# Patient Record
Sex: MALE | Race: WHITE | Employment: FULL TIME | URBAN - NONMETROPOLITAN AREA
[De-identification: names, ages, dates, MRNs, and addresses within clinical notes are randomized per-mention and may not be internally consistent; named-entity substitution may affect disease eponyms.]

---

## 2017-01-09 ENCOUNTER — OFFICE VISIT (OUTPATIENT)
Dept: FAMILY MEDICINE CLINIC | Facility: CLINIC | Age: 27
End: 2017-01-09

## 2017-01-09 VITALS
SYSTOLIC BLOOD PRESSURE: 114 MMHG | TEMPERATURE: 99 F | BODY MASS INDEX: 38 KG/M2 | WEIGHT: 315 LBS | DIASTOLIC BLOOD PRESSURE: 86 MMHG

## 2017-01-09 DIAGNOSIS — R10.9 ABDOMINAL CRAMPING: ICD-10-CM

## 2017-01-09 DIAGNOSIS — E66.09 NON MORBID OBESITY DUE TO EXCESS CALORIES: ICD-10-CM

## 2017-01-09 DIAGNOSIS — K52.9 GASTROENTERITIS: Primary | ICD-10-CM

## 2017-01-09 PROCEDURE — 99214 OFFICE O/P EST MOD 30 MIN: CPT | Performed by: FAMILY MEDICINE

## 2017-01-09 NOTE — PATIENT INSTRUCTIONS
Discussed low residue diet. Avoid dairy ×48 hours. Pepto-Bismol as needed. Clear liquids to progress. May return to work tomorrow. Call with questions or problems. Good handwashing and hygiene.

## 2017-01-09 NOTE — PROGRESS NOTES
HPI:    Patient ID: Jake is a 31year old male.  + vomiting / diarrhea last night  Vomiting resolved  Diarrhea persists. Without abdominal pain no. Did not go to work today. Needs note for work. Without fever, chills.   Without blood noted in

## 2017-09-01 ENCOUNTER — OFFICE VISIT (OUTPATIENT)
Dept: FAMILY MEDICINE CLINIC | Facility: CLINIC | Age: 27
End: 2017-09-01

## 2017-09-01 VITALS
DIASTOLIC BLOOD PRESSURE: 72 MMHG | HEART RATE: 80 BPM | WEIGHT: 310.25 LBS | BODY MASS INDEX: 37.39 KG/M2 | SYSTOLIC BLOOD PRESSURE: 126 MMHG | RESPIRATION RATE: 16 BRPM | HEIGHT: 76.5 IN | TEMPERATURE: 98 F

## 2017-09-01 DIAGNOSIS — K21.9 GASTROESOPHAGEAL REFLUX DISEASE WITHOUT ESOPHAGITIS: ICD-10-CM

## 2017-09-01 DIAGNOSIS — R13.10 DYSPHAGIA, UNSPECIFIED TYPE: Primary | ICD-10-CM

## 2017-09-01 PROCEDURE — 99214 OFFICE O/P EST MOD 30 MIN: CPT | Performed by: FAMILY MEDICINE

## 2017-09-01 RX ORDER — OMEPRAZOLE 40 MG/1
40 CAPSULE, DELAYED RELEASE ORAL DAILY
Qty: 30 CAPSULE | Refills: 2 | Status: SHIPPED | OUTPATIENT
Start: 2017-09-01 | End: 2017-12-28 | Stop reason: ALTCHOICE

## 2017-09-01 NOTE — PROGRESS NOTES
HPI:    Patient ID: Jake is a 31year old male. With every meal - foods stuck - solids,w/o problems liquids  W/o heartburn  HPI    Review of Systems   Constitutional: Negative for chills and fever.    HENT: Positive for trouble swallowing (solid Referrals:  None       ET#[id number]

## 2017-09-01 NOTE — PATIENT INSTRUCTIONS
Michie diet. Avoid eating 2-3 hours prior to bedtime. Continue the symptoms recommend GI evaluation. Resolution of symptoms recommend continue Prilosec ×3 months. Call with questions or problems.

## 2017-10-12 ENCOUNTER — APPOINTMENT (OUTPATIENT)
Dept: GENERAL RADIOLOGY | Age: 27
End: 2017-10-12
Attending: FAMILY MEDICINE
Payer: COMMERCIAL

## 2017-10-12 ENCOUNTER — HOSPITAL ENCOUNTER (OUTPATIENT)
Age: 27
Discharge: HOME | End: 2017-10-12
Attending: FAMILY MEDICINE
Payer: COMMERCIAL

## 2017-10-12 VITALS
HEART RATE: 69 BPM | RESPIRATION RATE: 16 BRPM | TEMPERATURE: 98 F | DIASTOLIC BLOOD PRESSURE: 77 MMHG | OXYGEN SATURATION: 97 % | WEIGHT: 300 LBS | BODY MASS INDEX: 36 KG/M2 | SYSTOLIC BLOOD PRESSURE: 120 MMHG

## 2017-10-12 DIAGNOSIS — S29.9XXA CHEST WALL INJURY, INITIAL ENCOUNTER: Primary | ICD-10-CM

## 2017-10-12 DIAGNOSIS — S89.92XA INJURY OF LEFT KNEE, INITIAL ENCOUNTER: ICD-10-CM

## 2017-10-12 PROCEDURE — 71101 X-RAY EXAM UNILAT RIBS/CHEST: CPT | Performed by: FAMILY MEDICINE

## 2017-10-12 PROCEDURE — 99204 OFFICE O/P NEW MOD 45 MIN: CPT

## 2017-10-12 PROCEDURE — 99214 OFFICE O/P EST MOD 30 MIN: CPT

## 2017-10-12 PROCEDURE — 73560 X-RAY EXAM OF KNEE 1 OR 2: CPT | Performed by: FAMILY MEDICINE

## 2017-10-12 RX ORDER — IBUPROFEN 600 MG/1
600 TABLET ORAL EVERY 6 HOURS PRN
COMMUNITY
End: 2017-11-27 | Stop reason: ALTCHOICE

## 2017-10-12 NOTE — ED PROVIDER NOTES
Patient Seen in: [address] [facility]    History   Patient presents with:  Knee Pain  Fall (musculoskeletal, neurologic)    Stated Complaint: KNEE/RIB PAIN/FALL    HPI    58-year-old male who presents to the clinic today with chief complaints [10/12/17 1419]  BP: 124/78  Pulse: 71  Resp: 17  Temp: 98.5 °F (36.9 °C)  Temp src: Temporal  SpO2: 97 %  O2 Device: None (Room air)    Current:/77   Pulse 69   Temp 98.4 °F (36.9 °C)   Resp 16   Wt 136.1 kg   SpO2 97%   BMI 36.04 kg/m²         Phys drawer sign - negative   - Lachman's - negative   - Apley's - negative   - Mc Nair - negative   - ROM of knee joint - full   - able to bear weight - yes   - Tendon function intact:  Yes  -  Skin intact:  Yes  -  Normal sensation: Yes  -  Normal capillary ============================================================  ED Course  ------------------------------------------------------------  MDM     Left rib x-rays: Negative for fracture. No pneumothorax. No pleural effusion.   Left knee x-ray: No fracture o

## 2017-10-12 NOTE — ED INITIAL ASSESSMENT (HPI)
10/11 Pt was at home and slipped on deck. Hit his left side on wood railing and left knee on concrete. Denies SOB at this time but states pain increases with deep breaths.  +Full ROM with left knee but painful. Denies LOC or hitting head.

## 2017-11-27 ENCOUNTER — OFFICE VISIT (OUTPATIENT)
Dept: FAMILY MEDICINE CLINIC | Facility: CLINIC | Age: 27
End: 2017-11-27

## 2017-11-27 VITALS
BODY MASS INDEX: 37 KG/M2 | HEART RATE: 120 BPM | DIASTOLIC BLOOD PRESSURE: 70 MMHG | OXYGEN SATURATION: 97 % | SYSTOLIC BLOOD PRESSURE: 120 MMHG | WEIGHT: 304.38 LBS | TEMPERATURE: 98 F

## 2017-11-27 DIAGNOSIS — K52.9 GASTROENTERITIS: Primary | ICD-10-CM

## 2017-11-27 PROCEDURE — 99213 OFFICE O/P EST LOW 20 MIN: CPT | Performed by: FAMILY MEDICINE

## 2017-11-27 NOTE — PROGRESS NOTES
HPI:    Patient ID: Jake is a 28year old male.   abd pain / vomiting last night  + diarrhea x 3  W/o fever / chills  W/o cough / cold  HPI    Review of Systems           Current Outpatient Prescriptions:  Omeprazole 40 MG Oral Capsule Delayed Re

## 2017-12-28 ENCOUNTER — OFFICE VISIT (OUTPATIENT)
Dept: FAMILY MEDICINE CLINIC | Facility: CLINIC | Age: 27
End: 2017-12-28

## 2017-12-28 VITALS
OXYGEN SATURATION: 99 % | HEART RATE: 77 BPM | SYSTOLIC BLOOD PRESSURE: 136 MMHG | WEIGHT: 301.5 LBS | BODY MASS INDEX: 36 KG/M2 | DIASTOLIC BLOOD PRESSURE: 86 MMHG | TEMPERATURE: 98 F

## 2017-12-28 DIAGNOSIS — K21.9 GASTROESOPHAGEAL REFLUX DISEASE WITHOUT ESOPHAGITIS: Primary | ICD-10-CM

## 2017-12-28 DIAGNOSIS — R03.0 ELEVATED BLOOD PRESSURE READING IN OFFICE WITHOUT DIAGNOSIS OF HYPERTENSION: ICD-10-CM

## 2017-12-28 PROCEDURE — 99214 OFFICE O/P EST MOD 30 MIN: CPT | Performed by: FAMILY MEDICINE

## 2017-12-28 RX ORDER — OMEPRAZOLE 40 MG/1
40 CAPSULE, DELAYED RELEASE ORAL DAILY
Qty: 30 CAPSULE | Refills: 1 | Status: SHIPPED | OUTPATIENT
Start: 2017-12-28 | End: 2018-04-16

## 2017-12-28 NOTE — PATIENT INSTRUCTIONS
BIANCA motrin  maalox  Restart prilosec  Dumas diet  Apple cider vinegar 1 tablespoon daily trial  Cont problems GI eval

## 2017-12-28 NOTE — PROGRESS NOTES
HPI:    Patient ID: Jake is a 28year old male. Pt epigastric pain / heartburn  Worsening  W/o dysphagia  Motrin for discomfort  HPI    Review of Systems   Respiratory: Negative for cough and shortness of breath.     Cardiovascular: Negative for

## 2018-01-01 ENCOUNTER — HOSPITAL ENCOUNTER (OUTPATIENT)
Dept: RADIATION ONCOLOGY | Age: 28
Discharge: HOME | End: 2018-01-01
Attending: RADIOLOGY
Payer: COMMERCIAL

## 2018-01-02 ENCOUNTER — TELEPHONE (OUTPATIENT)
Dept: FAMILY MEDICINE CLINIC | Facility: CLINIC | Age: 28
End: 2018-01-02

## 2018-01-02 DIAGNOSIS — K21.9 GASTROESOPHAGEAL REFLUX DISEASE WITHOUT ESOPHAGITIS: Primary | ICD-10-CM

## 2018-01-02 NOTE — TELEPHONE ENCOUNTER
F/u Dr Davis Felty  Worsening or change in symptoms also option to re-see me or go to the  Urgent care

## 2018-01-02 NOTE — TELEPHONE ENCOUNTER
Was in last week and was told to call back if symptoms do not improve. Pt said he is still feeling the same way. Wants to know what he should do? Was given some meds that were not covered by insurance company.

## 2018-01-02 NOTE — TELEPHONE ENCOUNTER
PATIENT CONTINUES WITH SYMPTOMS- PAIN IN CHEST/ESOPHAGUS. GAVE HIM DR Abeba Marino INFORMATION PER YOUR NOTE.   PLEASE ADVISE

## 2018-01-03 ENCOUNTER — TELEPHONE (OUTPATIENT)
Dept: FAMILY MEDICINE CLINIC | Facility: CLINIC | Age: 28
End: 2018-01-03

## 2018-01-03 NOTE — TELEPHONE ENCOUNTER
Patient advised of below and verbalizes understanding. States he has not vomited since. Scheduled to see Dr. Lyric Cardenas tomorrow. Advised may take Maalox or Mylanta as advised at office visit.   If symptoms return or worsen before tomorrow, needs to go to ER

## 2018-01-03 NOTE — TELEPHONE ENCOUNTER
RAUN SPOKE WITH THE NURSE YESTERDAY AND HE SAID THE VOMITING GOT WORSE OVER NIGHT AND HE THINKS THERE IS BLOOD IN THE VOMIT.

## 2018-01-04 ENCOUNTER — APPOINTMENT (OUTPATIENT)
Dept: LAB | Age: 28
End: 2018-01-04
Attending: INTERNAL MEDICINE
Payer: COMMERCIAL

## 2018-01-04 DIAGNOSIS — Z86.14 HISTORY OF MRSA INFECTION: ICD-10-CM

## 2018-01-04 PROCEDURE — 87081 CULTURE SCREEN ONLY: CPT

## 2018-01-05 ENCOUNTER — NURSE ONLY (OUTPATIENT)
Dept: FAMILY MEDICINE CLINIC | Facility: CLINIC | Age: 28
End: 2018-01-05

## 2018-01-05 DIAGNOSIS — Z86.14 HISTORY OF MRSA INFECTION: ICD-10-CM

## 2018-01-05 PROCEDURE — 87081 CULTURE SCREEN ONLY: CPT | Performed by: INTERNAL MEDICINE

## 2018-01-06 ENCOUNTER — APPOINTMENT (OUTPATIENT)
Dept: LAB | Age: 28
End: 2018-01-06
Attending: INTERNAL MEDICINE
Payer: COMMERCIAL

## 2018-01-06 DIAGNOSIS — Z86.14 HISTORY OF MRSA INFECTION: ICD-10-CM

## 2018-01-06 PROCEDURE — 87081 CULTURE SCREEN ONLY: CPT

## 2018-01-08 ENCOUNTER — TELEPHONE (OUTPATIENT)
Dept: FAMILY MEDICINE CLINIC | Facility: CLINIC | Age: 28
End: 2018-01-08

## 2018-01-15 ENCOUNTER — LABORATORY ENCOUNTER (OUTPATIENT)
Dept: LAB | Age: 28
End: 2018-01-15
Attending: FAMILY MEDICINE
Payer: COMMERCIAL

## 2018-01-15 DIAGNOSIS — R10.13 EPIGASTRIC PAIN: ICD-10-CM

## 2018-01-15 LAB
ALBUMIN SERPL-MCNC: 3.9 G/DL (ref 3.5–4.8)
ALP LIVER SERPL-CCNC: 103 U/L (ref 45–117)
ALT SERPL-CCNC: 71 U/L (ref 17–63)
AST SERPL-CCNC: 49 U/L (ref 15–41)
BASOPHILS # BLD AUTO: 0.05 X10(3) UL (ref 0–0.1)
BASOPHILS NFR BLD AUTO: 0.6 %
BILIRUB SERPL-MCNC: 0.2 MG/DL (ref 0.1–2)
BUN BLD-MCNC: 8 MG/DL (ref 8–20)
CALCIUM BLD-MCNC: 9 MG/DL (ref 8.3–10.3)
CHLORIDE: 106 MMOL/L (ref 101–111)
CO2: 27 MMOL/L (ref 22–32)
CREAT BLD-MCNC: 0.84 MG/DL (ref 0.7–1.3)
EOSINOPHIL # BLD AUTO: 0.16 X10(3) UL (ref 0–0.3)
EOSINOPHIL NFR BLD AUTO: 1.9 %
ERYTHROCYTE [DISTWIDTH] IN BLOOD BY AUTOMATED COUNT: 13.4 % (ref 11.5–16)
GLUCOSE BLD-MCNC: 83 MG/DL (ref 70–99)
HCT VFR BLD AUTO: 41.3 % (ref 37–53)
HGB BLD-MCNC: 12.8 G/DL (ref 13–17)
IMMATURE GRANULOCYTE COUNT: 0.02 X10(3) UL (ref 0–1)
IMMATURE GRANULOCYTE RATIO %: 0.2 %
LYMPHOCYTES # BLD AUTO: 2.34 X10(3) UL (ref 0.9–4)
LYMPHOCYTES NFR BLD AUTO: 28 %
M PROTEIN MFR SERPL ELPH: 7.9 G/DL (ref 6.1–8.3)
MCH RBC QN AUTO: 25.1 PG (ref 27–33.2)
MCHC RBC AUTO-ENTMCNC: 31 G/DL (ref 31–37)
MCV RBC AUTO: 81 FL (ref 80–99)
MONOCYTES # BLD AUTO: 0.59 X10(3) UL (ref 0.1–0.6)
MONOCYTES NFR BLD AUTO: 7 %
NEUTROPHIL ABS PRELIM: 5.21 X10 (3) UL (ref 1.3–6.7)
NEUTROPHILS # BLD AUTO: 5.21 X10(3) UL (ref 1.3–6.7)
NEUTROPHILS NFR BLD AUTO: 62.3 %
PLATELET # BLD AUTO: 177 10(3)UL (ref 150–450)
POTASSIUM SERPL-SCNC: 4.2 MMOL/L (ref 3.6–5.1)
RBC # BLD AUTO: 5.1 X10(6)UL (ref 4.3–5.7)
RED CELL DISTRIBUTION WIDTH-SD: 39.4 FL (ref 35.1–46.3)
SODIUM SERPL-SCNC: 139 MMOL/L (ref 136–144)
WBC # BLD AUTO: 8.4 X10(3) UL (ref 4–13)

## 2018-01-15 PROCEDURE — 80053 COMPREHEN METABOLIC PANEL: CPT

## 2018-01-15 PROCEDURE — 85025 COMPLETE CBC W/AUTO DIFF WBC: CPT

## 2018-01-15 PROCEDURE — 36415 COLL VENOUS BLD VENIPUNCTURE: CPT

## 2018-01-17 ENCOUNTER — HOSPITAL ENCOUNTER (OUTPATIENT)
Dept: CT IMAGING | Age: 28
Discharge: HOME | End: 2018-01-17
Attending: CLINICAL NURSE SPECIALIST
Payer: COMMERCIAL

## 2018-01-17 ENCOUNTER — OFFICE VISIT (OUTPATIENT)
Dept: HEMATOLOGY/ONCOLOGY | Facility: HOSPITAL | Age: 28
End: 2018-01-17
Attending: INTERNAL MEDICINE
Payer: COMMERCIAL

## 2018-01-17 VITALS
HEART RATE: 80 BPM | WEIGHT: 291 LBS | BODY MASS INDEX: 33.67 KG/M2 | TEMPERATURE: 98 F | RESPIRATION RATE: 18 BRPM | HEIGHT: 78 IN | SYSTOLIC BLOOD PRESSURE: 134 MMHG | DIASTOLIC BLOOD PRESSURE: 73 MMHG

## 2018-01-17 DIAGNOSIS — C15.5 MALIGNANT NEOPLASM OF LOWER THIRD OF ESOPHAGUS (HCC): ICD-10-CM

## 2018-01-17 DIAGNOSIS — C15.5 MALIGNANT NEOPLASM OF LOWER THIRD OF ESOPHAGUS (HCC): Primary | ICD-10-CM

## 2018-01-17 LAB
CANCER AG19-9 SERPL-ACNC: 157 U/ML (ref 0–35)
CEA SERPL-MCNC: 1.4 NG/ML (ref 0–5)

## 2018-01-17 PROCEDURE — 71260 CT THORAX DX C+: CPT | Performed by: CLINICAL NURSE SPECIALIST

## 2018-01-17 PROCEDURE — 99205 OFFICE O/P NEW HI 60 MIN: CPT | Performed by: INTERNAL MEDICINE

## 2018-01-17 PROCEDURE — 74177 CT ABD & PELVIS W/CONTRAST: CPT | Performed by: CLINICAL NURSE SPECIALIST

## 2018-01-17 PROCEDURE — 99212 OFFICE O/P EST SF 10 MIN: CPT | Performed by: INTERNAL MEDICINE

## 2018-01-17 RX ORDER — HYDROCODONE BITARTRATE AND ACETAMINOPHEN 10; 325 MG/1; MG/1
1-2 TABLET ORAL EVERY 4 HOURS PRN
Qty: 120 TABLET | Refills: 0 | Status: SHIPPED | OUTPATIENT
Start: 2018-01-17 | End: 2018-01-23

## 2018-01-17 NOTE — PROGRESS NOTES
Pt here to see Dr. Connie White were ordered. Labs drawn at first attempt on left [facility] pt tolerated well, covered site with a band aid. Pt discharged with family member ambulatory.

## 2018-01-18 ENCOUNTER — TELEPHONE (OUTPATIENT)
Dept: HEMATOLOGY/ONCOLOGY | Facility: HOSPITAL | Age: 28
End: 2018-01-18

## 2018-01-18 ENCOUNTER — LAB ENCOUNTER (OUTPATIENT)
Dept: LAB | Facility: HOSPITAL | Age: 28
End: 2018-01-18
Attending: SURGERY
Payer: COMMERCIAL

## 2018-01-18 DIAGNOSIS — C15.5 MALIGNANT NEOPLASM OF LOWER THIRD OF ESOPHAGUS (HCC): Primary | ICD-10-CM

## 2018-01-18 DIAGNOSIS — R93.3 ABNORMAL CT SCAN, ESOPHAGUS: ICD-10-CM

## 2018-01-18 DIAGNOSIS — R69 DIAGNOSIS UNKNOWN: Primary | ICD-10-CM

## 2018-01-18 DIAGNOSIS — R59.0 RETROPERITONEAL LYMPHADENOPATHY: ICD-10-CM

## 2018-01-18 PROCEDURE — 88360 TUMOR IMMUNOHISTOCHEM/MANUAL: CPT

## 2018-01-18 NOTE — TELEPHONE ENCOUNTER
PET scheduled for Monday 1/22 at 2 pm, rescheduled Dr Jose Camejo appt to 12:30 pm. PET instructions emailed to his wife.

## 2018-01-18 NOTE — TELEPHONE ENCOUNTER
Spoke with patient and reviewed results of CT, retroperitoneal lymph node. Needs PET, ordered and will assist in scheduling and will call patient. He has appointment with Dr Kristie Zarate next Tuesday at 1 pm. Will try to get PET in am that day.

## 2018-01-19 ENCOUNTER — SOCIAL WORK SERVICES (OUTPATIENT)
Dept: HEMATOLOGY/ONCOLOGY | Facility: HOSPITAL | Age: 28
End: 2018-01-19

## 2018-01-20 ENCOUNTER — SOCIAL WORK SERVICES (OUTPATIENT)
Dept: HEMATOLOGY/ONCOLOGY | Facility: HOSPITAL | Age: 28
End: 2018-01-20

## 2018-01-20 NOTE — PROGRESS NOTES
ELIO called patient again, but he advised he was in [place] and couldn't talk. ELIO offered to email him sperm banking resources, and he agreed. ELIO emailed list of resources and [employer] brochure to him at [email or web address].   Encouraged him to call ELIO if

## 2018-01-22 ENCOUNTER — OFFICE VISIT (OUTPATIENT)
Dept: SURGERY | Facility: CLINIC | Age: 28
End: 2018-01-22

## 2018-01-22 ENCOUNTER — HOSPITAL ENCOUNTER (OUTPATIENT)
Dept: NUCLEAR MEDICINE | Facility: HOSPITAL | Age: 28
Discharge: HOME | End: 2018-01-22
Attending: CLINICAL NURSE SPECIALIST
Payer: COMMERCIAL

## 2018-01-22 VITALS
HEART RATE: 74 BPM | RESPIRATION RATE: 18 BRPM | TEMPERATURE: 97 F | SYSTOLIC BLOOD PRESSURE: 127 MMHG | BODY MASS INDEX: 33 KG/M2 | DIASTOLIC BLOOD PRESSURE: 83 MMHG | WEIGHT: 286.81 LBS

## 2018-01-22 DIAGNOSIS — C15.5 MALIGNANT NEOPLASM OF LOWER THIRD OF ESOPHAGUS (HCC): ICD-10-CM

## 2018-01-22 DIAGNOSIS — R93.3 ABNORMAL CT SCAN, ESOPHAGUS: ICD-10-CM

## 2018-01-22 DIAGNOSIS — C16.0 GE JUNCTION CARCINOMA (HCC): Primary | ICD-10-CM

## 2018-01-22 DIAGNOSIS — R59.0 RETROPERITONEAL LYMPHADENOPATHY: ICD-10-CM

## 2018-01-22 LAB
GLUCOSE BLD-MCNC: 77 MG/DL (ref 65–99)
PERCENT OF CELLS/CIRCUMFEREN: 0

## 2018-01-22 PROCEDURE — 82962 GLUCOSE BLOOD TEST: CPT

## 2018-01-22 PROCEDURE — 99245 OFF/OP CONSLTJ NEW/EST HI 55: CPT | Performed by: SURGERY

## 2018-01-22 PROCEDURE — 78815 PET IMAGE W/CT SKULL-THIGH: CPT | Performed by: CLINICAL NURSE SPECIALIST

## 2018-01-22 NOTE — CONSULTS
[facility] Surgical Oncology    Patient Name:  Samara Sanchez   YOB: 1990   Gender:  Male   Appt Date:  1/22/2018   Provider:  General Sun MD   Insurance:  Jackson Griffin DO   Address: [address] •  HYDROcodone-acetaminophen  MG Oral Tab, Take 1-2 tablets by mouth every 4 (four) hours as needed for Pain., Disp: 120 tablet, Rfl: 0  •  HYDROcodone-acetaminophen (NORCO)  MG Oral Tab, Take 1 tablet by mouth every 6 (six) hours as needed for · MUSCULOSKELETAL: no joint complaints, no back pain  · NEURO: no tingling, numbness, weakness  · ENDOCRINE: About 40 pounds weight loss since the summer.   · PSYCH: anxious         Physical Examination:  Constitutional: General Appearance: healthy-appearin [address] [place] [address],  Neel [facility], [address] [place] [address]  T: [phone]  F: [phone]

## 2018-01-23 ENCOUNTER — SOCIAL WORK SERVICES (OUTPATIENT)
Dept: HEMATOLOGY/ONCOLOGY | Facility: HOSPITAL | Age: 28
End: 2018-01-23

## 2018-01-23 ENCOUNTER — HOSPITAL ENCOUNTER (OUTPATIENT)
Dept: RADIATION ONCOLOGY | Age: 28
Discharge: HOME | End: 2018-01-23
Attending: RADIOLOGY
Payer: COMMERCIAL

## 2018-01-23 VITALS
RESPIRATION RATE: 16 BRPM | WEIGHT: 289.88 LBS | SYSTOLIC BLOOD PRESSURE: 127 MMHG | DIASTOLIC BLOOD PRESSURE: 83 MMHG | OXYGEN SATURATION: 99 % | HEART RATE: 95 BPM | BODY MASS INDEX: 33.54 KG/M2 | TEMPERATURE: 98 F | HEIGHT: 78 IN

## 2018-01-23 DIAGNOSIS — C16.0 GE JUNCTION CARCINOMA (HCC): ICD-10-CM

## 2018-01-23 PROCEDURE — 99214 OFFICE O/P EST MOD 30 MIN: CPT

## 2018-01-23 RX ORDER — FLUTICASONE PROPIONATE 50 MCG
2 SPRAY, SUSPENSION (ML) NASAL AS NEEDED
COMMUNITY
End: 2018-12-18

## 2018-01-23 NOTE — PROGRESS NOTES
Nursing Consultation Note  Patient: Can Villaseñor  YOB: 1990  Age: 28year old  Radiation Oncologist: Dr. Michel Level  Referring Physician: Bryant Reeves, Dr. Manpreet David, Dr. Noam Mosher, Dr. Jeannie Sullivan  Diagnosis: Alaina June   Spouse name: N/A    Years of education: N/A  Number of children: 0     Occupational History   at [address] [place] [address] History Main Topics   Smoking status: Never Smoker    Smokeless tobacco: Never Used    Alcohol use Yes  0.0 oz/week 134.4 kg (296 lb 6.4 oz)  12/28/17 : (!) 136.8 kg (301 lb 8 oz)  11/27/17 : (!) 138.1 kg (304 lb 6 oz)

## 2018-01-23 NOTE — PROGRESS NOTES
SW will meet with patient in [facility] on Thursday, 1/25, regarding FMLA and STD paperwork.

## 2018-01-23 NOTE — PATIENT INSTRUCTIONS
RADIATION INSTRUCTIONS:    - CT SIMULATION/MAPPING SCHEDULED FOR JAN. 25 AT 3:15 PM AT [facility].    - IF YOU HAVE ANY QUESTIONS OR CONCERNS, PLEASE CALL [phone].

## 2018-01-24 ENCOUNTER — HOSPITAL ENCOUNTER (OUTPATIENT)
Dept: RADIATION ONCOLOGY | Age: 28
Discharge: HOME | End: 2018-01-24
Attending: RADIOLOGY
Payer: COMMERCIAL

## 2018-01-24 PROCEDURE — 77334 RADIATION TREATMENT AID(S): CPT | Performed by: RADIOLOGY

## 2018-01-25 PROCEDURE — 77470 SPECIAL RADIATION TREATMENT: CPT | Performed by: RADIOLOGY

## 2018-01-25 PROCEDURE — 77399 UNLISTED PX MED RADJ PHYSICS: CPT | Performed by: RADIOLOGY

## 2018-01-26 ENCOUNTER — SOCIAL WORK SERVICES (OUTPATIENT)
Dept: HEMATOLOGY/ONCOLOGY | Facility: HOSPITAL | Age: 28
End: 2018-01-26

## 2018-01-26 NOTE — PROGRESS NOTES
ELIO got Dr Mark Restrepo on patient disability form that ELIO faxed to [address] fax # [phone].

## 2018-01-30 PROCEDURE — 77300 RADIATION THERAPY DOSE PLAN: CPT | Performed by: RADIOLOGY

## 2018-01-30 PROCEDURE — 77338 DESIGN MLC DEVICE FOR IMRT: CPT | Performed by: RADIOLOGY

## 2018-01-30 PROCEDURE — 77301 RADIOTHERAPY DOSE PLAN IMRT: CPT | Performed by: RADIOLOGY

## 2018-01-30 PROCEDURE — 77293 RESPIRATOR MOTION MGMT SIMUL: CPT | Performed by: RADIOLOGY

## 2018-01-31 ENCOUNTER — HOSPITAL ENCOUNTER (OUTPATIENT)
Dept: RADIATION ONCOLOGY | Age: 28
Discharge: HOME | End: 2018-01-31
Attending: RADIOLOGY
Payer: COMMERCIAL

## 2018-01-31 PROCEDURE — 77386 HC IMRT COMPLEX: CPT | Performed by: RADIOLOGY

## 2018-02-01 ENCOUNTER — HOSPITAL ENCOUNTER (OUTPATIENT)
Dept: RADIATION ONCOLOGY | Age: 28
Discharge: HOME | End: 2018-02-01
Attending: RADIOLOGY
Payer: COMMERCIAL

## 2018-02-01 ENCOUNTER — OFFICE VISIT (OUTPATIENT)
Dept: HEMATOLOGY/ONCOLOGY | Age: 28
End: 2018-02-01
Attending: INTERNAL MEDICINE
Payer: COMMERCIAL

## 2018-02-01 ENCOUNTER — NURSE ONLY (OUTPATIENT)
Dept: HEMATOLOGY/ONCOLOGY | Age: 28
End: 2018-02-01
Attending: INTERNAL MEDICINE
Payer: COMMERCIAL

## 2018-02-01 VITALS
OXYGEN SATURATION: 99 % | HEART RATE: 82 BPM | TEMPERATURE: 98 F | DIASTOLIC BLOOD PRESSURE: 83 MMHG | RESPIRATION RATE: 20 BRPM | SYSTOLIC BLOOD PRESSURE: 119 MMHG | HEIGHT: 78 IN | BODY MASS INDEX: 32.95 KG/M2 | WEIGHT: 284.81 LBS

## 2018-02-01 DIAGNOSIS — D64.9 NORMOCYTIC ANEMIA: Primary | ICD-10-CM

## 2018-02-01 DIAGNOSIS — K21.9 GASTROESOPHAGEAL REFLUX DISEASE WITHOUT ESOPHAGITIS: Primary | ICD-10-CM

## 2018-02-01 DIAGNOSIS — C16.0 GE JUNCTION CARCINOMA (HCC): ICD-10-CM

## 2018-02-01 DIAGNOSIS — R11.0 CHEMOTHERAPY-INDUCED NAUSEA: Primary | ICD-10-CM

## 2018-02-01 DIAGNOSIS — D50.0 IRON DEFICIENCY ANEMIA DUE TO CHRONIC BLOOD LOSS: ICD-10-CM

## 2018-02-01 DIAGNOSIS — T45.1X5A CHEMOTHERAPY-INDUCED NAUSEA: Primary | ICD-10-CM

## 2018-02-01 DIAGNOSIS — D64.9 NORMOCYTIC ANEMIA: ICD-10-CM

## 2018-02-01 DIAGNOSIS — Z71.9 ENCOUNTER FOR HEALTH EDUCATION: ICD-10-CM

## 2018-02-01 DIAGNOSIS — C16.0 GE JUNCTION CARCINOMA (HCC): Primary | ICD-10-CM

## 2018-02-01 LAB
ALBUMIN SERPL-MCNC: 3.6 G/DL (ref 3.5–4.8)
ALP LIVER SERPL-CCNC: 98 U/L (ref 45–117)
ALT SERPL-CCNC: 23 U/L (ref 17–63)
AST SERPL-CCNC: 19 U/L (ref 15–41)
BASOPHILS # BLD AUTO: 0.03 X10(3) UL (ref 0–0.1)
BASOPHILS NFR BLD AUTO: 0.4 %
BILIRUB SERPL-MCNC: 0.3 MG/DL (ref 0.1–2)
BUN BLD-MCNC: 7 MG/DL (ref 8–20)
CALCIUM BLD-MCNC: 8.9 MG/DL (ref 8.3–10.3)
CHLORIDE: 105 MMOL/L (ref 101–111)
CO2: 27 MMOL/L (ref 22–32)
CREAT BLD-MCNC: 0.82 MG/DL (ref 0.7–1.3)
DEPRECATED HBV CORE AB SER IA-ACNC: 4.5 NG/ML (ref 22–322)
EOSINOPHIL # BLD AUTO: 0.08 X10(3) UL (ref 0–0.3)
EOSINOPHIL NFR BLD AUTO: 1.1 %
ERYTHROCYTE [DISTWIDTH] IN BLOOD BY AUTOMATED COUNT: 14.3 % (ref 11.5–16)
GLUCOSE BLD-MCNC: 88 MG/DL (ref 70–99)
HCT VFR BLD AUTO: 37.1 % (ref 37–53)
HGB BLD-MCNC: 12.1 G/DL (ref 13–17)
IMMATURE GRANULOCYTE COUNT: 0.02 X10(3) UL (ref 0–1)
IMMATURE GRANULOCYTE RATIO %: 0.3 %
IRON SATURATION: 11 % (ref 13–45)
IRON: 52 UG/DL (ref 45–182)
LYMPHOCYTES # BLD AUTO: 1.4 X10(3) UL (ref 0.9–4)
LYMPHOCYTES NFR BLD AUTO: 18.9 %
M PROTEIN MFR SERPL ELPH: 7.7 G/DL (ref 6.1–8.3)
MCH RBC QN AUTO: 25.5 PG (ref 27–33.2)
MCHC RBC AUTO-ENTMCNC: 32.6 G/DL (ref 31–37)
MCV RBC AUTO: 78.3 FL (ref 80–99)
MONOCYTES # BLD AUTO: 0.52 X10(3) UL (ref 0.1–0.6)
MONOCYTES NFR BLD AUTO: 7 %
NEUTROPHIL ABS PRELIM: 5.34 X10 (3) UL (ref 1.3–6.7)
NEUTROPHILS # BLD AUTO: 5.34 X10(3) UL (ref 1.3–6.7)
NEUTROPHILS NFR BLD AUTO: 72.3 %
PLATELET # BLD AUTO: 165 10(3)UL (ref 150–450)
POTASSIUM SERPL-SCNC: 3.7 MMOL/L (ref 3.6–5.1)
RBC # BLD AUTO: 4.74 X10(6)UL (ref 4.3–5.7)
RED CELL DISTRIBUTION WIDTH-SD: 39.9 FL (ref 35.1–46.3)
SODIUM SERPL-SCNC: 139 MMOL/L (ref 136–144)
TOTAL IRON BINDING CAPACITY: 463 UG/DL (ref 298–536)
TRANSFERRIN: 311 MG/DL (ref 200–360)
WBC # BLD AUTO: 7.4 X10(3) UL (ref 4–13)

## 2018-02-01 PROCEDURE — 96417 CHEMO IV INFUS EACH ADDL SEQ: CPT

## 2018-02-01 PROCEDURE — 96375 TX/PRO/DX INJ NEW DRUG ADDON: CPT

## 2018-02-01 PROCEDURE — 83550 IRON BINDING TEST: CPT | Performed by: CLINICAL NURSE SPECIALIST

## 2018-02-01 PROCEDURE — 36415 COLL VENOUS BLD VENIPUNCTURE: CPT

## 2018-02-01 PROCEDURE — 85025 COMPLETE CBC W/AUTO DIFF WBC: CPT | Performed by: CLINICAL NURSE SPECIALIST

## 2018-02-01 PROCEDURE — 80053 COMPREHEN METABOLIC PANEL: CPT | Performed by: CLINICAL NURSE SPECIALIST

## 2018-02-01 PROCEDURE — 96413 CHEMO IV INFUSION 1 HR: CPT

## 2018-02-01 PROCEDURE — S0028 INJECTION, FAMOTIDINE, 20 MG: HCPCS | Performed by: INTERNAL MEDICINE

## 2018-02-01 PROCEDURE — 83540 ASSAY OF IRON: CPT | Performed by: CLINICAL NURSE SPECIALIST

## 2018-02-01 PROCEDURE — 77386 HC IMRT COMPLEX: CPT | Performed by: RADIOLOGY

## 2018-02-01 PROCEDURE — 82728 ASSAY OF FERRITIN: CPT | Performed by: CLINICAL NURSE SPECIALIST

## 2018-02-01 PROCEDURE — 99214 OFFICE O/P EST MOD 30 MIN: CPT | Performed by: INTERNAL MEDICINE

## 2018-02-01 RX ORDER — PROCHLORPERAZINE MALEATE 10 MG
10 TABLET ORAL EVERY 6 HOURS PRN
Qty: 30 TABLET | Refills: 3 | Status: SHIPPED | OUTPATIENT
Start: 2018-02-01 | End: 2018-08-03

## 2018-02-01 RX ORDER — ACETAMINOPHEN 325 MG/1
TABLET ORAL EVERY 6 HOURS PRN
Status: CANCELLED | OUTPATIENT
Start: 2018-02-08

## 2018-02-01 RX ORDER — ACETAMINOPHEN 325 MG/1
TABLET ORAL EVERY 6 HOURS PRN
Status: CANCELLED | OUTPATIENT
Start: 2018-02-22

## 2018-02-01 RX ORDER — DIPHENHYDRAMINE HYDROCHLORIDE 50 MG/ML
INJECTION INTRAMUSCULAR; INTRAVENOUS EVERY 4 HOURS PRN
Status: CANCELLED | OUTPATIENT
Start: 2018-03-01

## 2018-02-01 RX ORDER — LORAZEPAM 0.5 MG/1
TABLET ORAL EVERY 4 HOURS PRN
Status: CANCELLED | OUTPATIENT
Start: 2018-03-01

## 2018-02-01 RX ORDER — METOCLOPRAMIDE HYDROCHLORIDE 5 MG/ML
10 INJECTION INTRAMUSCULAR; INTRAVENOUS EVERY 6 HOURS PRN
Status: CANCELLED | OUTPATIENT
Start: 2018-02-08

## 2018-02-01 RX ORDER — DIPHENHYDRAMINE HYDROCHLORIDE 50 MG/ML
INJECTION INTRAMUSCULAR; INTRAVENOUS EVERY 4 HOURS PRN
Status: CANCELLED | OUTPATIENT
Start: 2018-02-01

## 2018-02-01 RX ORDER — ONDANSETRON 2 MG/ML
8 INJECTION INTRAMUSCULAR; INTRAVENOUS EVERY 6 HOURS PRN
Status: CANCELLED | OUTPATIENT
Start: 2018-02-15

## 2018-02-01 RX ORDER — ONDANSETRON 2 MG/ML
8 INJECTION INTRAMUSCULAR; INTRAVENOUS EVERY 6 HOURS PRN
Status: CANCELLED | OUTPATIENT
Start: 2018-02-01

## 2018-02-01 RX ORDER — DIPHENHYDRAMINE HYDROCHLORIDE 50 MG/ML
INJECTION INTRAMUSCULAR; INTRAVENOUS EVERY 4 HOURS PRN
Status: CANCELLED | OUTPATIENT
Start: 2018-02-08

## 2018-02-01 RX ORDER — METOCLOPRAMIDE HYDROCHLORIDE 5 MG/ML
10 INJECTION INTRAMUSCULAR; INTRAVENOUS EVERY 6 HOURS PRN
Status: CANCELLED | OUTPATIENT
Start: 2018-02-15

## 2018-02-01 RX ORDER — ALBUTEROL SULFATE 90 UG/1
2 AEROSOL, METERED RESPIRATORY (INHALATION) AS NEEDED
Status: CANCELLED | OUTPATIENT
Start: 2018-02-22

## 2018-02-01 RX ORDER — POLYETHYLENE GLYCOL 3350 17 G/17G
17 POWDER, FOR SOLUTION ORAL DAILY
COMMUNITY
End: 2018-04-16

## 2018-02-01 RX ORDER — RANITIDINE 25 MG/ML
50 INJECTION, SOLUTION INTRAMUSCULAR; INTRAVENOUS AS NEEDED
Status: CANCELLED | OUTPATIENT
Start: 2018-02-15

## 2018-02-01 RX ORDER — PROCHLORPERAZINE MALEATE 10 MG
10 TABLET ORAL EVERY 6 HOURS PRN
Status: CANCELLED | OUTPATIENT
Start: 2018-02-01

## 2018-02-01 RX ORDER — DIPHENHYDRAMINE HYDROCHLORIDE 50 MG/ML
25 INJECTION INTRAMUSCULAR; INTRAVENOUS ONCE
Status: CANCELLED
Start: 2018-03-01 | End: 2018-02-26

## 2018-02-01 RX ORDER — LORAZEPAM 0.5 MG/1
TABLET ORAL EVERY 4 HOURS PRN
Status: CANCELLED | OUTPATIENT
Start: 2018-02-01

## 2018-02-01 RX ORDER — LORAZEPAM 2 MG/ML
1 INJECTION INTRAMUSCULAR ONCE
Status: CANCELLED
Start: 2018-02-01 | End: 2018-02-01

## 2018-02-01 RX ORDER — ONDANSETRON 2 MG/ML
8 INJECTION INTRAMUSCULAR; INTRAVENOUS EVERY 6 HOURS PRN
Status: CANCELLED | OUTPATIENT
Start: 2018-02-22

## 2018-02-01 RX ORDER — ALBUTEROL SULFATE 90 UG/1
2 AEROSOL, METERED RESPIRATORY (INHALATION) AS NEEDED
Status: CANCELLED | OUTPATIENT
Start: 2018-02-15

## 2018-02-01 RX ORDER — HYDROCODONE BITARTRATE AND ACETAMINOPHEN 10; 325 MG/1; MG/1
2 TABLET ORAL EVERY 4 HOURS PRN
Status: CANCELLED
Start: 2018-02-01

## 2018-02-01 RX ORDER — RANITIDINE 25 MG/ML
50 INJECTION, SOLUTION INTRAMUSCULAR; INTRAVENOUS AS NEEDED
Status: CANCELLED | OUTPATIENT
Start: 2018-02-01

## 2018-02-01 RX ORDER — METOCLOPRAMIDE HYDROCHLORIDE 5 MG/ML
10 INJECTION INTRAMUSCULAR; INTRAVENOUS EVERY 6 HOURS PRN
Status: CANCELLED | OUTPATIENT
Start: 2018-02-22

## 2018-02-01 RX ORDER — LORAZEPAM 2 MG/ML
INJECTION INTRAMUSCULAR EVERY 4 HOURS PRN
Status: CANCELLED | OUTPATIENT
Start: 2018-02-22

## 2018-02-01 RX ORDER — ACETAMINOPHEN 325 MG/1
TABLET ORAL EVERY 6 HOURS PRN
Status: CANCELLED | OUTPATIENT
Start: 2018-02-01

## 2018-02-01 RX ORDER — LORAZEPAM 0.5 MG/1
TABLET ORAL EVERY 4 HOURS PRN
Status: CANCELLED | OUTPATIENT
Start: 2018-02-22

## 2018-02-01 RX ORDER — ALBUTEROL SULFATE 90 UG/1
2 AEROSOL, METERED RESPIRATORY (INHALATION) AS NEEDED
Status: CANCELLED | OUTPATIENT
Start: 2018-02-01

## 2018-02-01 RX ORDER — ONDANSETRON 2 MG/ML
8 INJECTION INTRAMUSCULAR; INTRAVENOUS EVERY 6 HOURS PRN
Status: CANCELLED | OUTPATIENT
Start: 2018-03-01

## 2018-02-01 RX ORDER — ONDANSETRON 2 MG/ML
8 INJECTION INTRAMUSCULAR; INTRAVENOUS EVERY 6 HOURS PRN
Status: CANCELLED | OUTPATIENT
Start: 2018-02-08

## 2018-02-01 RX ORDER — PROCHLORPERAZINE MALEATE 10 MG
10 TABLET ORAL EVERY 6 HOURS PRN
Status: CANCELLED | OUTPATIENT
Start: 2018-02-08

## 2018-02-01 RX ORDER — RANITIDINE 25 MG/ML
50 INJECTION, SOLUTION INTRAMUSCULAR; INTRAVENOUS AS NEEDED
Status: CANCELLED | OUTPATIENT
Start: 2018-02-22

## 2018-02-01 RX ORDER — DIPHENHYDRAMINE HYDROCHLORIDE 50 MG/ML
25 INJECTION INTRAMUSCULAR; INTRAVENOUS ONCE
Status: CANCELLED
Start: 2018-02-15 | End: 2018-02-12

## 2018-02-01 RX ORDER — MEPERIDINE HYDROCHLORIDE 25 MG/ML
INJECTION INTRAMUSCULAR; INTRAVENOUS; SUBCUTANEOUS AS NEEDED
Status: CANCELLED | OUTPATIENT
Start: 2018-02-15

## 2018-02-01 RX ORDER — FAMOTIDINE 10 MG/ML
20 INJECTION, SOLUTION INTRAVENOUS ONCE
Status: CANCELLED
Start: 2018-02-15 | End: 2018-02-12

## 2018-02-01 RX ORDER — LORAZEPAM 2 MG/ML
INJECTION INTRAMUSCULAR EVERY 4 HOURS PRN
Status: CANCELLED | OUTPATIENT
Start: 2018-02-08

## 2018-02-01 RX ORDER — LORAZEPAM 0.5 MG/1
TABLET ORAL EVERY 4 HOURS PRN
Status: CANCELLED | OUTPATIENT
Start: 2018-02-15

## 2018-02-01 RX ORDER — METOCLOPRAMIDE HYDROCHLORIDE 5 MG/ML
10 INJECTION INTRAMUSCULAR; INTRAVENOUS EVERY 6 HOURS PRN
Status: CANCELLED | OUTPATIENT
Start: 2018-03-01

## 2018-02-01 RX ORDER — DIPHENHYDRAMINE HYDROCHLORIDE 50 MG/ML
25 INJECTION INTRAMUSCULAR; INTRAVENOUS ONCE
Status: CANCELLED
Start: 2018-02-22 | End: 2018-02-19

## 2018-02-01 RX ORDER — PROCHLORPERAZINE MALEATE 10 MG
10 TABLET ORAL EVERY 6 HOURS PRN
Status: CANCELLED | OUTPATIENT
Start: 2018-03-01

## 2018-02-01 RX ORDER — LORAZEPAM 2 MG/ML
INJECTION INTRAMUSCULAR EVERY 4 HOURS PRN
Status: CANCELLED | OUTPATIENT
Start: 2018-03-01

## 2018-02-01 RX ORDER — LORAZEPAM 1 MG/1
1 TABLET ORAL EVERY 4 HOURS PRN
Qty: 60 TABLET | Refills: 0 | Status: SHIPPED | OUTPATIENT
Start: 2018-02-01 | End: 2018-06-01

## 2018-02-01 RX ORDER — MEPERIDINE HYDROCHLORIDE 25 MG/ML
INJECTION INTRAMUSCULAR; INTRAVENOUS; SUBCUTANEOUS AS NEEDED
Status: CANCELLED | OUTPATIENT
Start: 2018-02-22

## 2018-02-01 RX ORDER — MEPERIDINE HYDROCHLORIDE 25 MG/ML
INJECTION INTRAMUSCULAR; INTRAVENOUS; SUBCUTANEOUS AS NEEDED
Status: CANCELLED | OUTPATIENT
Start: 2018-02-08

## 2018-02-01 RX ORDER — DIPHENHYDRAMINE HYDROCHLORIDE 50 MG/ML
25 INJECTION INTRAMUSCULAR; INTRAVENOUS ONCE
Status: COMPLETED | OUTPATIENT
Start: 2018-02-01 | End: 2018-02-01

## 2018-02-01 RX ORDER — LORAZEPAM 2 MG/ML
INJECTION INTRAMUSCULAR EVERY 4 HOURS PRN
Status: CANCELLED | OUTPATIENT
Start: 2018-02-15

## 2018-02-01 RX ORDER — DIPHENHYDRAMINE HYDROCHLORIDE 50 MG/ML
25 INJECTION INTRAMUSCULAR; INTRAVENOUS ONCE
Status: CANCELLED
Start: 2018-02-08 | End: 2018-02-05

## 2018-02-01 RX ORDER — ONDANSETRON 8 MG/1
8 TABLET, ORALLY DISINTEGRATING ORAL EVERY 8 HOURS PRN
Qty: 30 TABLET | Refills: 5 | Status: SHIPPED | OUTPATIENT
Start: 2018-02-01 | End: 2019-02-07

## 2018-02-01 RX ORDER — RANITIDINE 25 MG/ML
50 INJECTION, SOLUTION INTRAMUSCULAR; INTRAVENOUS AS NEEDED
Status: CANCELLED | OUTPATIENT
Start: 2018-03-01

## 2018-02-01 RX ORDER — FAMOTIDINE 10 MG/ML
20 INJECTION, SOLUTION INTRAVENOUS ONCE
Status: CANCELLED
Start: 2018-02-22 | End: 2018-02-19

## 2018-02-01 RX ORDER — MEPERIDINE HYDROCHLORIDE 25 MG/ML
INJECTION INTRAMUSCULAR; INTRAVENOUS; SUBCUTANEOUS AS NEEDED
Status: CANCELLED | OUTPATIENT
Start: 2018-03-01

## 2018-02-01 RX ORDER — FAMOTIDINE 10 MG/ML
20 INJECTION, SOLUTION INTRAVENOUS ONCE
Status: COMPLETED | OUTPATIENT
Start: 2018-02-01 | End: 2018-02-01

## 2018-02-01 RX ORDER — DIPHENHYDRAMINE HYDROCHLORIDE 50 MG/ML
INJECTION INTRAMUSCULAR; INTRAVENOUS EVERY 4 HOURS PRN
Status: CANCELLED | OUTPATIENT
Start: 2018-02-22

## 2018-02-01 RX ORDER — LORAZEPAM 0.5 MG/1
TABLET ORAL EVERY 4 HOURS PRN
Status: CANCELLED | OUTPATIENT
Start: 2018-02-08

## 2018-02-01 RX ORDER — PROCHLORPERAZINE MALEATE 10 MG
10 TABLET ORAL EVERY 6 HOURS PRN
Status: CANCELLED | OUTPATIENT
Start: 2018-02-15

## 2018-02-01 RX ORDER — LORAZEPAM 2 MG/ML
INJECTION INTRAMUSCULAR EVERY 4 HOURS PRN
Status: CANCELLED | OUTPATIENT
Start: 2018-02-01

## 2018-02-01 RX ORDER — MEPERIDINE HYDROCHLORIDE 25 MG/ML
INJECTION INTRAMUSCULAR; INTRAVENOUS; SUBCUTANEOUS AS NEEDED
Status: CANCELLED | OUTPATIENT
Start: 2018-02-01

## 2018-02-01 RX ORDER — DIPHENHYDRAMINE HYDROCHLORIDE 50 MG/ML
INJECTION INTRAMUSCULAR; INTRAVENOUS EVERY 4 HOURS PRN
Status: CANCELLED | OUTPATIENT
Start: 2018-02-15

## 2018-02-01 RX ORDER — ALBUTEROL SULFATE 90 UG/1
2 AEROSOL, METERED RESPIRATORY (INHALATION) AS NEEDED
Status: CANCELLED | OUTPATIENT
Start: 2018-02-08

## 2018-02-01 RX ORDER — PROCHLORPERAZINE MALEATE 10 MG
10 TABLET ORAL EVERY 6 HOURS PRN
Status: CANCELLED | OUTPATIENT
Start: 2018-02-22

## 2018-02-01 RX ORDER — RANITIDINE 25 MG/ML
50 INJECTION, SOLUTION INTRAMUSCULAR; INTRAVENOUS AS NEEDED
Status: CANCELLED | OUTPATIENT
Start: 2018-02-08

## 2018-02-01 RX ORDER — FAMOTIDINE 10 MG/ML
20 INJECTION, SOLUTION INTRAVENOUS ONCE
Status: CANCELLED
Start: 2018-03-01 | End: 2018-02-26

## 2018-02-01 RX ORDER — METOCLOPRAMIDE HYDROCHLORIDE 5 MG/ML
10 INJECTION INTRAMUSCULAR; INTRAVENOUS EVERY 6 HOURS PRN
Status: CANCELLED | OUTPATIENT
Start: 2018-02-01

## 2018-02-01 RX ORDER — ACETAMINOPHEN 325 MG/1
TABLET ORAL EVERY 6 HOURS PRN
Status: CANCELLED | OUTPATIENT
Start: 2018-02-15

## 2018-02-01 RX ORDER — ACETAMINOPHEN 325 MG/1
TABLET ORAL EVERY 6 HOURS PRN
Status: CANCELLED | OUTPATIENT
Start: 2018-03-01

## 2018-02-01 RX ORDER — ALBUTEROL SULFATE 90 UG/1
2 AEROSOL, METERED RESPIRATORY (INHALATION) AS NEEDED
Status: CANCELLED | OUTPATIENT
Start: 2018-03-01

## 2018-02-01 RX ORDER — FAMOTIDINE 10 MG/ML
20 INJECTION, SOLUTION INTRAVENOUS ONCE
Status: CANCELLED
Start: 2018-02-08 | End: 2018-02-05

## 2018-02-01 RX ADMIN — FAMOTIDINE 20 MG: 10 INJECTION, SOLUTION INTRAVENOUS at 10:30:00

## 2018-02-01 RX ADMIN — DIPHENHYDRAMINE HYDROCHLORIDE 25 MG: 50 INJECTION INTRAMUSCULAR; INTRAVENOUS at 10:33:00

## 2018-02-01 NOTE — PROGRESS NOTES
Chemotherapy Education    Learner:  Patient and Spouse    Barriers / Limitations:   Other:  Nausea    Chemotherapy education goals:  · Learn the drug names  · Administration schedule  · Routes of administration  · Treatment setting    Drug names:  Milla Bah Achieved    Possible menstrual irregularity and vaginal dryness:  N/A    Notify MD/RN of any changes or problems:  Achieved    Comments:    Treatment Effects on Emotional Status:    Potential mood changes, depression, nervousness, difficulty sleeping:  Ach

## 2018-02-01 NOTE — PROGRESS NOTES
[facility]    PATIENT'S NAME: Deonte Fearing   ATTENDING PHYSICIAN: Katelynn Goodwin M.D.    PATIENT ACCOUNT #: [de-identified] LOCATION: [address] [place] [address] RECORD #: [id number] YOB: 1990   DATE OF SERVICE: 02/01/2018       CANCER CENT Unremarkable. He has pink conjunctivae, anicteric sclerae. Pharynx without lesions. LYMPHATICS:  No cervical, supraclavicular, or axillary adenopathy. LUNGS:  Resonant to percussion and clear to auscultation, with no wheezing, rales, or rhonchi.    HE

## 2018-02-01 NOTE — PROGRESS NOTES
Education Record    Learner:  Patient, spouse    Disease / Diagnosis: esophageal ca    Barriers / Limitations:  None   Comments:    Method:  Discussion, Printed material and Reinforcement   Comments:    General Topics:  Medication, Side effects and symptom

## 2018-02-02 ENCOUNTER — TELEPHONE (OUTPATIENT)
Dept: HEMATOLOGY/ONCOLOGY | Facility: HOSPITAL | Age: 28
End: 2018-02-02

## 2018-02-02 PROCEDURE — 77386 HC IMRT COMPLEX: CPT | Performed by: RADIOLOGY

## 2018-02-02 NOTE — TELEPHONE ENCOUNTER
Date of Treatment:  2/1/18                              Type of Chemo: Taxol/Carbo    Comments: Spoke with patient. He denies issues with appetite. No nausea this am, he did however, have dry heaves when leaving after RT. He denies fevers or chills.  No con

## 2018-02-02 NOTE — PROGRESS NOTES
[facility] Radiation Treatment Management Note 1-5    Patient:  Alessia Cabrera  Age:  28year old  Visit Diagnosis:    1. GE junction carcinoma ([address]      Primary Rad/Onc:  Dr. Bruna Jones    Site Delivered Dose (Gy) Prescrib

## 2018-02-05 ENCOUNTER — HOSPITAL ENCOUNTER (OUTPATIENT)
Dept: RADIATION ONCOLOGY | Age: 28
Discharge: HOME | End: 2018-02-05
Attending: RADIOLOGY
Payer: COMMERCIAL

## 2018-02-05 VITALS
RESPIRATION RATE: 16 BRPM | WEIGHT: 279.81 LBS | SYSTOLIC BLOOD PRESSURE: 125 MMHG | TEMPERATURE: 98 F | OXYGEN SATURATION: 99 % | BODY MASS INDEX: 32 KG/M2 | DIASTOLIC BLOOD PRESSURE: 85 MMHG | HEART RATE: 102 BPM

## 2018-02-05 DIAGNOSIS — C16.0 GE JUNCTION CARCINOMA (HCC): Primary | ICD-10-CM

## 2018-02-05 PROCEDURE — 77386 HC IMRT COMPLEX: CPT | Performed by: RADIOLOGY

## 2018-02-06 PROCEDURE — 77386 HC IMRT COMPLEX: CPT | Performed by: RADIOLOGY

## 2018-02-08 ENCOUNTER — NURSE ONLY (OUTPATIENT)
Dept: HEMATOLOGY/ONCOLOGY | Age: 28
End: 2018-02-08
Attending: INTERNAL MEDICINE
Payer: COMMERCIAL

## 2018-02-08 ENCOUNTER — OFFICE VISIT (OUTPATIENT)
Dept: HEMATOLOGY/ONCOLOGY | Age: 28
End: 2018-02-08
Attending: INTERNAL MEDICINE
Payer: COMMERCIAL

## 2018-02-08 ENCOUNTER — APPOINTMENT (OUTPATIENT)
Dept: HEMATOLOGY/ONCOLOGY | Age: 28
End: 2018-02-08
Attending: INTERNAL MEDICINE
Payer: COMMERCIAL

## 2018-02-08 ENCOUNTER — SOCIAL WORK SERVICES (OUTPATIENT)
Dept: HEMATOLOGY/ONCOLOGY | Facility: HOSPITAL | Age: 28
End: 2018-02-08

## 2018-02-08 VITALS
DIASTOLIC BLOOD PRESSURE: 79 MMHG | TEMPERATURE: 99 F | HEART RATE: 71 BPM | OXYGEN SATURATION: 98 % | SYSTOLIC BLOOD PRESSURE: 126 MMHG | RESPIRATION RATE: 18 BRPM

## 2018-02-08 VITALS
DIASTOLIC BLOOD PRESSURE: 84 MMHG | TEMPERATURE: 98 F | OXYGEN SATURATION: 97 % | RESPIRATION RATE: 20 BRPM | WEIGHT: 284.63 LBS | BODY MASS INDEX: 33 KG/M2 | SYSTOLIC BLOOD PRESSURE: 126 MMHG | HEART RATE: 82 BPM

## 2018-02-08 DIAGNOSIS — R11.0 CHEMOTHERAPY-INDUCED NAUSEA: ICD-10-CM

## 2018-02-08 DIAGNOSIS — C16.0 GE JUNCTION CARCINOMA (HCC): Primary | ICD-10-CM

## 2018-02-08 DIAGNOSIS — K21.9 GASTROESOPHAGEAL REFLUX DISEASE WITHOUT ESOPHAGITIS: Primary | ICD-10-CM

## 2018-02-08 DIAGNOSIS — T45.1X5A CHEMOTHERAPY-INDUCED NAUSEA: ICD-10-CM

## 2018-02-08 DIAGNOSIS — C16.0 GE JUNCTION CARCINOMA (HCC): ICD-10-CM

## 2018-02-08 LAB
BASOPHILS # BLD AUTO: 0.03 X10(3) UL (ref 0–0.1)
BASOPHILS NFR BLD AUTO: 0.6 %
BUN BLD-MCNC: 7 MG/DL (ref 8–20)
CALCIUM BLD-MCNC: 8.4 MG/DL (ref 8.3–10.3)
CHLORIDE: 108 MMOL/L (ref 101–111)
CO2: 26 MMOL/L (ref 22–32)
CREAT BLD-MCNC: 0.73 MG/DL (ref 0.7–1.3)
EOSINOPHIL # BLD AUTO: 0.16 X10(3) UL (ref 0–0.3)
EOSINOPHIL NFR BLD AUTO: 3.1 %
ERYTHROCYTE [DISTWIDTH] IN BLOOD BY AUTOMATED COUNT: 14.6 % (ref 11.5–16)
GLUCOSE BLD-MCNC: 99 MG/DL (ref 70–99)
HCT VFR BLD AUTO: 35.4 % (ref 37–53)
HGB BLD-MCNC: 11.7 G/DL (ref 13–17)
IMMATURE GRANULOCYTE COUNT: 0.03 X10(3) UL (ref 0–1)
IMMATURE GRANULOCYTE RATIO %: 0.6 %
LYMPHOCYTES # BLD AUTO: 0.67 X10(3) UL (ref 0.9–4)
LYMPHOCYTES NFR BLD AUTO: 13.1 %
MCH RBC QN AUTO: 25.9 PG (ref 27–33.2)
MCHC RBC AUTO-ENTMCNC: 33.1 G/DL (ref 31–37)
MCV RBC AUTO: 78.3 FL (ref 80–99)
MONOCYTES # BLD AUTO: 0.48 X10(3) UL (ref 0.1–1)
MONOCYTES NFR BLD AUTO: 9.4 %
NEUTROPHIL ABS PRELIM: 3.75 X10 (3) UL (ref 1.3–6.7)
NEUTROPHILS # BLD AUTO: 3.75 X10(3) UL (ref 1.3–6.7)
NEUTROPHILS NFR BLD AUTO: 73.2 %
PLATELET # BLD AUTO: 205 10(3)UL (ref 150–450)
POTASSIUM SERPL-SCNC: 4.1 MMOL/L (ref 3.6–5.1)
RBC # BLD AUTO: 4.52 X10(6)UL (ref 4.3–5.7)
RED CELL DISTRIBUTION WIDTH-SD: 40.7 FL (ref 35.1–46.3)
SODIUM SERPL-SCNC: 139 MMOL/L (ref 136–144)
WBC # BLD AUTO: 5.1 X10(3) UL (ref 4–13)

## 2018-02-08 PROCEDURE — 99214 OFFICE O/P EST MOD 30 MIN: CPT | Performed by: NURSE PRACTITIONER

## 2018-02-08 PROCEDURE — 77386 HC IMRT COMPLEX: CPT | Performed by: RADIOLOGY

## 2018-02-08 PROCEDURE — 96367 TX/PROPH/DG ADDL SEQ IV INF: CPT

## 2018-02-08 PROCEDURE — 96375 TX/PRO/DX INJ NEW DRUG ADDON: CPT

## 2018-02-08 PROCEDURE — 96417 CHEMO IV INFUS EACH ADDL SEQ: CPT

## 2018-02-08 PROCEDURE — S0028 INJECTION, FAMOTIDINE, 20 MG: HCPCS | Performed by: INTERNAL MEDICINE

## 2018-02-08 PROCEDURE — 96413 CHEMO IV INFUSION 1 HR: CPT

## 2018-02-08 PROCEDURE — 96376 TX/PRO/DX INJ SAME DRUG ADON: CPT

## 2018-02-08 RX ORDER — ACETAMINOPHEN 325 MG/1
650 TABLET ORAL EVERY 6 HOURS PRN
Status: DISCONTINUED | OUTPATIENT
Start: 2018-02-08 | End: 2018-02-08

## 2018-02-08 RX ORDER — DIPHENHYDRAMINE HYDROCHLORIDE 50 MG/ML
25 INJECTION INTRAMUSCULAR; INTRAVENOUS ONCE
Status: COMPLETED | OUTPATIENT
Start: 2018-02-08 | End: 2018-02-08

## 2018-02-08 RX ORDER — HYDROCODONE BITARTRATE AND ACETAMINOPHEN 10; 325 MG/1; MG/1
2 TABLET ORAL EVERY 4 HOURS PRN
Status: CANCELLED
Start: 2018-02-08

## 2018-02-08 RX ORDER — LORAZEPAM 2 MG/ML
1 INJECTION INTRAMUSCULAR ONCE
Status: CANCELLED
Start: 2018-02-08 | End: 2018-02-08

## 2018-02-08 RX ORDER — FAMOTIDINE 10 MG/ML
20 INJECTION, SOLUTION INTRAVENOUS ONCE
Status: COMPLETED | OUTPATIENT
Start: 2018-02-08 | End: 2018-02-08

## 2018-02-08 RX ADMIN — ACETAMINOPHEN 650 MG: 325 TABLET ORAL at 15:44:00

## 2018-02-08 RX ADMIN — DIPHENHYDRAMINE HYDROCHLORIDE 25 MG: 50 INJECTION INTRAMUSCULAR; INTRAVENOUS at 12:27:00

## 2018-02-08 RX ADMIN — FAMOTIDINE 20 MG: 10 INJECTION, SOLUTION INTRAVENOUS at 12:29:00

## 2018-02-08 NOTE — PROGRESS NOTES
Pt meeting criteria for SEVERE MALNUTRITION in the context of acute illness as evidenced by 6% unplanned wt loss x 1 mo and po intake meeting less than 50% estimated nutrition needs.     Nutrition Consultation    Patient Name: [facility]  Date of Birth Height: 6'6\"       Weight: 284 lbs 13 oz         IBW: 214 +/- 10%    WT HX:   02/01/18: 284 lbs 13 oz  01/17/18: 291 lbs  01/04/18: 296 lbs 6 oz  12/28/17: 301 lbs 8 oz  11/27/17: 304 lbs       Estimated Nutrition Needs: 30-35 kcals/kg IBW (108 kg) =

## 2018-02-08 NOTE — PATIENT INSTRUCTIONS
Iron Dextran injection  Brand Name: Evelia Elham  What is this medicine? IRON DEXTRAN (AHY manoj DEX mcduffie) is an iron complex. Iron is used to make healthy red blood cells, which carry oxygen and nutrients through the body.  This medicine is used to treat people It is important not to miss your dose. Call your doctor or health care professional if you are unable to keep an appointment. Where should I keep my medicine? This drug is given in a hospital or clinic and will not be stored at home.   What should I tell Talk to your pediatrician regarding the use of this medicine in children. While this drug may be prescribed for children as young as 1 months old for selected conditions, precautions do apply. What side effects may I notice from receiving this medicine? · an unusual or allergic reaction to iron, other medicines, foods, dyes, or preservatives  · pregnant or trying to get pregnant  · breast-feeding  What should I watch for while using this medicine? Visit your doctor or health care professional regularly.

## 2018-02-08 NOTE — PROGRESS NOTES
Pt here for follow up and treatment. See toxicities. Pt states Friday, Saturday and Sunday he was fatigued and could not keep anything down. He was taking compazine and zofran. He complains of constipation. He takes miralax daily.

## 2018-02-08 NOTE — PROGRESS NOTES
30 min post VS for INFED are stable. Patient is 30 min into his Taxol and tolerating fine. Patient going down for RT now.

## 2018-02-08 NOTE — PROGRESS NOTES
Patient here for [facility] Taxol/Carbo. Reinforced when to take antiemetics and call MD. Patient also getting 1x dose of INFED. Patient has never had before. Test dose given. Reviewed side effects and handout given. Next appts already made.     Education Record

## 2018-02-08 NOTE — PROGRESS NOTES
Patient called ELIO to ask that date be added next to his name on disability forms sent to [facility] on 2/8. ELIO added date and faxed to Mg, [phone], as requested.

## 2018-02-09 PROCEDURE — 77336 RADIATION PHYSICS CONSULT: CPT | Performed by: RADIOLOGY

## 2018-02-09 NOTE — PROGRESS NOTES
ANP Visit Note    Patient Name: Azalia Dodge   YOB: 1990   Medical Record Number: [id number]   CSN: [id number]   Date of visit: 2/9/2018     Chief Complaint/Reason for Visit: Follow up/ Esophageal Cancer     Oncologic History: The patient i History  Social History   Marital status:   Spouse name: N/A    Years of education: N/A  Number of children: 0     Occupational History   at [address] [place] [address] History Main Topics   Smoking status: Never Smoker    Smokeless tobacco: N 97%   BMI 32.60 kg/m²   HEENT: EOMs intact. PERRL. Oropharynx is clear. Neck: No JVD. No palpable lymphadenopathy. Neck is supple. Chest: Clear to auscultation. Heart: Regular rate and rhythm. Abdomen: Soft, non tender with good bowel sounds.   Extrem Final  Sodium                                        Date: 02/01/2018  Value: 139         Ref range: 136 - 144 mmol/L   Status: Final  Potassium                                     Date: 02/01/2018  Value: 3.7         Ref range: 3.6 - 5.1 mmol/L   Status: Date: 02/01/2018  Value: 5.34        Ref range: 1.30 - 6.70 x10(*  Status: Final  Lymphocyte Absolute                           Date: 02/01/2018  Value: 1.40        Ref range: 0.90 - 4.00 x10(*  Status: Final  Monocyte Absolute 02/01/2018  Value: 463         Ref range: 298 - 536 ug/dL    Status: Final  Iron Saturation                               Date: 02/01/2018  Value: 11*         Ref range: 13 - 45 %          Status: Final  ------------    Impression/Plan:    Esophageal Cance

## 2018-02-12 ENCOUNTER — HOSPITAL ENCOUNTER (OUTPATIENT)
Dept: RADIATION ONCOLOGY | Age: 28
Discharge: HOME | End: 2018-02-12
Attending: RADIOLOGY
Payer: COMMERCIAL

## 2018-02-12 VITALS
TEMPERATURE: 99 F | OXYGEN SATURATION: 99 % | SYSTOLIC BLOOD PRESSURE: 118 MMHG | DIASTOLIC BLOOD PRESSURE: 86 MMHG | WEIGHT: 282.81 LBS | BODY MASS INDEX: 32 KG/M2 | HEART RATE: 95 BPM | RESPIRATION RATE: 16 BRPM

## 2018-02-12 DIAGNOSIS — C16.0 GE JUNCTION CARCINOMA (HCC): Primary | ICD-10-CM

## 2018-02-12 PROCEDURE — 77386 HC IMRT COMPLEX: CPT | Performed by: RADIOLOGY

## 2018-02-12 NOTE — PROGRESS NOTES
[facility] Radiation Treatment Management Note 6-10    Patient:  Leslie Frost  Age:  28year old  Visit Diagnosis:    1. GE junction carcinoma ([address]      Primary Rad/Onc:  Dr. Laine Ayon    Site Delivered Dose (Gy) Prescri

## 2018-02-13 PROCEDURE — 77386 HC IMRT COMPLEX: CPT | Performed by: RADIOLOGY

## 2018-02-13 NOTE — PROGRESS NOTES
Pt meeting criteria for SEVERE MALNUTRITION in the context of acute illness as evidenced by 6% unplanned wt loss x 1 mo and po intake meeting less than 50% estimated nutrition needs.     Nutrition F/U Note:      Date of visit: 2/8/2018     Diet Rx: High pr

## 2018-02-14 PROCEDURE — 77386 HC IMRT COMPLEX: CPT | Performed by: RADIOLOGY

## 2018-02-15 ENCOUNTER — OFFICE VISIT (OUTPATIENT)
Dept: HEMATOLOGY/ONCOLOGY | Age: 28
End: 2018-02-15
Attending: INTERNAL MEDICINE
Payer: COMMERCIAL

## 2018-02-15 ENCOUNTER — NURSE ONLY (OUTPATIENT)
Dept: HEMATOLOGY/ONCOLOGY | Age: 28
End: 2018-02-15
Attending: INTERNAL MEDICINE
Payer: COMMERCIAL

## 2018-02-15 VITALS
TEMPERATURE: 98 F | OXYGEN SATURATION: 98 % | DIASTOLIC BLOOD PRESSURE: 82 MMHG | SYSTOLIC BLOOD PRESSURE: 126 MMHG | HEART RATE: 93 BPM | BODY MASS INDEX: 32 KG/M2 | RESPIRATION RATE: 20 BRPM | WEIGHT: 282.19 LBS

## 2018-02-15 VITALS
DIASTOLIC BLOOD PRESSURE: 77 MMHG | TEMPERATURE: 98 F | OXYGEN SATURATION: 100 % | RESPIRATION RATE: 20 BRPM | SYSTOLIC BLOOD PRESSURE: 114 MMHG | HEART RATE: 73 BPM

## 2018-02-15 DIAGNOSIS — C16.0 GE JUNCTION CARCINOMA (HCC): Primary | ICD-10-CM

## 2018-02-15 DIAGNOSIS — T80.90XA INFUSION REACTION, INITIAL ENCOUNTER: ICD-10-CM

## 2018-02-15 LAB
BASOPHILS # BLD AUTO: 0.03 X10(3) UL (ref 0–0.1)
BASOPHILS NFR BLD AUTO: 0.6 %
BUN BLD-MCNC: 9 MG/DL (ref 8–20)
CALCIUM BLD-MCNC: 8.4 MG/DL (ref 8.3–10.3)
CHLORIDE: 108 MMOL/L (ref 101–111)
CO2: 26 MMOL/L (ref 22–32)
CREAT BLD-MCNC: 0.74 MG/DL (ref 0.7–1.3)
EOSINOPHIL # BLD AUTO: 0.1 X10(3) UL (ref 0–0.3)
EOSINOPHIL NFR BLD AUTO: 1.8 %
ERYTHROCYTE [DISTWIDTH] IN BLOOD BY AUTOMATED COUNT: 15.8 % (ref 11.5–16)
GLUCOSE BLD-MCNC: 95 MG/DL (ref 70–99)
HCT VFR BLD AUTO: 35.2 % (ref 37–53)
HGB BLD-MCNC: 11.6 G/DL (ref 13–17)
IMMATURE GRANULOCYTE COUNT: 0.01 X10(3) UL (ref 0–1)
IMMATURE GRANULOCYTE RATIO %: 0.2 %
LYMPHOCYTES # BLD AUTO: 0.48 X10(3) UL (ref 0.9–4)
LYMPHOCYTES NFR BLD AUTO: 8.8 %
MCH RBC QN AUTO: 26.2 PG (ref 27–33.2)
MCHC RBC AUTO-ENTMCNC: 33 G/DL (ref 31–37)
MCV RBC AUTO: 79.6 FL (ref 80–99)
MONOCYTES # BLD AUTO: 0.47 X10(3) UL (ref 0.1–1)
MONOCYTES NFR BLD AUTO: 8.6 %
NEUTROPHIL ABS PRELIM: 4.35 X10 (3) UL (ref 1.3–6.7)
NEUTROPHILS # BLD AUTO: 4.35 X10(3) UL (ref 1.3–6.7)
NEUTROPHILS NFR BLD AUTO: 80 %
PLATELET # BLD AUTO: 167 10(3)UL (ref 150–450)
POTASSIUM SERPL-SCNC: 3.9 MMOL/L (ref 3.6–5.1)
RBC # BLD AUTO: 4.42 X10(6)UL (ref 4.3–5.7)
RED CELL DISTRIBUTION WIDTH-SD: 42.9 FL (ref 35.1–46.3)
SODIUM SERPL-SCNC: 139 MMOL/L (ref 136–144)
WBC # BLD AUTO: 5.4 X10(3) UL (ref 4–13)

## 2018-02-15 PROCEDURE — 96413 CHEMO IV INFUSION 1 HR: CPT

## 2018-02-15 PROCEDURE — 96375 TX/PRO/DX INJ NEW DRUG ADDON: CPT

## 2018-02-15 PROCEDURE — 96417 CHEMO IV INFUS EACH ADDL SEQ: CPT

## 2018-02-15 PROCEDURE — S0028 INJECTION, FAMOTIDINE, 20 MG: HCPCS | Performed by: INTERNAL MEDICINE

## 2018-02-15 PROCEDURE — 96411 CHEMO IV PUSH ADDL DRUG: CPT

## 2018-02-15 PROCEDURE — 96376 TX/PRO/DX INJ SAME DRUG ADON: CPT

## 2018-02-15 PROCEDURE — 96367 TX/PROPH/DG ADDL SEQ IV INF: CPT

## 2018-02-15 PROCEDURE — 99214 OFFICE O/P EST MOD 30 MIN: CPT | Performed by: INTERNAL MEDICINE

## 2018-02-15 PROCEDURE — 77386 HC IMRT COMPLEX: CPT | Performed by: RADIOLOGY

## 2018-02-15 RX ORDER — DEXAMETHASONE 4 MG/1
TABLET ORAL
Qty: 12 TABLET | Refills: 0 | Status: ON HOLD | OUTPATIENT
Start: 2018-02-15 | End: 2018-05-04

## 2018-02-15 RX ORDER — FAMOTIDINE 10 MG/ML
20 INJECTION, SOLUTION INTRAVENOUS ONCE
Status: COMPLETED | OUTPATIENT
Start: 2018-02-15 | End: 2018-02-15

## 2018-02-15 RX ORDER — DIPHENHYDRAMINE HYDROCHLORIDE 50 MG/ML
25 INJECTION INTRAMUSCULAR; INTRAVENOUS ONCE
Status: COMPLETED | OUTPATIENT
Start: 2018-02-15 | End: 2018-02-15

## 2018-02-15 RX ORDER — DIPHENHYDRAMINE HYDROCHLORIDE 50 MG/ML
25 INJECTION INTRAMUSCULAR; INTRAVENOUS EVERY 4 HOURS PRN
Status: DISCONTINUED | OUTPATIENT
Start: 2018-02-15 | End: 2018-02-15

## 2018-02-15 RX ADMIN — DIPHENHYDRAMINE HYDROCHLORIDE 25 MG: 50 INJECTION INTRAMUSCULAR; INTRAVENOUS at 12:34:00

## 2018-02-15 RX ADMIN — DIPHENHYDRAMINE HYDROCHLORIDE 25 MG: 50 INJECTION INTRAMUSCULAR; INTRAVENOUS at 13:41:00

## 2018-02-15 RX ADMIN — FAMOTIDINE 20 MG: 10 INJECTION, SOLUTION INTRAVENOUS at 12:38:00

## 2018-02-15 NOTE — PROGRESS NOTES
Shortly after Taxol started, pt became flushed with back pain and SOB. Taxol stopped and pt immediately felt better. 1336 IVF started. 2Lnc. 1337 Solu cortef 100mg. 1340 Dr Marshall Dears in room and ordered to re challenge Taxol when pt feeling better. See VSS.

## 2018-02-15 NOTE — PROGRESS NOTES
Pt meeting criteria for SEVERE MALNUTRITION in the context of acute illness as evidenced by 6% unplanned wt loss x 1 mo and po intake meeting less than 50% estimated nutrition needs.     Nutrition F/U Note:      Date of visit: 2/15/2018     Diet Rx: High p

## 2018-02-15 NOTE — PROGRESS NOTES
Pt here for 2 week MD f/vinod and due for chemo. Pt notes feeling nausea/vomiting for 4 days after chemo, improved after that. Energy level is down after chemo, then improves. Pt had constipation after chemo, then turns to diarrhea, reinforced miralax use.  Pt

## 2018-02-15 NOTE — PROGRESS NOTES
Medication - paclitaxel    Type of reaction, patient symptoms at time of reaction - Face flushing, SOB, back pain    Vital Signs taken - see flowsheet    MD/APN called to room, orders received - Dr Alejandro Zuniga at bedside    Outcome of reaction - re-challenged p

## 2018-02-15 NOTE — PATIENT INSTRUCTIONS
To reach Dr Wilbert Austin nurse during business hours, please call [phone]. After hours, including weekends, evenings, and holidays, please call the main number [phone] for emergent needs.

## 2018-02-15 NOTE — PROGRESS NOTES
Education Record    Learner:  Patient    Disease / Diagnosis:esophogeal cancer    Barriers / Limitations:  None    Method:  Brief focused, printed material and  reinforcement    General Topics:  Plan of care reviewed    Outcome:  Shows understanding

## 2018-02-16 PROCEDURE — 77336 RADIATION PHYSICS CONSULT: CPT | Performed by: RADIOLOGY

## 2018-02-16 PROCEDURE — 77386 HC IMRT COMPLEX: CPT | Performed by: RADIOLOGY

## 2018-02-19 ENCOUNTER — HOSPITAL ENCOUNTER (OUTPATIENT)
Dept: RADIATION ONCOLOGY | Age: 28
Discharge: HOME | End: 2018-02-19
Attending: RADIOLOGY
Payer: COMMERCIAL

## 2018-02-19 VITALS
DIASTOLIC BLOOD PRESSURE: 81 MMHG | BODY MASS INDEX: 32 KG/M2 | SYSTOLIC BLOOD PRESSURE: 127 MMHG | RESPIRATION RATE: 16 BRPM | HEART RATE: 111 BPM | WEIGHT: 282.69 LBS | TEMPERATURE: 99 F

## 2018-02-19 DIAGNOSIS — C16.0 GE JUNCTION CARCINOMA (HCC): Primary | ICD-10-CM

## 2018-02-19 PROCEDURE — 77386 HC IMRT COMPLEX: CPT | Performed by: RADIOLOGY

## 2018-02-19 NOTE — PROGRESS NOTES
[facility] Radiation Treatment Management Note 11-15    Patient:  Bryon Moss  Age:  28year old  Visit Diagnosis:    1. GE junction carcinoma ([address]      Primary Rad/Onc:  Dr. Zee Coto    Site Delivered Dose (Gy) Prescr

## 2018-02-20 PROCEDURE — 77386 HC IMRT COMPLEX: CPT | Performed by: RADIOLOGY

## 2018-02-21 PROCEDURE — 77386 HC IMRT COMPLEX: CPT | Performed by: RADIOLOGY

## 2018-02-22 ENCOUNTER — NURSE ONLY (OUTPATIENT)
Dept: HEMATOLOGY/ONCOLOGY | Age: 28
End: 2018-02-22
Attending: INTERNAL MEDICINE
Payer: COMMERCIAL

## 2018-02-22 ENCOUNTER — OFFICE VISIT (OUTPATIENT)
Dept: HEMATOLOGY/ONCOLOGY | Age: 28
End: 2018-02-22
Attending: INTERNAL MEDICINE
Payer: COMMERCIAL

## 2018-02-22 VITALS
OXYGEN SATURATION: 95 % | RESPIRATION RATE: 20 BRPM | TEMPERATURE: 99 F | HEART RATE: 107 BPM | SYSTOLIC BLOOD PRESSURE: 129 MMHG | DIASTOLIC BLOOD PRESSURE: 80 MMHG | BODY MASS INDEX: 32 KG/M2 | WEIGHT: 283 LBS

## 2018-02-22 DIAGNOSIS — C16.0 GE JUNCTION CARCINOMA (HCC): Primary | ICD-10-CM

## 2018-02-22 LAB
BASOPHILS # BLD AUTO: 0.01 X10(3) UL (ref 0–0.1)
BASOPHILS NFR BLD AUTO: 0.1 %
BUN BLD-MCNC: 10 MG/DL (ref 8–20)
CALCIUM BLD-MCNC: 8.8 MG/DL (ref 8.3–10.3)
CHLORIDE: 103 MMOL/L (ref 101–111)
CO2: 24 MMOL/L (ref 22–32)
CREAT BLD-MCNC: 0.78 MG/DL (ref 0.7–1.3)
EOSINOPHIL # BLD AUTO: 0 X10(3) UL (ref 0–0.3)
EOSINOPHIL NFR BLD AUTO: 0 %
ERYTHROCYTE [DISTWIDTH] IN BLOOD BY AUTOMATED COUNT: 17.4 % (ref 11.5–16)
GLUCOSE BLD-MCNC: 162 MG/DL (ref 70–99)
HCT VFR BLD AUTO: 37.4 % (ref 37–53)
HGB BLD-MCNC: 12.4 G/DL (ref 13–17)
IMMATURE GRANULOCYTE COUNT: 0.05 X10(3) UL (ref 0–1)
IMMATURE GRANULOCYTE RATIO %: 0.6 %
LYMPHOCYTES # BLD AUTO: 0.11 X10(3) UL (ref 0.9–4)
LYMPHOCYTES NFR BLD AUTO: 1.4 %
MCH RBC QN AUTO: 26.6 PG (ref 27–33.2)
MCHC RBC AUTO-ENTMCNC: 33.2 G/DL (ref 31–37)
MCV RBC AUTO: 80.3 FL (ref 80–99)
MONOCYTES # BLD AUTO: 0.25 X10(3) UL (ref 0.1–1)
MONOCYTES NFR BLD AUTO: 3.2 %
NEUTROPHIL ABS PRELIM: 7.3 X10 (3) UL (ref 1.3–6.7)
NEUTROPHILS # BLD AUTO: 7.3 X10(3) UL (ref 1.3–6.7)
NEUTROPHILS NFR BLD AUTO: 94.7 %
PLATELET # BLD AUTO: 195 10(3)UL (ref 150–450)
POTASSIUM SERPL-SCNC: 3.7 MMOL/L (ref 3.6–5.1)
RBC # BLD AUTO: 4.66 X10(6)UL (ref 4.3–5.7)
RED CELL DISTRIBUTION WIDTH-SD: 47.8 FL (ref 35.1–46.3)
SODIUM SERPL-SCNC: 137 MMOL/L (ref 136–144)
WBC # BLD AUTO: 7.7 X10(3) UL (ref 4–13)

## 2018-02-22 PROCEDURE — 85025 COMPLETE CBC W/AUTO DIFF WBC: CPT

## 2018-02-22 PROCEDURE — 96417 CHEMO IV INFUS EACH ADDL SEQ: CPT

## 2018-02-22 PROCEDURE — 36415 COLL VENOUS BLD VENIPUNCTURE: CPT

## 2018-02-22 PROCEDURE — 96413 CHEMO IV INFUSION 1 HR: CPT

## 2018-02-22 PROCEDURE — 80048 BASIC METABOLIC PNL TOTAL CA: CPT

## 2018-02-22 PROCEDURE — S0028 INJECTION, FAMOTIDINE, 20 MG: HCPCS | Performed by: INTERNAL MEDICINE

## 2018-02-22 PROCEDURE — 96367 TX/PROPH/DG ADDL SEQ IV INF: CPT

## 2018-02-22 PROCEDURE — 96375 TX/PRO/DX INJ NEW DRUG ADDON: CPT

## 2018-02-22 PROCEDURE — 77386 HC IMRT COMPLEX: CPT | Performed by: RADIOLOGY

## 2018-02-22 RX ORDER — DIPHENHYDRAMINE HYDROCHLORIDE 50 MG/ML
25 INJECTION INTRAMUSCULAR; INTRAVENOUS ONCE
Status: COMPLETED | OUTPATIENT
Start: 2018-02-22 | End: 2018-02-22

## 2018-02-22 RX ORDER — FAMOTIDINE 10 MG/ML
20 INJECTION, SOLUTION INTRAVENOUS ONCE
Status: COMPLETED | OUTPATIENT
Start: 2018-02-22 | End: 2018-02-22

## 2018-02-22 RX ADMIN — FAMOTIDINE 20 MG: 10 INJECTION, SOLUTION INTRAVENOUS at 12:19:00

## 2018-02-22 RX ADMIN — DIPHENHYDRAMINE HYDROCHLORIDE 25 MG: 50 INJECTION INTRAMUSCULAR; INTRAVENOUS at 12:23:00

## 2018-02-22 NOTE — PROGRESS NOTES
Pt here for Taxol/Carbo- noted reaction last visit. Pt confirmed he is taking PO dex starting day before chemo.

## 2018-02-22 NOTE — PROGRESS NOTES
Education Record    Learner:  Patient    Disease / Lebron Lopez    Barriers / Limitations:  None   Comments:    Method:  Brief focused and Printed material   Comments:    General Topics:  Medication, Side effects and symptom management and Plan of c

## 2018-02-22 NOTE — PROGRESS NOTES
Pt took PO steroids @ home last night and this AM. Pt given Taxol/Carbo per MD orders with out incident.

## 2018-02-23 PROCEDURE — 77386 HC IMRT COMPLEX: CPT | Performed by: RADIOLOGY

## 2018-02-23 PROCEDURE — 77336 RADIATION PHYSICS CONSULT: CPT | Performed by: RADIOLOGY

## 2018-02-25 PROBLEM — T80.90XA INFUSION REACTION: Status: ACTIVE | Noted: 2018-02-25

## 2018-02-26 ENCOUNTER — HOSPITAL ENCOUNTER (OUTPATIENT)
Dept: RADIATION ONCOLOGY | Age: 28
Discharge: HOME | End: 2018-02-26
Attending: RADIOLOGY
Payer: COMMERCIAL

## 2018-02-26 VITALS
RESPIRATION RATE: 16 BRPM | TEMPERATURE: 98 F | SYSTOLIC BLOOD PRESSURE: 128 MMHG | BODY MASS INDEX: 32 KG/M2 | DIASTOLIC BLOOD PRESSURE: 83 MMHG | OXYGEN SATURATION: 98 % | WEIGHT: 283.13 LBS | HEART RATE: 116 BPM

## 2018-02-26 DIAGNOSIS — C16.0 GE JUNCTION CARCINOMA (HCC): Primary | ICD-10-CM

## 2018-02-26 PROCEDURE — 77386 HC IMRT COMPLEX: CPT | Performed by: RADIOLOGY

## 2018-02-26 NOTE — PROGRESS NOTES
[facility] Radiation Treatment Management Note 16-20    Patient:  Lincoln Monroe  Age:  28year old  Visit Diagnosis:    1. GE junction carcinoma ([address]      Primary Rad/Onc:  Dr. Lex Meneses    Site Delivered Dose (Gy) Prescr Doretha Laurent MD

## 2018-02-26 NOTE — PROGRESS NOTES
[facility]    PATIENT'S NAME: Rebeca Marley   ATTENDING PHYSICIAN: Cisco Reeves M.D.    PATIENT ACCOUNT #: [de-identified] LOCATION: [address] [place] [address] RECORD #: [id number] YOB: 1990   DATE OF SERVICE: 02/15/2018       CANCER CENT cervical, supraclavicular, or axillary adenopathy. LUNGS:  Resonant to percussion and clear to auscultation with no wheezing, rales, or rhonchi. HEART:  Regular S1 and S2 with no murmur or gallop. ABDOMEN:  No hepatosplenomegaly or tenderness.   EXTREMIT

## 2018-02-27 PROCEDURE — 77386 HC IMRT COMPLEX: CPT | Performed by: RADIOLOGY

## 2018-02-28 PROCEDURE — 77386 HC IMRT COMPLEX: CPT | Performed by: RADIOLOGY

## 2018-03-01 ENCOUNTER — HOSPITAL ENCOUNTER (OUTPATIENT)
Dept: RADIATION ONCOLOGY | Age: 28
Discharge: HOME | End: 2018-03-01
Attending: RADIOLOGY
Payer: COMMERCIAL

## 2018-03-01 ENCOUNTER — NURSE ONLY (OUTPATIENT)
Dept: HEMATOLOGY/ONCOLOGY | Age: 28
End: 2018-03-01
Attending: INTERNAL MEDICINE
Payer: COMMERCIAL

## 2018-03-01 ENCOUNTER — OFFICE VISIT (OUTPATIENT)
Dept: HEMATOLOGY/ONCOLOGY | Age: 28
End: 2018-03-01
Attending: INTERNAL MEDICINE
Payer: COMMERCIAL

## 2018-03-01 VITALS
HEIGHT: 78 IN | RESPIRATION RATE: 18 BRPM | WEIGHT: 283.38 LBS | SYSTOLIC BLOOD PRESSURE: 122 MMHG | HEART RATE: 109 BPM | TEMPERATURE: 98 F | OXYGEN SATURATION: 96 % | BODY MASS INDEX: 32.79 KG/M2 | DIASTOLIC BLOOD PRESSURE: 86 MMHG

## 2018-03-01 DIAGNOSIS — C16.0 GE JUNCTION CARCINOMA (HCC): Primary | ICD-10-CM

## 2018-03-01 LAB
BASOPHILS # BLD AUTO: 0.01 X10(3) UL (ref 0–0.1)
BASOPHILS NFR BLD AUTO: 0.1 %
BUN BLD-MCNC: 9 MG/DL (ref 8–20)
CALCIUM BLD-MCNC: 8.7 MG/DL (ref 8.3–10.3)
CHLORIDE: 105 MMOL/L (ref 101–111)
CO2: 21 MMOL/L (ref 22–32)
CREAT BLD-MCNC: 0.83 MG/DL (ref 0.7–1.3)
EOSINOPHIL # BLD AUTO: 0 X10(3) UL (ref 0–0.3)
EOSINOPHIL NFR BLD AUTO: 0 %
ERYTHROCYTE [DISTWIDTH] IN BLOOD BY AUTOMATED COUNT: 17.8 % (ref 11.5–16)
GLUCOSE BLD-MCNC: 128 MG/DL (ref 70–99)
HCT VFR BLD AUTO: 37.5 % (ref 37–53)
HGB BLD-MCNC: 12.8 G/DL (ref 13–17)
IMMATURE GRANULOCYTE COUNT: 0.06 X10(3) UL (ref 0–1)
IMMATURE GRANULOCYTE RATIO %: 0.8 %
LYMPHOCYTES # BLD AUTO: 0.08 X10(3) UL (ref 0.9–4)
LYMPHOCYTES NFR BLD AUTO: 1 %
MCH RBC QN AUTO: 27.6 PG (ref 27–33.2)
MCHC RBC AUTO-ENTMCNC: 34.1 G/DL (ref 31–37)
MCV RBC AUTO: 81 FL (ref 80–99)
MONOCYTES # BLD AUTO: 0.11 X10(3) UL (ref 0.1–1)
MONOCYTES NFR BLD AUTO: 1.4 %
NEUTROPHIL ABS PRELIM: 7.57 X10 (3) UL (ref 1.3–6.7)
NEUTROPHILS # BLD AUTO: 7.57 X10(3) UL (ref 1.3–6.7)
NEUTROPHILS NFR BLD AUTO: 96.7 %
PLATELET # BLD AUTO: 163 10(3)UL (ref 150–450)
POTASSIUM SERPL-SCNC: 3.8 MMOL/L (ref 3.6–5.1)
RBC # BLD AUTO: 4.63 X10(6)UL (ref 4.3–5.7)
RED CELL DISTRIBUTION WIDTH-SD: 50.8 FL (ref 35.1–46.3)
SODIUM SERPL-SCNC: 137 MMOL/L (ref 136–144)
WBC # BLD AUTO: 7.8 X10(3) UL (ref 4–13)

## 2018-03-01 PROCEDURE — 96417 CHEMO IV INFUS EACH ADDL SEQ: CPT

## 2018-03-01 PROCEDURE — 99214 OFFICE O/P EST MOD 30 MIN: CPT | Performed by: INTERNAL MEDICINE

## 2018-03-01 PROCEDURE — 96375 TX/PRO/DX INJ NEW DRUG ADDON: CPT

## 2018-03-01 PROCEDURE — 96413 CHEMO IV INFUSION 1 HR: CPT

## 2018-03-01 PROCEDURE — S0028 INJECTION, FAMOTIDINE, 20 MG: HCPCS | Performed by: INTERNAL MEDICINE

## 2018-03-01 PROCEDURE — 77386 HC IMRT COMPLEX: CPT | Performed by: RADIOLOGY

## 2018-03-01 PROCEDURE — 96367 TX/PROPH/DG ADDL SEQ IV INF: CPT

## 2018-03-01 RX ORDER — FAMOTIDINE 10 MG/ML
20 INJECTION, SOLUTION INTRAVENOUS ONCE
Status: COMPLETED | OUTPATIENT
Start: 2018-03-01 | End: 2018-03-01

## 2018-03-01 RX ORDER — DIPHENHYDRAMINE HYDROCHLORIDE 50 MG/ML
25 INJECTION INTRAMUSCULAR; INTRAVENOUS ONCE
Status: COMPLETED | OUTPATIENT
Start: 2018-03-01 | End: 2018-03-01

## 2018-03-01 RX ADMIN — DIPHENHYDRAMINE HYDROCHLORIDE 25 MG: 50 INJECTION INTRAMUSCULAR; INTRAVENOUS at 12:26:00

## 2018-03-01 RX ADMIN — FAMOTIDINE 20 MG: 10 INJECTION, SOLUTION INTRAVENOUS at 12:21:00

## 2018-03-01 NOTE — PROGRESS NOTES
Pt meeting criteria for SEVERE MALNUTRITION in the context of acute illness as evidenced by 6% unplanned wt loss x 1 mo and po intake meeting less than 50% estimated nutrition needs.     Nutrition F/U Note:      Date of visit: 03/01/2018     Diet Rx: High

## 2018-03-01 NOTE — PROGRESS NOTES
Pt here for 2 week MD f/u and due for chemo. Energy level is fair, appetite good. Pt started on liquid hydrocodone for issues with swallowing. Pt notes some bowel irregularities but not bad enough for any intervention. No nausea.

## 2018-03-02 PROCEDURE — 77386 HC IMRT COMPLEX: CPT | Performed by: RADIOLOGY

## 2018-03-02 PROCEDURE — 77336 RADIATION PHYSICS CONSULT: CPT | Performed by: RADIOLOGY

## 2018-03-02 NOTE — PROGRESS NOTES
[facility]    PATIENT'S NAME: Andie aM   ATTENDING PHYSICIAN: Ruperto Ruiz M.D.    PATIENT ACCOUNT #: [de-identified] LOCATION: [address] [place] [address] RECORD #: [id number] YOB: 1990   DATE OF SERVICE: 03/01/2018       CANCER CENT anicteric sclerae. Pharynx without lesions. LYMPHATICS:  He has no cervical, supraclavicular, or axillary adenopathy. LUNGS:  Resonant to percussion and clear to auscultation. No wheezing, rales, or rhonchi.   HEART:  Regular S1 and S2 with no murmur or

## 2018-03-05 ENCOUNTER — HOSPITAL ENCOUNTER (OUTPATIENT)
Dept: RADIATION ONCOLOGY | Age: 28
Discharge: HOME | End: 2018-03-05
Attending: RADIOLOGY
Payer: COMMERCIAL

## 2018-03-05 VITALS
OXYGEN SATURATION: 99 % | WEIGHT: 283.38 LBS | TEMPERATURE: 98 F | RESPIRATION RATE: 16 BRPM | HEART RATE: 115 BPM | DIASTOLIC BLOOD PRESSURE: 82 MMHG | BODY MASS INDEX: 32 KG/M2 | SYSTOLIC BLOOD PRESSURE: 132 MMHG

## 2018-03-05 DIAGNOSIS — C16.0 GE JUNCTION CARCINOMA (HCC): Primary | ICD-10-CM

## 2018-03-05 PROCEDURE — 77386 HC IMRT COMPLEX: CPT | Performed by: RADIOLOGY

## 2018-03-05 NOTE — PROGRESS NOTES
[facility] Radiation Treatment Management Note 21-25    Patient:  Laura Aguillon  Age:  28year old  Visit Diagnosis:    1. GE junction carcinoma ([address]      Primary Rad/Onc:  Dr. Perico House    Site Delivered Dose (Gy) Prescr

## 2018-03-06 PROCEDURE — 77386 HC IMRT COMPLEX: CPT | Performed by: RADIOLOGY

## 2018-03-07 ENCOUNTER — APPOINTMENT (OUTPATIENT)
Dept: RADIATION ONCOLOGY | Age: 28
End: 2018-03-07
Attending: RADIOLOGY
Payer: COMMERCIAL

## 2018-03-07 PROCEDURE — 77386 HC IMRT COMPLEX: CPT | Performed by: RADIOLOGY

## 2018-03-08 ENCOUNTER — NURSE ONLY (OUTPATIENT)
Dept: HEMATOLOGY/ONCOLOGY | Age: 28
End: 2018-03-08
Attending: INTERNAL MEDICINE
Payer: COMMERCIAL

## 2018-03-08 PROCEDURE — 77386 HC IMRT COMPLEX: CPT | Performed by: RADIOLOGY

## 2018-03-08 PROCEDURE — 77338 DESIGN MLC DEVICE FOR IMRT: CPT | Performed by: RADIOLOGY

## 2018-03-08 PROCEDURE — 77300 RADIATION THERAPY DOSE PLAN: CPT | Performed by: RADIOLOGY

## 2018-03-09 ENCOUNTER — HOSPITAL ENCOUNTER (OUTPATIENT)
Dept: RADIATION ONCOLOGY | Age: 28
Discharge: HOME | End: 2018-03-09
Attending: RADIOLOGY
Payer: COMMERCIAL

## 2018-03-09 ENCOUNTER — OFFICE VISIT (OUTPATIENT)
Dept: FAMILY MEDICINE CLINIC | Facility: CLINIC | Age: 28
End: 2018-03-09

## 2018-03-09 ENCOUNTER — OFFICE VISIT (OUTPATIENT)
Dept: SURGERY | Facility: CLINIC | Age: 28
End: 2018-03-09

## 2018-03-09 ENCOUNTER — TELEPHONE (OUTPATIENT)
Dept: FAMILY MEDICINE CLINIC | Facility: CLINIC | Age: 28
End: 2018-03-09

## 2018-03-09 VITALS
DIASTOLIC BLOOD PRESSURE: 80 MMHG | TEMPERATURE: 98 F | SYSTOLIC BLOOD PRESSURE: 130 MMHG | BODY MASS INDEX: 28 KG/M2 | WEIGHT: 248.5 LBS

## 2018-03-09 VITALS
WEIGHT: 248 LBS | SYSTOLIC BLOOD PRESSURE: 130 MMHG | DIASTOLIC BLOOD PRESSURE: 80 MMHG | TEMPERATURE: 98 F | BODY MASS INDEX: 28 KG/M2

## 2018-03-09 DIAGNOSIS — L05.91 PILONIDAL CYST: Primary | ICD-10-CM

## 2018-03-09 DIAGNOSIS — L05.01 PILONIDAL CYST WITH ABSCESS: Primary | ICD-10-CM

## 2018-03-09 PROCEDURE — 99213 OFFICE O/P EST LOW 20 MIN: CPT | Performed by: FAMILY MEDICINE

## 2018-03-09 PROCEDURE — 77386 HC IMRT COMPLEX: CPT | Performed by: RADIOLOGY

## 2018-03-09 PROCEDURE — 77336 RADIATION PHYSICS CONSULT: CPT | Performed by: RADIOLOGY

## 2018-03-09 PROCEDURE — 99243 OFF/OP CNSLTJ NEW/EST LOW 30: CPT | Performed by: SURGERY

## 2018-03-09 RX ORDER — CEPHALEXIN 250 MG/1
500 CAPSULE ORAL 2 TIMES DAILY
Qty: 28 CAPSULE | Refills: 0 | Status: SHIPPED | OUTPATIENT
Start: 2018-03-09 | End: 2018-03-16

## 2018-03-09 NOTE — H&P
Min Koch is a 28year old male  No chief complaint on file. REFERRED BY    Patient presents with swelling on the ian cleft area for 1 week. Patient states that he developed severe swelling it with pain at this location.   Patient states he ha 1 tablet by mouth every 6 (six) hours as needed for Pain. Disp: 30 tablet Rfl: 0   Omeprazole 40 MG Oral Capsule Delayed Release Take 1 capsule (40 mg total) by mouth daily.  Disp: 30 capsule Rfl: 1     No current facility-administered medications on file p auscultation, no rales , rhonchi or wheezing  CARDIOVASCULAR: RRR, murmur negative  ABDOMEN: normal active BS, soft, nondistended  no HSM, no masses or hernias  LYMPHATIC: no axillary , supraclavicular or inguinal lymphadenopathy  EXTREMITIES: no cyanosis, 0.10 x10(3) uL Final   • Immature Granulocyte Absolute 03/01/2018 0.06  0.00 - 1.00 x10(3) uL Final   • Neutrophil % 03/01/2018 96.7  % Final   • Lymphocyte % 03/01/2018 1.0  % Final   • Monocyte % 03/01/2018 1.4  % Final   • Eosinophil % 03/01/2018 0.0  %

## 2018-03-09 NOTE — PATIENT INSTRUCTIONS
POST-RADIATION INSTRUCTIONS:   - CALL [phone] FOR A FOLLOW-UP WITH DR. Gaston Kim 3 MONTHS AFTER SURGERY  - PET/CT ON APRIL 2 AT 8 AM  [address] DR. ORTIZ ON APRIL 4 AT 11 AM AT [address] [facility] EFFECTS O

## 2018-03-09 NOTE — PROGRESS NOTES
Sharifa Saunders is a 28year old male. Patient presents with: Other: pt noticed area about tail bone, possible cyst.. pt reports it drainged last night. . room 2      HPI:   As above. He did have some mild pain. Drain last night.   He really was not awar WISDOM TEETH REMOVED  Family History   Problem Relation Age of Onset   • Cancer Mother 50     breast cancer   • cancer- kidney [OTHER] Maternal Grandfather    • Cancer Paternal Grandmother      cervicasl cancer        Social History:  Smoking status: Never

## 2018-03-09 NOTE — TELEPHONE ENCOUNTER
Bump on tailbone not sure if it is a cyst, is there something that can help like a cream? Please advise

## 2018-03-09 NOTE — TELEPHONE ENCOUNTER
Patient states that he has what appears to be a pimple on his tailbone. It broke open in the middle of the night. It continues to drain a yellowish, brown discharge. No fever. Advised that patient should be seen.   He has an appointment with Dr. Yash Blevins

## 2018-03-09 NOTE — PROGRESS NOTES
Pt meeting criteria for SEVERE MALNUTRITION in the context of acute illness as evidenced by 6% unplanned wt loss x 1 mo and po intake meeting less than 50% estimated nutrition needs.     Nutrition F/U Note:      Date of visit: 03/08/2018     Diet Rx: High

## 2018-03-12 ENCOUNTER — HOSPITAL ENCOUNTER (OUTPATIENT)
Dept: RADIATION ONCOLOGY | Age: 28
Discharge: HOME | End: 2018-03-12
Attending: RADIOLOGY
Payer: COMMERCIAL

## 2018-03-12 VITALS
HEART RATE: 17 BPM | OXYGEN SATURATION: 97 % | RESPIRATION RATE: 17 BRPM | DIASTOLIC BLOOD PRESSURE: 84 MMHG | SYSTOLIC BLOOD PRESSURE: 125 MMHG

## 2018-03-12 DIAGNOSIS — C16.0 GE JUNCTION CARCINOMA (HCC): Primary | ICD-10-CM

## 2018-03-12 PROCEDURE — 77386 HC IMRT COMPLEX: CPT | Performed by: RADIOLOGY

## 2018-03-12 NOTE — PROGRESS NOTES
[facility] Radiation Treatment Management Note 26-30    Patient:  Bryon Moss  Age:  28year old  Visit Diagnosis:    1. GE junction carcinoma ([address]      Primary Rad/Onc:  Dr. Zee Coto    Site Delivered Dose (Gy) Prescr

## 2018-03-13 PROCEDURE — 77386 HC IMRT COMPLEX: CPT | Performed by: RADIOLOGY

## 2018-03-16 PROCEDURE — 77336 RADIATION PHYSICS CONSULT: CPT | Performed by: RADIOLOGY

## 2018-03-17 ENCOUNTER — MEDICAL CORRESPONDENCE (OUTPATIENT)
Dept: RADIATION ONCOLOGY | Age: 28
End: 2018-03-17

## 2018-03-17 NOTE — PROGRESS NOTES
[facility] Location: [facility]   Date of Birth   10/3/1990 Radiation Oncologist     Татьяна Bonds MD, [facility]       RADIATION ONCOLOGY TREATMENT SUMMARY     PHYSICIANS: T3N1M0, stage IIIA.       The patient underwent metastatic workup including CT chest, abdomen, and pelvis performed January 17, 2018. This revealed a large distal esophageal mass consistent with his known adenocarcinoma.   It measured 3.5 x 3.2 x 3.5 cm on 3/8/2018 36   Esophagus Boost 6X 1.8 3 / 3 5.4 3/9/2018 3/13/2018 4   Total:    50.4 1/31/2018 3/13/2018 41     [x] Concurrent Chemo:  Drugs: Carboplatin and Taxol (Dr. Jourdan Lin)    TOLERANCE: The patient tolerated radiation therapy reasonably well and had m

## 2018-03-21 ENCOUNTER — TELEPHONE (OUTPATIENT)
Dept: HEMATOLOGY/ONCOLOGY | Facility: HOSPITAL | Age: 28
End: 2018-03-21

## 2018-03-21 NOTE — TELEPHONE ENCOUNTER
Dry cough with some dyspnea with exertion, Dr Ginny Simmonds told him related to RT, asking when to expect to go away.

## 2018-04-02 ENCOUNTER — HOSPITAL ENCOUNTER (OUTPATIENT)
Dept: NUCLEAR MEDICINE | Facility: HOSPITAL | Age: 28
Discharge: HOME | End: 2018-04-02
Attending: INTERNAL MEDICINE
Payer: COMMERCIAL

## 2018-04-02 DIAGNOSIS — C16.0 GE JUNCTION CARCINOMA (HCC): ICD-10-CM

## 2018-04-02 PROCEDURE — 82962 GLUCOSE BLOOD TEST: CPT

## 2018-04-02 PROCEDURE — 78815 PET IMAGE W/CT SKULL-THIGH: CPT | Performed by: INTERNAL MEDICINE

## 2018-04-04 ENCOUNTER — OFFICE VISIT (OUTPATIENT)
Dept: SURGERY | Facility: CLINIC | Age: 28
End: 2018-04-04

## 2018-04-04 ENCOUNTER — OFFICE VISIT (OUTPATIENT)
Dept: HEMATOLOGY/ONCOLOGY | Facility: HOSPITAL | Age: 28
End: 2018-04-04
Attending: INTERNAL MEDICINE
Payer: COMMERCIAL

## 2018-04-04 VITALS
OXYGEN SATURATION: 100 % | WEIGHT: 283 LBS | TEMPERATURE: 98 F | DIASTOLIC BLOOD PRESSURE: 84 MMHG | HEIGHT: 78 IN | BODY MASS INDEX: 32.74 KG/M2 | RESPIRATION RATE: 17 BRPM | HEART RATE: 87 BPM | SYSTOLIC BLOOD PRESSURE: 123 MMHG

## 2018-04-04 DIAGNOSIS — C16.0 GE JUNCTION CARCINOMA (HCC): Primary | ICD-10-CM

## 2018-04-04 PROCEDURE — 99214 OFFICE O/P EST MOD 30 MIN: CPT | Performed by: SURGERY

## 2018-04-04 PROCEDURE — 99211 OFF/OP EST MAY X REQ PHY/QHP: CPT

## 2018-04-04 NOTE — H&P
[facility] Surgical Oncology    Patient Name:  Sharifa Saunders   YOB: 1990   Gender:  Male   Appt Date:  4/4/2018   Provider:  Brandi Yousif MD   Insurance:  [facility]    Address: [facility] •  HYDROcodone-acetaminophen 7.5-325 MG/15ML Oral Solution, Take 15 mL by mouth every 4 (four) hours as needed for Pain., Disp: 473 mL, Rfl: 0  •  dexamethasone (DECADRON) 4 MG tablet, 2 tabs evening prior and am of chemo, Disp: 12 tablet, Rfl: 0  •  LORAZ Alcohol use: Yes           0.0 oz/week     Comment: 4-5 beers on the weekend     Reviewed:  Family History   Problem Relation Age of Onset   • Cancer Mother 50     breast cancer   • cancer- kidney [OTHER] Maternal Grandfather    • Cancer Paternal Herve Villatoro Comparing to the study from 1/22/2018, FDG activity within the distal esophageal mass has decreased from prior study. Maximal SUV on prior examination was 18.32 and size of mass has decreased.   On prior examination this measured approximate 3.7 x 2.8 cm

## 2018-04-04 NOTE — PATIENT INSTRUCTIONS
Surgery:Esophagogastrectomy with feeding j-tube. Joint case with Dr. Mark Srinivasan. Date of Surgery: 4/27/18    Hosptial:    [place] [address] Mariela Damon, [address]  Phone: [phone]      · This is an inpatient procedure.   · Use t your primary care physician of your surgery and ask if him/her will need to see you prior to surgery. · Contact Lalo Byers RN for CTU7 education class for Friday 4/13/18 10am-11am. [phone].       Pre-Operative Testing   CBC  CMP  BMP    PT, PTT, I

## 2018-04-05 ENCOUNTER — SOCIAL WORK SERVICES (OUTPATIENT)
Dept: HEMATOLOGY/ONCOLOGY | Facility: HOSPITAL | Age: 28
End: 2018-04-05

## 2018-04-05 NOTE — PROGRESS NOTES
ELIO prepared letter for patient to return to work until his surgery date. ELIO gave to [facility] for Dr. Denita Valladares signature.

## 2018-04-06 DIAGNOSIS — C16.0 GE JUNCTION CARCINOMA (HCC): Primary | ICD-10-CM

## 2018-04-09 NOTE — CONSULTS
Thoracic Surgery Consult    Reason for Consultation: esophageal cancer    Consulting Physician: Brandin Peck    Chief Complaint: \" I've finished my chemo and radiation. \"    History of Present Illness: Patient is a 28year old, male with esophageal cancer. Breath      Past Medical History:    Past Medical History:   Diagnosis Date   • Abdominal distention    • Abdominal pain    • Bloating    • Body piercing    • Chest pain    • Decorative tattoo    • Flatulence/gas pain/belching    • Frequent urinat non-distended. No hepatosplenomegaly noted. Extremities: No clubbing or cyanosis.  No lateralizing weakness  Neuro: No gross cranial nerve defects, no loss of sensation  Psych: oriented to person place and time, normal mood and affect    Review of Data: risks.

## 2018-04-12 ENCOUNTER — SOCIAL WORK SERVICES (OUTPATIENT)
Dept: HEMATOLOGY/ONCOLOGY | Facility: HOSPITAL | Age: 28
End: 2018-04-12

## 2018-04-12 NOTE — PROGRESS NOTES
Physician's portion of Application for Disability Benefits completed and faxed to [place]., [phone].

## 2018-04-16 RX ORDER — HEPARIN SODIUM 5000 [USP'U]/ML
5000 INJECTION, SOLUTION INTRAVENOUS; SUBCUTANEOUS ONCE
Status: CANCELLED | OUTPATIENT
Start: 2018-04-16 | End: 2018-04-16

## 2018-04-16 RX ORDER — METRONIDAZOLE 500 MG/100ML
500 INJECTION, SOLUTION INTRAVENOUS ONCE
Status: CANCELLED | OUTPATIENT
Start: 2018-04-16 | End: 2018-04-16

## 2018-04-18 NOTE — TELEPHONE ENCOUNTER
Patient has [facility] paperwork that needs to be completed, gave him fax number. Patient also with a panic/anxiety attack last night. He took Ativan 1 mg twice and he was able to sleep.  Discussed with him and will start Celexa and will also take Ativan PRN when

## 2018-04-19 ENCOUNTER — SOCIAL WORK SERVICES (OUTPATIENT)
Dept: HEMATOLOGY/ONCOLOGY | Facility: HOSPITAL | Age: 28
End: 2018-04-19

## 2018-04-21 ENCOUNTER — LABORATORY ENCOUNTER (OUTPATIENT)
Dept: LAB | Age: 28
End: 2018-04-21
Payer: COMMERCIAL

## 2018-04-21 DIAGNOSIS — C16.0 GE JUNCTION CARCINOMA (HCC): ICD-10-CM

## 2018-04-21 PROCEDURE — 86850 RBC ANTIBODY SCREEN: CPT | Performed by: SURGERY

## 2018-04-21 PROCEDURE — 86901 BLOOD TYPING SEROLOGIC RH(D): CPT | Performed by: SURGERY

## 2018-04-21 PROCEDURE — 86900 BLOOD TYPING SEROLOGIC ABO: CPT | Performed by: SURGERY

## 2018-04-25 ENCOUNTER — ANESTHESIA EVENT (OUTPATIENT)
Dept: SURGERY | Facility: HOSPITAL | Age: 28
DRG: 327 | End: 2018-04-25
Payer: COMMERCIAL

## 2018-04-27 ENCOUNTER — HOSPITAL ENCOUNTER (INPATIENT)
Facility: HOSPITAL | Age: 28
LOS: 7 days | Discharge: HOME HEALTH | DRG: 327 | End: 2018-05-04
Attending: SURGERY | Admitting: SURGERY
Payer: COMMERCIAL

## 2018-04-27 ENCOUNTER — ANESTHESIA (OUTPATIENT)
Dept: SURGERY | Facility: HOSPITAL | Age: 28
DRG: 327 | End: 2018-04-27
Payer: COMMERCIAL

## 2018-04-27 ENCOUNTER — SURGERY (OUTPATIENT)
Age: 28
End: 2018-04-27

## 2018-04-27 ENCOUNTER — APPOINTMENT (OUTPATIENT)
Dept: GENERAL RADIOLOGY | Facility: HOSPITAL | Age: 28
DRG: 327 | End: 2018-04-27
Attending: INTERNAL MEDICINE
Payer: COMMERCIAL

## 2018-04-27 DIAGNOSIS — C16.0 GE JUNCTION CARCINOMA (HCC): Primary | ICD-10-CM

## 2018-04-27 PROBLEM — D69.6 THROMBOCYTOPENIA (HCC): Status: ACTIVE | Noted: 2018-04-27

## 2018-04-27 PROBLEM — F41.9 ANXIETY: Status: ACTIVE | Noted: 2018-04-27

## 2018-04-27 PROCEDURE — 07B70ZX EXCISION OF THORAX LYMPHATIC, OPEN APPROACH, DIAGNOSTIC: ICD-10-PCS | Performed by: SURGERY

## 2018-04-27 PROCEDURE — 71045 X-RAY EXAM CHEST 1 VIEW: CPT | Performed by: INTERNAL MEDICINE

## 2018-04-27 PROCEDURE — 0D1A0Z4 BYPASS JEJUNUM TO CUTANEOUS, OPEN APPROACH: ICD-10-PCS | Performed by: SURGERY

## 2018-04-27 PROCEDURE — 0DB40ZZ EXCISION OF ESOPHAGOGASTRIC JUNCTION, OPEN APPROACH: ICD-10-PCS | Performed by: THORACIC SURGERY (CARDIOTHORACIC VASCULAR SURGERY)

## 2018-04-27 PROCEDURE — 88342 IMHCHEM/IMCYTCHM 1ST ANTB: CPT

## 2018-04-27 PROCEDURE — 88360 TUMOR IMMUNOHISTOCHEM/MANUAL: CPT

## 2018-04-27 PROCEDURE — 0DB30ZZ EXCISION OF LOWER ESOPHAGUS, OPEN APPROACH: ICD-10-PCS | Performed by: THORACIC SURGERY (CARDIOTHORACIC VASCULAR SURGERY)

## 2018-04-27 PROCEDURE — 0DB60ZZ EXCISION OF STOMACH, OPEN APPROACH: ICD-10-PCS | Performed by: SURGERY

## 2018-04-27 PROCEDURE — 0BJ08ZZ INSPECTION OF TRACHEOBRONCHIAL TREE, VIA NATURAL OR ARTIFICIAL OPENING ENDOSCOPIC: ICD-10-PCS | Performed by: THORACIC SURGERY (CARDIOTHORACIC VASCULAR SURGERY)

## 2018-04-27 PROCEDURE — 07BB0ZX EXCISION OF MESENTERIC LYMPHATIC, OPEN APPROACH, DIAGNOSTIC: ICD-10-PCS | Performed by: SURGERY

## 2018-04-27 PROCEDURE — 99233 SBSQ HOSP IP/OBS HIGH 50: CPT | Performed by: HOSPITALIST

## 2018-04-27 RX ORDER — ACETAMINOPHEN 500 MG
1000 TABLET ORAL ONCE
Status: ON HOLD | COMMUNITY
End: 2018-05-04

## 2018-04-27 RX ORDER — LORAZEPAM 2 MG/ML
1 INJECTION INTRAMUSCULAR EVERY 6 HOURS PRN
Status: DISCONTINUED | OUTPATIENT
Start: 2018-04-27 | End: 2018-05-04

## 2018-04-27 RX ORDER — DIPHENHYDRAMINE HYDROCHLORIDE 50 MG/ML
12.5 INJECTION INTRAMUSCULAR; INTRAVENOUS AS NEEDED
Status: ACTIVE | OUTPATIENT
Start: 2018-04-27 | End: 2018-04-28

## 2018-04-27 RX ORDER — SODIUM CHLORIDE, SODIUM LACTATE, POTASSIUM CHLORIDE, CALCIUM CHLORIDE 600; 310; 30; 20 MG/100ML; MG/100ML; MG/100ML; MG/100ML
INJECTION, SOLUTION INTRAVENOUS CONTINUOUS
Status: DISCONTINUED | OUTPATIENT
Start: 2018-04-27 | End: 2018-04-28

## 2018-04-27 RX ORDER — NALOXONE HYDROCHLORIDE 0.4 MG/ML
80 INJECTION, SOLUTION INTRAMUSCULAR; INTRAVENOUS; SUBCUTANEOUS AS NEEDED
Status: ACTIVE | OUTPATIENT
Start: 2018-04-27 | End: 2018-04-28

## 2018-04-27 RX ORDER — ONDANSETRON 2 MG/ML
4 INJECTION INTRAMUSCULAR; INTRAVENOUS EVERY 6 HOURS PRN
Status: CANCELLED | OUTPATIENT
Start: 2018-04-27

## 2018-04-27 RX ORDER — SODIUM CHLORIDE, SODIUM LACTATE, POTASSIUM CHLORIDE, CALCIUM CHLORIDE 600; 310; 30; 20 MG/100ML; MG/100ML; MG/100ML; MG/100ML
INJECTION, SOLUTION INTRAVENOUS CONTINUOUS
Status: CANCELLED | OUTPATIENT
Start: 2018-04-27

## 2018-04-27 RX ORDER — MEPERIDINE HYDROCHLORIDE 25 MG/ML
12.5 INJECTION INTRAMUSCULAR; INTRAVENOUS; SUBCUTANEOUS AS NEEDED
Status: ACTIVE | OUTPATIENT
Start: 2018-04-27 | End: 2018-04-28

## 2018-04-27 RX ORDER — ACETAMINOPHEN 500 MG
1000 TABLET ORAL ONCE
Status: DISCONTINUED | OUTPATIENT
Start: 2018-04-27 | End: 2018-04-27 | Stop reason: HOSPADM

## 2018-04-27 RX ORDER — ENOXAPARIN SODIUM 100 MG/ML
40 INJECTION SUBCUTANEOUS DAILY
Status: CANCELLED | OUTPATIENT
Start: 2018-04-27

## 2018-04-27 RX ORDER — HEPARIN SODIUM 5000 [USP'U]/ML
5000 INJECTION, SOLUTION INTRAVENOUS; SUBCUTANEOUS ONCE
Status: COMPLETED | OUTPATIENT
Start: 2018-04-27 | End: 2018-04-27

## 2018-04-27 RX ORDER — LORAZEPAM 2 MG/ML
0.5 INJECTION INTRAMUSCULAR EVERY 6 HOURS PRN
Status: DISCONTINUED | OUTPATIENT
Start: 2018-04-27 | End: 2018-05-04

## 2018-04-27 RX ORDER — FAMOTIDINE 20 MG/1
20 TABLET ORAL 2 TIMES DAILY
Status: CANCELLED | OUTPATIENT
Start: 2018-04-27

## 2018-04-27 RX ORDER — ACETAMINOPHEN 10 MG/ML
1000 INJECTION, SOLUTION INTRAVENOUS EVERY 6 HOURS
Status: CANCELLED | OUTPATIENT
Start: 2018-04-27

## 2018-04-27 RX ORDER — METOCLOPRAMIDE HYDROCHLORIDE 5 MG/ML
10 INJECTION INTRAMUSCULAR; INTRAVENOUS AS NEEDED
Status: ACTIVE | OUTPATIENT
Start: 2018-04-27 | End: 2018-04-28

## 2018-04-27 RX ORDER — ACETAMINOPHEN 10 MG/ML
1000 INJECTION, SOLUTION INTRAVENOUS EVERY 6 HOURS
Status: DISCONTINUED | OUTPATIENT
Start: 2018-04-27 | End: 2018-05-03

## 2018-04-27 RX ORDER — ENOXAPARIN SODIUM 100 MG/ML
40 INJECTION SUBCUTANEOUS DAILY
Status: DISCONTINUED | OUTPATIENT
Start: 2018-04-28 | End: 2018-05-04

## 2018-04-27 RX ORDER — FAMOTIDINE 10 MG/ML
20 INJECTION, SOLUTION INTRAVENOUS 2 TIMES DAILY
Status: DISCONTINUED | OUTPATIENT
Start: 2018-04-27 | End: 2018-05-04

## 2018-04-27 RX ORDER — FAMOTIDINE 10 MG/ML
20 INJECTION, SOLUTION INTRAVENOUS 2 TIMES DAILY
Status: CANCELLED | OUTPATIENT
Start: 2018-04-27

## 2018-04-27 RX ORDER — BUPIVACAINE HYDROCHLORIDE 2.5 MG/ML
INJECTION, SOLUTION EPIDURAL; INFILTRATION; INTRACAUDAL AS NEEDED
Status: DISCONTINUED | OUTPATIENT
Start: 2018-04-27 | End: 2018-04-27 | Stop reason: HOSPADM

## 2018-04-27 RX ORDER — FAMOTIDINE 20 MG/1
20 TABLET ORAL 2 TIMES DAILY
Status: DISCONTINUED | OUTPATIENT
Start: 2018-04-27 | End: 2018-05-04

## 2018-04-27 RX ORDER — CEFOXITIN 1 G/1
INJECTION, POWDER, FOR SOLUTION INTRAVENOUS
Status: DISCONTINUED | OUTPATIENT
Start: 2018-04-27 | End: 2018-04-27 | Stop reason: HOSPADM

## 2018-04-27 RX ORDER — ONDANSETRON 2 MG/ML
4 INJECTION INTRAMUSCULAR; INTRAVENOUS AS NEEDED
Status: ACTIVE | OUTPATIENT
Start: 2018-04-27 | End: 2018-04-28

## 2018-04-27 RX ORDER — HYDROMORPHONE HYDROCHLORIDE 1 MG/ML
0.4 INJECTION, SOLUTION INTRAMUSCULAR; INTRAVENOUS; SUBCUTANEOUS EVERY 5 MIN PRN
Status: DISPENSED | OUTPATIENT
Start: 2018-04-27 | End: 2018-04-27

## 2018-04-27 RX ORDER — ONDANSETRON 2 MG/ML
4 INJECTION INTRAMUSCULAR; INTRAVENOUS EVERY 6 HOURS PRN
Status: DISCONTINUED | OUTPATIENT
Start: 2018-04-27 | End: 2018-05-04

## 2018-04-27 RX ORDER — ACETAMINOPHEN 10 MG/ML
INJECTION, SOLUTION INTRAVENOUS
Status: DISCONTINUED | OUTPATIENT
Start: 2018-04-27 | End: 2018-04-27 | Stop reason: HOSPADM

## 2018-04-27 NOTE — CONSULTS
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  Disp: 30 tablet Rfl: 5 More than a month at Unknown time   Prochlorperazine Maleate (COMPAZINE) 10 mg tablet Take 1 evening prior and am of chemo Disp: 12 tablet Rfl: 0   LORAZEPAM 1 MG Oral Tab Take 1 tablet (1 mg total) by mouth every 4 (four) hours as needed for Anxiety.  Disp: 60 tablet Rfl: 0       Scheduled Medication:  • [START ON 4/28/2018] enoxaparin  40 mg Subc Pulse 94   Temp 97.9 °F (36.6 °C) (Temporal)   Resp 19   Ht 6' 6\" (1.981 m)   Wt 292 lb 5.3 oz (132.6 kg)   SpO2 98%   BMI 33.78 kg/m²     General Appearance:    Somnolent, obese, appears stated age   Head:    Normocephalic, without obvious abnormality, a nasal tube will check CBC  3. Anxiety  -prn benzos  4. FEN  -NPO, feeds when ok w/ surgery  5. Proph  -SCDs  6.  Dispo  -ICU                   Lizette Maria [facility] [place]  [facility] Pulmonary and Critical Care

## 2018-04-27 NOTE — INTERVAL H&P NOTE
There has been no significant change since I saw patient as documented in Psychiatric. Surgery revisted. To proceed as planned. Chele Dalal MD FACS

## 2018-04-27 NOTE — ANESTHESIA PREPROCEDURE EVALUATION
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          Pulmonary    Pulmonary exam normal.                 Other findings            ASA: 3   Plan: g

## 2018-04-27 NOTE — PLAN OF CARE
CARDIOVASCULAR - ADULT    • Maintains optimal cardiac output and hemodynamic stability Progressing        GASTROINTESTINAL - ADULT    • Minimal or absence of nausea and vomiting Progressing        HEMATOLOGIC - ADULT    • Maintains hematologic stability Pr

## 2018-04-27 NOTE — BRIEF OP NOTE
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

## 2018-04-27 NOTE — H&P (VIEW-ONLY)
[facility]    Patient Name:  Guerline Kelsey   YOB: 1990   Gender:  Male   Appt Date:  4/4/2018   Provider:  Larry Jacobsen MD   Insurance:  [facility]    Address: [facility] •  HYDROcodone-acetaminophen 7.5-325 MG/15ML Oral Solution, Take 15 mL by mouth every 4 (four) hours as needed for Pain., Disp: 473 mL, Rfl: 0  •  dexamethasone (DECADRON) 4 MG tablet, 2 tabs evening prior and am of chemo, Disp: 12 tablet, Rfl: 0  •  LORAZ Alcohol use: Yes           0.0 oz/week     Comment: 4-5 beers on the weekend     Reviewed:  Family History   Problem Relation Age of Onset   • Cancer Mother 50     breast cancer   • cancer- kidney [OTHER] Maternal Grandfather    • Cancer Paternal Silvia Valencia Comparing to the study from 1/22/2018, FDG activity within the distal esophageal mass has decreased from prior study. Maximal SUV on prior examination was 18.32 and size of mass has decreased.   On prior examination this measured approximate 3.7 x 2.8 cm

## 2018-04-27 NOTE — ANESTHESIA POSTPROCEDURE EVALUATION
[address] Patient Status:  Inpatient   Age/Gender 28year old male MRN [id number]   [facility] 4SW-A Attending Caitlyn Valdovinos MD   Hosp Day # 0 PCP Niko Mariscal, DO       Anesthesia Post-op Note    Procedure(s):  La

## 2018-04-28 ENCOUNTER — APPOINTMENT (OUTPATIENT)
Dept: GENERAL RADIOLOGY | Facility: HOSPITAL | Age: 28
DRG: 327 | End: 2018-04-28
Attending: NURSE PRACTITIONER
Payer: COMMERCIAL

## 2018-04-28 PROCEDURE — 71045 X-RAY EXAM CHEST 1 VIEW: CPT | Performed by: NURSE PRACTITIONER

## 2018-04-28 PROCEDURE — 99232 SBSQ HOSP IP/OBS MODERATE 35: CPT | Performed by: HOSPITALIST

## 2018-04-28 RX ORDER — HYDROMORPHONE HYDROCHLORIDE 1 MG/ML
0.3 INJECTION, SOLUTION INTRAMUSCULAR; INTRAVENOUS; SUBCUTANEOUS
Status: DISCONTINUED | OUTPATIENT
Start: 2018-04-28 | End: 2018-04-29

## 2018-04-28 RX ORDER — DEXTROSE, SODIUM CHLORIDE, AND POTASSIUM CHLORIDE 5; .45; .15 G/100ML; G/100ML; G/100ML
INJECTION INTRAVENOUS CONTINUOUS
Status: DISCONTINUED | OUTPATIENT
Start: 2018-04-28 | End: 2018-05-04

## 2018-04-28 RX ORDER — IPRATROPIUM BROMIDE AND ALBUTEROL SULFATE 2.5; .5 MG/3ML; MG/3ML
3 SOLUTION RESPIRATORY (INHALATION) EVERY 4 HOURS PRN
Status: DISCONTINUED | OUTPATIENT
Start: 2018-04-28 | End: 2018-05-04

## 2018-04-28 NOTE — PROGRESS NOTES
[facility]   [facility] Progress Note    Boom Brody Patient Status:  Inpatient    10/3/1990 MRN [id number]   [facility] 4SW-A Attending Lincoln Kaur MD   Hosp Day # 1 PCP Jennifer Avila DO     Subjective:    Pain with deep breathin 04/28/2018   BUN 9 04/28/2018    04/28/2018   K 4.3 04/28/2018    04/28/2018   CO2 27.0 04/28/2018    04/28/2018   CA 7.6 04/28/2018   ALB 2.5 04/28/2018       Chest Xray: FINDINGS:       Enteric catheter having the tip adjacent to the r

## 2018-04-28 NOTE — PROGRESS NOTES
Pulmonary Progress Note        NAME: [facility]: 469/469-A - MRN: [id number] - Age: 28year old - : 10/3/1990        SUBJECTIVE: No events overnight, still having some pain related to chest tube    OBJECTIVE:   18  0500 18  06 for input(s): BNP in the last 72 hours.     Invalid input(s): TROPI      Albumin   Date/Time Value Ref Range Status   04/28/2018 04:14 AM 2.5 (L) 3.5 - 4.8 g/dL Final   ----------      Imaging: chest x-ray reviewed- bibasilar atelectasis, chest tube noted

## 2018-04-28 NOTE — PLAN OF CARE
Pt alert/. oriented x4, afebrile, rating pain 5-8 out of 10, states pain is the worst at chest tube site, right rib cage. Abdominal incisions intact, old bloody drainage on dressings, no active bleeding.   PATO x1 Right abdomen draining serosang (85ml dayshif

## 2018-04-28 NOTE — PROGRESS NOTES
[facility]  Progress Note    Rodolfo Boast Patient Status:  Inpatient    10/3/1990 MRN [id number]   [facility] 4SW-A Attending Heath Andrews MD   Hosp Day # 1 PCP Jill Stevenson DO     Subjective:    Awake and alert.   Pain control

## 2018-04-28 NOTE — DIETARY NOTE
[address] ASSESSMENT    Pt does not meet malnutrition criteria    NUTRITION DIAGNOSIS/PROBLEM:    Inadequate oral intake related to altered GI structure and/or function as evidenced by need for J-tube feeds and NPO status    NUTR grams protein/day (1.5-2 grams protein per kg IBW)  Fluid: ~1 ml/kcal or per MD discretion    MONITOR AND EVALUATE/NUTRITION GOALS:    3. No signs of skin breakdown  4. Maintain lean body mass  5.  Tolerance of enteral nutrition without signs/symptoms of in

## 2018-04-28 NOTE — PROGRESS NOTES
[facility]  Progress Note     Christiankapil Warner Patient Status:  Inpatient    10/3/1990 MRN [id number]   [facility] 4SW-A Attending Brittni Quintanilla MD   Hosp Day # 1 PCP Ainsley Chew DO     Chief Complaint: Medical Management    S: 1. Esophageal CA:  S/P Esophagogastrectomy, pain control. Per Dr. Steffanie Tucker  2. Thrombocytopenia:  Stable, likely mild consumption.   3. Anxiety:  PRN ativan    Plan of care: As above    Quality:  · DVT Prophylaxis: Lovenox  · CODE status: Full  · Howard: P

## 2018-04-28 NOTE — CONSULTS
[facility]  CONSULT     [address] [facility] Patient Status:  Inpatient    10/3/1990 MRN [id number]   [facility] 4SW-A Attending Lauren Blevins MD   Hosp Day # 0 PCP Keyonna Bland DO     Reason for consult: Medical management    Req Paclitaxel              Pain, Face Flushing, Shortness of                            Breath    Medications:    No current facility-administered medications on file prior to encounter.    Current Outpatient Prescriptions on File Prior to Encounter:  dexame sounds. No rebound, guarding or organomegaly. Neurologic: No focal neurological deficits. CNII-XII grossly intact. Musculoskeletal: Moves all extremities. Extremities: No edema or cyanosis. Integument: No rashes or lesions.    Psychiatric: Appropriate m

## 2018-04-29 ENCOUNTER — APPOINTMENT (OUTPATIENT)
Dept: GENERAL RADIOLOGY | Facility: HOSPITAL | Age: 28
DRG: 327 | End: 2018-04-29
Attending: NURSE PRACTITIONER
Payer: COMMERCIAL

## 2018-04-29 PROCEDURE — 99232 SBSQ HOSP IP/OBS MODERATE 35: CPT | Performed by: HOSPITALIST

## 2018-04-29 PROCEDURE — 71045 X-RAY EXAM CHEST 1 VIEW: CPT | Performed by: NURSE PRACTITIONER

## 2018-04-29 RX ORDER — HYDROMORPHONE HYDROCHLORIDE 1 MG/ML
0.5 INJECTION, SOLUTION INTRAMUSCULAR; INTRAVENOUS; SUBCUTANEOUS EVERY 30 MIN PRN
Status: DISCONTINUED | OUTPATIENT
Start: 2018-04-29 | End: 2018-05-04

## 2018-04-29 RX ORDER — KETOROLAC TROMETHAMINE 30 MG/ML
30 INJECTION, SOLUTION INTRAMUSCULAR; INTRAVENOUS ONCE
Status: COMPLETED | OUTPATIENT
Start: 2018-04-29 | End: 2018-04-29

## 2018-04-29 NOTE — PROGRESS NOTES
Pulmonary Progress Note        NAME: Kristie Reeves - ROOM: [address] [place] [address]-A - MRN: [id number] - Age: 28year old - : 10/3/1990        SUBJECTIVE: Febrile and tachycardic overnight w/ increased pain, better this AM    OBJECTIVE:   18  0400 18 Invalid input(s): ARTERIALPH, ARTERIALPO2, ARTERIALPCO2, ARTERIALHCO3    No results for input(s): BNP in the last 72 hours.     Invalid input(s): TROPI      Albumin   Date/Time Value Ref Range Status   04/28/2018 04:14 AM 2.5 (L) 3.5 - 4.8 g/dL Final

## 2018-04-29 NOTE — PLAN OF CARE
Assumed care at 1900. Pt alert and oriented x4, intermittently drowsy. -116 per tele at the start of the shift. On 3L NC. At 2000 temp up to 101.6, not due for tylenol yet and verified with pharmacy about giving it early. Cold wash cloth applied.  HR Term Goal Progressing    • Patient/Family Short Term Goal Progressing        RESPIRATORY - ADULT    • Achieves optimal ventilation and oxygenation Progressing        SKIN/TISSUE INTEGRITY - ADULT    • Skin integrity remains intact Progressing    • Incision

## 2018-04-29 NOTE — PROGRESS NOTES
[facility]  Progress Note    Virginie Spicer Patient Status:  Inpatient    10/3/1990 MRN [id number]   [facility] [place] 4SW-A Attending Elida Cockayne, MD   [address] Day # 2 PCP Bridgette Hammond DO     Subjective:    Pain better controlled.  Compla

## 2018-04-29 NOTE — PROGRESS NOTES
Assumed care of patient at 1330  AOx4, [facility], NSR on tele  Pain 2/10- managed with Dilaudid PCA  NPO  NGT to LISChrisube with Vital TF at 20 ml/hr  Right chest tube to wall suction, dressing C/D/I  Right PATO drain, dressing C/D/I  IVF infusing per order  Will

## 2018-04-29 NOTE — PHYSICAL THERAPY NOTE
PHYSICAL THERAPY EVALUATION - INPATIENT     Room Number: 469/469-A  Evaluation Date: 4/29/2018  Type of Evaluation: Initial  Physician Order: PT Eval and Treat    Presenting Problem: S/p gastroesophagectomy on 4/27 for ge junction ca  Reason for Ther ok\"    Patient self-stated goal is  - not stated this session, pt lethargic.     OBJECTIVE  Precautions: Chest tube;Drain(s) (Hob cannot be lower than 30 degrees)  Fall Risk: Standard fall risk    WEIGHT BEARING RESTRICTION  Weight Bearing Restriction: Non Scale): 45.44   CMS Modifier (G-Code): CK    FUNCTIONAL ABILITY STATUS  Gait Assessment   Gait Assistance: Dependent assistance (Based on FIM scale per dept protocol)  Distance (ft): 3  Assistive Device: Rolling walker  Pattern: Shuffle          Skilled Th degree of impairment in mobility. Research supports that patients with this level of impairment may benefit from home at d/c. Based on this evaluation, patient's clinical presentation is stable and overall the evaluation complexity is considered low.   The

## 2018-04-29 NOTE — PROGRESS NOTES
[facility]  Progress Note     Telly Boast Patient Status:  Inpatient    10/3/1990 MRN [id number]   [facility] 4SW-A Attending Heath Andrews MD   Hosp Day # 2 PCP Jill Stevenson DO     Chief Complaint: Medical Management    S: Once   • enoxaparin  40 mg Subcutaneous Daily   • acetaminophen  1,000 mg Intravenous Q6H   • famoTIDine  20 mg Oral BID    Or   • famoTIDine  20 mg Intravenous BID       ASSESSMENT / PLAN:     1. Esophageal CA:  S/P Esophagogastrectomy, pain control.   Per

## 2018-04-29 NOTE — PROGRESS NOTES
[facility]   [facility] Progress Note    Cristofer Rose Patient Status:  Inpatient    10/3/1990 MRN [id number]   [facility] 4SW-A Attending Hima Torrez MD   Hosp Day # 2 PCP Geradlo Bro DO     Subjective:  Sitting up in chair surgi (ATIVAN) injection 1 mg 1 mg Intravenous Q6H PRN       Labs:    Lab Results  Component Value Date   WBC 5.8 04/29/2018   HGB 11.0 04/29/2018   HCT 34.7 04/29/2018   .0 04/29/2018   CREATSERUM 0.75 04/29/2018   BUN 9 04/29/2018    04/29/2018

## 2018-04-30 PROCEDURE — 99232 SBSQ HOSP IP/OBS MODERATE 35: CPT | Performed by: HOSPITALIST

## 2018-04-30 NOTE — PROGRESS NOTES
POD #3    Patient admits to feeling warm at night, Tmax 100 F  Denies worsening abdominal pain, N/V, chest pain, or SOB  No flatus, No BM    /72 (BP Location: Right arm)   Pulse 104   Temp 97.9 °F (36.6 °C) (Oral)   Resp 18   Ht 1.981 m (6' 6\")   Wt

## 2018-04-30 NOTE — OPERATIVE REPORT
[facility]    PATIENT'S NAME: ARUN Benitez   ATTENDING PHYSICIAN: Neva Grimaldo. Quita Villanueva MD   OPERATING PHYSICIAN: Neva Grimaldo.  Quita Villanueva MD   PATIENT ACCOUNT#:   [id number]    LOCATION:  [address] [place] [address]  MEDICAL RECORD #:   [id number]       DATE OF BIRTH: appeared to be soft, clear spots on the omentum. Biopsies were performed, which were negative. The greater curvature of the stomach was then mobilized towards the hiatus. This was carried out, also, on the medial aspect of the lesser curvature.   The eso further tacking sutures to the abdominal wall and seromuscularly. The system was inspected for leak via saline injection, and none was noted. The drain was stitched in place using 3-0 nylon suture.   The 12 mm trocar site on the right side was closed usin

## 2018-04-30 NOTE — PROGRESS NOTES
04/30/18 1453   Clinical Encounter Type   Visited With Patient and family together  (Responded to consult.)   Continue Visiting No  ( remains available at pager #[phone] as needed/requested.)   Patient's Supportive Strategies/Resources  pro

## 2018-04-30 NOTE — PAYOR COMM NOTE
--------------  ADMISSION REVIEW     Payor: GABBIE RODAS  Subscriber #:  [id number]  Authorization Number:  [id number]    Admit date: 4/27/18  Admit time: [address]       Admitting Physician: Woo Louis MD  Attending Physician:  Woo Louis MD  Primary Car AM  2. Adenoca s/p Esophagogastrectomy and Lymph node dissection  -surgery following  -managing tubes and drains  3. Anemia  -due to underlying disease  -monitor and transfuse as needed for Hgb <7  4.  Thrombocytopenia  -suspect consumption related to surge Intravenous Ivana Alejandra, KLEBER    4/29/2018 1946 Rate/Dose Verify (none) Intravenous Jorge Swanson, KLEBER    4/29/2018 1701 New Bag (none) Intravenous Ying Grant, RN          PLEASE FAX INPATIENT AUTHORIZATION. THANK YOU.

## 2018-04-30 NOTE — PLAN OF CARE
Assumed care of patient at 0730  Oriented X 4, drowsy at times, 1LNC, NSR/ST on tele  Pain managed with Dilaudid PCA/IV Tylenol  NPO, Vital AF Tube feeding via J tube, tolerating, no residuals noted  Right PATO drain with serosanguinous output, dressing C/D/

## 2018-04-30 NOTE — OPERATIVE REPORT
[address]    PATIENT'S NAME: Phillip ARUN Romero   ATTENDING PHYSICIAN: Vu Franks MD   OPERATING PHYSICIAN: Mony Schuster.  Ailyn Verma MD   PATIENT ACCOUNT#:   [id number]    LOCATION:  [address] [place] [address]  MEDICAL RECORD #:   [id number]       DATE OF BIR supine and underwent laparoscopic gastric mobilization and conduit creation with lymph node dissection by Dr. Yanira Velazquez with me as co-surgeon. Please see separate dictation for this portion of the procedure.   Once this was done, he was placed in left lateral and transected it using a vascular load of the Ethicon stapler. I continued my circumferential dissection to approximately 2 cm above the azygous vein.   I then went back down cephalad and ensured that I had good dissection around the esophagus at the leve with warm water irrigation. An NG tube was placed by anesthesia and air was insufflated through this and the conduit was clamped. No leaking was seen from the conduit or the anastomosis. Irrigation was sucked out.   I placed a 28-Sinhala chest tube and a

## 2018-04-30 NOTE — PHYSICAL THERAPY NOTE
PHYSICAL THERAPY TREATMENT NOTE - INPATIENT    Room Number: 6187/2710-K     Session: 1   Number of Visits to Meet Established Goals: 3    Presenting Problem: S/p gastroesophagectomy on 4/27 for ge junction ca    Problem List  Active Problems:    Non morbi bedclothes, sheets and blankets)?: None   -   Sitting down on and standing up from a chair with arms (e.g., wheelchair, bedside commode, etc.): A Little   -   Moving from lying on back to sitting on the side of the bed?: None   How much help from another p session/findings; All patient questions and concerns addressed; Family present    ASSESSMENT     Pt seen for gait training this AM due to [facility] admission for S/p gastroesophagectomy on 4/27 for ge junction ca.     Results of the AM-PAC \"6 clicks\"

## 2018-04-30 NOTE — PAYOR COMM NOTE
--------------  CONTINUED STAY REVIEW    Carlos A Strickland [facility]  Subscriber #:  [id number]  Authorization Number:  [id number]    Admit date: 4/27/18  Admit time: 1624    Admitting Physician: Woo Louis MD  Attending Physician:  Woo Louis MD  Primary Ca feeds at 40 mL/hr, decreased IV fluids to 60 mL/hr              -Bowel regimen  Neuro: postoperative pain              -IV dilaudid PRN, IV tylenol scheduled q6h  Prophylaxis: SQH, SCDs, OOBA at least 3x daily      PHYSICAL THERAPY TREATMENT NOTE - Joselito Bedoya

## 2018-04-30 NOTE — PROGRESS NOTES
THORACIC SURGERY PROGRESS NOTE  Keya Chavarria is a 28year old male. MRN [id number]. Admitted 4/27/2018    [address] [facility] EVENTS:     No acute events overnight. Patient up to chair this morning, has not ambulated.   Tolerating tube feeds, complaint of junction now s/p [place] Bruno esophagectomy on 4/27/18, POD 3    CV: No active issues  Pulm: Right chest tube to water seal, Geovanni drain on bulb suction   -AMBULATE IN HALLWAY at least 3 times daily   -Wilfredo; encourage cough/deep breathe; IS 10x/hr every h

## 2018-04-30 NOTE — PROGRESS NOTES
[facility]  Progress Note     Mau Barney Patient Status:  Inpatient    10/3/1990 MRN [id number]   [facility] 4SW-A Attending Dacia Clayton MD   Hosp Day # 3 PCP Laine Jones DO     Chief Complaint: Medical Management    S: famoTIDine  20 mg Oral BID    Or   • famoTIDine  20 mg Intravenous BID       ASSESSMENT / PLAN:     1. Esophageal CA:  S/P Esophagogastrectomy, pain control. Per Dr. Nancy Sunshine  2. Thrombocytopenia:  Stable, likely mild consumption. CBC today  3.  Atelectasis:

## 2018-05-01 PROCEDURE — 99232 SBSQ HOSP IP/OBS MODERATE 35: CPT | Performed by: HOSPITALIST

## 2018-05-01 NOTE — PAYOR COMM NOTE
--------------  CONTINUED STAY REVIEW    PayorJuanna Leyden [facility]  Subscriber #:  [id number]  Authorization Number:  [id number]    Admit date: 4/27/18  Admit time: 1624    Admitting Physician: Heath Andrews MD  Attending Physician:  Heath Andrews MD  Primary Ca RN      HYDROmorphone HCl (DILAUDID) 1 MG/ML injection 0.5 mg     Date Action Dose Route User    4/30/2018 1008 Given 0.5 mg Intravenous Ying Grant, KLEBER      dextrose 5 % and 0.45 % NaCl with KCl 20 mEq infusion     Date Action Dose Route User    5/1/2

## 2018-05-01 NOTE — CM/SW NOTE
Pt is a 27 yo male admitted for GE junction carcinoma. Pt is  and lives with his wife in a single-story home in [place]. They have no children. Pt was independent for his adls and was working full time as an  for [employer].   Pt had manrique

## 2018-05-01 NOTE — PROGRESS NOTES
[facility]  Progress Note     Teresa Garrison Patient Status:  Inpatient    10/3/1990 MRN [id number]   [facility] 4SW-A Attending [place] MD   [facility] Day # 4 PCP Lex Degroot DO     Chief Complaint: Medical Management    S: Intravenous Q6H   • famoTIDine  20 mg Oral BID    Or   • famoTIDine  20 mg Intravenous BID       ASSESSMENT / PLAN:     1. Esophageal CA:  S/P Esophagogastrectomy, pain control. Per Dr. Juan Alberto Jaramillo  2. Thrombocytopenia:  Imprved, likely mild consumption.    3. A

## 2018-05-01 NOTE — PHYSICAL THERAPY NOTE
PHYSICAL THERAPY TREATMENT NOTE - INPATIENT    Room Number: 3153/4419-B     Session: 2   Number of Visits to Meet Established Goals: 3    Presenting Problem: S/p gastroesophagectomy on 4/27 for ge junction ca    Problem List  Active Problems:    Non morbi sheets and blankets)?: None   -   Sitting down on and standing up from a chair with arms (e.g., wheelchair, bedside commode, etc.): A Little   -   Moving from lying on back to sitting on the side of the bed?: None   How much help from another person does t training this PM due to [facility] admission for S/p gastroesophagectomy on 4/27 for ge junction CA. Pt able to increased ambulation distance and with improved mona/balance.       Results of the AM-PAC \"6 clicks\" Inpatient Daily Mobility Short Fo

## 2018-05-01 NOTE — PROGRESS NOTES
THORACIC SURGERY PROGRESS NOTE  Foster Reaves is a 28year old male. MRN [id number]. Admitted 4/27/2018    [address] [facility] [address] EVENTS:     No acute events overnight. States feeling better than yesterday.   Patient has been ambulating, using incentive spirom 05/01/2018   AST 24 05/01/2018   ALT 46 05/01/2018       ASSESSMENT / PLAN:   28year old male with carcinoma of the GE junction now s/p [place] Bruno esophagectomy on 4/27/18, POD 4    CV:no active issues  Pulm: Right chest tube to water seal, Geovanni drain on

## 2018-05-01 NOTE — PROGRESS NOTES
POD #4    Patient admits to feeling warm at night, Tmax 98.9 F  Denies worsening abdominal pain, N/V, chest pain, or SOB  No flatus, No BM, denies abdominal distention or bloating    /80 (BP Location: Left arm)   Pulse 111   Temp 98.6 °F (37 °C) (Ora

## 2018-05-02 ENCOUNTER — APPOINTMENT (OUTPATIENT)
Dept: GENERAL RADIOLOGY | Facility: HOSPITAL | Age: 28
DRG: 327 | End: 2018-05-02
Attending: SURGERY
Payer: COMMERCIAL

## 2018-05-02 PROCEDURE — 99232 SBSQ HOSP IP/OBS MODERATE 35: CPT | Performed by: HOSPITALIST

## 2018-05-02 PROCEDURE — 74240 X-RAY XM UPR GI TRC 1CNTRST: CPT | Performed by: SURGERY

## 2018-05-02 NOTE — HOME CARE LIAISON
Referral received from Dr. Eli James today in Surgical- Oncology multidisciplinary rounds. I will meet with the patient.   Thank you for the referral.

## 2018-05-02 NOTE — PLAN OF CARE
GASTROINTESTINAL - ADULT    • Minimal or absence of nausea and vomiting Progressing        Patient/Family Goals    • Patient/Family Long Term Goal Progressing    • Patient/Family Short Term Goal Progressing        RESPIRATORY - ADULT    • Achieves optimal

## 2018-05-02 NOTE — PROGRESS NOTES
[facility]  Progress Note     Nghia Yip Patient Status:  Inpatient    10/3/1990 MRN [id number]   [facility] 4SW-A Attending Manny Morales MD   Hosp Day # 5 PCP Jacinto Mulligan DO     Chief Complaint: Medical Management    S: 20 mg Oral BID    Or   • famoTIDine  20 mg Intravenous BID       ASSESSMENT / PLAN:     1. Esophageal CA:  S/P Esophagogastrectomy, pain control. Per Dr. Kimmie Franks. Chest tube to be removed today. 2. Thrombocytopenia:  Improved, likely mild consumption.    3

## 2018-05-02 NOTE — PAYOR COMM NOTE
--------------  CONTINUED STAY REVIEW    [facility]  Subscriber #:  [id number]  Authorization Number:  [id number]    Admit date: 4/27/18  Admit time: 1624    Admitting Physician: Chris Colon MD  Attending Physician:  Chris Colon MD  Primary Ca RN         HYDROmorphone (DILAUDID) 0.2 mg/ml PCA infusion     Date Action Dose Route User    5/1/2018 1806 New Bag 20 mg Intravenous Cady Rico RN      ondansetron HCl [facility]) injection 4 mg     Date Action Dose Route User    5/2/2018 1012 Given

## 2018-05-02 NOTE — PHYSICAL THERAPY NOTE
PHYSICAL THERAPY TREATMENT NOTE - INPATIENT    Room Number: 4752/5082-W     Session: 3   Number of Visits to Meet Established Goals: 3    Presenting Problem: S/p gastroesophagectomy on 4/27 for ge junction ca    Problem List  Active Problems:    Non morbi and blankets)?: None   -   Sitting down on and standing up from a chair with arms (e.g., wheelchair, bedside commode, etc.): A Little   -   Moving from lying on back to sitting on the side of the bed?: None   How much help from another person does the wilner met    ASSESSMENT     Pt seen for gait training this PM due to [facility] admission for S/p gastroesophagectomy on 4/27 for ge junction CA. Pt able to increased ambulation distance and with improved mona/balance.       Results of the AM-PAC \"6 cli

## 2018-05-02 NOTE — HOME CARE LIAISON
I met with patient. He is agreeable to home health. We discussed the importance and difference between incentive spirometry and flutter. Patient reports 1500 ml on incentive spirometry and + cough with flutter and notices its benefit.   Patient reports s

## 2018-05-02 NOTE — HOME CARE LIAISON
Patient sitting up in chair napping. Did not wake up with my approach. Brochure and my card left at bedside, will check back.

## 2018-05-02 NOTE — PROGRESS NOTES
THORACIC SURGERY PROGRESS NOTE  Hussein Sheridan is a 28year old male. MRN [id number]. Admitted 2018    [address] [place] [address] EVENTS:     Pt feeling well. Had upper GI study this morning.     VITALS:     Temp (24hrs), Av.7 °F (37.1 °C), Min:98.5 °F (3 s/p [place] Bruno esophagectomy on 4/27/18, POD 5    CV: no active issues  Pulm: R chest tube pulled today   -AMBULATE IN HALLWAY at least 3 times daily   -Duonebs; encourage cough/deep breathe; IS 10x/hr every hour they're awake  Renal: appropriate UOP  ID:

## 2018-05-02 NOTE — PLAN OF CARE
Assumed care @7170. Pt AOx4. VSS on RA. Pain controlled with scheduled IV Tylenol and Dilaudid PCA. Chest tube removed by MD.   NGT removed. CLD. Tolerating well. Tube feedings infusing per order through j-tube. Tubing changed.    Incisions c/d/I,

## 2018-05-02 NOTE — CM/SW NOTE
Order for home health care received. Home care liaison to assess patient for home healthcare.  sw to continue to follow    [address] [facility]

## 2018-05-02 NOTE — PROGRESS NOTES
POD #5    Patient had upper GI to assess esophagogastric anastomosis.   Complains of some nausea s/p upper GI study  Otherwise feels well this AM    /87 (BP Location: Left arm)   Pulse 122   Temp 98.7 °F (37.1 °C) (Oral)   Resp 18   Ht 1.981 m (6' 6\" reviewed all relevant labs and reports. I agree with her physical exam and the data listed in the report.     I agree with the above listed assessment and plan and have modified the report to reflect my opinion.     Care discussed as above  DIANA

## 2018-05-02 NOTE — PLAN OF CARE
Pt was up walking in halls today. Tolerated well. Up in chair most of day. Using IS. pca working well for pain control. Tolerating feedings. Labs stable. vss. Family here this evening. For [employer]. CT to water seal. Will cont to monitor.    CARDIO

## 2018-05-03 PROCEDURE — 99232 SBSQ HOSP IP/OBS MODERATE 35: CPT | Performed by: HOSPITALIST

## 2018-05-03 RX ORDER — ESCITALOPRAM OXALATE 20 MG/1
20 TABLET ORAL DAILY
Status: DISCONTINUED | OUTPATIENT
Start: 2018-05-03 | End: 2018-05-04

## 2018-05-03 RX ORDER — ACETAMINOPHEN 500 MG
1000 TABLET ORAL EVERY 6 HOURS
Status: DISCONTINUED | OUTPATIENT
Start: 2018-05-03 | End: 2018-05-04

## 2018-05-03 RX ORDER — OXYCODONE HYDROCHLORIDE 5 MG/1
5 TABLET ORAL EVERY 6 HOURS PRN
Status: DISCONTINUED | OUTPATIENT
Start: 2018-05-03 | End: 2018-05-04

## 2018-05-03 RX ORDER — ENOXAPARIN SODIUM 100 MG/ML
40 INJECTION SUBCUTANEOUS DAILY
Qty: 21 SYRINGE | Refills: 0 | Status: SHIPPED | OUTPATIENT
Start: 2018-05-04 | End: 2018-06-07

## 2018-05-03 NOTE — PAYOR COMM NOTE
--------------  CONTINUED STAY REVIEW    [facility]  Subscriber #:  [id number]  Authorization Number:  [id number]    Admit date: 4/27/18  Admit time: 1624    Admitting Physician: Adelina Martin MD  Attending Physician:  Adelina Martin MD  Primary Ca New Bag (none) Intravenous Tres Guadalupe RN          APPROVED TO 5/2 - PATIENT REMAINS HOSPITALIZED ON DILAUDID PCA AND ATTEMPTING TO ADVANCE DIET - PLEASE PROVIDE ADDITIONAL INPATIENT DAYS. THANK YOU.

## 2018-05-03 NOTE — PROGRESS NOTES
[facility]  Progress Note     Crys Luna Patient Status:  Inpatient    10/3/1990 MRN [id number]   [facility] 4SW-A Attending Dayanara Hardy MD   [address] Day # 6 PCP Nichole Murphy DO     Chief Complaint: Medical Management    S: Oral BID    Or   • famoTIDine  20 mg Intravenous BID       ASSESSMENT / PLAN:     1. Esophageal CA:  S/P Esophagogastrectomy, pain control. Per Dr. Dori Kehr. Chest tube to be removed today. 2. Thrombocytopenia:  Improved, likely mild consumption.    3. Atel

## 2018-05-03 NOTE — DIETARY NOTE
Nutrition Short Note    Pt seen for diet education for home.  Appropriate education and handout provided to patient and patient's wife. Receptive, expect compliance.  All questions answered and RD contact information provided. Will continue to monitor daily

## 2018-05-03 NOTE — PROGRESS NOTES
POD #5    Doing well this AM  Denies fever or chills  + BM, +flatus  Tolerated clear liquid diet, no N/V    /78 (BP Location: Left arm)   Pulse 100   Temp 98.8 °F (37.1 °C) (Oral)   Resp 18   Ht 1.981 m (6' 6\")   Wt 127.4 kg (280 lb 13.9 oz)   SpO2

## 2018-05-03 NOTE — PHYSICAL THERAPY NOTE
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 ..   -   Moving DISCHARGE RECOMMENDATIONS  PT Discharge Recommendations: Home     PLAN  PT Treatment Plan: Bed mobility; Body mechanics; Endurance; Patient education; Family education;Gait training;Strengthening;Stoop training;Transfer training;Balance training  Rehab Pot

## 2018-05-03 NOTE — PLAN OF CARE
Pt AOx4. Family at bedside. RA.  ., active bowel sounds, +gas, No BM.  JTube intact infusing tube feed at 75cc/hr. NSR/ST. PATO draining SS.  D5.45 + 20K at 20cc/hr. PCA Dilaudid for pain relief. Will continue to monitor.

## 2018-05-03 NOTE — PLAN OF CARE
Received pt A&Ox4, RA, NSR-ST at 0700  Pt has active bowel sounds, + gas, last BM 5/3/18  Jtube intact and infusing at 75 cc/hr  PCA dilaudid and PO tylenol for pain relief  PATO drain has minimal drainage  Educated pt on lovenox for self administration, pt

## 2018-05-03 NOTE — PROGRESS NOTES
THORACIC SURGERY PROGRESS NOTE  Noemi Sierra is a 28year old male. MRN [id number]. Admitted 4/27/2018    [address] [facility] EVENTS:     Pt feeling well. Has been ambulating with PT and nursing staff.   Tolerating full liquids today, no nausea, vomiting, No active issues; perioperative antibiotics complete  GI: tolerating tube feedings, on full liquid diet today, no issues   -BM today  Neuro: postoperative pain   -IV dilaudid PRN, IV tylenol scheduled q6h  Prophylaxis: SQH, SCDs, OOBA at least 3x daily  Natasha Combs

## 2018-05-04 VITALS
DIASTOLIC BLOOD PRESSURE: 75 MMHG | HEART RATE: 90 BPM | WEIGHT: 275.88 LBS | TEMPERATURE: 99 F | RESPIRATION RATE: 18 BRPM | SYSTOLIC BLOOD PRESSURE: 132 MMHG | OXYGEN SATURATION: 96 % | BODY MASS INDEX: 31.92 KG/M2 | HEIGHT: 78 IN

## 2018-05-04 PROCEDURE — 99232 SBSQ HOSP IP/OBS MODERATE 35: CPT | Performed by: HOSPITALIST

## 2018-05-04 RX ORDER — ACETAMINOPHEN 500 MG
1000 TABLET ORAL EVERY 6 HOURS PRN
Qty: 30 TABLET | Refills: 0 | Status: SHIPPED | OUTPATIENT
Start: 2018-05-04 | End: 2019-05-02

## 2018-05-04 RX ORDER — HYDROCODONE BITARTRATE AND ACETAMINOPHEN 5; 325 MG/1; MG/1
1-2 TABLET ORAL EVERY 4 HOURS PRN
Status: DISCONTINUED | OUTPATIENT
Start: 2018-05-04 | End: 2018-05-04

## 2018-05-04 RX ORDER — OXYCODONE HYDROCHLORIDE 5 MG/1
5 TABLET ORAL EVERY 6 HOURS PRN
Qty: 40 TABLET | Refills: 0 | Status: SHIPPED | OUTPATIENT
Start: 2018-05-04 | End: 2018-12-03

## 2018-05-04 NOTE — PLAN OF CARE
CARDIOVASCULAR - ADULT    • Maintains optimal cardiac output and hemodynamic stability Adequate for Discharge        GASTROINTESTINAL - ADULT    • Minimal or absence of nausea and vomiting Adequate for Discharge        HEMATOLOGIC - ADULT    • Maintains he

## 2018-05-04 NOTE — DIETARY NOTE
Nutrition Short Note    Educated pt and pt's wife on full liquid diet and calorie/protein goals. Discussed strategies to meet needs, handout provided. Receptive, expect compliance.  All questions answered and RD contact information provided. Will continue t

## 2018-05-04 NOTE — PLAN OF CARE
NURSING DISCHARGE NOTE    Discharged Home via Wheelchair. Accompanied by Support staff  Belongings Taken by patient/family. VSS. IV removed without s/s of complications. Pain tolerable. Demonstrated lovenox injection.  Confirmed Lovenox available at p

## 2018-05-04 NOTE — CM/SW NOTE
05/04/18 1200   Discharge disposition   Expected discharge disposition Home-Health   Name of Facillity/Home Care/Hospice Residential

## 2018-05-04 NOTE — PROGRESS NOTES
[facility]  Progress Note     Kimberly Hester Patient Status:  Inpatient    10/3/1990 MRN [id number]   [facility] 4SW-A Attending Sidney Pagan MD   [facility] Day # 7 PCP Arian Hu DO     Chief Complaint: Medical Management    S: control. Per Dr. Em Fan. Chest tube removed. PRN [place], DC PCA if ok with Dr. Em Fan. 2. Thrombocytopenia:  Resolved  3. Atelectasis: Improved  4. Fever:  Resolved  5.  Anxiety:  PRN ativan, celexa    Plan of care: As above    Quality:  · DVT Prophylaxis:

## 2018-05-04 NOTE — PAYOR COMM NOTE
--------------  CONTINUED STAY REVIEW    Celestina Forte [facility]  Subscriber #:  [id number]  Authorization Number:  [id number]    Admit date: 4/27/18  Admit time: 1624    Admitting Physician: Howard Reyes MD  Attending Physician:  Howard Reyes MD  Primary Ca oxyCODONE HCl (OXY-IR) cap/tab 5 mg     Date Action Dose Route User    5/4/2018 6632 Given 5 mg Oral Babita Wayne, RN          APPROVED TO 5/4 - NO PLANS TO DC TODAY - PLEASE PROVIDE AUTHORIZATION FOR ADDITIONAL INPATIENT DAYS. THANK YOU.

## 2018-05-04 NOTE — PROGRESS NOTES
Assumed pt care @ 2230. Pt sitting up in chair, no acute distress observed, no complaints made. Pt with PCA dilaudid, Vital AF 1.2 infusing @ 75cc/hr via J-tube, IVF infusing @ 20cc/hr. Geovanni drain from right abdomen with min. Drainage.

## 2018-05-04 NOTE — PROGRESS NOTES
THORACIC SURGERY PROGRESS NOTE  Josy Forman is a 28year old male. MRN [id number]. Admitted 4/27/2018    [address] [facility] EVENTS:     Pt feeling well. Has been ambulating today without issue.   Tolerating full liquid diet, no nausea, vomiting, pain wit [phone] Ext [phone]

## 2018-05-04 NOTE — PROGRESS NOTES
POD #7    Patient feels well today  Denies fever, chills, or abdominal pain  +BM, +flatus  Tolerated full liquid diet, no N/V    /71 (BP Location: Right arm)   Pulse 82   Temp 98.5 °F (36.9 °C) (Oral)   Resp 18   Ht 1.981 m (6' 6\")   Wt 125.1 kg (27

## 2018-05-04 NOTE — PLAN OF CARE
Assumed care at [address]. Pt A/O x4. RA. NSR on tele. VSS. Pt c/o pain rated at 1/10. Dilaudid PCA in place. JTube with Vital AF 1.2  Infusing at 75ml/hr with 20ml water flushes q12. Pt up with standby assist.  Walked x1 through the halls.  Tolerating full liq

## 2018-05-05 ENCOUNTER — TELEPHONE (OUTPATIENT)
Dept: FAMILY MEDICINE CLINIC | Facility: CLINIC | Age: 28
End: 2018-05-05

## 2018-05-05 NOTE — TELEPHONE ENCOUNTER
Iram @ [facility] called,  Pt has been admitted into [address]. Would like to talk to the nurse. Please call.

## 2018-05-07 ENCOUNTER — OFFICE VISIT (OUTPATIENT)
Dept: HEMATOLOGY/ONCOLOGY | Facility: HOSPITAL | Age: 28
End: 2018-05-07
Attending: INTERNAL MEDICINE
Payer: COMMERCIAL

## 2018-05-07 ENCOUNTER — PATIENT OUTREACH (OUTPATIENT)
Dept: CASE MANAGEMENT | Age: 28
End: 2018-05-07

## 2018-05-07 ENCOUNTER — OFFICE VISIT (OUTPATIENT)
Dept: SURGERY | Facility: CLINIC | Age: 28
End: 2018-05-07

## 2018-05-07 VITALS
SYSTOLIC BLOOD PRESSURE: 114 MMHG | RESPIRATION RATE: 16 BRPM | WEIGHT: 270 LBS | HEART RATE: 88 BPM | BODY MASS INDEX: 31 KG/M2 | DIASTOLIC BLOOD PRESSURE: 80 MMHG | TEMPERATURE: 98 F

## 2018-05-07 DIAGNOSIS — C16.0 GE JUNCTION CARCINOMA (HCC): ICD-10-CM

## 2018-05-07 DIAGNOSIS — C16.0 GE JUNCTION CARCINOMA (HCC): Primary | ICD-10-CM

## 2018-05-07 PROCEDURE — 99024 POSTOP FOLLOW-UP VISIT: CPT | Performed by: PHYSICIAN ASSISTANT

## 2018-05-07 NOTE — DISCHARGE SUMMARY
[facility]  Discharge Summary    Kimberly Hester Patient Status:  Inpatient    10/3/1990 MRN [id number]   [facility] 7NE-A Attending No att. providers found   Hosp Day # 7 PCP Agueda Wells DO     Date of Admission: 2018    Antoni removed. He was tolerating a full liquid diet, pain was controlled, and he was ambulating. Patient discharged on POD #7. Procedures:   1. Laparoscopic-assisted [place] Bruno esophagogastrectomy. 2.       Abdominal and mediastinal lymphadenectomy. days with Joy/Nilam.     Karlene Esparza  5/7/2018  7:44 AM

## 2018-05-07 NOTE — PROGRESS NOTES
[facility] Surgical Oncology    Patient Name:  Mau Barney   YOB: 1990   Gender:  Male   Appt Date:  5/7/2018   Provider:  LARRY Helton   Insurance:  [facility] PPO     PATIENT Yaelyong 56, DO   Address: 1 Rfl: 3  •  LORAZEPAM 1 MG Oral Tab, Take 1 tablet (1 mg total) by mouth every 4 (four) hours as needed for Anxiety. , Disp: 60 tablet, Rfl: 0  •  ondansetron 8 MG Oral Tablet Dispersible, Take 1 tablet (8 mg total) by mouth every 8 (eight) hours as needed f Reviewed:       Review of Systems:  Patient feels well. Appetite is good. No fevers or chills. No coughing. Physical Examination:  Constitutional:  NAD. Eyes: non-icteric. Abdomen: Soft, non-tender, non-distended.  Incision healing well w

## 2018-05-07 NOTE — PROGRESS NOTES
Initial Post Discharge Follow Up   Discharge Date: 5/4/18  Contact Date: 5/7/2018    Consent Verification:  Assessment Completed With: Patient  HIPAA Verified?   Yes    Discharge Dx:   Ge junction carcinoma, S/P Laparoscopic assisted angela Aguilar mouth every 6 (six) hours as needed. Disp: 40 tablet Rfl: 0   acetaminophen 500 MG Oral Tab Take 2 tablets (1,000 mg total) by mouth every 6 (six) hours as needed for Pain.  Disp: 30 tablet Rfl: 0   Enoxaparin Sodium 40 MG/0.4ML Subcutaneous Solution Inject meds, disease concerns, Etc): No     Follow up appointments:       Your appointments     Date & Time Appointment Department [facility])    May 11, 2018 10:30 AM CDT Hospital/SNF F/U with DO Lizy Fuller [address]

## 2018-05-08 ENCOUNTER — TELEPHONE (OUTPATIENT)
Dept: HEMATOLOGY/ONCOLOGY | Facility: HOSPITAL | Age: 28
End: 2018-05-08

## 2018-05-08 NOTE — PROGRESS NOTES
Pt meeting criteria for SEVERE MALNUTRITION in the context of acute illness as evidenced by 5% unplanned wt loss x 1 mo and po intake meeting less than 50% estimated nutrition needs.     Nutrition F/U Note:      Date of visit: 05/07/2018     Diet Rx: High

## 2018-05-11 ENCOUNTER — OFFICE VISIT (OUTPATIENT)
Dept: FAMILY MEDICINE CLINIC | Facility: CLINIC | Age: 28
End: 2018-05-11

## 2018-05-11 VITALS
DIASTOLIC BLOOD PRESSURE: 80 MMHG | HEART RATE: 100 BPM | SYSTOLIC BLOOD PRESSURE: 108 MMHG | TEMPERATURE: 98 F | BODY MASS INDEX: 31.35 KG/M2 | OXYGEN SATURATION: 97 % | WEIGHT: 271 LBS | HEIGHT: 78 IN

## 2018-05-11 DIAGNOSIS — K21.9 GASTROESOPHAGEAL REFLUX DISEASE WITHOUT ESOPHAGITIS: ICD-10-CM

## 2018-05-11 DIAGNOSIS — C16.0 GE JUNCTION CARCINOMA (HCC): Primary | ICD-10-CM

## 2018-05-11 PROCEDURE — 1111F DSCHRG MED/CURRENT MED MERGE: CPT | Performed by: FAMILY MEDICINE

## 2018-05-11 PROCEDURE — 99214 OFFICE O/P EST MOD 30 MIN: CPT | Performed by: FAMILY MEDICINE

## 2018-05-11 RX ORDER — PANTOPRAZOLE SODIUM 40 MG/1
40 TABLET, DELAYED RELEASE ORAL
Qty: 30 TABLET | Refills: 2 | Status: SHIPPED | OUTPATIENT
Start: 2018-05-11 | End: 2018-05-15

## 2018-05-11 NOTE — PROGRESS NOTES
HPI:    Patient ID: Danita Leiva is a 28year old male. Pt s/p surgery esophagus   Doing OK  Breathing OK  c/o GERD  w/o swelling    HPI    Review of Systems   Constitutional: Negative for chills and fever.    Respiratory: Negative for cough and dave Cardiovascular: Normal rate, normal heart sounds and intact distal pulses. Pulmonary/Chest: Effort normal and breath sounds normal.   Musculoskeletal: He exhibits no edema. Neurological: He is alert and oriented to person, place, and time.    Skin: S

## 2018-05-15 ENCOUNTER — TELEPHONE (OUTPATIENT)
Dept: FAMILY MEDICINE CLINIC | Facility: CLINIC | Age: 28
End: 2018-05-15

## 2018-05-15 RX ORDER — OMEPRAZOLE 40 MG/1
40 CAPSULE, DELAYED RELEASE ORAL DAILY
Qty: 30 CAPSULE | Refills: 0 | Status: SHIPPED | OUTPATIENT
Start: 2018-05-15 | End: 2019-05-23

## 2018-05-15 NOTE — TELEPHONE ENCOUNTER
HE MEDICATION THAT WAS PRESCRIBED TO HIM ON Friday IS NOT COVERED BY HIS INSURANCE SO IS THERE ANOTHER ONE?

## 2018-05-16 ENCOUNTER — OFFICE VISIT (OUTPATIENT)
Dept: SURGERY | Facility: CLINIC | Age: 28
End: 2018-05-16

## 2018-05-16 ENCOUNTER — TELEPHONE (OUTPATIENT)
Dept: HEMATOLOGY/ONCOLOGY | Facility: HOSPITAL | Age: 28
End: 2018-05-16

## 2018-05-16 VITALS
RESPIRATION RATE: 17 BRPM | SYSTOLIC BLOOD PRESSURE: 118 MMHG | HEIGHT: 78 IN | BODY MASS INDEX: 30.8 KG/M2 | DIASTOLIC BLOOD PRESSURE: 77 MMHG | HEART RATE: 84 BPM | TEMPERATURE: 98 F | WEIGHT: 266.19 LBS | OXYGEN SATURATION: 99 %

## 2018-05-16 DIAGNOSIS — C16.0 GE JUNCTION CARCINOMA (HCC): Primary | ICD-10-CM

## 2018-05-16 PROCEDURE — 99024 POSTOP FOLLOW-UP VISIT: CPT | Performed by: SURGERY

## 2018-05-16 NOTE — TELEPHONE ENCOUNTER
Pt meeting criteria for SEVERE MALNUTRITION in the context of acute illness as evidenced by 5% unplanned wt loss x 1 mo and po intake meeting less than 50% estimated nutrition needs.     Nutrition F/U Note:      Date of visit: 05/16/2018     Diet Rx: High

## 2018-05-16 NOTE — PROGRESS NOTES
Surgical oncology progress note    Patient returns today for a post visit. He has been doing fairly well since we saw her last.    Rashida Arellano was constipated and took MiraLAX and felt better afterwards.   Otherwise no fevers chills nausea vomiting or d

## 2018-05-17 ENCOUNTER — SOCIAL WORK SERVICES (OUTPATIENT)
Dept: HEMATOLOGY/ONCOLOGY | Facility: HOSPITAL | Age: 28
End: 2018-05-17

## 2018-05-17 ENCOUNTER — OFFICE VISIT (OUTPATIENT)
Dept: HEMATOLOGY/ONCOLOGY | Age: 28
End: 2018-05-17
Attending: INTERNAL MEDICINE
Payer: COMMERCIAL

## 2018-05-17 VITALS
TEMPERATURE: 97 F | SYSTOLIC BLOOD PRESSURE: 118 MMHG | HEART RATE: 74 BPM | DIASTOLIC BLOOD PRESSURE: 81 MMHG | OXYGEN SATURATION: 98 % | WEIGHT: 267.63 LBS | BODY MASS INDEX: 31 KG/M2 | RESPIRATION RATE: 18 BRPM

## 2018-05-17 DIAGNOSIS — C16.0 GE JUNCTION CARCINOMA (HCC): ICD-10-CM

## 2018-05-17 DIAGNOSIS — D50.0 IRON DEFICIENCY ANEMIA DUE TO CHRONIC BLOOD LOSS: ICD-10-CM

## 2018-05-17 PROCEDURE — 99214 OFFICE O/P EST MOD 30 MIN: CPT | Performed by: INTERNAL MEDICINE

## 2018-05-17 NOTE — PROGRESS NOTES
Pt here for 2.5 month MD f/u. Pt had surgery on 4/27, still feeling weak. Energy level has been low, appetite is fair. Pt feels that food is stuck in mid esophagus, started Prilosec yesterday. Pt has post surgical pain, takes Tylenol or Oxycodone.  Pt had c

## 2018-05-17 NOTE — PROGRESS NOTES
ELIO met with patient and wife. Patient states he has been off work since the beginning of the year. 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   Patient has contact his employer and expressed his wish to continue working and not take t

## 2018-05-18 ENCOUNTER — MED REC SCAN ONLY (OUTPATIENT)
Dept: FAMILY MEDICINE CLINIC | Facility: CLINIC | Age: 28
End: 2018-05-18

## 2018-05-18 NOTE — PROGRESS NOTES
[facility]    PATIENT'S NAME: Petey LUTZ   ATTENDING PHYSICIAN: James Curtis M.D.    PATIENT ACCOUNT #: [de-identified] LOCATION: [address] [place] [address] RECORD #: [id number] YOB: 1990   DATE OF SERVICE: 05/17/2018       CANCER daily, enoxaparin 40 mg subcutaneous daily which he is doing for a total of 21 days, fluticasone propionate nasal spray, lorazepam 1 mg q.4 h. p.r.n., omeprazole 40 mg daily, ondansetron 8 mg q.8 h. p.r.n., oxycodone 5 to 10 mg q.6 h. p.r.n., prochlorperaz removal.  We talked about the side effects, including hair loss, peripheral neuropathy, cold sensitivity, cytopenia, mucositis, risk of diarrhea, constipation, and he agrees to proceed.   We are planning on starting him as of May 31, which will give him 2 m

## 2018-05-21 ENCOUNTER — OFFICE VISIT (OUTPATIENT)
Dept: SURGERY | Facility: CLINIC | Age: 28
End: 2018-05-21

## 2018-05-21 DIAGNOSIS — C16.0 GE JUNCTION CARCINOMA (HCC): Primary | ICD-10-CM

## 2018-05-21 PROCEDURE — 99024 POSTOP FOLLOW-UP VISIT: CPT | Performed by: PHYSICIAN ASSISTANT

## 2018-05-21 NOTE — PROGRESS NOTES
Surgical Oncology Progress Note  HPI:  The patient presents for removal of j-tube. He denies fever, chills, abdominal pain, heartburn, dysphagia, N/V, diarrhea, or constipation. He has been tolerating PO diet.  He has gained 1 lb since his last vi

## 2018-05-23 ENCOUNTER — APPOINTMENT (OUTPATIENT)
Dept: HEMATOLOGY/ONCOLOGY | Facility: HOSPITAL | Age: 28
End: 2018-05-23
Attending: INTERNAL MEDICINE
Payer: COMMERCIAL

## 2018-05-23 VITALS
TEMPERATURE: 97 F | HEART RATE: 94 BPM | WEIGHT: 266 LBS | RESPIRATION RATE: 20 BRPM | DIASTOLIC BLOOD PRESSURE: 83 MMHG | SYSTOLIC BLOOD PRESSURE: 123 MMHG | HEIGHT: 78 IN | OXYGEN SATURATION: 99 % | BODY MASS INDEX: 30.78 KG/M2

## 2018-05-23 DIAGNOSIS — C16.0 GE JUNCTION CARCINOMA (HCC): Primary | ICD-10-CM

## 2018-05-23 PROCEDURE — 99211 OFF/OP EST MAY X REQ PHY/QHP: CPT

## 2018-05-23 NOTE — PROGRESS NOTES
Thoracic Surgery Postoperative Visit    CC: s/p esophagectomy, initial hospital f/u    Mr. Nikolay Davidson is a 28year old year old male seen today in follow up after a laparoscopic- assisted Oleta Cone esophagogastrectomy performed on 4/27/18.   He is doing well as needed for Nausea., Disp: 30 tablet, Rfl: 5  •  Prochlorperazine Maleate (COMPAZINE) 10 mg tablet, Take 1 tablet (10 mg total) by mouth every 6 (six) hours as needed for Nausea., Disp: 30 tablet, Rfl: 3  •  Fluticasone Propionate 50 MCG/ACT Nasal Suspen active lifestyle. Plan:  Encouraged continued exercise and range of motion work.   No activity restrictions  Follow up again as needed    Rogelio Vines PA-C  Thoracic Surgery

## 2018-05-24 ENCOUNTER — HOSPITAL ENCOUNTER (OUTPATIENT)
Dept: RADIATION ONCOLOGY | Age: 28
End: 2018-05-24
Attending: RADIOLOGY
Payer: COMMERCIAL

## 2018-05-25 ENCOUNTER — TELEPHONE (OUTPATIENT)
Dept: FAMILY MEDICINE CLINIC | Facility: CLINIC | Age: 28
End: 2018-05-25

## 2018-05-25 NOTE — TELEPHONE ENCOUNTER
Home Health orders signed for renewal on 5/14/2018. Good through 7/3/2018. Message left for [facility].

## 2018-05-30 ENCOUNTER — HOSPITAL ENCOUNTER (OUTPATIENT)
Dept: PERIOP | Facility: HOSPITAL | Age: 28
Discharge: HOME | End: 2018-05-30
Attending: INTERNAL MEDICINE
Payer: COMMERCIAL

## 2018-05-30 PROCEDURE — 36569 INSJ PICC 5 YR+ W/O IMAGING: CPT

## 2018-05-30 PROCEDURE — 76937 US GUIDE VASCULAR ACCESS: CPT

## 2018-05-31 ENCOUNTER — OFFICE VISIT (OUTPATIENT)
Dept: HEMATOLOGY/ONCOLOGY | Age: 28
End: 2018-05-31
Attending: INTERNAL MEDICINE
Payer: COMMERCIAL

## 2018-05-31 VITALS
OXYGEN SATURATION: 99 % | DIASTOLIC BLOOD PRESSURE: 70 MMHG | SYSTOLIC BLOOD PRESSURE: 111 MMHG | HEIGHT: 76.22 IN | BODY MASS INDEX: 31.6 KG/M2 | TEMPERATURE: 98 F | WEIGHT: 262.19 LBS | HEART RATE: 80 BPM | RESPIRATION RATE: 20 BRPM

## 2018-05-31 DIAGNOSIS — Z71.9 ENCOUNTER FOR HEALTH EDUCATION: Primary | ICD-10-CM

## 2018-05-31 DIAGNOSIS — C16.0 GE JUNCTION CARCINOMA (HCC): Primary | ICD-10-CM

## 2018-05-31 DIAGNOSIS — C16.0 GE JUNCTION CARCINOMA (HCC): ICD-10-CM

## 2018-05-31 PROCEDURE — 96375 TX/PRO/DX INJ NEW DRUG ADDON: CPT

## 2018-05-31 PROCEDURE — 99215 OFFICE O/P EST HI 40 MIN: CPT | Performed by: CLINICAL NURSE SPECIALIST

## 2018-05-31 PROCEDURE — 96417 CHEMO IV INFUS EACH ADDL SEQ: CPT

## 2018-05-31 PROCEDURE — 96415 CHEMO IV INFUSION ADDL HR: CPT

## 2018-05-31 PROCEDURE — 96368 THER/DIAG CONCURRENT INF: CPT

## 2018-05-31 PROCEDURE — 96413 CHEMO IV INFUSION 1 HR: CPT

## 2018-05-31 PROCEDURE — 96377 APPLICATON ON-BODY INJECTOR: CPT

## 2018-05-31 RX ORDER — FLUOROURACIL 50 MG/ML
2600 INJECTION, SOLUTION INTRAVENOUS CONTINUOUS
Status: DISCONTINUED | OUTPATIENT
Start: 2018-05-31 | End: 2018-05-31

## 2018-05-31 RX ADMIN — FLUOROURACIL 6650 MG: 50 INJECTION, SOLUTION INTRAVENOUS at 15:00:00

## 2018-05-31 NOTE — PROGRESS NOTES
Pt here for C1D1.   Arrives Ambulating independently, accompanied by Spouse           Modifications in dose or schedule: No     Frequency of blood return and site check throughout administration: Prior to administration   Discharged to Home, accompanied by

## 2018-05-31 NOTE — PROGRESS NOTES
Chemotherapy Education    Learner:  Patient and Spouse    Barriers / Limitations:  None    Chemotherapy education goals:  · Learn the drug names  · Administration schedule  · Routes of administration  · Treatment setting    Drug names:  Docetaxel, Leucovor Achieved    Possible menstrual irregularity and vaginal dryness:  N/A    Notify MD/RN of any changes or problems:  Achieved    Comments:    Treatment Effects on Emotional Status:    Potential mood changes, depression, nervousness, difficulty sleeping:  Ach

## 2018-05-31 NOTE — PATIENT INSTRUCTIONS
To reach Dr Ora Diego nurse during business hours, please call [phone]. After hours, including weekends, evenings, and holidays, please call the main number [phone] for emergent needs.                                                 On-body Inject placed in the trash.

## 2018-06-01 ENCOUNTER — OFFICE VISIT (OUTPATIENT)
Dept: HEMATOLOGY/ONCOLOGY | Age: 28
End: 2018-06-01
Attending: INTERNAL MEDICINE
Payer: COMMERCIAL

## 2018-06-01 ENCOUNTER — NURSE ONLY (OUTPATIENT)
Dept: HEMATOLOGY/ONCOLOGY | Age: 28
End: 2018-06-01
Attending: INTERNAL MEDICINE
Payer: COMMERCIAL

## 2018-06-01 DIAGNOSIS — R11.2 CHEMOTHERAPY INDUCED NAUSEA AND VOMITING: ICD-10-CM

## 2018-06-01 DIAGNOSIS — T45.1X5A CHEMOTHERAPY INDUCED NAUSEA AND VOMITING: ICD-10-CM

## 2018-06-01 DIAGNOSIS — R11.2 NAUSEA AND VOMITING: ICD-10-CM

## 2018-06-01 DIAGNOSIS — C16.0 GE JUNCTION CARCINOMA (HCC): Primary | ICD-10-CM

## 2018-06-01 DIAGNOSIS — E86.0 DEHYDRATION: ICD-10-CM

## 2018-06-01 PROCEDURE — 80048 BASIC METABOLIC PNL TOTAL CA: CPT

## 2018-06-01 PROCEDURE — 96375 TX/PRO/DX INJ NEW DRUG ADDON: CPT

## 2018-06-01 PROCEDURE — 83735 ASSAY OF MAGNESIUM: CPT

## 2018-06-01 PROCEDURE — 96365 THER/PROPH/DIAG IV INF INIT: CPT

## 2018-06-01 PROCEDURE — 99215 OFFICE O/P EST HI 40 MIN: CPT | Performed by: CLINICAL NURSE SPECIALIST

## 2018-06-01 PROCEDURE — 85025 COMPLETE CBC W/AUTO DIFF WBC: CPT

## 2018-06-01 PROCEDURE — 96361 HYDRATE IV INFUSION ADD-ON: CPT

## 2018-06-01 RX ORDER — SODIUM CHLORIDE 9 MG/ML
1000 INJECTION, SOLUTION INTRAVENOUS ONCE
Status: CANCELLED
Start: 2018-06-01 | End: 2018-06-01

## 2018-06-01 RX ORDER — LORAZEPAM 1 MG/1
1 TABLET ORAL EVERY 4 HOURS PRN
Qty: 60 TABLET | Refills: 0 | Status: SHIPPED | OUTPATIENT
Start: 2018-06-01 | End: 2018-10-09

## 2018-06-01 RX ORDER — ONDANSETRON 2 MG/ML
8 INJECTION INTRAMUSCULAR; INTRAVENOUS ONCE
Status: CANCELLED
Start: 2018-06-01 | End: 2018-06-01

## 2018-06-01 RX ORDER — SODIUM CHLORIDE 9 MG/ML
1000 INJECTION, SOLUTION INTRAVENOUS ONCE
Status: DISCONTINUED | OUTPATIENT
Start: 2018-06-03 | End: 2020-03-30

## 2018-06-01 RX ORDER — SODIUM CHLORIDE 9 MG/ML
1000 INJECTION, SOLUTION INTRAVENOUS ONCE
Status: DISCONTINUED | OUTPATIENT
Start: 2018-06-02 | End: 2020-03-30

## 2018-06-01 RX ORDER — DEXAMETHASONE 4 MG/1
8 TABLET ORAL 2 TIMES DAILY
Qty: 12 TABLET | Refills: 0 | Status: SHIPPED | OUTPATIENT
Start: 2018-06-01 | End: 2018-06-04

## 2018-06-01 RX ORDER — APREPITANT 125MG-80MG
KIT ORAL
Qty: 1 PACKAGE | Refills: 0 | Status: SHIPPED | OUTPATIENT
Start: 2018-06-01 | End: 2018-06-01

## 2018-06-01 RX ORDER — SODIUM CHLORIDE 9 MG/ML
1000 INJECTION, SOLUTION INTRAVENOUS ONCE
Status: COMPLETED | OUTPATIENT
Start: 2018-06-01 | End: 2018-06-01

## 2018-06-01 RX ORDER — LORAZEPAM 0.5 MG/1
1 TABLET ORAL ONCE
Status: COMPLETED | OUTPATIENT
Start: 2018-06-01 | End: 2018-06-01

## 2018-06-01 RX ADMIN — LORAZEPAM 1 MG: 0.5 TABLET ORAL at 15:39:00

## 2018-06-01 RX ADMIN — SODIUM CHLORIDE 1000 ML: 9 INJECTION, SOLUTION INTRAVENOUS at 14:30:00

## 2018-06-01 NOTE — PROGRESS NOTES
Pt here for pump d/c, IVF.   Arrives Ambulating independently, accompanied by Family member              Frequency of blood return and site check throughout administration: Prior to administration and At completion of therapy   Discharged to Del[facility] Erazo

## 2018-06-01 NOTE — PROGRESS NOTES
Cancer Center Progress Note    Patient Name: Annie Lopes   YOB: 1990   Medical Record Number: [id number]   CSN: [id number]   Date of visit: 6/1/2018   Provider: [facility]  Referring Physician: No ref.  provider found    Pro every 6 (six) hours as needed for Pain., Disp: 30 tablet, Rfl: 0  •  Enoxaparin Sodium 40 MG/0.4ML Subcutaneous Solution, Inject 0.4 mL (40 mg total) into the skin daily. 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

## 2018-06-01 NOTE — PATIENT INSTRUCTIONS
Anti- nausea medication:  Take Zofran 8 mg tablet every 8 hours while awake. Take Compazine 10 mg tablet every 6 hours while aware.   Take Dexamethasone 4 mg tablet twice daily, once with breakfast, once with dinner (TAKE WITH FOOD)  As needed, take Ativan

## 2018-06-02 ENCOUNTER — OFFICE VISIT (OUTPATIENT)
Dept: HEMATOLOGY/ONCOLOGY | Facility: HOSPITAL | Age: 28
End: 2018-06-02
Attending: INTERNAL MEDICINE
Payer: COMMERCIAL

## 2018-06-02 DIAGNOSIS — C16.0 GE JUNCTION CARCINOMA (HCC): Primary | ICD-10-CM

## 2018-06-03 ENCOUNTER — OFFICE VISIT (OUTPATIENT)
Dept: HEMATOLOGY/ONCOLOGY | Facility: HOSPITAL | Age: 28
End: 2018-06-03
Attending: INTERNAL MEDICINE
Payer: COMMERCIAL

## 2018-06-03 DIAGNOSIS — C16.0 GE JUNCTION CARCINOMA (HCC): Primary | ICD-10-CM

## 2018-06-04 ENCOUNTER — OFFICE VISIT (OUTPATIENT)
Dept: HEMATOLOGY/ONCOLOGY | Age: 28
End: 2018-06-04
Attending: INTERNAL MEDICINE
Payer: COMMERCIAL

## 2018-06-04 ENCOUNTER — APPOINTMENT (OUTPATIENT)
Dept: HEMATOLOGY/ONCOLOGY | Age: 28
End: 2018-06-04
Attending: INTERNAL MEDICINE
Payer: COMMERCIAL

## 2018-06-04 VITALS
HEART RATE: 92 BPM | WEIGHT: 258.63 LBS | DIASTOLIC BLOOD PRESSURE: 72 MMHG | BODY MASS INDEX: 31 KG/M2 | OXYGEN SATURATION: 97 % | RESPIRATION RATE: 18 BRPM | SYSTOLIC BLOOD PRESSURE: 120 MMHG | TEMPERATURE: 98 F

## 2018-06-04 DIAGNOSIS — E87.1 HYPONATREMIA: ICD-10-CM

## 2018-06-04 DIAGNOSIS — T45.1X5A CHEMOTHERAPY INDUCED NAUSEA AND VOMITING: ICD-10-CM

## 2018-06-04 DIAGNOSIS — E86.0 DEHYDRATION: ICD-10-CM

## 2018-06-04 DIAGNOSIS — R11.2 CHEMOTHERAPY INDUCED NAUSEA AND VOMITING: ICD-10-CM

## 2018-06-04 DIAGNOSIS — C16.0 GE JUNCTION CARCINOMA (HCC): Primary | ICD-10-CM

## 2018-06-04 PROCEDURE — 80053 COMPREHEN METABOLIC PANEL: CPT

## 2018-06-04 PROCEDURE — 36591 DRAW BLOOD OFF VENOUS DEVICE: CPT

## 2018-06-04 PROCEDURE — 85027 COMPLETE CBC AUTOMATED: CPT

## 2018-06-04 PROCEDURE — 99215 OFFICE O/P EST HI 40 MIN: CPT | Performed by: CLINICAL NURSE SPECIALIST

## 2018-06-04 PROCEDURE — 83735 ASSAY OF MAGNESIUM: CPT

## 2018-06-04 PROCEDURE — 85025 COMPLETE CBC W/AUTO DIFF WBC: CPT

## 2018-06-04 PROCEDURE — 85007 BL SMEAR W/DIFF WBC COUNT: CPT

## 2018-06-04 NOTE — PROGRESS NOTES
Cancer Center Progress Note    Patient Name: Margarita Grover   YOB: 1990   Medical Record Number: [id number]   CSN: [id number]   Date of visit: 6/4/2018   Provider: [facility] Ruth  Referring Physician: No ref.  provider found    Pro capsule, Rfl: 0  •  OxyCODONE HCl 5 MG Oral Tab, Take 1 tablet (5 mg total) by mouth every 6 (six) hours as needed. , Disp: 40 tablet, Rfl: 0  •  acetaminophen 500 MG Oral Tab, Take 2 tablets (1,000 mg total) by mouth every 6 (six) hours as needed for Pain. 8.3 g/dL   Albumin 3.5 3.5 - 4.8 g/dL   Sodium 134 (L) 136 - 144 mmol/L   Potassium 3.9 3.6 - 5.1 mmol/L   Chloride 104 101 - 111 mmol/L   CO2 23.0 22.0 - 32.0 mmol/L   -MAGNESIUM   Collection Time: 06/04/18  8:25 AM   Result Value Ref Range   Magnesium 2.

## 2018-06-04 NOTE — PROGRESS NOTES
Pt here for CLL, apn assessment and possible IVF. He reports receiving IVF over the weekend at home. Last episode of vomiting was Friday. Pt ate breakfast this am. Denies fevers, chills, no constipation or diarrhea. Labs drawn. APN aware of above.   Pt he

## 2018-06-06 ENCOUNTER — TELEPHONE (OUTPATIENT)
Dept: HEMATOLOGY/ONCOLOGY | Facility: HOSPITAL | Age: 28
End: 2018-06-06

## 2018-06-06 NOTE — TELEPHONE ENCOUNTER
Raw esophagus from throwing up this weekend. Add Zantac 150 mg at bedtime to Omeprazole and can take Gaviscon liquid.

## 2018-06-07 ENCOUNTER — OFFICE VISIT (OUTPATIENT)
Dept: HEMATOLOGY/ONCOLOGY | Age: 28
End: 2018-06-07
Attending: INTERNAL MEDICINE
Payer: COMMERCIAL

## 2018-06-07 ENCOUNTER — NURSE ONLY (OUTPATIENT)
Dept: HEMATOLOGY/ONCOLOGY | Age: 28
End: 2018-06-07
Attending: INTERNAL MEDICINE
Payer: COMMERCIAL

## 2018-06-07 ENCOUNTER — HOSPITAL ENCOUNTER (OUTPATIENT)
Dept: ULTRASOUND IMAGING | Age: 28
Discharge: HOME | End: 2018-06-07
Attending: CLINICAL NURSE SPECIALIST
Payer: COMMERCIAL

## 2018-06-07 VITALS
RESPIRATION RATE: 18 BRPM | SYSTOLIC BLOOD PRESSURE: 119 MMHG | OXYGEN SATURATION: 97 % | DIASTOLIC BLOOD PRESSURE: 81 MMHG | TEMPERATURE: 98 F | HEART RATE: 112 BPM

## 2018-06-07 DIAGNOSIS — C16.0 GE JUNCTION CARCINOMA (HCC): ICD-10-CM

## 2018-06-07 DIAGNOSIS — M79.601 RIGHT ARM PAIN: ICD-10-CM

## 2018-06-07 DIAGNOSIS — R11.15 INTRACTABLE CYCLICAL VOMITING WITH NAUSEA: ICD-10-CM

## 2018-06-07 DIAGNOSIS — K21.9 GASTROESOPHAGEAL REFLUX DISEASE WITHOUT ESOPHAGITIS: Primary | ICD-10-CM

## 2018-06-07 DIAGNOSIS — M79.601 RIGHT ARM PAIN: Primary | ICD-10-CM

## 2018-06-07 PROCEDURE — 96523 IRRIG DRUG DELIVERY DEVICE: CPT

## 2018-06-07 PROCEDURE — 93971 EXTREMITY STUDY: CPT | Performed by: CLINICAL NURSE SPECIALIST

## 2018-06-07 PROCEDURE — 99214 OFFICE O/P EST MOD 30 MIN: CPT | Performed by: CLINICAL NURSE SPECIALIST

## 2018-06-07 RX ORDER — SODIUM CHLORIDE 0.9 % (FLUSH) 0.9 %
10 SYRINGE (ML) INJECTION ONCE
Status: CANCELLED | OUTPATIENT
Start: 2018-06-07

## 2018-06-07 NOTE — PROGRESS NOTES
Pt here for PICC dressing change. Pt noted that right arm above picc is \"sore and feels like there is a lump there. \" No redness or warmth to area. Dressing changed, pt initially said pain better however, then recurred when moving arm.   Pt denies lifting

## 2018-06-07 NOTE — PROGRESS NOTES
ANP Visit Note    Patient Name: Margarita Grover   YOB: 1990   Medical Record Number: [id number]   CSN: [id number]   Date of visit: 6/7/2018       Chief Complaint/Reason for Visit:  Esophageal cancer, right arm pain     History of Present I Marital status:   Spouse name: N/A    Years of education: N/A  Number of children: 0     Occupational History   at [address] [place] [address] History Main Topics   Smoking status: Never Smoker    Smokeless tobacco: Never Used    Alcohol use Y Height    Height    Weight    Weight    BSA (m2)    /81   Pulse 112   Resp 18   Temp 98.1   SpO2 97   Pain Score 0     HEENT: EOMs intact. PERRL. Oropharynx is clear. Neck: No JVD. No palpable lymphadenopathy. Neck is supple.   Chest: Clear to Shantelle Malone Ref range: 0.1 - 2.0 mg/dL    Status: Final  Total Protein                                 Date: 06/04/2018  Value: 7.9         Ref range: 6.1 - 8.3 g/dL     Status: Final  Albumin                                       Date: 06/04/2018  Value: 3.5 g/dL   Status: Final  RDW                                           Date: 06/04/2018  Value: 12.2        Ref range: 11.5 - 16.0 %      Status: Final  RDW-SD                                        Date: 06/04/2018  Value: 36.8        Ref range: 35.1 - 46.3 Final  Basophil % Manual                             Date: 06/04/2018  Value: 0           Ref range: %                  Status: Final  Metamyelocyte %                               Date: 06/04/2018  Value: 2           Ref range: %                  Status: Negative.     =====  CONCLUSION:  Findings of DVT within the right upper extremity extending from the right mid brachial vein to the proximal subclavian vein. There is superficial thrombus present within the right basilic vein.      Critical results we

## 2018-06-08 ENCOUNTER — TELEPHONE (OUTPATIENT)
Dept: HEMATOLOGY/ONCOLOGY | Facility: HOSPITAL | Age: 28
End: 2018-06-08

## 2018-06-08 RX ORDER — OLANZAPINE 5 MG/1
5 TABLET ORAL NIGHTLY
Qty: 30 TABLET | Refills: 0 | Status: SHIPPED | OUTPATIENT
Start: 2018-06-08 | End: 2018-07-09

## 2018-06-08 NOTE — TELEPHONE ENCOUNTER
Continues to have emesis in the am, prescription sent for Olanzapine 5 mg nightly. Patient will call with update. Arm less painful today was able to take the Xarelto without any issues.

## 2018-06-11 ENCOUNTER — TELEPHONE (OUTPATIENT)
Dept: HEMATOLOGY/ONCOLOGY | Facility: HOSPITAL | Age: 28
End: 2018-06-11

## 2018-06-11 ENCOUNTER — NURSE ONLY (OUTPATIENT)
Dept: HEMATOLOGY/ONCOLOGY | Age: 28
End: 2018-06-11
Attending: INTERNAL MEDICINE
Payer: COMMERCIAL

## 2018-06-11 DIAGNOSIS — C16.0 GE JUNCTION CARCINOMA (HCC): Primary | ICD-10-CM

## 2018-06-11 PROCEDURE — 96523 IRRIG DRUG DELIVERY DEVICE: CPT

## 2018-06-11 RX ORDER — MINOCYCLINE HYDROCHLORIDE 100 MG/1
100 TABLET ORAL 2 TIMES DAILY
Qty: 60 TABLET | Refills: 0 | Status: SHIPPED | OUTPATIENT
Start: 2018-06-11 | End: 2018-09-19

## 2018-06-11 RX ORDER — SODIUM CHLORIDE 0.9 % (FLUSH) 0.9 %
10 SYRINGE (ML) INJECTION ONCE
Status: CANCELLED | OUTPATIENT
Start: 2018-06-11

## 2018-06-11 RX ORDER — ONDANSETRON 2 MG/ML
8 INJECTION INTRAMUSCULAR; INTRAVENOUS ONCE
Status: CANCELLED
Start: 2018-06-11 | End: 2018-06-11

## 2018-06-11 RX ORDER — SODIUM CHLORIDE 0.9 % (FLUSH) 0.9 %
10 SYRINGE (ML) INJECTION ONCE
Status: COMPLETED | OUTPATIENT
Start: 2018-06-11 | End: 2018-06-11

## 2018-06-11 RX ORDER — SODIUM CHLORIDE 9 MG/ML
1000 INJECTION, SOLUTION INTRAVENOUS ONCE
Status: CANCELLED
Start: 2018-06-11 | End: 2018-06-11

## 2018-06-11 RX ADMIN — SODIUM CHLORIDE 0.9 % (FLUSH) 10 ML: 0.9 % SYRINGE (ML) INJECTION at 16:45:00

## 2018-06-11 NOTE — TELEPHONE ENCOUNTER
Having diarrhea, Imodium stops it but when it wears off, he has diarrhea. Will take TID today and in am, if no BM will hold that later doses tomorrow.

## 2018-06-11 NOTE — PROGRESS NOTES
Pt here for RN assessment. Pt reports noticing blood under dressing on right arm picc. Pt had blood clot last discovered last week and start anti-coagulation. He reports improved pain and movement in that arm. Dressing site assessed.  Scan amount of blood

## 2018-06-14 ENCOUNTER — NURSE ONLY (OUTPATIENT)
Dept: HEMATOLOGY/ONCOLOGY | Age: 28
End: 2018-06-14
Attending: INTERNAL MEDICINE
Payer: COMMERCIAL

## 2018-06-14 ENCOUNTER — OFFICE VISIT (OUTPATIENT)
Dept: HEMATOLOGY/ONCOLOGY | Age: 28
End: 2018-06-14
Attending: INTERNAL MEDICINE
Payer: COMMERCIAL

## 2018-06-14 VITALS
TEMPERATURE: 97 F | DIASTOLIC BLOOD PRESSURE: 77 MMHG | OXYGEN SATURATION: 99 % | RESPIRATION RATE: 16 BRPM | SYSTOLIC BLOOD PRESSURE: 116 MMHG | WEIGHT: 265 LBS | BODY MASS INDEX: 32 KG/M2 | HEART RATE: 99 BPM

## 2018-06-14 DIAGNOSIS — R11.2 CHEMOTHERAPY-INDUCED NAUSEA AND VOMITING: ICD-10-CM

## 2018-06-14 DIAGNOSIS — C16.0 GE JUNCTION CARCINOMA (HCC): Primary | ICD-10-CM

## 2018-06-14 DIAGNOSIS — L73.9 FOLLICULITIS: ICD-10-CM

## 2018-06-14 DIAGNOSIS — T45.1X5A ANEMIA DUE TO CHEMOTHERAPY: ICD-10-CM

## 2018-06-14 DIAGNOSIS — M79.89 SWELLING OF RIGHT UPPER EXTREMITY: ICD-10-CM

## 2018-06-14 DIAGNOSIS — D64.81 ANEMIA DUE TO CHEMOTHERAPY: ICD-10-CM

## 2018-06-14 DIAGNOSIS — T45.1X5A CHEMOTHERAPY-INDUCED NAUSEA AND VOMITING: ICD-10-CM

## 2018-06-14 DIAGNOSIS — T45.1X5A CHEMOTHERAPY INDUCED NAUSEA AND VOMITING: ICD-10-CM

## 2018-06-14 DIAGNOSIS — L53.9 ERYTHEMA OF UPPER EXTREMITY: Primary | ICD-10-CM

## 2018-06-14 DIAGNOSIS — C16.0 GE JUNCTION CARCINOMA (HCC): ICD-10-CM

## 2018-06-14 DIAGNOSIS — R11.2 CHEMOTHERAPY INDUCED NAUSEA AND VOMITING: ICD-10-CM

## 2018-06-14 PROCEDURE — 80053 COMPREHEN METABOLIC PANEL: CPT

## 2018-06-14 PROCEDURE — 96415 CHEMO IV INFUSION ADDL HR: CPT

## 2018-06-14 PROCEDURE — 96375 TX/PRO/DX INJ NEW DRUG ADDON: CPT

## 2018-06-14 PROCEDURE — 99214 OFFICE O/P EST MOD 30 MIN: CPT | Performed by: INTERNAL MEDICINE

## 2018-06-14 PROCEDURE — 87040 BLOOD CULTURE FOR BACTERIA: CPT

## 2018-06-14 PROCEDURE — 85025 COMPLETE CBC W/AUTO DIFF WBC: CPT

## 2018-06-14 PROCEDURE — 96413 CHEMO IV INFUSION 1 HR: CPT

## 2018-06-14 PROCEDURE — 96367 TX/PROPH/DG ADDL SEQ IV INF: CPT

## 2018-06-14 PROCEDURE — 96417 CHEMO IV INFUS EACH ADDL SEQ: CPT

## 2018-06-14 PROCEDURE — 96416 CHEMO PROLONG INFUSE W/PUMP: CPT

## 2018-06-14 PROCEDURE — 96368 THER/DIAG CONCURRENT INF: CPT

## 2018-06-14 PROCEDURE — 96377 APPLICATON ON-BODY INJECTOR: CPT

## 2018-06-14 RX ORDER — FLUOROURACIL 50 MG/ML
2600 INJECTION, SOLUTION INTRAVENOUS CONTINUOUS
Status: DISCONTINUED | OUTPATIENT
Start: 2018-06-14 | End: 2018-06-14

## 2018-06-14 RX ORDER — FLUOROURACIL 50 MG/ML
2600 INJECTION, SOLUTION INTRAVENOUS CONTINUOUS
Status: CANCELLED | OUTPATIENT
Start: 2018-06-14

## 2018-06-14 RX ORDER — LOPERAMIDE HYDROCHLORIDE 2 MG/1
2 CAPSULE ORAL 4 TIMES DAILY PRN
COMMUNITY

## 2018-06-14 RX ADMIN — FLUOROURACIL 6650 MG: 50 INJECTION, SOLUTION INTRAVENOUS at 15:50:00

## 2018-06-14 NOTE — PROGRESS NOTES
Pt meeting criteria for SEVERE MALNUTRITION in the context of chronic illness as evidenced by 7% unplanned wt loss x 3 mos and po intake meeting less than 75% estimated nutrition needs.     Nutrition F/U Note:      Date of visit: 06/14/2018     Diet Rx: Hi

## 2018-06-14 NOTE — PROGRESS NOTES
Pt here for C2D1.   Arrives Ambulating independently, accompanied by Family member           Modifications in dose or schedule:      Frequency of blood return and site check throughout administration: Prior to administration   Discharged to Home, accompanie

## 2018-06-14 NOTE — PATIENT INSTRUCTIONS
On-body Injector for Neulasta Patient Instructions       Your On-Body Injection device was applied on June 14 @ 2 pm    Your dose of medication will start on Symone 15 @ 5 pm    You may remove this device on Symone 15 @ 7 pm      Important information to dhiraj

## 2018-06-14 NOTE — PROGRESS NOTES
Pt here for 1 week MD f/u and due for chemo. Energy level and appetite has been up and down. Feeling overall better this week. Nausea is better since adding olanzapine at night, also alternating compazine/zofran.  Pt notes new redness/swelling and warmth in

## 2018-06-15 ENCOUNTER — NURSE ONLY (OUTPATIENT)
Dept: HEMATOLOGY/ONCOLOGY | Age: 28
End: 2018-06-15
Attending: INTERNAL MEDICINE
Payer: COMMERCIAL

## 2018-06-15 PROCEDURE — 96523 IRRIG DRUG DELIVERY DEVICE: CPT

## 2018-06-15 NOTE — PROGRESS NOTES
Patient states his cousin is a nurse and will be flushing his picc and d/c'ing his pump after chemo.

## 2018-06-18 PROBLEM — T45.1X5A CHEMOTHERAPY-INDUCED NAUSEA AND VOMITING: Status: ACTIVE | Noted: 2018-06-18

## 2018-06-18 PROBLEM — L73.9 FOLLICULITIS: Status: ACTIVE | Noted: 2018-06-18

## 2018-06-18 PROBLEM — T45.1X5A ANEMIA DUE TO CHEMOTHERAPY: Status: ACTIVE | Noted: 2018-06-18

## 2018-06-18 PROBLEM — D64.81 ANEMIA DUE TO CHEMOTHERAPY: Status: ACTIVE | Noted: 2018-06-18

## 2018-06-18 PROBLEM — R11.2 CHEMOTHERAPY-INDUCED NAUSEA AND VOMITING: Status: ACTIVE | Noted: 2018-06-18

## 2018-06-19 NOTE — PROGRESS NOTES
[facility]    PATIENT'S NAME: Tye Clarisse LUTZ   ATTENDING PHYSICIAN: Teresa Blood M.D.    PATIENT ACCOUNT #: [de-identified] LOCATION: [address] [place] [address] RECORD #: [id number] YOB: 1990   DATE OF SERVICE: 06/14/2018       CANCER deal of weight to lose.   His current medications include Tylenol; citalopram 20 mg daily; dexamethasone 2 tablets twice daily for 3 days around the chemotherapy; fluticasone propionate nasal spray; loperamide 2 mg 1 to 2 tablets q.i.d. p.r.n.; lorazepam 1 Sharrie Councilman, M.D.  d: 06/18/2018 13:59:24  t: 06/18/2018 15:35:17  [facility] [id number]  XZ/    cc: WADE Oliveira M.D. Joanne Landsberg, M.D. Nonnie Irvine, D.O.

## 2018-06-21 ENCOUNTER — NURSE ONLY (OUTPATIENT)
Dept: HEMATOLOGY/ONCOLOGY | Age: 28
End: 2018-06-21
Attending: INTERNAL MEDICINE
Payer: COMMERCIAL

## 2018-06-21 ENCOUNTER — SOCIAL WORK SERVICES (OUTPATIENT)
Dept: HEMATOLOGY/ONCOLOGY | Facility: HOSPITAL | Age: 28
End: 2018-06-21

## 2018-06-21 PROCEDURE — 96523 IRRIG DRUG DELIVERY DEVICE: CPT

## 2018-06-21 NOTE — PROGRESS NOTES
Pt here for dressing change. Pt reported one episode of vomiting this AM. Energy and appetite good. Denies nausea. Denies bowel problems. Denies pain. Pt states he had one nosebleed that stopped immediately. No other bleeding.  Advised pt to use OTC nasal s

## 2018-06-25 ENCOUNTER — TELEPHONE (OUTPATIENT)
Dept: HEMATOLOGY/ONCOLOGY | Facility: HOSPITAL | Age: 28
End: 2018-06-25

## 2018-06-28 ENCOUNTER — OFFICE VISIT (OUTPATIENT)
Dept: HEMATOLOGY/ONCOLOGY | Age: 28
End: 2018-06-28
Attending: INTERNAL MEDICINE
Payer: COMMERCIAL

## 2018-06-28 ENCOUNTER — NURSE ONLY (OUTPATIENT)
Dept: HEMATOLOGY/ONCOLOGY | Age: 28
End: 2018-06-28
Attending: INTERNAL MEDICINE
Payer: COMMERCIAL

## 2018-06-28 VITALS
HEIGHT: 76.22 IN | OXYGEN SATURATION: 98 % | WEIGHT: 265 LBS | SYSTOLIC BLOOD PRESSURE: 119 MMHG | HEART RATE: 77 BPM | DIASTOLIC BLOOD PRESSURE: 77 MMHG | RESPIRATION RATE: 16 BRPM | TEMPERATURE: 98 F | BODY MASS INDEX: 31.94 KG/M2

## 2018-06-28 DIAGNOSIS — L73.9 FOLLICULITIS: ICD-10-CM

## 2018-06-28 DIAGNOSIS — T45.1X5A ANEMIA DUE TO CHEMOTHERAPY: ICD-10-CM

## 2018-06-28 DIAGNOSIS — C16.0 GE JUNCTION CARCINOMA (HCC): Primary | ICD-10-CM

## 2018-06-28 DIAGNOSIS — D64.81 ANEMIA DUE TO CHEMOTHERAPY: ICD-10-CM

## 2018-06-28 PROCEDURE — 96415 CHEMO IV INFUSION ADDL HR: CPT

## 2018-06-28 PROCEDURE — 96413 CHEMO IV INFUSION 1 HR: CPT

## 2018-06-28 PROCEDURE — 96417 CHEMO IV INFUS EACH ADDL SEQ: CPT

## 2018-06-28 PROCEDURE — 80053 COMPREHEN METABOLIC PANEL: CPT

## 2018-06-28 PROCEDURE — 96416 CHEMO PROLONG INFUSE W/PUMP: CPT

## 2018-06-28 PROCEDURE — 96377 APPLICATON ON-BODY INJECTOR: CPT

## 2018-06-28 PROCEDURE — 96367 TX/PROPH/DG ADDL SEQ IV INF: CPT

## 2018-06-28 PROCEDURE — 99214 OFFICE O/P EST MOD 30 MIN: CPT | Performed by: INTERNAL MEDICINE

## 2018-06-28 PROCEDURE — 96375 TX/PRO/DX INJ NEW DRUG ADDON: CPT

## 2018-06-28 PROCEDURE — 85025 COMPLETE CBC W/AUTO DIFF WBC: CPT

## 2018-06-28 PROCEDURE — 96368 THER/DIAG CONCURRENT INF: CPT

## 2018-06-28 RX ORDER — FLUOROURACIL 50 MG/ML
2600 INJECTION, SOLUTION INTRAVENOUS CONTINUOUS
Status: DISCONTINUED | OUTPATIENT
Start: 2018-06-28 | End: 2018-06-28

## 2018-06-28 RX ORDER — FLUOROURACIL 50 MG/ML
2600 INJECTION, SOLUTION INTRAVENOUS CONTINUOUS
Status: CANCELLED | OUTPATIENT
Start: 2018-06-28

## 2018-06-28 RX ADMIN — FLUOROURACIL 6650 MG: 50 INJECTION, SOLUTION INTRAVENOUS at 15:02:00

## 2018-06-28 NOTE — PROGRESS NOTES
Pt here for 2 week MD lynn/vinod and due for chemo. Pt notes this cycle went better than previous cycle. Energy level has been pretty good. Appetite is good. Denies pain. Pt has had 2 random episodes on nausea/vomiting this last week, took meds with relief.  Pt ha

## 2018-06-28 NOTE — PROGRESS NOTES
Pt here for C3D1.   Arrives Ambulating independently, accompanied by Family Member          Modifications in dose or schedule: No     Frequency of blood return and site check throughout administration: Prior to administration   Discharged to Home, Ambulatin

## 2018-06-28 NOTE — PATIENT INSTRUCTIONS
On-body Injector for Neulasta Patient Instructions       Your On-Body Injection device was applied on June 28 @ 1:00 pm    Your dose of medication will start on June 29 @ 4:00 pm    You may remove this device on June 29 @ 6:00 pm      Important informatio

## 2018-06-29 NOTE — PROGRESS NOTES
[facility]    PATIENT'S NAME: Flaco Mirela LUTZ   ATTENDING PHYSICIAN: Katharine Sigala M.D.    PATIENT ACCOUNT #: [de-identified] LOCATION: [address] [place] [address] RECORD #: [id number] YOB: 1990   DATE OF SERVICE: 06/28/2018       CANCER p.r.n.; lorazepam 1 mg q.6 h. p.r.n. anxiety; minocycline 100 mg b.i.d.; olanzapine 5 mg every night for 5 days after the chemotherapy;  omeprazole 40 mg daily; ondansetron 8 mg q.8 h. p.r.n.; oxycodone 5 mg q.6 h. p.r.n., which he is not taking routinely; intervention. Dictated By Dorys Wolff M.D.  d: 06/29/2018 06:19:15  t: 06/29/2018 13:38:20  Job [id number]  /    cc: WADE Arriaga M.D. Silvester Scrivener, M.D. Brantley Limerick, D.O.

## 2018-07-02 NOTE — PROGRESS NOTES
Pt meeting criteria for SEVERE MALNUTRITION in the context of chronic illness as evidenced by 7% unplanned wt loss x 3 mos and po intake meeting less than 75% estimated nutrition needs.     Nutrition F/U Note:      Date of visit: 06/28/2018     Diet Rx: Hi

## 2018-07-05 ENCOUNTER — NURSE ONLY (OUTPATIENT)
Dept: HEMATOLOGY/ONCOLOGY | Age: 28
End: 2018-07-05
Attending: INTERNAL MEDICINE
Payer: COMMERCIAL

## 2018-07-05 PROCEDURE — 96523 IRRIG DRUG DELIVERY DEVICE: CPT

## 2018-07-05 NOTE — PROGRESS NOTES
Pt here for dressing change. Pt c/o dry cough x 3-4 days. Denies fever/chills. Denies sore throat, nasal drainage. Denies any other complaints.    Discussed pt c/o with [facility] Carpenter- pt to take Claritin or Allegra once a day and call if develops fever, 10

## 2018-07-07 ENCOUNTER — MED REC SCAN ONLY (OUTPATIENT)
Dept: FAMILY MEDICINE CLINIC | Facility: CLINIC | Age: 28
End: 2018-07-07

## 2018-07-09 RX ORDER — OLANZAPINE 5 MG/1
TABLET ORAL
Qty: 30 TABLET | Refills: 5 | Status: SHIPPED | OUTPATIENT
Start: 2018-07-09 | End: 2018-12-27

## 2018-07-12 ENCOUNTER — OFFICE VISIT (OUTPATIENT)
Dept: HEMATOLOGY/ONCOLOGY | Age: 28
End: 2018-07-12
Attending: INTERNAL MEDICINE
Payer: COMMERCIAL

## 2018-07-12 ENCOUNTER — NURSE ONLY (OUTPATIENT)
Dept: HEMATOLOGY/ONCOLOGY | Age: 28
End: 2018-07-12
Attending: INTERNAL MEDICINE
Payer: COMMERCIAL

## 2018-07-12 VITALS
RESPIRATION RATE: 16 BRPM | HEART RATE: 77 BPM | OXYGEN SATURATION: 100 % | DIASTOLIC BLOOD PRESSURE: 80 MMHG | SYSTOLIC BLOOD PRESSURE: 117 MMHG | WEIGHT: 265.81 LBS | BODY MASS INDEX: 32 KG/M2 | TEMPERATURE: 98 F

## 2018-07-12 DIAGNOSIS — F41.8 ANXIETY ABOUT HEALTH: ICD-10-CM

## 2018-07-12 DIAGNOSIS — C16.0 GE JUNCTION CARCINOMA (HCC): ICD-10-CM

## 2018-07-12 DIAGNOSIS — D64.81 ANEMIA DUE TO CHEMOTHERAPY: ICD-10-CM

## 2018-07-12 DIAGNOSIS — C15.5 MALIGNANT NEOPLASM OF LOWER THIRD OF ESOPHAGUS (HCC): Primary | ICD-10-CM

## 2018-07-12 DIAGNOSIS — C16.0 GE JUNCTION CARCINOMA (HCC): Primary | ICD-10-CM

## 2018-07-12 DIAGNOSIS — T45.1X5A ANEMIA DUE TO CHEMOTHERAPY: ICD-10-CM

## 2018-07-12 LAB
ALBUMIN SERPL-MCNC: 3.3 G/DL (ref 3.5–4.8)
ALP LIVER SERPL-CCNC: 152 U/L (ref 45–117)
ALT SERPL-CCNC: 51 U/L (ref 17–63)
AST SERPL-CCNC: 36 U/L (ref 15–41)
BASOPHILS # BLD AUTO: 0.05 X10(3) UL (ref 0–0.1)
BASOPHILS NFR BLD AUTO: 0.5 %
BILIRUB SERPL-MCNC: 0.3 MG/DL (ref 0.1–2)
BUN BLD-MCNC: 7 MG/DL (ref 8–20)
CALCIUM BLD-MCNC: 8.7 MG/DL (ref 8.3–10.3)
CHLORIDE: 108 MMOL/L (ref 101–111)
CO2: 26 MMOL/L (ref 22–32)
CREAT BLD-MCNC: 0.71 MG/DL (ref 0.7–1.3)
EOSINOPHIL # BLD AUTO: 0.1 X10(3) UL (ref 0–0.3)
EOSINOPHIL NFR BLD AUTO: 1.1 %
ERYTHROCYTE [DISTWIDTH] IN BLOOD BY AUTOMATED COUNT: 16.9 % (ref 11.5–16)
GLUCOSE BLD-MCNC: 115 MG/DL (ref 70–99)
HCT VFR BLD AUTO: 36.9 % (ref 37–53)
HGB BLD-MCNC: 12 G/DL (ref 13–17)
IMMATURE GRANULOCYTE COUNT: 0.06 X10(3) UL (ref 0–1)
IMMATURE GRANULOCYTE RATIO %: 0.6 %
LYMPHOCYTES # BLD AUTO: 0.85 X10(3) UL (ref 0.9–4)
LYMPHOCYTES NFR BLD AUTO: 9.2 %
M PROTEIN MFR SERPL ELPH: 7.1 G/DL (ref 6.1–8.3)
MCH RBC QN AUTO: 27.4 PG (ref 27–33.2)
MCHC RBC AUTO-ENTMCNC: 32.5 G/DL (ref 31–37)
MCV RBC AUTO: 84.2 FL (ref 80–99)
MONOCYTES # BLD AUTO: 0.62 X10(3) UL (ref 0.1–1)
MONOCYTES NFR BLD AUTO: 6.7 %
NEUTROPHIL ABS PRELIM: 7.6 X10 (3) UL (ref 1.3–6.7)
NEUTROPHILS # BLD AUTO: 7.6 X10(3) UL (ref 1.3–6.7)
NEUTROPHILS NFR BLD AUTO: 81.9 %
PLATELET # BLD AUTO: 158 10(3)UL (ref 150–450)
POTASSIUM SERPL-SCNC: 4.1 MMOL/L (ref 3.6–5.1)
RBC # BLD AUTO: 4.38 X10(6)UL (ref 4.3–5.7)
RED CELL DISTRIBUTION WIDTH-SD: 49.8 FL (ref 35.1–46.3)
SODIUM SERPL-SCNC: 140 MMOL/L (ref 136–144)
WBC # BLD AUTO: 9.3 X10(3) UL (ref 4–13)

## 2018-07-12 PROCEDURE — 96377 APPLICATON ON-BODY INJECTOR: CPT

## 2018-07-12 PROCEDURE — 96368 THER/DIAG CONCURRENT INF: CPT

## 2018-07-12 PROCEDURE — 96413 CHEMO IV INFUSION 1 HR: CPT

## 2018-07-12 PROCEDURE — 96367 TX/PROPH/DG ADDL SEQ IV INF: CPT

## 2018-07-12 PROCEDURE — 96417 CHEMO IV INFUS EACH ADDL SEQ: CPT

## 2018-07-12 PROCEDURE — 99214 OFFICE O/P EST MOD 30 MIN: CPT | Performed by: INTERNAL MEDICINE

## 2018-07-12 PROCEDURE — 80053 COMPREHEN METABOLIC PANEL: CPT

## 2018-07-12 PROCEDURE — 96415 CHEMO IV INFUSION ADDL HR: CPT

## 2018-07-12 PROCEDURE — 96375 TX/PRO/DX INJ NEW DRUG ADDON: CPT

## 2018-07-12 PROCEDURE — 85025 COMPLETE CBC W/AUTO DIFF WBC: CPT

## 2018-07-12 RX ORDER — LORATADINE 10 MG/1
10 TABLET ORAL DAILY
COMMUNITY
End: 2018-12-18

## 2018-07-12 RX ORDER — FLUOROURACIL 50 MG/ML
2600 INJECTION, SOLUTION INTRAVENOUS CONTINUOUS
Status: DISCONTINUED | OUTPATIENT
Start: 2018-07-12 | End: 2018-07-12

## 2018-07-12 RX ORDER — FLUOROURACIL 50 MG/ML
2600 INJECTION, SOLUTION INTRAVENOUS CONTINUOUS
Status: CANCELLED | OUTPATIENT
Start: 2018-07-12

## 2018-07-12 RX ADMIN — FLUOROURACIL 6650 MG: 50 INJECTION, SOLUTION INTRAVENOUS at 17:04:00

## 2018-07-12 NOTE — PROGRESS NOTES
Pt here for C4D1.   Arrives Ambulating independently, accompanied by Spouse           Modifications in dose or schedule: No     Frequency of blood return and site check throughout administration: Prior to administration and Prior to each drug   Discharged t

## 2018-07-12 NOTE — PROGRESS NOTES
Per Dr. Jerad Ellis, patient to have PICC line removed tomorrow after iv chemo pump complete. Patient is currently taking Xarelto for DVT in arm from PICC line.  Per Dr. Jerad Ellis, since PICC line is being removed, patient is to continue taking Xarelto for 2 more w

## 2018-07-12 NOTE — PATIENT INSTRUCTIONS
Continue your Xarelto for the next two weeks, then you can stop taking per Dr. Hammond Springerton for Neulasta Patient Instructions       Your On-Body Injection device was applied on this day:  7/12 at th

## 2018-07-12 NOTE — PROGRESS NOTES
Pt here for 2 week MD f/u and due for chemo. Energy level was very low through the weekend following chemo, but has slowly gotten better.  Appetite is NL, but has had a lot of random nausea and vomiting throughout the cycle, has abdominal pain right before

## 2018-07-13 ENCOUNTER — NURSE ONLY (OUTPATIENT)
Dept: HEMATOLOGY/ONCOLOGY | Age: 28
End: 2018-07-13
Attending: INTERNAL MEDICINE
Payer: COMMERCIAL

## 2018-07-13 PROCEDURE — 96523 IRRIG DRUG DELIVERY DEVICE: CPT

## 2018-07-13 NOTE — PROGRESS NOTES
Pt meeting criteria for SEVERE MALNUTRITION in the context of chronic illness as evidenced by 7% unplanned wt loss x 3 mos and po intake meeting less than 75% estimated nutrition needs.     Nutrition F/U Note:      Date of visit: 07/12/2018     Diet Rx: Hi

## 2018-07-14 NOTE — PROGRESS NOTES
[facility]    PATIENT'S NAME: Con Nara LUTZ   ATTENDING PHYSICIAN: Car Paulino M.D.    PATIENT ACCOUNT #: [de-identified] LOCATION: [address] [place] [address] RECORD #: [id number] YOB: 1990   DATE OF SERVICE: 07/12/2018       CANCER chemotherapy, omeprazole 40 mg daily, ondansetron 8 mg q.8 h. p.r.n., oxycodone which he is not taking routinely, prochlorperazine 10 mg q.6 h. p.r.n., rivaroxaban for his upper extremity clot.     PHYSICAL EXAMINATION:    GENERAL:  He is a well-developed m Lord Leatha M.D. Vishnu Bucio M.D.    Kem Biggs D.O.

## 2018-07-16 ENCOUNTER — NURSE ONLY (OUTPATIENT)
Dept: HEMATOLOGY/ONCOLOGY | Facility: HOSPITAL | Age: 28
End: 2018-07-16

## 2018-07-19 ENCOUNTER — TELEPHONE (OUTPATIENT)
Dept: FAMILY MEDICINE CLINIC | Facility: CLINIC | Age: 28
End: 2018-07-19

## 2018-07-19 ENCOUNTER — TELEPHONE (OUTPATIENT)
Dept: HEMATOLOGY/ONCOLOGY | Facility: HOSPITAL | Age: 28
End: 2018-07-19

## 2018-07-19 ENCOUNTER — NURSE ONLY (OUTPATIENT)
Dept: FAMILY MEDICINE CLINIC | Facility: CLINIC | Age: 28
End: 2018-07-19
Payer: COMMERCIAL

## 2018-07-19 DIAGNOSIS — R10.9 FLANK PAIN, ACUTE: Primary | ICD-10-CM

## 2018-07-19 LAB
APPEARANCE: CLEAR
BILIRUBIN: NEGATIVE
GLUCOSE (URINE DIPSTICK): NEGATIVE MG/DL
KETONES (URINE DIPSTICK): NEGATIVE MG/DL
LEUKOCYTES: NEGATIVE
MULTISTIX LOT#: NORMAL NUMERIC
NITRITE, URINE: NEGATIVE
PH, URINE: 5.5 (ref 4.5–8)
SPECIFIC GRAVITY: 1.02 (ref 1–1.03)
URINE-COLOR: YELLOW
UROBILINOGEN,SEMI-QN: 0.2 MG/DL (ref 0–1.9)

## 2018-07-19 PROCEDURE — 87086 URINE CULTURE/COLONY COUNT: CPT | Performed by: FAMILY MEDICINE

## 2018-07-19 PROCEDURE — 81003 URINALYSIS AUTO W/O SCOPE: CPT | Performed by: FAMILY MEDICINE

## 2018-07-19 RX ORDER — CEPHALEXIN 500 MG/1
500 CAPSULE ORAL 2 TIMES DAILY
Qty: 20 CAPSULE | Refills: 0 | Status: SHIPPED | OUTPATIENT
Start: 2018-07-19 | End: 2018-07-29

## 2018-07-19 NOTE — TELEPHONE ENCOUNTER
Returned pt's phone call, reports he completed his chemo last week, has been recently put on xarelto by Dr. Vianney Hi due to blood clot in PICC line.  States for the last 24 hours he has had lower right sided back pain radiating to the groin with dark urine ou

## 2018-07-19 NOTE — PROGRESS NOTES
Pt provides urine specimen. Results given to Dr. Halie Grant for review. Pt to be started on Keflex 500mg twice daily x 10 days will send to [facility] per pt's request. Encouraged to push fluids. Culture ordered and sent.  Pt asks if he should be mariia

## 2018-07-19 NOTE — TELEPHONE ENCOUNTER
Patient with lower back pain early this am then had groin pain with dark urine. Is at PCP office to have UA.  Will follow up pending results

## 2018-07-30 ENCOUNTER — TELEPHONE (OUTPATIENT)
Dept: HEMATOLOGY/ONCOLOGY | Facility: HOSPITAL | Age: 28
End: 2018-07-30

## 2018-07-30 ENCOUNTER — SOCIAL WORK SERVICES (OUTPATIENT)
Dept: HEMATOLOGY/ONCOLOGY | Facility: HOSPITAL | Age: 28
End: 2018-07-30

## 2018-07-30 NOTE — TELEPHONE ENCOUNTER
Kristie calling, he increased the Celexa to 40 mg, over the weekend on Friday and yesterday out of the blue he had 2 full blown panic attacks, He took Ativan and it worked, calling to discuss medication alternatives.  He also was approved for STD until 8/19 an

## 2018-07-30 NOTE — PROGRESS NOTES
ELIO prepared letter extending patient's time off work with return to work date of August 20. ELIO emailed it to Digna at [employer], [email or web address]

## 2018-08-03 DIAGNOSIS — D64.9 NORMOCYTIC ANEMIA: ICD-10-CM

## 2018-08-03 DIAGNOSIS — D50.0 IRON DEFICIENCY ANEMIA DUE TO CHRONIC BLOOD LOSS: ICD-10-CM

## 2018-08-03 DIAGNOSIS — C16.0 GE JUNCTION CARCINOMA (HCC): ICD-10-CM

## 2018-08-03 RX ORDER — PROCHLORPERAZINE MALEATE 10 MG
10 TABLET ORAL EVERY 6 HOURS PRN
Qty: 30 TABLET | Refills: 5 | Status: SHIPPED | OUTPATIENT
Start: 2018-08-03 | End: 2019-01-31

## 2018-08-06 ENCOUNTER — HOSPITAL ENCOUNTER (OUTPATIENT)
Dept: CT IMAGING | Age: 28
Discharge: HOME | End: 2018-08-06
Attending: INTERNAL MEDICINE
Payer: COMMERCIAL

## 2018-08-06 DIAGNOSIS — C15.5 MALIGNANT NEOPLASM OF LOWER THIRD OF ESOPHAGUS (HCC): ICD-10-CM

## 2018-08-06 LAB — CREAT SERPL-MCNC: 0.7 MG/DL (ref 0.7–1.3)

## 2018-08-06 PROCEDURE — 74177 CT ABD & PELVIS W/CONTRAST: CPT | Performed by: INTERNAL MEDICINE

## 2018-08-06 PROCEDURE — 82565 ASSAY OF CREATININE: CPT

## 2018-08-06 PROCEDURE — 71260 CT THORAX DX C+: CPT | Performed by: INTERNAL MEDICINE

## 2018-08-07 ENCOUNTER — SOCIAL WORK SERVICES (OUTPATIENT)
Dept: HEMATOLOGY/ONCOLOGY | Facility: HOSPITAL | Age: 28
End: 2018-08-07

## 2018-08-07 NOTE — PROGRESS NOTES
SW received call from patient who states his employer states they have not received the most recent return to work letter. ELIO spoke with patient and faxed and emailed again to Digna at [employer]. Digna emailed back that she has received the letter.

## 2018-08-10 RX ORDER — CITALOPRAM 20 MG/1
40 TABLET ORAL DAILY
Qty: 60 TABLET | Refills: 3 | Status: SHIPPED
Start: 2018-08-10 | End: 2019-03-28

## 2018-08-23 ENCOUNTER — TELEPHONE (OUTPATIENT)
Dept: HEMATOLOGY/ONCOLOGY | Facility: HOSPITAL | Age: 28
End: 2018-08-23

## 2018-08-23 NOTE — TELEPHONE ENCOUNTER
Spoke to patient. He has returned to work this week. First day went well. Second day he felt sick and had to leave work. Third day went well and then fourth day (today) he felt sick and vomited and left.   Still coping with anxiety and due to see psychi

## 2018-08-23 NOTE — TELEPHONE ENCOUNTER
Pt left multiple messages and called clinical leader today re: questioning if he should go on long term disability d/t the fact that he went back to work and has been coming home feeling sick.  DW, Dr. Izzy Jorgensen, states he needs to talk with him about it over

## 2018-08-24 ENCOUNTER — SOCIAL WORK SERVICES (OUTPATIENT)
Dept: HEMATOLOGY/ONCOLOGY | Facility: HOSPITAL | Age: 28
End: 2018-08-24

## 2018-08-24 NOTE — PROGRESS NOTES
ELIO spoke with patient. He is having difficulty returning to work. ELIO discussed with Dr. Deirdre Carranza. ELIO wrote letter for patient to be off work for six weeks. ELIO faxed to Digna at [employer] at [phone].

## 2018-08-31 ENCOUNTER — SOCIAL WORK SERVICES (OUTPATIENT)
Dept: HEMATOLOGY/ONCOLOGY | Facility: HOSPITAL | Age: 28
End: 2018-08-31

## 2018-08-31 NOTE — PROGRESS NOTES
ELIO spoke with patient and completed LTD paperwork. Patient states he met with psychiatrist.   The patient and the psychiatrist decided against medication at this time and will pursue some other strategies first, such as distractions and exercise.    Alma

## 2018-09-06 ENCOUNTER — OFFICE VISIT (OUTPATIENT)
Dept: HEMATOLOGY/ONCOLOGY | Age: 28
End: 2018-09-06
Attending: INTERNAL MEDICINE
Payer: COMMERCIAL

## 2018-09-06 VITALS
OXYGEN SATURATION: 97 % | DIASTOLIC BLOOD PRESSURE: 74 MMHG | SYSTOLIC BLOOD PRESSURE: 125 MMHG | TEMPERATURE: 97 F | RESPIRATION RATE: 18 BRPM | BODY MASS INDEX: 31 KG/M2 | HEART RATE: 83 BPM | WEIGHT: 259.19 LBS

## 2018-09-06 DIAGNOSIS — C16.0 GE JUNCTION CARCINOMA (HCC): Primary | ICD-10-CM

## 2018-09-06 LAB
ALBUMIN SERPL-MCNC: 3.7 G/DL (ref 3.5–4.8)
ALBUMIN/GLOB SERPL: 0.9 {RATIO} (ref 1–2)
ALP LIVER SERPL-CCNC: 166 U/L (ref 45–117)
ALT SERPL-CCNC: 40 U/L (ref 17–63)
ANION GAP SERPL CALC-SCNC: 5 MMOL/L (ref 0–18)
AST SERPL-CCNC: 30 U/L (ref 15–41)
BASOPHILS # BLD AUTO: 0.04 X10(3) UL (ref 0–0.1)
BASOPHILS NFR BLD AUTO: 0.7 %
BILIRUB SERPL-MCNC: 0.3 MG/DL (ref 0.1–2)
BUN BLD-MCNC: 13 MG/DL (ref 8–20)
BUN/CREAT SERPL: 20 (ref 10–20)
CALCIUM BLD-MCNC: 9.1 MG/DL (ref 8.3–10.3)
CANCER AG19-9 SERPL-ACNC: 221.5 U/ML (ref ?–37)
CEA SERPL-MCNC: 1.5 NG/ML (ref 0.5–5)
CHLORIDE SERPL-SCNC: 108 MMOL/L (ref 101–111)
CO2 SERPL-SCNC: 28 MMOL/L (ref 22–32)
CREAT BLD-MCNC: 0.65 MG/DL (ref 0.7–1.3)
EOSINOPHIL # BLD AUTO: 0.2 X10(3) UL (ref 0–0.3)
EOSINOPHIL NFR BLD AUTO: 3.7 %
ERYTHROCYTE [DISTWIDTH] IN BLOOD BY AUTOMATED COUNT: 14.6 % (ref 11.5–16)
GLOBULIN PLAS-MCNC: 4 G/DL (ref 2.5–4)
GLUCOSE BLD-MCNC: 79 MG/DL (ref 70–99)
HCT VFR BLD AUTO: 42.7 % (ref 37–53)
HGB BLD-MCNC: 13.8 G/DL (ref 13–17)
IMMATURE GRANULOCYTE COUNT: 0.02 X10(3) UL (ref 0–1)
IMMATURE GRANULOCYTE RATIO %: 0.4 %
LYMPHOCYTES # BLD AUTO: 0.75 X10(3) UL (ref 0.9–4)
LYMPHOCYTES NFR BLD AUTO: 13.9 %
M PROTEIN MFR SERPL ELPH: 7.7 G/DL (ref 6.1–8.3)
MCH RBC QN AUTO: 28.3 PG (ref 27–33.2)
MCHC RBC AUTO-ENTMCNC: 32.3 G/DL (ref 31–37)
MCV RBC AUTO: 87.5 FL (ref 80–99)
MONOCYTES # BLD AUTO: 0.61 X10(3) UL (ref 0.1–1)
MONOCYTES NFR BLD AUTO: 11.3 %
NEUTROPHIL ABS PRELIM: 3.76 X10 (3) UL (ref 1.3–6.7)
NEUTROPHILS # BLD AUTO: 3.76 X10(3) UL (ref 1.3–6.7)
NEUTROPHILS NFR BLD AUTO: 70 %
OSMOLALITY SERPL CALC.SUM OF ELEC: 291 MOSM/KG (ref 275–295)
PLATELET # BLD AUTO: 187 10(3)UL (ref 150–450)
POTASSIUM SERPL-SCNC: 4.4 MMOL/L (ref 3.6–5.1)
RBC # BLD AUTO: 4.88 X10(6)UL (ref 4.3–5.7)
RED CELL DISTRIBUTION WIDTH-SD: 46.7 FL (ref 35.1–46.3)
SODIUM SERPL-SCNC: 141 MMOL/L (ref 136–144)
WBC # BLD AUTO: 5.4 X10(3) UL (ref 4–13)

## 2018-09-06 PROCEDURE — 99214 OFFICE O/P EST MOD 30 MIN: CPT | Performed by: INTERNAL MEDICINE

## 2018-09-06 NOTE — PROGRESS NOTES
Patient is here for MD f/u. Patient has not been able to work due to chronic fatigue. Patient had one episode of upset stomach after eating but generally eating well. Patient has 2 loose stools every morning. Last Ct scan was on 8/6.          [facility]

## 2018-09-11 NOTE — PROGRESS NOTES
[facility]    PATIENT'S NAME: Charlotteglenis Samina LUTZ   ATTENDING PHYSICIAN: Zachary Lopez M.D.    PATIENT ACCOUNT #: [de-identified] LOCATION: [address] [place] [address] RECORD #: [id number] YOB: 1990   DATE OF SERVICE: 09/06/2018       CANCER daily, lorazepam 1 mg b.i.d. p.r.n. anxiety, minocycline 100 mg twice daily p.r.n. rash, omeprazole 40 mg daily. He is no longer taking olanzapine.   Ondansetron 8 mg q.8 hours p.r.n., oxycodone 5 mg q.6 hours p.r.n., which he is not taking routinely, and 05:54:58  [facility] [id number]  /    cc: WADE Taborr, WADE Shankar M.D. Coley Bers, D.O.

## 2018-09-19 RX ORDER — MINOCYCLINE HYDROCHLORIDE 100 MG/1
100 TABLET ORAL 2 TIMES DAILY
Qty: 60 TABLET | Refills: 0 | Status: SHIPPED | OUTPATIENT
Start: 2018-09-19 | End: 2018-12-18

## 2018-10-09 DIAGNOSIS — C16.0 GE JUNCTION CARCINOMA (HCC): ICD-10-CM

## 2018-10-09 DIAGNOSIS — R11.2 NAUSEA AND VOMITING: ICD-10-CM

## 2018-10-09 RX ORDER — LORAZEPAM 1 MG/1
1 TABLET ORAL EVERY 4 HOURS PRN
Qty: 60 TABLET | Refills: 0 | OUTPATIENT
Start: 2018-10-09 | End: 2018-11-14

## 2018-11-07 ENCOUNTER — HOSPITAL ENCOUNTER (OUTPATIENT)
Dept: CT IMAGING | Age: 28
Discharge: HOME | End: 2018-11-07
Attending: INTERNAL MEDICINE
Payer: COMMERCIAL

## 2018-11-07 DIAGNOSIS — C16.0 GE JUNCTION CARCINOMA (HCC): ICD-10-CM

## 2018-11-07 PROCEDURE — 71260 CT THORAX DX C+: CPT | Performed by: INTERNAL MEDICINE

## 2018-11-07 PROCEDURE — 82565 ASSAY OF CREATININE: CPT

## 2018-11-07 PROCEDURE — 74177 CT ABD & PELVIS W/CONTRAST: CPT | Performed by: INTERNAL MEDICINE

## 2018-11-09 ENCOUNTER — TELEPHONE (OUTPATIENT)
Dept: HEMATOLOGY/ONCOLOGY | Facility: HOSPITAL | Age: 28
End: 2018-11-09

## 2018-11-13 ENCOUNTER — TELEPHONE (OUTPATIENT)
Dept: HEMATOLOGY/ONCOLOGY | Facility: HOSPITAL | Age: 28
End: 2018-11-13

## 2018-11-13 DIAGNOSIS — Z85.01 HISTORY OF ESOPHAGEAL CANCER: Primary | ICD-10-CM

## 2018-11-13 DIAGNOSIS — R59.0 RETROPERITONEAL LYMPHADENOPATHY: ICD-10-CM

## 2018-11-13 NOTE — TELEPHONE ENCOUNTER
Spoke to patient about CT. Has new minor growth of retroperitoneal nodes at the level of the kidneys. Needs a PET scan to see if this is FDG avid and if there is any other disease evident. Order placed. He will call to schedule.   Livan Wiggins MD

## 2018-11-14 ENCOUNTER — TELEPHONE (OUTPATIENT)
Dept: HEMATOLOGY/ONCOLOGY | Facility: HOSPITAL | Age: 28
End: 2018-11-14

## 2018-11-14 DIAGNOSIS — R11.2 NAUSEA AND VOMITING: ICD-10-CM

## 2018-11-14 DIAGNOSIS — C16.0 GE JUNCTION CARCINOMA (HCC): ICD-10-CM

## 2018-11-14 RX ORDER — LORAZEPAM 1 MG/1
1 TABLET ORAL EVERY 4 HOURS PRN
Qty: 60 TABLET | Refills: 0 | Status: SHIPPED
Start: 2018-11-14 | End: 2019-02-21

## 2018-11-14 NOTE — TELEPHONE ENCOUNTER
Patient is having a lot of anxiety after last night's conversation with Dr Xavi Mccauley. He has been vomiting today. Scheduled for a PET on Monday. Per Eva Gleason, patient to take his Ativan more regular. Rx sent to patient's pharmacy. Await PET results.

## 2018-11-19 ENCOUNTER — HOSPITAL ENCOUNTER (OUTPATIENT)
Dept: NUCLEAR MEDICINE | Facility: HOSPITAL | Age: 28
Discharge: HOME | End: 2018-11-19
Attending: INTERNAL MEDICINE
Payer: COMMERCIAL

## 2018-11-19 DIAGNOSIS — R59.0 RETROPERITONEAL LYMPHADENOPATHY: ICD-10-CM

## 2018-11-19 DIAGNOSIS — Z85.01 HISTORY OF ESOPHAGEAL CANCER: ICD-10-CM

## 2018-11-19 PROCEDURE — 78815 PET IMAGE W/CT SKULL-THIGH: CPT | Performed by: INTERNAL MEDICINE

## 2018-11-19 PROCEDURE — 82962 GLUCOSE BLOOD TEST: CPT

## 2018-11-21 ENCOUNTER — TELEPHONE (OUTPATIENT)
Dept: HEMATOLOGY/ONCOLOGY | Facility: HOSPITAL | Age: 28
End: 2018-11-21

## 2018-11-21 NOTE — TELEPHONE ENCOUNTER
Pt called LVM asking if Dr. Lisa Aase has his PET scan results. He was hoping to hear from MD before the long holiday weekend.

## 2018-11-21 NOTE — TELEPHONE ENCOUNTER
Spoke to patient and told him PET lights up in para-aortic nodes and neck nodes - likley cancer recurrence - getting PDL-1 testing done. Also contact in to Dr David Silverman at [facility] for clinical trial options. To see me on Tuesday at 4:15 in [address] [place] [address].   A H

## 2018-11-27 ENCOUNTER — LAB ENCOUNTER (OUTPATIENT)
Dept: LAB | Facility: HOSPITAL | Age: 28
End: 2018-11-27
Attending: SURGERY
Payer: COMMERCIAL

## 2018-11-27 ENCOUNTER — OFFICE VISIT (OUTPATIENT)
Dept: HEMATOLOGY/ONCOLOGY | Facility: HOSPITAL | Age: 28
End: 2018-11-27
Attending: INTERNAL MEDICINE
Payer: COMMERCIAL

## 2018-11-27 VITALS
WEIGHT: 271.19 LBS | OXYGEN SATURATION: 99 % | DIASTOLIC BLOOD PRESSURE: 76 MMHG | TEMPERATURE: 98 F | HEART RATE: 82 BPM | BODY MASS INDEX: 32.68 KG/M2 | RESPIRATION RATE: 18 BRPM | HEIGHT: 76.22 IN | SYSTOLIC BLOOD PRESSURE: 118 MMHG

## 2018-11-27 DIAGNOSIS — C77.0 SECONDARY MALIGNANT NEOPLASM OF SUPRACLAVICULAR LYMPH NODE (HCC): ICD-10-CM

## 2018-11-27 DIAGNOSIS — C16.0 GE JUNCTION CARCINOMA (HCC): Primary | ICD-10-CM

## 2018-11-27 DIAGNOSIS — C77.2 SECONDARY MALIGNANT NEOPLASM OF RETROPERITONEAL LYMPH NODES (HCC): ICD-10-CM

## 2018-11-27 PROCEDURE — 99214 OFFICE O/P EST MOD 30 MIN: CPT | Performed by: INTERNAL MEDICINE

## 2018-11-27 NOTE — PROGRESS NOTES
Patient is here for MD f/u to discuss recent PET/CT. Denies pain. Appetite is good. No GI complaints.          Education Record    Learner:  Patient    Disease / Diagnosis:  GE junction carcinoma     Barriers / Limitations:  None   Comments:    Method:  Dis

## 2018-11-28 ENCOUNTER — NURSE NAVIGATOR ENCOUNTER (OUTPATIENT)
Dept: PHARMACY | Facility: HOSPITAL | Age: 28
End: 2018-11-28

## 2018-12-03 ENCOUNTER — TELEPHONE (OUTPATIENT)
Dept: HEMATOLOGY/ONCOLOGY | Facility: HOSPITAL | Age: 28
End: 2018-12-03

## 2018-12-03 ENCOUNTER — TELEPHONE (OUTPATIENT)
Dept: FAMILY MEDICINE CLINIC | Facility: CLINIC | Age: 28
End: 2018-12-03

## 2018-12-03 NOTE — TELEPHONE ENCOUNTER
Having back pain. Lymph nodes of his back are inflamed according to his oncologist - he is requesting medication for the pain.

## 2018-12-03 NOTE — TELEPHONE ENCOUNTER
Patient reports back pain started last week- he has been taking Tylenol and applying icy hot. Patient doesn't know if from inflamed lymph nodes. Reported to Dr Shana Guerin. Advised- recommend he notify Dr Jass Root office of development of back pain.   If defe

## 2018-12-06 PROBLEM — C77.2 SECONDARY MALIGNANT NEOPLASM OF RETROPERITONEAL LYMPH NODES (HCC): Status: ACTIVE | Noted: 2018-12-06

## 2018-12-06 PROBLEM — C77.0 SECONDARY MALIGNANT NEOPLASM OF SUPRACLAVICULAR LYMPH NODE (HCC): Status: ACTIVE | Noted: 2018-12-06

## 2018-12-18 ENCOUNTER — TELEPHONE (OUTPATIENT)
Dept: HEMATOLOGY/ONCOLOGY | Facility: HOSPITAL | Age: 28
End: 2018-12-18

## 2018-12-18 ENCOUNTER — SOCIAL WORK SERVICES (OUTPATIENT)
Dept: HEMATOLOGY/ONCOLOGY | Facility: HOSPITAL | Age: 28
End: 2018-12-18

## 2018-12-18 ENCOUNTER — OFFICE VISIT (OUTPATIENT)
Dept: HEMATOLOGY/ONCOLOGY | Facility: HOSPITAL | Age: 28
End: 2018-12-18
Attending: INTERNAL MEDICINE
Payer: COMMERCIAL

## 2018-12-18 VITALS
DIASTOLIC BLOOD PRESSURE: 77 MMHG | RESPIRATION RATE: 18 BRPM | SYSTOLIC BLOOD PRESSURE: 120 MMHG | HEIGHT: 76.22 IN | OXYGEN SATURATION: 99 % | WEIGHT: 272.38 LBS | BODY MASS INDEX: 32.83 KG/M2 | TEMPERATURE: 98 F | HEART RATE: 78 BPM

## 2018-12-18 DIAGNOSIS — C15.5 MALIGNANT NEOPLASM OF LOWER THIRD OF ESOPHAGUS (HCC): Primary | ICD-10-CM

## 2018-12-18 DIAGNOSIS — C77.0 SECONDARY MALIGNANT NEOPLASM LYMPH NODES OF HEAD, FACE AND NECK (HCC): ICD-10-CM

## 2018-12-18 DIAGNOSIS — C77.2 SECONDARY MALIGNANT NEOPLASM OF RETROPERITONEAL LYMPH NODES (HCC): ICD-10-CM

## 2018-12-18 DIAGNOSIS — C77.0 SECONDARY MALIGNANT NEOPLASM OF SUPRACLAVICULAR LYMPH NODE (HCC): ICD-10-CM

## 2018-12-18 DIAGNOSIS — C16.0 GE JUNCTION CARCINOMA (HCC): ICD-10-CM

## 2018-12-18 PROCEDURE — 99214 OFFICE O/P EST MOD 30 MIN: CPT | Performed by: INTERNAL MEDICINE

## 2018-12-18 RX ORDER — ONDANSETRON HYDROCHLORIDE 8 MG/1
8 TABLET, FILM COATED ORAL EVERY 8 HOURS PRN
Qty: 30 TABLET | Refills: 3 | Status: SHIPPED | OUTPATIENT
Start: 2018-12-18 | End: 2019-05-02

## 2018-12-18 RX ORDER — PROCHLORPERAZINE MALEATE 10 MG
10 TABLET ORAL EVERY 6 HOURS PRN
Qty: 30 TABLET | Refills: 3 | Status: SHIPPED | OUTPATIENT
Start: 2018-12-18 | End: 2018-12-20

## 2018-12-18 NOTE — PROGRESS NOTES
SW met with patient and provided him with information on sperm banking. This info was also given to him in Jan 2018.

## 2018-12-18 NOTE — TELEPHONE ENCOUNTER
Spoke with patient and aware of below scheduled appointments:  12/26 730 for picc line placement  12/26 1045 for us guided bx. Pt verbalized understanding.   Order for PICC faxed to [address], vascular RN at ext [phone] per request.

## 2018-12-18 NOTE — PROGRESS NOTES
Patient is here for Md f/u. Dr Alis Martinez  at [facility] last Thursday. Patient is here to discuss the next step. Patient continues to have lower back pain. Patient is taking Oxycodone and Tylenol for pain. No other complaints.        Education Record    Learner

## 2018-12-19 NOTE — PROGRESS NOTES
[facility]    PATIENT'S NAME: Simone LUTZ   ATTENDING PHYSICIAN: Nguyen Alejandro M.D.    PATIENT ACCOUNT #: [de-identified] LOCATION: [address] [place] [address] RECORD #: [id number] YOB: 1990   DATE OF SERVICE: 12/18/2018       CANCER has been reported in a publication as showing a 79% disease control rate and approximately a 25% to 30% response rate. He is here to talk about this today. He is taking his pain medication, using oxycodone 5 to 10 mg at night and Tylenol during the day. radiation at this point, and he certainly may have occult disease in other places. He has had multiple prior chemotherapies, but FOLFIRI is a reasonable consideration now along with ramucirumab.   He has already had taxanes on 2 occasions with Taxol and do

## 2018-12-20 ENCOUNTER — TELEPHONE (OUTPATIENT)
Dept: HEMATOLOGY/ONCOLOGY | Facility: HOSPITAL | Age: 28
End: 2018-12-20

## 2018-12-20 ENCOUNTER — OFFICE VISIT (OUTPATIENT)
Dept: FAMILY MEDICINE CLINIC | Facility: CLINIC | Age: 28
End: 2018-12-20
Payer: COMMERCIAL

## 2018-12-20 VITALS
OXYGEN SATURATION: 97 % | HEIGHT: 76 IN | TEMPERATURE: 98 F | HEART RATE: 110 BPM | SYSTOLIC BLOOD PRESSURE: 138 MMHG | BODY MASS INDEX: 33.15 KG/M2 | WEIGHT: 272.25 LBS | DIASTOLIC BLOOD PRESSURE: 78 MMHG

## 2018-12-20 DIAGNOSIS — N45.1 EPIDIDYMITIS, LEFT: Primary | ICD-10-CM

## 2018-12-20 PROCEDURE — 99213 OFFICE O/P EST LOW 20 MIN: CPT | Performed by: FAMILY MEDICINE

## 2018-12-20 RX ORDER — DOXYCYCLINE 100 MG/1
100 CAPSULE ORAL 2 TIMES DAILY
Qty: 20 CAPSULE | Refills: 0 | Status: SHIPPED | OUTPATIENT
Start: 2018-12-20 | End: 2019-01-11

## 2018-12-20 NOTE — TELEPHONE ENCOUNTER
Sperm banking is cost prohibitive, asking about fertility, explained the issue is the ability to give him a treatment break in which time they would be able to try to conceive which he places at about 50%.    Patient understands and they are not ready at th

## 2018-12-20 NOTE — PROGRESS NOTES
HPI:    Patient ID: Jess Hdez is a 33year old male.   Last week - L testicle pain  Off + on  W/o discharge  W/o dysuria / freq  W/o fever  HPI    Review of Systems           Current Outpatient Medications:  Doxycycline Monohydrate 100 MG Oral Cap Penis normal. Right testis shows no mass, no swelling and no tenderness. Left testis shows mass and tenderness. Left testis shows no swelling. Circumcised. Musculoskeletal: He exhibits no edema.    Lymphadenopathy:        Right: No inguinal adenopathy pre

## 2018-12-26 ENCOUNTER — HOSPITAL ENCOUNTER (OUTPATIENT)
Dept: PERIOP | Facility: HOSPITAL | Age: 28
Setting detail: HOSPITAL OUTPATIENT SURGERY
Discharge: HOME | End: 2018-12-26
Attending: INTERNAL MEDICINE
Payer: COMMERCIAL

## 2018-12-26 ENCOUNTER — HOSPITAL ENCOUNTER (OUTPATIENT)
Dept: ULTRASOUND IMAGING | Facility: HOSPITAL | Age: 28
Discharge: HOME | End: 2018-12-26
Attending: INTERNAL MEDICINE
Payer: COMMERCIAL

## 2018-12-26 ENCOUNTER — APPOINTMENT (OUTPATIENT)
Dept: LAB | Facility: HOSPITAL | Age: 28
End: 2018-12-26
Attending: INTERNAL MEDICINE
Payer: COMMERCIAL

## 2018-12-26 VITALS
OXYGEN SATURATION: 96 % | RESPIRATION RATE: 16 BRPM | DIASTOLIC BLOOD PRESSURE: 72 MMHG | HEART RATE: 69 BPM | SYSTOLIC BLOOD PRESSURE: 123 MMHG

## 2018-12-26 DIAGNOSIS — C77.0 SECONDARY MALIGNANT NEOPLASM LYMPH NODES OF HEAD, FACE AND NECK (HCC): ICD-10-CM

## 2018-12-26 DIAGNOSIS — C15.5 MALIGNANT NEOPLASM OF LOWER THIRD OF ESOPHAGUS (HCC): Primary | ICD-10-CM

## 2018-12-26 PROCEDURE — 05HY33Z INSERTION OF INFUSION DEVICE INTO UPPER VEIN, PERCUTANEOUS APPROACH: ICD-10-PCS | Performed by: RADIOLOGY

## 2018-12-26 PROCEDURE — 88172 CYTP DX EVAL FNA 1ST EA SITE: CPT | Performed by: INTERNAL MEDICINE

## 2018-12-26 PROCEDURE — 07923ZX DRAINAGE OF LEFT NECK LYMPHATIC, PERCUTANEOUS APPROACH, DIAGNOSTIC: ICD-10-PCS | Performed by: RADIOLOGY

## 2018-12-26 PROCEDURE — 76937 US GUIDE VASCULAR ACCESS: CPT

## 2018-12-26 PROCEDURE — 10022 US FNA LYMPH NODE, LEFT (CPT=76942,10022): CPT | Performed by: INTERNAL MEDICINE

## 2018-12-26 PROCEDURE — 76942 ECHO GUIDE FOR BIOPSY: CPT | Performed by: INTERNAL MEDICINE

## 2018-12-26 PROCEDURE — 88305 TISSUE EXAM BY PATHOLOGIST: CPT | Performed by: INTERNAL MEDICINE

## 2018-12-26 PROCEDURE — 88173 CYTOPATH EVAL FNA REPORT: CPT | Performed by: INTERNAL MEDICINE

## 2018-12-26 PROCEDURE — 36569 INSJ PICC 5 YR+ W/O IMAGING: CPT

## 2018-12-26 PROCEDURE — BP49ZZZ ULTRASONOGRAPHY OF LEFT SHOULDER: ICD-10-PCS | Performed by: RADIOLOGY

## 2018-12-26 RX ORDER — SODIUM CHLORIDE 0.9 % (FLUSH) 0.9 %
10 SYRINGE (ML) INJECTION AS NEEDED
Status: DISCONTINUED | OUTPATIENT
Start: 2018-12-26 | End: 2018-12-28

## 2018-12-26 RX ORDER — SODIUM CHLORIDE 9 MG/ML
INJECTION, SOLUTION INTRAVENOUS CONTINUOUS
Status: DISCONTINUED | OUTPATIENT
Start: 2018-12-26 | End: 2018-12-28

## 2018-12-26 NOTE — IMAGING NOTE
Pt here for lymph node biopsy of L supraclavicular node. Accompanied by parents. Pt denies being on any prescription BT meds and last used Aleve on 12/24. Was using Aleve BID for 4 days prior to stopping it. No labs needed per Dr Kulwant Miranda.  Pre-procedure maria victoria

## 2018-12-27 ENCOUNTER — NURSE ONLY (OUTPATIENT)
Dept: HEMATOLOGY/ONCOLOGY | Age: 28
End: 2018-12-27
Attending: INTERNAL MEDICINE
Payer: COMMERCIAL

## 2018-12-27 ENCOUNTER — OFFICE VISIT (OUTPATIENT)
Dept: HEMATOLOGY/ONCOLOGY | Age: 28
End: 2018-12-27
Attending: INTERNAL MEDICINE
Payer: COMMERCIAL

## 2018-12-27 VITALS
HEART RATE: 82 BPM | WEIGHT: 275 LBS | DIASTOLIC BLOOD PRESSURE: 77 MMHG | SYSTOLIC BLOOD PRESSURE: 124 MMHG | TEMPERATURE: 98 F | RESPIRATION RATE: 18 BRPM | OXYGEN SATURATION: 96 % | BODY MASS INDEX: 33 KG/M2

## 2018-12-27 VITALS — BODY MASS INDEX: 33 KG/M2 | HEIGHT: 76.38 IN

## 2018-12-27 DIAGNOSIS — C16.0 GE JUNCTION CARCINOMA (HCC): ICD-10-CM

## 2018-12-27 DIAGNOSIS — C77.2 SECONDARY MALIGNANT NEOPLASM OF RETROPERITONEAL LYMPH NODES (HCC): Primary | ICD-10-CM

## 2018-12-27 DIAGNOSIS — Z71.9 ENCOUNTER FOR HEALTH EDUCATION: ICD-10-CM

## 2018-12-27 DIAGNOSIS — T45.1X5A CHEMOTHERAPY INDUCED NAUSEA AND VOMITING: ICD-10-CM

## 2018-12-27 DIAGNOSIS — C77.0 SECONDARY MALIGNANT NEOPLASM OF SUPRACLAVICULAR LYMPH NODE (HCC): ICD-10-CM

## 2018-12-27 DIAGNOSIS — R11.2 CHEMOTHERAPY INDUCED NAUSEA AND VOMITING: ICD-10-CM

## 2018-12-27 PROCEDURE — 96413 CHEMO IV INFUSION 1 HR: CPT

## 2018-12-27 PROCEDURE — 96375 TX/PRO/DX INJ NEW DRUG ADDON: CPT

## 2018-12-27 PROCEDURE — 96368 THER/DIAG CONCURRENT INF: CPT

## 2018-12-27 PROCEDURE — 86301 IMMUNOASSAY TUMOR CA 19-9: CPT

## 2018-12-27 PROCEDURE — 96416 CHEMO PROLONG INFUSE W/PUMP: CPT

## 2018-12-27 PROCEDURE — 85025 COMPLETE CBC W/AUTO DIFF WBC: CPT

## 2018-12-27 PROCEDURE — 96367 TX/PROPH/DG ADDL SEQ IV INF: CPT

## 2018-12-27 PROCEDURE — 80053 COMPREHEN METABOLIC PANEL: CPT

## 2018-12-27 PROCEDURE — 82378 CARCINOEMBRYONIC ANTIGEN: CPT

## 2018-12-27 PROCEDURE — 99215 OFFICE O/P EST HI 40 MIN: CPT | Performed by: CLINICAL NURSE SPECIALIST

## 2018-12-27 RX ORDER — PROCHLORPERAZINE MALEATE 10 MG
10 TABLET ORAL EVERY 6 HOURS PRN
Status: DISCONTINUED | OUTPATIENT
Start: 2018-12-27 | End: 2018-12-27

## 2018-12-27 RX ORDER — LORAZEPAM 2 MG/ML
INJECTION INTRAMUSCULAR EVERY 4 HOURS PRN
Status: CANCELLED | OUTPATIENT
Start: 2018-12-27

## 2018-12-27 RX ORDER — METOCLOPRAMIDE HYDROCHLORIDE 5 MG/ML
10 INJECTION INTRAMUSCULAR; INTRAVENOUS EVERY 6 HOURS PRN
Status: CANCELLED | OUTPATIENT
Start: 2018-12-27

## 2018-12-27 RX ORDER — OLANZAPINE 5 MG/1
5 TABLET ORAL NIGHTLY
Qty: 30 TABLET | Refills: 5 | Status: SHIPPED | OUTPATIENT
Start: 2018-12-27 | End: 2019-06-25

## 2018-12-27 RX ORDER — LORAZEPAM 0.5 MG/1
TABLET ORAL EVERY 4 HOURS PRN
Status: CANCELLED | OUTPATIENT
Start: 2018-12-27

## 2018-12-27 RX ORDER — OXYCODONE HYDROCHLORIDE 5 MG/1
5 TABLET ORAL EVERY 4 HOURS PRN
Qty: 60 TABLET | Refills: 0 | Status: SHIPPED | OUTPATIENT
Start: 2018-12-27 | End: 2019-08-05 | Stop reason: ALTCHOICE

## 2018-12-27 RX ORDER — ONDANSETRON 2 MG/ML
8 INJECTION INTRAMUSCULAR; INTRAVENOUS EVERY 6 HOURS PRN
Status: CANCELLED | OUTPATIENT
Start: 2018-12-27

## 2018-12-27 RX ORDER — ATROPINE SULFATE 0.4 MG/ML
0.2 AMPUL (ML) INJECTION ONCE
Status: COMPLETED | OUTPATIENT
Start: 2018-12-27 | End: 2018-12-27

## 2018-12-27 RX ORDER — FLUOROURACIL 50 MG/ML
2400 INJECTION, SOLUTION INTRAVENOUS CONTINUOUS
Status: DISCONTINUED | OUTPATIENT
Start: 2018-12-27 | End: 2018-12-27

## 2018-12-27 RX ADMIN — ATROPINE SULFATE 0.2 MG: 0.4 MG/ML AMPUL (ML) INJECTION at 10:04:00

## 2018-12-27 RX ADMIN — FLUOROURACIL 6050 MG: 50 INJECTION, SOLUTION INTRAVENOUS at 12:31:00

## 2018-12-27 RX ADMIN — PROCHLORPERAZINE MALEATE 10 MG: 10 MG TABLET ORAL at 11:54:00

## 2018-12-27 NOTE — PROGRESS NOTES
Chemotherapy Education    Learner:  Patient and Spouse    Barriers / Limitations:  None    Chemotherapy education goals:  · Learn the drug names  · Administration schedule  · Routes of administration  · Treatment setting    Drug names:  Fluorouracil, Leuco menstrual irregularity and vaginal dryness:  N/A    Notify MD/RN of any changes or problems:  Achieved    Comments:    Treatment Effects on Emotional Status:    Potential mood changes, depression, nervousness, difficulty sleeping:  Achieved    Importance o

## 2018-12-27 NOTE — PROGRESS NOTES
Pt here for C1D1.   Arrives Ambulating independently, accompanied by Spouse           Modifications in dose or schedule: No - first chemo     Frequency of blood return and site check throughout administration: Prior to administration   Discharged to Home, A

## 2018-12-27 NOTE — PROGRESS NOTES
Nutrition Consultation    Patient Name: Kinsey Presume  YOB: 1990  Medical Record Number: [id number]   Account Number: [de-identified]  Dietitian: Willian Walter RD    Date of visit: 12/27/2018    Diet Rx: high protein/calorie, post-esopha as needed for Nausea., Disp: 30 tablet, Rfl: 5  •  Loperamide HCl 2 MG Oral Cap, Take 2 mg by mouth 4 (four) times daily as needed for Diarrhea., Disp: , Rfl:   •  Omeprazole 40 MG Oral Capsule Delayed Release, Take 1 capsule (40 mg total) by mouth daily. , to assess tolerance. Both pt and spouse verbalized understanding. RD provided name and # for referral. RD will follow throughout his tx. Thank you for allowing me to participate in the continued care of Kristie.

## 2019-01-02 ENCOUNTER — TELEPHONE (OUTPATIENT)
Dept: HEMATOLOGY/ONCOLOGY | Facility: HOSPITAL | Age: 29
End: 2019-01-02

## 2019-01-02 NOTE — TELEPHONE ENCOUNTER
Date of Treatment: 12/27/18                                 Chemo: FOLFIRI    Comments: Spoke with patient. He states nausea was significant on Monday- not able to keep much more than medications down. Better today, eating and drinking.   Pt also went to No

## 2019-01-03 ENCOUNTER — NURSE ONLY (OUTPATIENT)
Dept: HEMATOLOGY/ONCOLOGY | Age: 29
End: 2019-01-03
Attending: INTERNAL MEDICINE
Payer: COMMERCIAL

## 2019-01-03 PROCEDURE — 96523 IRRIG DRUG DELIVERY DEVICE: CPT

## 2019-01-04 ENCOUNTER — SOCIAL WORK SERVICES (OUTPATIENT)
Dept: HEMATOLOGY/ONCOLOGY | Facility: HOSPITAL | Age: 29
End: 2019-01-04

## 2019-01-10 ENCOUNTER — OFFICE VISIT (OUTPATIENT)
Dept: HEMATOLOGY/ONCOLOGY | Age: 29
End: 2019-01-10
Attending: INTERNAL MEDICINE
Payer: COMMERCIAL

## 2019-01-10 VITALS
TEMPERATURE: 98 F | RESPIRATION RATE: 18 BRPM | BODY MASS INDEX: 32.12 KG/M2 | DIASTOLIC BLOOD PRESSURE: 79 MMHG | HEIGHT: 76.38 IN | HEART RATE: 90 BPM | WEIGHT: 266.5 LBS | OXYGEN SATURATION: 96 % | SYSTOLIC BLOOD PRESSURE: 124 MMHG

## 2019-01-10 DIAGNOSIS — C77.0 SECONDARY MALIGNANT NEOPLASM OF SUPRACLAVICULAR LYMPH NODE (HCC): ICD-10-CM

## 2019-01-10 DIAGNOSIS — C77.2 SECONDARY MALIGNANT NEOPLASM OF RETROPERITONEAL LYMPH NODES (HCC): Primary | ICD-10-CM

## 2019-01-10 DIAGNOSIS — C16.0 GE JUNCTION CARCINOMA (HCC): Primary | ICD-10-CM

## 2019-01-10 DIAGNOSIS — C16.0 GE JUNCTION CARCINOMA (HCC): ICD-10-CM

## 2019-01-10 DIAGNOSIS — C77.2 SECONDARY MALIGNANT NEOPLASM OF RETROPERITONEAL LYMPH NODES (HCC): ICD-10-CM

## 2019-01-10 LAB
ALBUMIN SERPL-MCNC: 3.6 G/DL (ref 3.1–4.5)
ALBUMIN/GLOB SERPL: 0.9 {RATIO} (ref 1–2)
ALP LIVER SERPL-CCNC: 140 U/L (ref 45–117)
ALT SERPL-CCNC: 37 U/L (ref 17–63)
ANION GAP SERPL CALC-SCNC: 6 MMOL/L (ref 0–18)
AST SERPL-CCNC: 21 U/L (ref 15–41)
BASOPHILS # BLD AUTO: 0.03 X10(3) UL (ref 0–0.1)
BASOPHILS NFR BLD AUTO: 0.7 %
BILIRUB SERPL-MCNC: 0.3 MG/DL (ref 0.1–2)
BUN BLD-MCNC: 11 MG/DL (ref 8–20)
BUN/CREAT SERPL: 12.2 (ref 10–20)
CALCIUM BLD-MCNC: 8.7 MG/DL (ref 8.3–10.3)
CANCER AG19-9 SERPL-ACNC: 897.6 U/ML (ref ?–37)
CEA SERPL-MCNC: 2 NG/ML (ref 0.5–5)
CHLORIDE SERPL-SCNC: 108 MMOL/L (ref 101–111)
CO2 SERPL-SCNC: 27 MMOL/L (ref 22–32)
CONTROL RUN WITHIN 24 HOURS?: YES
CREAT BLD-MCNC: 0.9 MG/DL (ref 0.7–1.3)
EOSINOPHIL # BLD AUTO: 0.12 X10(3) UL (ref 0–0.3)
EOSINOPHIL NFR BLD AUTO: 2.9 %
ERYTHROCYTE [DISTWIDTH] IN BLOOD BY AUTOMATED COUNT: 12.8 % (ref 11.5–16)
GLOBULIN PLAS-MCNC: 3.9 G/DL (ref 2.8–4.4)
GLUCOSE BLD-MCNC: 104 MG/DL (ref 70–99)
GLUCOSE URINE: NEGATIVE
HCT VFR BLD AUTO: 41.2 % (ref 37–53)
HGB BLD-MCNC: 13.5 G/DL (ref 13–17)
IMMATURE GRANULOCYTE COUNT: 0.01 X10(3) UL (ref 0–1)
IMMATURE GRANULOCYTE RATIO %: 0.2 %
LEUKOCYTE ESTERASE URINE: NEGATIVE
LYMPHOCYTES # BLD AUTO: 0.59 X10(3) UL (ref 0.9–4)
LYMPHOCYTES NFR BLD AUTO: 14.5 %
M PROTEIN MFR SERPL ELPH: 7.5 G/DL (ref 6.4–8.2)
MCH RBC QN AUTO: 28.2 PG (ref 27–33.2)
MCHC RBC AUTO-ENTMCNC: 32.8 G/DL (ref 31–37)
MCV RBC AUTO: 86.2 FL (ref 80–99)
MONOCYTES # BLD AUTO: 0.43 X10(3) UL (ref 0.1–1)
MONOCYTES NFR BLD AUTO: 10.6 %
NEUTROPHIL ABS PRELIM: 2.89 X10 (3) UL (ref 1.3–6.7)
NEUTROPHILS # BLD AUTO: 2.89 X10(3) UL (ref 1.3–6.7)
NEUTROPHILS NFR BLD AUTO: 71.1 %
NITRITE URINE: NEGATIVE
OSMOLALITY SERPL CALC.SUM OF ELEC: 292 MOSM/KG (ref 275–295)
PLATELET # BLD AUTO: 184 10(3)UL (ref 150–450)
POTASSIUM SERPL-SCNC: 3.9 MMOL/L (ref 3.6–5.1)
PROTEIN URINE: NEGATIVE
RBC # BLD AUTO: 4.78 X10(6)UL (ref 4.3–5.7)
RED CELL DISTRIBUTION WIDTH-SD: 39.4 FL (ref 35.1–46.3)
SODIUM SERPL-SCNC: 141 MMOL/L (ref 136–144)
WBC # BLD AUTO: 4.1 X10(3) UL (ref 4–13)

## 2019-01-10 PROCEDURE — 80053 COMPREHEN METABOLIC PANEL: CPT

## 2019-01-10 PROCEDURE — 96413 CHEMO IV INFUSION 1 HR: CPT

## 2019-01-10 PROCEDURE — 96367 TX/PROPH/DG ADDL SEQ IV INF: CPT

## 2019-01-10 PROCEDURE — 86301 IMMUNOASSAY TUMOR CA 19-9: CPT

## 2019-01-10 PROCEDURE — 96416 CHEMO PROLONG INFUSE W/PUMP: CPT

## 2019-01-10 PROCEDURE — 82378 CARCINOEMBRYONIC ANTIGEN: CPT

## 2019-01-10 PROCEDURE — 99214 OFFICE O/P EST MOD 30 MIN: CPT | Performed by: INTERNAL MEDICINE

## 2019-01-10 PROCEDURE — 85025 COMPLETE CBC W/AUTO DIFF WBC: CPT

## 2019-01-10 PROCEDURE — 96375 TX/PRO/DX INJ NEW DRUG ADDON: CPT

## 2019-01-10 PROCEDURE — 96417 CHEMO IV INFUS EACH ADDL SEQ: CPT

## 2019-01-10 PROCEDURE — 96377 APPLICATON ON-BODY INJECTOR: CPT

## 2019-01-10 PROCEDURE — 96368 THER/DIAG CONCURRENT INF: CPT

## 2019-01-10 PROCEDURE — S0028 INJECTION, FAMOTIDINE, 20 MG: HCPCS | Performed by: INTERNAL MEDICINE

## 2019-01-10 RX ORDER — DEXAMETHASONE 4 MG/1
TABLET ORAL
Qty: 40 TABLET | Refills: 0 | Status: SHIPPED | OUTPATIENT
Start: 2019-01-10 | End: 2019-03-07

## 2019-01-10 RX ORDER — ONDANSETRON 2 MG/ML
8 INJECTION INTRAMUSCULAR; INTRAVENOUS EVERY 6 HOURS PRN
Status: CANCELLED | OUTPATIENT
Start: 2019-01-10

## 2019-01-10 RX ORDER — ATROPINE SULFATE 0.4 MG/ML
0.2 AMPUL (ML) INJECTION ONCE
Status: CANCELLED | OUTPATIENT
Start: 2019-01-10

## 2019-01-10 RX ORDER — PROCHLORPERAZINE MALEATE 10 MG
10 TABLET ORAL EVERY 6 HOURS PRN
Status: CANCELLED | OUTPATIENT
Start: 2019-01-10

## 2019-01-10 RX ORDER — ATROPINE SULFATE 0.4 MG/ML
0.2 AMPUL (ML) INJECTION ONCE
Status: COMPLETED | OUTPATIENT
Start: 2019-01-10 | End: 2019-01-10

## 2019-01-10 RX ORDER — LORAZEPAM 0.5 MG/1
TABLET ORAL EVERY 4 HOURS PRN
Status: CANCELLED | OUTPATIENT
Start: 2019-01-10

## 2019-01-10 RX ORDER — FLUOROURACIL 50 MG/ML
2400 INJECTION, SOLUTION INTRAVENOUS CONTINUOUS
Status: CANCELLED | OUTPATIENT
Start: 2019-01-10

## 2019-01-10 RX ORDER — DIPHENHYDRAMINE HYDROCHLORIDE 50 MG/ML
50 INJECTION INTRAMUSCULAR; INTRAVENOUS ONCE
Status: COMPLETED | OUTPATIENT
Start: 2019-01-10 | End: 2019-01-10

## 2019-01-10 RX ORDER — FLUOROURACIL 50 MG/ML
2400 INJECTION, SOLUTION INTRAVENOUS CONTINUOUS
Status: DISCONTINUED | OUTPATIENT
Start: 2019-01-10 | End: 2019-01-10

## 2019-01-10 RX ORDER — LORAZEPAM 2 MG/ML
INJECTION INTRAMUSCULAR EVERY 4 HOURS PRN
Status: CANCELLED | OUTPATIENT
Start: 2019-01-10

## 2019-01-10 RX ORDER — FAMOTIDINE 10 MG/ML
20 INJECTION, SOLUTION INTRAVENOUS ONCE
Status: COMPLETED | OUTPATIENT
Start: 2019-01-10 | End: 2019-01-10

## 2019-01-10 RX ORDER — METOCLOPRAMIDE HYDROCHLORIDE 5 MG/ML
10 INJECTION INTRAMUSCULAR; INTRAVENOUS EVERY 6 HOURS PRN
Status: CANCELLED | OUTPATIENT
Start: 2019-01-10

## 2019-01-10 RX ADMIN — DIPHENHYDRAMINE HYDROCHLORIDE 50 MG: 50 INJECTION INTRAMUSCULAR; INTRAVENOUS at 13:00:00

## 2019-01-10 RX ADMIN — FAMOTIDINE 20 MG: 10 INJECTION, SOLUTION INTRAVENOUS at 13:00:00

## 2019-01-10 RX ADMIN — ATROPINE SULFATE 0.2 MG: 0.4 MG/ML AMPUL (ML) INJECTION at 14:46:00

## 2019-01-10 RX ADMIN — FLUOROURACIL 6050 MG: 50 INJECTION, SOLUTION INTRAVENOUS at 16:37:00

## 2019-01-10 NOTE — PROGRESS NOTES
Patient is here for Md lynn/vinod and cycle 2 of chemo. Patient had severe nausea and vomiting that lasted 24 hours on 12/31. Very tired and fatigued. No cough or SOB. Patient also had diarrhea that lasted 24 hours the week after chemo.  Lower back pain has improv

## 2019-01-10 NOTE — PROGRESS NOTES
Pt here for C2D1.   Arrives Ambulating independently, accompanied by           Modifications in dose or schedule: No     Frequency of blood return and site check throughout administration: Prior to administration   Discharged to Home, Ambulating independent

## 2019-01-10 NOTE — PATIENT INSTRUCTIONS
On-body Injector for Neulasta Patient Instructions       Your On-Body Injection device was applied on Miky. 10 @ 3:00 pm    Your dose of medication will start on Jan. 11 @ 6:00 pm    You may remove this device on Jan. 8 @ 8:00 pm      Important information

## 2019-01-14 NOTE — PROGRESS NOTES
[facility]    PATIENT'S NAME: Kimmie LUTZ   ATTENDING PHYSICIAN: Malka Multani M.D.    PATIENT ACCOUNT #: [de-identified] LOCATION: [address] [place] [address] RECORD #: [id number] YOB: 1990   DATE OF SERVICE: 01/10/2019       CANCER the supraclavicular node to prove it was malignant, but there was insufficient tissue since only an FNA could be done due to the anatomy in the area. I have communicated on an ongoing basis with Dr. Nubia Alexander at the [facility]   His current med adding ramucirumab in. I cautioned him that this is unlikely to cause significant additional toxicity. He could have increased blood pressure, and he may have some risk of bleeding.   He is already anticoagulated, so thrombosis is unlikely to be a problem

## 2019-01-17 ENCOUNTER — NURSE ONLY (OUTPATIENT)
Dept: HEMATOLOGY/ONCOLOGY | Age: 29
End: 2019-01-17
Attending: INTERNAL MEDICINE
Payer: COMMERCIAL

## 2019-01-17 PROCEDURE — 99211 OFF/OP EST MAY X REQ PHY/QHP: CPT

## 2019-01-17 PROCEDURE — 96523 IRRIG DRUG DELIVERY DEVICE: CPT

## 2019-01-24 ENCOUNTER — NURSE ONLY (OUTPATIENT)
Dept: HEMATOLOGY/ONCOLOGY | Age: 29
End: 2019-01-24
Attending: INTERNAL MEDICINE
Payer: COMMERCIAL

## 2019-01-24 ENCOUNTER — OFFICE VISIT (OUTPATIENT)
Dept: HEMATOLOGY/ONCOLOGY | Age: 29
End: 2019-01-24
Attending: INTERNAL MEDICINE
Payer: COMMERCIAL

## 2019-01-24 VITALS
WEIGHT: 268 LBS | BODY MASS INDEX: 32 KG/M2 | HEART RATE: 76 BPM | RESPIRATION RATE: 16 BRPM | DIASTOLIC BLOOD PRESSURE: 80 MMHG | TEMPERATURE: 99 F | OXYGEN SATURATION: 98 % | SYSTOLIC BLOOD PRESSURE: 121 MMHG

## 2019-01-24 DIAGNOSIS — C77.0 SECONDARY MALIGNANT NEOPLASM OF SUPRACLAVICULAR LYMPH NODE (HCC): ICD-10-CM

## 2019-01-24 DIAGNOSIS — L73.9 FOLLICULITIS: ICD-10-CM

## 2019-01-24 DIAGNOSIS — R11.2 CHEMOTHERAPY-INDUCED NAUSEA AND VOMITING: ICD-10-CM

## 2019-01-24 DIAGNOSIS — C77.2 SECONDARY MALIGNANT NEOPLASM OF RETROPERITONEAL LYMPH NODES (HCC): Primary | ICD-10-CM

## 2019-01-24 DIAGNOSIS — T45.1X5A CHEMOTHERAPY-INDUCED NAUSEA AND VOMITING: ICD-10-CM

## 2019-01-24 DIAGNOSIS — C16.0 GE JUNCTION CARCINOMA (HCC): ICD-10-CM

## 2019-01-24 DIAGNOSIS — D69.6 THROMBOCYTOPENIA (HCC): ICD-10-CM

## 2019-01-24 DIAGNOSIS — F41.9 ANXIETY: ICD-10-CM

## 2019-01-24 DIAGNOSIS — C77.2 SECONDARY MALIGNANT NEOPLASM OF RETROPERITONEAL LYMPH NODES (HCC): ICD-10-CM

## 2019-01-24 DIAGNOSIS — D64.81 ANEMIA DUE TO CHEMOTHERAPY: ICD-10-CM

## 2019-01-24 DIAGNOSIS — K21.9 GASTROESOPHAGEAL REFLUX DISEASE WITHOUT ESOPHAGITIS: ICD-10-CM

## 2019-01-24 DIAGNOSIS — C16.0 GE JUNCTION CARCINOMA (HCC): Primary | ICD-10-CM

## 2019-01-24 DIAGNOSIS — Z51.11 ENCOUNTER FOR CHEMOTHERAPY MANAGEMENT: ICD-10-CM

## 2019-01-24 DIAGNOSIS — T45.1X5A ANEMIA DUE TO CHEMOTHERAPY: ICD-10-CM

## 2019-01-24 DIAGNOSIS — I82.A11 DVT OF RIGHT AXILLARY VEIN, ACUTE (HCC): ICD-10-CM

## 2019-01-24 LAB
ALBUMIN SERPL-MCNC: 2.9 G/DL (ref 3.1–4.5)
ALBUMIN/GLOB SERPL: 0.9 {RATIO} (ref 1–2)
ALP LIVER SERPL-CCNC: 118 U/L (ref 45–117)
ALT SERPL-CCNC: 54 U/L (ref 17–63)
ANION GAP SERPL CALC-SCNC: 6 MMOL/L (ref 0–18)
AST SERPL-CCNC: 30 U/L (ref 15–41)
BASOPHILS # BLD AUTO: 0.02 X10(3) UL (ref 0–0.1)
BASOPHILS NFR BLD AUTO: 0.7 %
BILIRUB SERPL-MCNC: 0.3 MG/DL (ref 0.1–2)
BUN BLD-MCNC: 11 MG/DL (ref 8–20)
BUN/CREAT SERPL: 14.1 (ref 10–20)
CALCIUM BLD-MCNC: 8.2 MG/DL (ref 8.3–10.3)
CANCER AG19-9 SERPL-ACNC: 894.7 U/ML (ref ?–37)
CEA SERPL-MCNC: 1.9 NG/ML (ref 0.5–5)
CHLORIDE SERPL-SCNC: 109 MMOL/L (ref 101–111)
CO2 SERPL-SCNC: 26 MMOL/L (ref 22–32)
CREAT BLD-MCNC: 0.78 MG/DL (ref 0.7–1.3)
EOSINOPHIL # BLD AUTO: 0.06 X10(3) UL (ref 0–0.3)
EOSINOPHIL NFR BLD AUTO: 2.1 %
ERYTHROCYTE [DISTWIDTH] IN BLOOD BY AUTOMATED COUNT: 13.6 % (ref 11.5–16)
GLOBULIN PLAS-MCNC: 3.4 G/DL (ref 2.8–4.4)
GLUCOSE BLD-MCNC: 104 MG/DL (ref 70–99)
HCT VFR BLD AUTO: 40.5 % (ref 37–53)
HGB BLD-MCNC: 13.2 G/DL (ref 13–17)
IMMATURE GRANULOCYTE COUNT: 0.01 X10(3) UL (ref 0–1)
IMMATURE GRANULOCYTE RATIO %: 0.4 %
LYMPHOCYTES # BLD AUTO: 0.63 X10(3) UL (ref 0.9–4)
LYMPHOCYTES NFR BLD AUTO: 22.2 %
M PROTEIN MFR SERPL ELPH: 6.3 G/DL (ref 6.4–8.2)
MCH RBC QN AUTO: 28.5 PG (ref 27–33.2)
MCHC RBC AUTO-ENTMCNC: 32.6 G/DL (ref 31–37)
MCV RBC AUTO: 87.5 FL (ref 80–99)
MONOCYTES # BLD AUTO: 0.64 X10(3) UL (ref 0.1–1)
MONOCYTES NFR BLD AUTO: 22.5 %
NEUTROPHIL ABS PRELIM: 1.48 X10 (3) UL (ref 1.3–6.7)
NEUTROPHILS # BLD AUTO: 1.48 X10(3) UL (ref 1.3–6.7)
NEUTROPHILS NFR BLD AUTO: 52.1 %
OSMOLALITY SERPL CALC.SUM OF ELEC: 292 MOSM/KG (ref 275–295)
PLATELET # BLD AUTO: 101 10(3)UL (ref 150–450)
POTASSIUM SERPL-SCNC: 4.1 MMOL/L (ref 3.6–5.1)
RBC # BLD AUTO: 4.63 X10(6)UL (ref 4.3–5.7)
RED CELL DISTRIBUTION WIDTH-SD: 42.5 FL (ref 35.1–46.3)
SODIUM SERPL-SCNC: 141 MMOL/L (ref 136–144)
WBC # BLD AUTO: 2.8 X10(3) UL (ref 4–13)

## 2019-01-24 PROCEDURE — 85025 COMPLETE CBC W/AUTO DIFF WBC: CPT

## 2019-01-24 PROCEDURE — 96367 TX/PROPH/DG ADDL SEQ IV INF: CPT

## 2019-01-24 PROCEDURE — 96413 CHEMO IV INFUSION 1 HR: CPT

## 2019-01-24 PROCEDURE — 96375 TX/PRO/DX INJ NEW DRUG ADDON: CPT

## 2019-01-24 PROCEDURE — 96368 THER/DIAG CONCURRENT INF: CPT

## 2019-01-24 PROCEDURE — S0028 INJECTION, FAMOTIDINE, 20 MG: HCPCS | Performed by: CLINICAL NURSE SPECIALIST

## 2019-01-24 PROCEDURE — 96377 APPLICATON ON-BODY INJECTOR: CPT

## 2019-01-24 PROCEDURE — 80053 COMPREHEN METABOLIC PANEL: CPT

## 2019-01-24 PROCEDURE — 86301 IMMUNOASSAY TUMOR CA 19-9: CPT

## 2019-01-24 PROCEDURE — 82378 CARCINOEMBRYONIC ANTIGEN: CPT

## 2019-01-24 PROCEDURE — 96417 CHEMO IV INFUS EACH ADDL SEQ: CPT

## 2019-01-24 PROCEDURE — 99215 OFFICE O/P EST HI 40 MIN: CPT | Performed by: CLINICAL NURSE SPECIALIST

## 2019-01-24 RX ORDER — FAMOTIDINE 10 MG/ML
20 INJECTION, SOLUTION INTRAVENOUS ONCE
Status: CANCELLED
Start: 2019-01-24 | End: 2019-01-24

## 2019-01-24 RX ORDER — LORAZEPAM 0.5 MG/1
TABLET ORAL EVERY 4 HOURS PRN
Status: CANCELLED | OUTPATIENT
Start: 2019-01-24

## 2019-01-24 RX ORDER — DIPHENHYDRAMINE HYDROCHLORIDE 50 MG/ML
50 INJECTION INTRAMUSCULAR; INTRAVENOUS ONCE
Status: COMPLETED | OUTPATIENT
Start: 2019-01-24 | End: 2019-01-24

## 2019-01-24 RX ORDER — ATROPINE SULFATE 0.4 MG/ML
0.2 AMPUL (ML) INJECTION ONCE
Status: COMPLETED | OUTPATIENT
Start: 2019-01-24 | End: 2019-01-24

## 2019-01-24 RX ORDER — METOCLOPRAMIDE HYDROCHLORIDE 5 MG/ML
10 INJECTION INTRAMUSCULAR; INTRAVENOUS EVERY 6 HOURS PRN
Status: CANCELLED | OUTPATIENT
Start: 2019-01-24

## 2019-01-24 RX ORDER — FLUOROURACIL 50 MG/ML
2400 INJECTION, SOLUTION INTRAVENOUS CONTINUOUS
Status: CANCELLED | OUTPATIENT
Start: 2019-01-24

## 2019-01-24 RX ORDER — LORAZEPAM 2 MG/ML
INJECTION INTRAMUSCULAR EVERY 4 HOURS PRN
Status: CANCELLED | OUTPATIENT
Start: 2019-01-24

## 2019-01-24 RX ORDER — ATROPINE SULFATE 0.4 MG/ML
0.2 AMPUL (ML) INJECTION ONCE
Status: CANCELLED | OUTPATIENT
Start: 2019-01-24

## 2019-01-24 RX ORDER — PROCHLORPERAZINE MALEATE 10 MG
10 TABLET ORAL EVERY 6 HOURS PRN
Status: CANCELLED | OUTPATIENT
Start: 2019-01-24

## 2019-01-24 RX ORDER — ONDANSETRON 2 MG/ML
8 INJECTION INTRAMUSCULAR; INTRAVENOUS EVERY 6 HOURS PRN
Status: CANCELLED | OUTPATIENT
Start: 2019-01-24

## 2019-01-24 RX ORDER — FAMOTIDINE 10 MG/ML
20 INJECTION, SOLUTION INTRAVENOUS ONCE
Status: COMPLETED | OUTPATIENT
Start: 2019-01-24 | End: 2019-01-24

## 2019-01-24 RX ORDER — FLUOROURACIL 50 MG/ML
2400 INJECTION, SOLUTION INTRAVENOUS CONTINUOUS
Status: DISCONTINUED | OUTPATIENT
Start: 2019-01-24 | End: 2019-01-24

## 2019-01-24 RX ORDER — DIPHENHYDRAMINE HYDROCHLORIDE 50 MG/ML
50 INJECTION INTRAMUSCULAR; INTRAVENOUS ONCE
Status: CANCELLED
Start: 2019-01-24 | End: 2019-01-24

## 2019-01-24 RX ADMIN — FAMOTIDINE 20 MG: 10 INJECTION, SOLUTION INTRAVENOUS at 10:37:00

## 2019-01-24 RX ADMIN — FLUOROURACIL 6050 MG: 50 INJECTION, SOLUTION INTRAVENOUS at 13:50:00

## 2019-01-24 RX ADMIN — DIPHENHYDRAMINE HYDROCHLORIDE 50 MG: 50 INJECTION INTRAMUSCULAR; INTRAVENOUS at 10:00:00

## 2019-01-24 RX ADMIN — ATROPINE SULFATE 0.2 MG: 0.4 MG/ML AMPUL (ML) INJECTION at 12:05:00

## 2019-01-24 NOTE — PROGRESS NOTES
Pt here for C3D1.   Arrives Ambulating independently, accompanied by Family member           Modifications in dose or schedule: No     Frequency of blood return and site check throughout administration: Prior to administration and At completion of therapy

## 2019-01-24 NOTE — PROGRESS NOTES
ANP Visit Note    Patient Name: Romina Menard   YOB: 1990   Medical Record Number: [id number]   CSN: [id number]   Date of visit: 1/24/2019       Chief Complaint/Reason for Visit:  Metastatic Esophageal Cancer, Chemotherapy     Oncologic diagnostic radiation    • Flatulence/gas pain/belching    • Frequent urination    • GE junction carcinoma (HCC)    • Indigestion    • Loss of appetite    • Personal history of antineoplastic chemotherapy     03/01/2018 - No Port   • Sleep disturbance    • Concerns:        Caffeine Concern: Yes          2 / day - pop        Exercise: Yes          walk        Seat Belt: Not Asked        Special Diet: Not Asked        Stress Concern: Not Asked        Weight Concern: Yes    Social History Narrative      Not on positives and negatives noted in the the HPI. Performance Status: ECOG 1    Physical Examination:  General: Patient is alert and oriented x 3, not in acute distress. Vital Signs:    Oncology Vitals 1/24/2019   Height    Height    Weight 268 lb   Weight x10(6)uL    HGB 13.2 13.0 - 17.0 g/dL    HCT 40.5 37.0 - 53.0 %    .0 (L) 150.0 - 450.0 10(3)uL    MCV 87.5 80.0 - 99.0 fL    MCH 28.5 27.0 - 33.2 pg    MCHC 32.6 31.0 - 37.0 g/dL    RDW 13.6 11.5 - 16.0 %    RDW-SD 42.5 35.1 - 46.3 fL    Neutrophil

## 2019-01-24 NOTE — PATIENT INSTRUCTIONS
On-body Injector for Neulasta Patient Instructions       Your On-Body Injection device was applied on Thurs.  1/24 @ 1:50pm    Your dose of medication will start on Fri. 1/25 @ 4:50pm    You may remove this device on Fri. 1/25 @ 6:50pm    Important informa

## 2019-01-29 NOTE — PROGRESS NOTES
Nutrition Consultation     Patient Name: Fiona Gorman  YOB: 1990  Medical Record Number: [id number]      Account Number: [de-identified]  Dietitian: Gita Jiménez RD     Date of visit: 01/24/19     Diet Rx: high protein/calorie, post-esopha by mouth every 6 (six) hours as needed for Nausea., Disp: 30 tablet, Rfl: 5  •  Loperamide HCl 2 MG Oral Cap, Take 2 mg by mouth 4 (four) times daily as needed for Diarrhea., Disp: , Rfl:   •  Omeprazole 40 MG Oral Capsule Delayed Release, Take 1 capsule (

## 2019-01-31 ENCOUNTER — NURSE ONLY (OUTPATIENT)
Dept: HEMATOLOGY/ONCOLOGY | Age: 29
End: 2019-01-31
Attending: INTERNAL MEDICINE
Payer: COMMERCIAL

## 2019-01-31 VITALS
OXYGEN SATURATION: 96 % | SYSTOLIC BLOOD PRESSURE: 120 MMHG | TEMPERATURE: 99 F | WEIGHT: 262.5 LBS | BODY MASS INDEX: 31.64 KG/M2 | HEART RATE: 94 BPM | DIASTOLIC BLOOD PRESSURE: 79 MMHG | RESPIRATION RATE: 20 BRPM | HEIGHT: 76.38 IN

## 2019-01-31 DIAGNOSIS — D50.0 IRON DEFICIENCY ANEMIA DUE TO CHRONIC BLOOD LOSS: ICD-10-CM

## 2019-01-31 DIAGNOSIS — D64.9 NORMOCYTIC ANEMIA: ICD-10-CM

## 2019-01-31 DIAGNOSIS — C16.0 GE JUNCTION CARCINOMA (HCC): ICD-10-CM

## 2019-01-31 PROCEDURE — 96523 IRRIG DRUG DELIVERY DEVICE: CPT

## 2019-01-31 RX ORDER — PROCHLORPERAZINE MALEATE 10 MG
TABLET ORAL
Qty: 30 TABLET | Refills: 0 | Status: SHIPPED | OUTPATIENT
Start: 2019-01-31 | End: 2019-05-02

## 2019-02-04 ENCOUNTER — OFFICE VISIT (OUTPATIENT)
Dept: HEMATOLOGY/ONCOLOGY | Age: 29
End: 2019-02-04
Attending: INTERNAL MEDICINE
Payer: COMMERCIAL

## 2019-02-04 ENCOUNTER — TELEPHONE (OUTPATIENT)
Dept: HEMATOLOGY/ONCOLOGY | Facility: HOSPITAL | Age: 29
End: 2019-02-04

## 2019-02-04 ENCOUNTER — NURSE ONLY (OUTPATIENT)
Dept: HEMATOLOGY/ONCOLOGY | Age: 29
End: 2019-02-04
Attending: INTERNAL MEDICINE
Payer: COMMERCIAL

## 2019-02-04 VITALS
HEART RATE: 98 BPM | WEIGHT: 266.5 LBS | RESPIRATION RATE: 20 BRPM | OXYGEN SATURATION: 95 % | BODY MASS INDEX: 32 KG/M2 | SYSTOLIC BLOOD PRESSURE: 127 MMHG | DIASTOLIC BLOOD PRESSURE: 85 MMHG | TEMPERATURE: 98 F

## 2019-02-04 DIAGNOSIS — M79.602 BILATERAL ARM PAIN: Primary | ICD-10-CM

## 2019-02-04 DIAGNOSIS — M79.601 BILATERAL ARM PAIN: Primary | ICD-10-CM

## 2019-02-04 DIAGNOSIS — C16.0 GE JUNCTION CARCINOMA (HCC): Primary | ICD-10-CM

## 2019-02-04 DIAGNOSIS — C77.2 SECONDARY MALIGNANT NEOPLASM OF RETROPERITONEAL LYMPH NODES (HCC): ICD-10-CM

## 2019-02-04 DIAGNOSIS — C16.0 GE JUNCTION CARCINOMA (HCC): ICD-10-CM

## 2019-02-04 DIAGNOSIS — C77.0 SECONDARY MALIGNANT NEOPLASM OF SUPRACLAVICULAR LYMPH NODE (HCC): ICD-10-CM

## 2019-02-04 DIAGNOSIS — M79.603 UPPER EXTREMITY PAIN, DIFFUSE, UNSPECIFIED LATERALITY: ICD-10-CM

## 2019-02-04 DIAGNOSIS — M79.602 BILATERAL ARM PAIN: ICD-10-CM

## 2019-02-04 DIAGNOSIS — M79.601 BILATERAL ARM PAIN: ICD-10-CM

## 2019-02-04 LAB
CK SERPL-CCNC: 31 IU/L (ref 39–308)
LDH: 273 U/L (ref 84–249)
SED RATE-ML: 1 MM/HR (ref 0–12)

## 2019-02-04 PROCEDURE — 99214 OFFICE O/P EST MOD 30 MIN: CPT | Performed by: CLINICAL NURSE SPECIALIST

## 2019-02-04 PROCEDURE — 82550 ASSAY OF CK (CPK): CPT

## 2019-02-04 PROCEDURE — 85652 RBC SED RATE AUTOMATED: CPT

## 2019-02-04 PROCEDURE — 83615 LACTATE (LD) (LDH) ENZYME: CPT

## 2019-02-04 PROCEDURE — 96374 THER/PROPH/DIAG INJ IV PUSH: CPT

## 2019-02-04 RX ORDER — SODIUM CHLORIDE 0.9 % (FLUSH) 0.9 %
10 SYRINGE (ML) INJECTION ONCE
Status: CANCELLED | OUTPATIENT
Start: 2019-02-04

## 2019-02-04 RX ORDER — ONDANSETRON 2 MG/ML
8 INJECTION INTRAMUSCULAR; INTRAVENOUS ONCE
Status: CANCELLED
Start: 2019-02-04 | End: 2019-02-04

## 2019-02-04 RX ORDER — SODIUM CHLORIDE 0.9 % (FLUSH) 0.9 %
10 SYRINGE (ML) INJECTION ONCE
Status: COMPLETED | OUTPATIENT
Start: 2019-02-04 | End: 2019-02-04

## 2019-02-04 RX ORDER — PREDNISONE 20 MG/1
TABLET ORAL
Qty: 18 TABLET | Refills: 0 | Status: SHIPPED | OUTPATIENT
Start: 2019-02-04 | End: 2019-02-15 | Stop reason: ALTCHOICE

## 2019-02-04 RX ORDER — SODIUM CHLORIDE 9 MG/ML
1000 INJECTION, SOLUTION INTRAVENOUS ONCE
Status: CANCELLED
Start: 2019-02-04 | End: 2019-02-04

## 2019-02-04 RX ADMIN — SODIUM CHLORIDE 0.9 % (FLUSH) 10 ML: 0.9 % SYRINGE (ML) INJECTION at 12:10:00

## 2019-02-04 NOTE — PROGRESS NOTES
Pt here for APN assessment.   Arrives Ambulating independently, accompanied by Family member           Frequency of blood return and site check throughout administration: Prior to administration and At completion of therapy   Discharged to Home, Sheldon Art

## 2019-02-04 NOTE — PROGRESS NOTES
ANP Visit Note    Patient Name: Gaurav Fernando   YOB: 1990   Medical Record Number: [id number]   CSN: [id number]   Date of visit: 2/4/2019       Chief Complaint/Reason for Visit:  Metastatic Esophageal Cancer, Bilateral Severe Arm pain History:   Procedure Laterality Date   • LAPAROSCOPIC ESOPHAGOGASTRECTOMY N/A 4/27/2018    Performed by Brittni Quintanilla MD at [facility] MAIN OR   • LAPAROSCOPIC ESOPHAGOGASTRECTOMY Right 4/27/2018    Performed by Pebbles Nelson MD at [facility] MAIN OR   • OTHER S tablet, TAKE 1 TABLET(10 MG) BY MOUTH EVERY 6 HOURS AS NEEDED FOR NAUSEA, Disp: 30 tablet, Rfl: 0  •  dexamethasone (DECADRON) 4 MG tablet, 2 tabs twice daily for 5 days starting after each chemo, Disp: 40 tablet, Rfl: 0  •  Rivaroxaban (XARELTO) 20 MG Ora HEENT: EOMs intact. PERRL. Oropharynx is clear. Neck: No JVD. No palpable lymphadenopathy. Neck is supple. Chest: Clear to auscultation. Heart: Regular rate and rhythm. Abdomen: Soft, non tender with good bowel sounds.   Extremities: Pedal pulses ar

## 2019-02-04 NOTE — TELEPHONE ENCOUNTER
Pain starting back in elbows. Will take PRednisone 20 mg now and then follow taper as prescribed starting tomorrow.

## 2019-02-04 NOTE — TELEPHONE ENCOUNTER
Severe bilateral arm pain that started on Saturday, Oxycodone and Advil did not touch the pain, patient to come in for labs and assessment. Denies any swelling.

## 2019-02-07 ENCOUNTER — OFFICE VISIT (OUTPATIENT)
Dept: HEMATOLOGY/ONCOLOGY | Age: 29
End: 2019-02-07
Attending: INTERNAL MEDICINE
Payer: COMMERCIAL

## 2019-02-07 ENCOUNTER — NURSE ONLY (OUTPATIENT)
Dept: HEMATOLOGY/ONCOLOGY | Age: 29
End: 2019-02-07
Attending: INTERNAL MEDICINE
Payer: COMMERCIAL

## 2019-02-07 VITALS
RESPIRATION RATE: 18 BRPM | TEMPERATURE: 99 F | BODY MASS INDEX: 32 KG/M2 | DIASTOLIC BLOOD PRESSURE: 57 MMHG | HEART RATE: 94 BPM | WEIGHT: 263 LBS | SYSTOLIC BLOOD PRESSURE: 125 MMHG | OXYGEN SATURATION: 97 %

## 2019-02-07 DIAGNOSIS — C16.0 GE JUNCTION CARCINOMA (HCC): ICD-10-CM

## 2019-02-07 DIAGNOSIS — M79.602 BILATERAL ARM PAIN: ICD-10-CM

## 2019-02-07 DIAGNOSIS — C77.0 SECONDARY MALIGNANT NEOPLASM OF SUPRACLAVICULAR LYMPH NODE (HCC): Primary | ICD-10-CM

## 2019-02-07 DIAGNOSIS — Z15.09 MONOALLELIC MUTATION OF ATM GENE: ICD-10-CM

## 2019-02-07 DIAGNOSIS — C77.2 SECONDARY MALIGNANT NEOPLASM OF RETROPERITONEAL LYMPH NODES (HCC): Primary | ICD-10-CM

## 2019-02-07 DIAGNOSIS — R11.2 CHEMOTHERAPY-INDUCED NAUSEA AND VOMITING: ICD-10-CM

## 2019-02-07 DIAGNOSIS — C77.0 SECONDARY MALIGNANT NEOPLASM OF SUPRACLAVICULAR LYMPH NODE (HCC): ICD-10-CM

## 2019-02-07 DIAGNOSIS — M79.601 BILATERAL ARM PAIN: ICD-10-CM

## 2019-02-07 DIAGNOSIS — F19.982 DRUG-INDUCED INSOMNIA (HCC): ICD-10-CM

## 2019-02-07 DIAGNOSIS — Z15.89 MONOALLELIC MUTATION OF ATM GENE: ICD-10-CM

## 2019-02-07 DIAGNOSIS — Z51.11 ENCOUNTER FOR CHEMOTHERAPY MANAGEMENT: ICD-10-CM

## 2019-02-07 DIAGNOSIS — Z15.01 MONOALLELIC MUTATION OF ATM GENE: ICD-10-CM

## 2019-02-07 DIAGNOSIS — T45.1X5A CHEMOTHERAPY-INDUCED NAUSEA AND VOMITING: ICD-10-CM

## 2019-02-07 DIAGNOSIS — C77.2 SECONDARY MALIGNANT NEOPLASM OF RETROPERITONEAL LYMPH NODES (HCC): ICD-10-CM

## 2019-02-07 DIAGNOSIS — D69.6 THROMBOCYTOPENIA (HCC): ICD-10-CM

## 2019-02-07 LAB
ALBUMIN SERPL-MCNC: 3 G/DL (ref 3.1–4.5)
ALBUMIN/GLOB SERPL: 1 {RATIO} (ref 1–2)
ALP LIVER SERPL-CCNC: 107 U/L (ref 45–117)
ALT SERPL-CCNC: 56 U/L (ref 17–63)
ANION GAP SERPL CALC-SCNC: 6 MMOL/L (ref 0–18)
AST SERPL-CCNC: 25 U/L (ref 15–41)
BASOPHILS # BLD AUTO: 0.05 X10(3) UL (ref 0–0.2)
BASOPHILS NFR BLD AUTO: 0.7 %
BILIRUB SERPL-MCNC: 0.3 MG/DL (ref 0.1–2)
BUN BLD-MCNC: 14 MG/DL (ref 8–20)
BUN/CREAT SERPL: 16.5 (ref 10–20)
CALCIUM BLD-MCNC: 8.1 MG/DL (ref 8.3–10.3)
CANCER AG19-9 SERPL-ACNC: 509 U/ML (ref ?–37)
CEA SERPL-MCNC: 2.4 NG/ML (ref 0.5–5)
CHLORIDE SERPL-SCNC: 109 MMOL/L (ref 101–111)
CO2 SERPL-SCNC: 27 MMOL/L (ref 22–32)
CONTROL RUN WITHIN 24 HOURS?: YES
CREAT BLD-MCNC: 0.85 MG/DL (ref 0.7–1.3)
DEPRECATED RDW RBC AUTO: 42.8 FL (ref 35.1–46.3)
EOSINOPHIL # BLD AUTO: 0.03 X10(3) UL (ref 0–0.7)
EOSINOPHIL NFR BLD AUTO: 0.4 %
ERYTHROCYTE [DISTWIDTH] IN BLOOD BY AUTOMATED COUNT: 14.6 % (ref 11–15)
GLOBULIN PLAS-MCNC: 3.1 G/DL (ref 2.8–4.4)
GLUCOSE BLD-MCNC: 97 MG/DL (ref 70–99)
GLUCOSE URINE: NEGATIVE
HCT VFR BLD AUTO: 41 % (ref 39–53)
HGB BLD-MCNC: 13.5 G/DL (ref 13–17.5)
IMM GRANULOCYTES # BLD AUTO: 0.28 X10(3) UL (ref 0–1)
IMM GRANULOCYTES NFR BLD: 3.8 %
LEUKOCYTE ESTERASE URINE: NEGATIVE
LYMPHOCYTES # BLD AUTO: 1.07 X10(3) UL (ref 1–4)
LYMPHOCYTES NFR BLD AUTO: 14.6 %
M PROTEIN MFR SERPL ELPH: 6.1 G/DL (ref 6.4–8.2)
MCH RBC QN AUTO: 28.8 PG (ref 26–34)
MCHC RBC AUTO-ENTMCNC: 32.9 G/DL (ref 31–37)
MCV RBC AUTO: 87.4 FL (ref 80–100)
MONOCYTES # BLD AUTO: 0.64 X10(3) UL (ref 0.1–1)
MONOCYTES NFR BLD AUTO: 8.8 %
NEUTROPHILS # BLD AUTO: 5.24 X10 (3) UL (ref 1.5–7.7)
NEUTROPHILS # BLD AUTO: 5.24 X10(3) UL (ref 1.5–7.7)
NEUTROPHILS NFR BLD AUTO: 71.7 %
NITRITE URINE: NEGATIVE
OSMOLALITY SERPL CALC.SUM OF ELEC: 294 MOSM/KG (ref 275–295)
PLATELET # BLD AUTO: 80 10(3)UL (ref 150–450)
POTASSIUM SERPL-SCNC: 3.6 MMOL/L (ref 3.6–5.1)
RBC # BLD AUTO: 4.69 X10(6)UL (ref 4.3–5.7)
SODIUM SERPL-SCNC: 142 MMOL/L (ref 136–144)
WBC # BLD AUTO: 7.3 X10(3) UL (ref 4–11)

## 2019-02-07 PROCEDURE — 80053 COMPREHEN METABOLIC PANEL: CPT

## 2019-02-07 PROCEDURE — 99215 OFFICE O/P EST HI 40 MIN: CPT | Performed by: CLINICAL NURSE SPECIALIST

## 2019-02-07 PROCEDURE — 96413 CHEMO IV INFUSION 1 HR: CPT

## 2019-02-07 PROCEDURE — S0028 INJECTION, FAMOTIDINE, 20 MG: HCPCS | Performed by: CLINICAL NURSE SPECIALIST

## 2019-02-07 PROCEDURE — 96367 TX/PROPH/DG ADDL SEQ IV INF: CPT

## 2019-02-07 PROCEDURE — 86301 IMMUNOASSAY TUMOR CA 19-9: CPT

## 2019-02-07 PROCEDURE — 96377 APPLICATON ON-BODY INJECTOR: CPT

## 2019-02-07 PROCEDURE — 96417 CHEMO IV INFUS EACH ADDL SEQ: CPT

## 2019-02-07 PROCEDURE — 82378 CARCINOEMBRYONIC ANTIGEN: CPT

## 2019-02-07 PROCEDURE — 85025 COMPLETE CBC W/AUTO DIFF WBC: CPT

## 2019-02-07 PROCEDURE — 96375 TX/PRO/DX INJ NEW DRUG ADDON: CPT

## 2019-02-07 RX ORDER — FAMOTIDINE 10 MG/ML
20 INJECTION, SOLUTION INTRAVENOUS ONCE
Status: COMPLETED | OUTPATIENT
Start: 2019-02-07 | End: 2019-02-07

## 2019-02-07 RX ORDER — LORAZEPAM 0.5 MG/1
TABLET ORAL EVERY 4 HOURS PRN
Status: CANCELLED | OUTPATIENT
Start: 2019-02-07

## 2019-02-07 RX ORDER — ATROPINE SULFATE 0.4 MG/ML
0.2 AMPUL (ML) INJECTION ONCE
Status: CANCELLED | OUTPATIENT
Start: 2019-02-07

## 2019-02-07 RX ORDER — DIPHENHYDRAMINE HYDROCHLORIDE 50 MG/ML
50 INJECTION INTRAMUSCULAR; INTRAVENOUS ONCE
Status: CANCELLED
Start: 2019-02-07 | End: 2019-02-07

## 2019-02-07 RX ORDER — ONDANSETRON 2 MG/ML
8 INJECTION INTRAMUSCULAR; INTRAVENOUS EVERY 6 HOURS PRN
Status: CANCELLED | OUTPATIENT
Start: 2019-02-07

## 2019-02-07 RX ORDER — DIPHENHYDRAMINE HYDROCHLORIDE 50 MG/ML
50 INJECTION INTRAMUSCULAR; INTRAVENOUS ONCE
Status: COMPLETED | OUTPATIENT
Start: 2019-02-07 | End: 2019-02-07

## 2019-02-07 RX ORDER — LORAZEPAM 2 MG/ML
INJECTION INTRAMUSCULAR EVERY 4 HOURS PRN
Status: CANCELLED | OUTPATIENT
Start: 2019-02-07

## 2019-02-07 RX ORDER — PROCHLORPERAZINE MALEATE 10 MG
10 TABLET ORAL EVERY 6 HOURS PRN
Status: CANCELLED | OUTPATIENT
Start: 2019-02-07

## 2019-02-07 RX ORDER — FLUOROURACIL 50 MG/ML
2400 INJECTION, SOLUTION INTRAVENOUS CONTINUOUS
Status: DISCONTINUED | OUTPATIENT
Start: 2019-02-07 | End: 2019-02-07

## 2019-02-07 RX ORDER — ATROPINE SULFATE 0.4 MG/ML
0.2 AMPUL (ML) INJECTION ONCE
Status: COMPLETED | OUTPATIENT
Start: 2019-02-07 | End: 2019-02-07

## 2019-02-07 RX ORDER — METOCLOPRAMIDE HYDROCHLORIDE 5 MG/ML
10 INJECTION INTRAMUSCULAR; INTRAVENOUS EVERY 6 HOURS PRN
Status: CANCELLED | OUTPATIENT
Start: 2019-02-07

## 2019-02-07 RX ORDER — FLUOROURACIL 50 MG/ML
2400 INJECTION, SOLUTION INTRAVENOUS CONTINUOUS
Status: CANCELLED | OUTPATIENT
Start: 2019-02-07

## 2019-02-07 RX ORDER — FAMOTIDINE 10 MG/ML
20 INJECTION, SOLUTION INTRAVENOUS ONCE
Status: CANCELLED
Start: 2019-02-07 | End: 2019-02-07

## 2019-02-07 RX ADMIN — DIPHENHYDRAMINE HYDROCHLORIDE 50 MG: 50 INJECTION INTRAMUSCULAR; INTRAVENOUS at 10:15:00

## 2019-02-07 RX ADMIN — ATROPINE SULFATE 0.2 MG: 0.4 MG/ML AMPUL (ML) INJECTION at 12:19:00

## 2019-02-07 RX ADMIN — FAMOTIDINE 20 MG: 10 INJECTION, SOLUTION INTRAVENOUS at 10:56:00

## 2019-02-07 RX ADMIN — FLUOROURACIL 6050 MG: 50 INJECTION, SOLUTION INTRAVENOUS at 14:13:00

## 2019-02-07 NOTE — PROGRESS NOTES
Pt here for C4D1.   Arrives Ambulating independently, accompanied by Family member           Modifications in dose or schedule: No     Frequency of blood return and site check throughout administration: Prior to administration, Every 2-3 ml IVP and At compl

## 2019-02-07 NOTE — PROGRESS NOTES
Nutrition F/U Note     Patient Name: Thierry Crawley  YOB: 1990  Medical Record Number: [id number]      Account Number: [de-identified]  Dietitian: Macarena Moore RD     Date of visit: 02/07/19     Diet Rx: high protein/calorie, post-esophagect Rfl: 5  •  Loperamide HCl 2 MG Oral Cap, Take 2 mg by mouth 4 (four) times daily as needed for Diarrhea., Disp: , Rfl:   •  Omeprazole 40 MG Oral Capsule Delayed Release, Take 1 capsule (40 mg total) by mouth daily. , Disp: 30 capsule, Rfl: 0  •  acetaminop

## 2019-02-07 NOTE — PROGRESS NOTES
Patient is here for APN assessment and cycle 4 of chemo. Patient continues on Xarelto 20 mg daily. Needs a refill today. He is having trouble sleeping at night. Currently on Prednisone for severe arm pain.  Patient is being tapered down and is now on 40 mg

## 2019-02-07 NOTE — PATIENT INSTRUCTIONS
For Dr. Efrem Barkley nurse line, call [phone] with any questions or concerns,  Monday through Friday 8:00 to 4:30.      After hours or weekends for urgent needs: [phone].   Central [phone]  Medical Records:   [phone]  Cancer [facility] retracted into  the hard case so it may be placed in the trash.

## 2019-02-14 ENCOUNTER — NURSE ONLY (OUTPATIENT)
Dept: HEMATOLOGY/ONCOLOGY | Age: 29
End: 2019-02-14
Attending: INTERNAL MEDICINE
Payer: COMMERCIAL

## 2019-02-14 PROCEDURE — 99211 OFF/OP EST MAY X REQ PHY/QHP: CPT

## 2019-02-14 PROCEDURE — 96523 IRRIG DRUG DELIVERY DEVICE: CPT

## 2019-02-15 ENCOUNTER — TELEPHONE (OUTPATIENT)
Dept: HEMATOLOGY/ONCOLOGY | Facility: HOSPITAL | Age: 29
End: 2019-02-15

## 2019-02-15 RX ORDER — PREDNISONE 20 MG/1
20 TABLET ORAL DAILY
Qty: 30 TABLET | Refills: 0 | Status: SHIPPED | OUTPATIENT
Start: 2019-02-15 | End: 2019-02-27 | Stop reason: ALTCHOICE

## 2019-02-15 RX ORDER — DOXYCYCLINE HYCLATE 100 MG/1
100 CAPSULE ORAL 2 TIMES DAILY
Qty: 60 CAPSULE | Refills: 1 | Status: SHIPPED | OUTPATIENT
Start: 2019-02-15 | End: 2019-05-13

## 2019-02-15 RX ORDER — CLINDAMYCIN PHOSPHATE 10 MG/ML
1 LOTION TOPICAL 2 TIMES DAILY
Qty: 60 ML | Refills: 2 | Status: ON HOLD | OUTPATIENT
Start: 2019-02-15 | End: 2020-01-01

## 2019-02-15 NOTE — TELEPHONE ENCOUNTER
Patient called, the pain in his arms was returning, he completed coarse of steroids, also rash on face is not better.    Prednisone, Clinda lotion and Doxicycline sent to pharmacy with instructions

## 2019-02-21 ENCOUNTER — OFFICE VISIT (OUTPATIENT)
Dept: HEMATOLOGY/ONCOLOGY | Age: 29
End: 2019-02-21
Attending: INTERNAL MEDICINE
Payer: COMMERCIAL

## 2019-02-21 VITALS
HEART RATE: 88 BPM | OXYGEN SATURATION: 99 % | SYSTOLIC BLOOD PRESSURE: 130 MMHG | TEMPERATURE: 98 F | RESPIRATION RATE: 18 BRPM | DIASTOLIC BLOOD PRESSURE: 85 MMHG | BODY MASS INDEX: 32 KG/M2 | WEIGHT: 265.31 LBS

## 2019-02-21 DIAGNOSIS — D64.81 ANEMIA DUE TO CHEMOTHERAPY: ICD-10-CM

## 2019-02-21 DIAGNOSIS — M79.601 BILATERAL ARM PAIN: Primary | ICD-10-CM

## 2019-02-21 DIAGNOSIS — C77.2 SECONDARY MALIGNANT NEOPLASM OF RETROPERITONEAL LYMPH NODES (HCC): Primary | ICD-10-CM

## 2019-02-21 DIAGNOSIS — C16.0 GE JUNCTION CARCINOMA (HCC): ICD-10-CM

## 2019-02-21 DIAGNOSIS — E87.6 HYPOKALEMIA: ICD-10-CM

## 2019-02-21 DIAGNOSIS — R11.2 NAUSEA AND VOMITING: ICD-10-CM

## 2019-02-21 DIAGNOSIS — D69.6 THROMBOCYTOPENIA (HCC): ICD-10-CM

## 2019-02-21 DIAGNOSIS — L73.9 FOLLICULITIS: ICD-10-CM

## 2019-02-21 DIAGNOSIS — T45.1X5A ANEMIA DUE TO CHEMOTHERAPY: ICD-10-CM

## 2019-02-21 DIAGNOSIS — M79.602 BILATERAL ARM PAIN: Primary | ICD-10-CM

## 2019-02-21 DIAGNOSIS — C77.2 SECONDARY MALIGNANT NEOPLASM OF RETROPERITONEAL LYMPH NODES (HCC): ICD-10-CM

## 2019-02-21 DIAGNOSIS — I82.A11 DVT OF RIGHT AXILLARY VEIN, ACUTE (HCC): ICD-10-CM

## 2019-02-21 DIAGNOSIS — Z51.11 ENCOUNTER FOR CHEMOTHERAPY MANAGEMENT: ICD-10-CM

## 2019-02-21 DIAGNOSIS — C77.0 SECONDARY MALIGNANT NEOPLASM OF SUPRACLAVICULAR LYMPH NODE (HCC): ICD-10-CM

## 2019-02-21 DIAGNOSIS — F41.9 ANXIETY: ICD-10-CM

## 2019-02-21 LAB
ALBUMIN SERPL-MCNC: 2.9 G/DL (ref 3.4–5)
ALBUMIN/GLOB SERPL: 1 {RATIO} (ref 1–2)
ALP LIVER SERPL-CCNC: 109 U/L (ref 45–117)
ALT SERPL-CCNC: 41 U/L (ref 16–61)
ANION GAP SERPL CALC-SCNC: 11 MMOL/L (ref 0–18)
AST SERPL-CCNC: 21 U/L (ref 15–37)
BASOPHILS # BLD: 0 X10(3) UL (ref 0–0.2)
BASOPHILS NFR BLD: 0 %
BILIRUB SERPL-MCNC: 0.3 MG/DL (ref 0.1–2)
BUN BLD-MCNC: 13 MG/DL (ref 7–18)
BUN/CREAT SERPL: 14.3 (ref 10–20)
CALCIUM BLD-MCNC: 8 MG/DL (ref 8.5–10.1)
CANCER AG19-9 SERPL-ACNC: 231.7 U/ML (ref ?–37)
CEA SERPL-MCNC: 2 NG/ML (ref ?–5)
CHLORIDE SERPL-SCNC: 107 MMOL/L (ref 98–107)
CO2 SERPL-SCNC: 24 MMOL/L (ref 21–32)
CREAT BLD-MCNC: 0.91 MG/DL (ref 0.7–1.3)
DEPRECATED RDW RBC AUTO: 48.3 FL (ref 35.1–46.3)
EOSINOPHIL # BLD: 0.07 X10(3) UL (ref 0–0.7)
EOSINOPHIL NFR BLD: 1 %
ERYTHROCYTE [DISTWIDTH] IN BLOOD BY AUTOMATED COUNT: 15.9 % (ref 11–15)
GLOBULIN PLAS-MCNC: 2.8 G/DL (ref 2.8–4.4)
GLUCOSE BLD-MCNC: 128 MG/DL (ref 70–99)
HCT VFR BLD AUTO: 35.9 % (ref 39–53)
HGB BLD-MCNC: 11.8 G/DL (ref 13–17.5)
LYMPHOCYTES NFR BLD: 0.66 X10(3) UL (ref 1–4)
LYMPHOCYTES NFR BLD: 10 %
M PROTEIN MFR SERPL ELPH: 5.7 G/DL (ref 6.4–8.2)
MCH RBC QN AUTO: 29.2 PG (ref 26–34)
MCHC RBC AUTO-ENTMCNC: 32.9 G/DL (ref 31–37)
MCV RBC AUTO: 88.9 FL (ref 80–100)
MONOCYTES # BLD: 0.53 X10(3) UL (ref 0.1–1)
MONOCYTES NFR BLD: 8 %
MORPHOLOGY: NORMAL
NEUTROPHILS # BLD AUTO: 4.99 X10 (3) UL (ref 1.5–7.7)
NEUTROPHILS NFR BLD: 75 %
NEUTS BAND NFR BLD: 6 %
NEUTS HYPERSEG # BLD: 5.35 X10(3) UL (ref 1.5–7.7)
NRBC BLD MANUAL-RTO: 2 %
OSMOLALITY SERPL CALC.SUM OF ELEC: 296 MOSM/KG (ref 275–295)
PLATELET # BLD AUTO: 92 10(3)UL (ref 150–450)
PLATELET MORPHOLOGY: NORMAL
POTASSIUM SERPL-SCNC: 3.1 MMOL/L (ref 3.5–5.1)
RBC # BLD AUTO: 4.04 X10(6)UL (ref 4.3–5.7)
SODIUM SERPL-SCNC: 142 MMOL/L (ref 136–145)
TOTAL CELLS COUNTED: 100
WBC # BLD AUTO: 6.6 X10(3) UL (ref 4–11)

## 2019-02-21 PROCEDURE — 85007 BL SMEAR W/DIFF WBC COUNT: CPT

## 2019-02-21 PROCEDURE — 85027 COMPLETE CBC AUTOMATED: CPT

## 2019-02-21 PROCEDURE — 96375 TX/PRO/DX INJ NEW DRUG ADDON: CPT

## 2019-02-21 PROCEDURE — 86301 IMMUNOASSAY TUMOR CA 19-9: CPT

## 2019-02-21 PROCEDURE — S0028 INJECTION, FAMOTIDINE, 20 MG: HCPCS | Performed by: CLINICAL NURSE SPECIALIST

## 2019-02-21 PROCEDURE — 85025 COMPLETE CBC W/AUTO DIFF WBC: CPT

## 2019-02-21 PROCEDURE — 96413 CHEMO IV INFUSION 1 HR: CPT

## 2019-02-21 PROCEDURE — 96368 THER/DIAG CONCURRENT INF: CPT

## 2019-02-21 PROCEDURE — 96376 TX/PRO/DX INJ SAME DRUG ADON: CPT

## 2019-02-21 PROCEDURE — 99215 OFFICE O/P EST HI 40 MIN: CPT | Performed by: CLINICAL NURSE SPECIALIST

## 2019-02-21 PROCEDURE — 82378 CARCINOEMBRYONIC ANTIGEN: CPT

## 2019-02-21 PROCEDURE — 96367 TX/PROPH/DG ADDL SEQ IV INF: CPT

## 2019-02-21 PROCEDURE — 80053 COMPREHEN METABOLIC PANEL: CPT

## 2019-02-21 PROCEDURE — 96417 CHEMO IV INFUS EACH ADDL SEQ: CPT

## 2019-02-21 RX ORDER — DIPHENHYDRAMINE HYDROCHLORIDE 50 MG/ML
50 INJECTION INTRAMUSCULAR; INTRAVENOUS ONCE
Status: COMPLETED | OUTPATIENT
Start: 2019-02-21 | End: 2019-02-21

## 2019-02-21 RX ORDER — METOCLOPRAMIDE HYDROCHLORIDE 5 MG/ML
10 INJECTION INTRAMUSCULAR; INTRAVENOUS EVERY 6 HOURS PRN
Status: CANCELLED | OUTPATIENT
Start: 2019-02-21

## 2019-02-21 RX ORDER — DIPHENHYDRAMINE HYDROCHLORIDE 50 MG/ML
50 INJECTION INTRAMUSCULAR; INTRAVENOUS ONCE
Status: CANCELLED
Start: 2019-02-21 | End: 2019-02-21

## 2019-02-21 RX ORDER — FLUOROURACIL 50 MG/ML
2400 INJECTION, SOLUTION INTRAVENOUS CONTINUOUS
Status: CANCELLED | OUTPATIENT
Start: 2019-02-21

## 2019-02-21 RX ORDER — ATROPINE SULFATE 0.4 MG/ML
0.2 AMPUL (ML) INJECTION ONCE
Status: CANCELLED | OUTPATIENT
Start: 2019-02-21

## 2019-02-21 RX ORDER — FAMOTIDINE 10 MG/ML
20 INJECTION, SOLUTION INTRAVENOUS ONCE
Status: COMPLETED | OUTPATIENT
Start: 2019-02-21 | End: 2019-02-21

## 2019-02-21 RX ORDER — ONDANSETRON 2 MG/ML
8 INJECTION INTRAMUSCULAR; INTRAVENOUS EVERY 6 HOURS PRN
Status: CANCELLED | OUTPATIENT
Start: 2019-02-21

## 2019-02-21 RX ORDER — LORAZEPAM 1 MG/1
1 TABLET ORAL EVERY 4 HOURS PRN
Qty: 60 TABLET | Refills: 0 | Status: SHIPPED
Start: 2019-02-21 | End: 2019-04-01

## 2019-02-21 RX ORDER — POTASSIUM CHLORIDE 1500 MG/1
1 TABLET, FILM COATED, EXTENDED RELEASE ORAL 2 TIMES DAILY
Qty: 60 TABLET | Refills: 0 | Status: SHIPPED | OUTPATIENT
Start: 2019-02-21 | End: 2019-04-11

## 2019-02-21 RX ORDER — ATROPINE SULFATE 0.4 MG/ML
0.2 AMPUL (ML) INJECTION ONCE
Status: COMPLETED | OUTPATIENT
Start: 2019-02-21 | End: 2019-02-21

## 2019-02-21 RX ORDER — LORAZEPAM 0.5 MG/1
TABLET ORAL EVERY 4 HOURS PRN
Status: CANCELLED | OUTPATIENT
Start: 2019-02-21

## 2019-02-21 RX ORDER — PROCHLORPERAZINE MALEATE 10 MG
10 TABLET ORAL EVERY 6 HOURS PRN
Status: CANCELLED | OUTPATIENT
Start: 2019-02-21

## 2019-02-21 RX ORDER — LORAZEPAM 2 MG/ML
INJECTION INTRAMUSCULAR EVERY 4 HOURS PRN
Status: CANCELLED | OUTPATIENT
Start: 2019-02-21

## 2019-02-21 RX ORDER — FAMOTIDINE 10 MG/ML
20 INJECTION, SOLUTION INTRAVENOUS ONCE
Status: CANCELLED
Start: 2019-02-21 | End: 2019-02-21

## 2019-02-21 RX ORDER — FLUOROURACIL 50 MG/ML
2400 INJECTION, SOLUTION INTRAVENOUS CONTINUOUS
Status: DISCONTINUED | OUTPATIENT
Start: 2019-02-21 | End: 2019-02-21

## 2019-02-21 RX ADMIN — DIPHENHYDRAMINE HYDROCHLORIDE 50 MG: 50 INJECTION INTRAMUSCULAR; INTRAVENOUS at 10:16:00

## 2019-02-21 RX ADMIN — FAMOTIDINE 20 MG: 10 INJECTION, SOLUTION INTRAVENOUS at 10:14:00

## 2019-02-21 RX ADMIN — ATROPINE SULFATE 0.2 MG: 0.4 MG/ML AMPUL (ML) INJECTION at 12:06:00

## 2019-02-21 RX ADMIN — FLUOROURACIL 6050 MG: 50 INJECTION, SOLUTION INTRAVENOUS at 13:51:00

## 2019-02-21 RX ADMIN — ATROPINE SULFATE 0.2 MG: 0.4 MG/ML AMPUL (ML) INJECTION at 13:28:00

## 2019-02-21 NOTE — PROGRESS NOTES
Pt here for C5D1.   Arrives Ambulating independently, accompanied by Family member           Modifications in dose or schedule: No     Frequency of blood return and site check throughout administration: Prior to administration   Discharged to Home, [address]

## 2019-02-21 NOTE — PROGRESS NOTES
Patient is here for APN assessment and cycle 5 of chemo. Patient reports he is feeling well. Denies pain. Appetite and energy level has been good. No GI complaints. Patient is currently on Prednisone 20 mg daily.       Education Record    Learner:  Patient

## 2019-02-21 NOTE — PATIENT INSTRUCTIONS
On-body Injector for Neulasta Patient Instructions       Your On-Body Injection device was applied on Feb. 21st @ 1400    Your dose of medication will start on Feb. 22 @ 1700    You may remove this device on Feb. 22 @ 1900      Important information to re

## 2019-02-21 NOTE — PROGRESS NOTES
ANP Visit Note    Patient Name: Jani Jose   YOB: 1990   Medical Record Number: [id number]   CSN: [id number]   Date of visit: 2/21/2019       Chief Complaint/Reason for Visit:  Metastatic Esophageal Cancer, Chemotherapy      Oncologic • Vomiting blood        Surgical History:  Past Surgical History:   Procedure Laterality Date   • LAPAROSCOPIC ESOPHAGOGASTRECTOMY N/A 4/27/2018    Performed by Lauren Blevins MD at [facility] MAIN OR   • LAPAROSCOPIC ESOPHAGOGASTRECTOMY Right 4/27/2018    Perfo Attends meetings of clubs or organizations: Not on file        Relationship status: Not on file      Intimate partner violence:        Fear of current or ex partner: Not on file        Emotionally abused: Not on file        Physically abused: Not on nicanor Citalopram Hydrobromide 20 MG Oral Tab, Take 2 tablets (40 mg total) by mouth daily. , Disp: 60 tablet, Rfl: 3  •  Loperamide HCl 2 MG Oral Cap, Take 2 mg by mouth 4 (four) times daily as needed for Diarrhea., Disp: , Rfl:   •  Omeprazole 40 MG Oral Capsule mmol/L    Chloride 107 98 - 107 mmol/L    CO2 24.0 21.0 - 32.0 mmol/L    Anion Gap 11 0 - 18 mmol/L    BUN 13 7 - 18 mg/dL    Creatinine 0.91 0.70 - 1.30 mg/dL    BUN/CREA Ratio 14.3 10.0 - 20.0    Calcium, Total 8.0 (L) 8.5 - 10.1 mg/dL    Calculated Osmo arm pain: will hold Prednisone while on Dex will try to take only 10 mg daily and if controlling pain decrease to 5 mg. 4. Anemia: continue to monitor  5. Thrombocytopenia: no bleeding will continue without dose modifications.    6. RUE DVT: continue Grace Gist

## 2019-02-25 ENCOUNTER — HOSPITAL ENCOUNTER (OUTPATIENT)
Dept: CT IMAGING | Age: 29
Discharge: HOME | End: 2019-02-25
Attending: CLINICAL NURSE SPECIALIST
Payer: COMMERCIAL

## 2019-02-25 DIAGNOSIS — C16.0 GE JUNCTION CARCINOMA (HCC): ICD-10-CM

## 2019-02-25 DIAGNOSIS — C77.2 SECONDARY MALIGNANT NEOPLASM OF RETROPERITONEAL LYMPH NODES (HCC): ICD-10-CM

## 2019-02-25 DIAGNOSIS — C77.0 SECONDARY MALIGNANT NEOPLASM OF SUPRACLAVICULAR LYMPH NODE (HCC): ICD-10-CM

## 2019-02-25 PROCEDURE — 71260 CT THORAX DX C+: CPT | Performed by: CLINICAL NURSE SPECIALIST

## 2019-02-25 PROCEDURE — 74177 CT ABD & PELVIS W/CONTRAST: CPT | Performed by: CLINICAL NURSE SPECIALIST

## 2019-02-27 ENCOUNTER — OFFICE VISIT (OUTPATIENT)
Dept: FAMILY MEDICINE CLINIC | Facility: CLINIC | Age: 29
End: 2019-02-27
Payer: COMMERCIAL

## 2019-02-27 VITALS
HEART RATE: 120 BPM | DIASTOLIC BLOOD PRESSURE: 70 MMHG | SYSTOLIC BLOOD PRESSURE: 124 MMHG | TEMPERATURE: 97 F | OXYGEN SATURATION: 95 % | BODY MASS INDEX: 30.1 KG/M2 | WEIGHT: 260.13 LBS | HEIGHT: 78 IN

## 2019-02-27 DIAGNOSIS — J02.9 PHARYNGITIS, UNSPECIFIED ETIOLOGY: Primary | ICD-10-CM

## 2019-02-27 PROCEDURE — 87081 CULTURE SCREEN ONLY: CPT | Performed by: FAMILY MEDICINE

## 2019-02-27 PROCEDURE — 99213 OFFICE O/P EST LOW 20 MIN: CPT | Performed by: FAMILY MEDICINE

## 2019-02-27 RX ORDER — AMOXICILLIN 875 MG/1
875 TABLET, COATED ORAL 2 TIMES DAILY
Qty: 20 TABLET | Refills: 0 | Status: SHIPPED | OUTPATIENT
Start: 2019-02-27 | End: 2019-03-07 | Stop reason: ALTCHOICE

## 2019-02-27 NOTE — PROGRESS NOTES
HPI:    Patient ID: Marjan Conde is a 33year old male. ST yest  W/o cough / cold  W/o strep contact   Breathing OK  HPI    Review of Systems   Constitutional: Negative for chills and fever. HENT: Positive for sore throat.  Negative for congestion Delayed Release Take 1 capsule (40 mg total) by mouth daily. Disp: 30 capsule Rfl: 0   acetaminophen 500 MG Oral Tab Take 2 tablets (1,000 mg total) by mouth every 6 (six) hours as needed for Pain.  Disp: 30 tablet Rfl: 0     Allergies:  Paclitaxel

## 2019-02-28 ENCOUNTER — NURSE ONLY (OUTPATIENT)
Dept: HEMATOLOGY/ONCOLOGY | Age: 29
End: 2019-02-28
Attending: INTERNAL MEDICINE
Payer: COMMERCIAL

## 2019-02-28 DIAGNOSIS — M79.602 BILATERAL ARM PAIN: Primary | ICD-10-CM

## 2019-02-28 DIAGNOSIS — M79.601 BILATERAL ARM PAIN: Primary | ICD-10-CM

## 2019-02-28 DIAGNOSIS — C16.0 GE JUNCTION CARCINOMA (HCC): ICD-10-CM

## 2019-02-28 PROCEDURE — 96523 IRRIG DRUG DELIVERY DEVICE: CPT

## 2019-02-28 RX ORDER — SODIUM CHLORIDE 0.9 % (FLUSH) 0.9 %
10 SYRINGE (ML) INJECTION ONCE
Status: CANCELLED | OUTPATIENT
Start: 2019-02-28

## 2019-02-28 RX ORDER — SODIUM CHLORIDE 0.9 % (FLUSH) 0.9 %
10 SYRINGE (ML) INJECTION ONCE
Status: COMPLETED | OUTPATIENT
Start: 2019-02-28 | End: 2019-02-28

## 2019-02-28 RX ORDER — ONDANSETRON 2 MG/ML
8 INJECTION INTRAMUSCULAR; INTRAVENOUS ONCE
Status: CANCELLED
Start: 2019-02-28 | End: 2019-02-28

## 2019-02-28 RX ORDER — SODIUM CHLORIDE 9 MG/ML
1000 INJECTION, SOLUTION INTRAVENOUS ONCE
Status: CANCELLED
Start: 2019-02-28 | End: 2019-02-28

## 2019-02-28 RX ADMIN — SODIUM CHLORIDE 0.9 % (FLUSH) 10 ML: 0.9 % SYRINGE (ML) INJECTION at 14:38:00

## 2019-03-07 ENCOUNTER — OFFICE VISIT (OUTPATIENT)
Dept: HEMATOLOGY/ONCOLOGY | Age: 29
End: 2019-03-07
Attending: INTERNAL MEDICINE
Payer: COMMERCIAL

## 2019-03-07 VITALS
TEMPERATURE: 99 F | WEIGHT: 262 LBS | HEART RATE: 84 BPM | SYSTOLIC BLOOD PRESSURE: 129 MMHG | RESPIRATION RATE: 18 BRPM | HEIGHT: 78 IN | DIASTOLIC BLOOD PRESSURE: 84 MMHG | BODY MASS INDEX: 30.31 KG/M2 | OXYGEN SATURATION: 98 %

## 2019-03-07 DIAGNOSIS — C77.2 SECONDARY MALIGNANT NEOPLASM OF RETROPERITONEAL LYMPH NODES (HCC): ICD-10-CM

## 2019-03-07 DIAGNOSIS — C77.0 SECONDARY MALIGNANT NEOPLASM OF SUPRACLAVICULAR LYMPH NODE (HCC): ICD-10-CM

## 2019-03-07 DIAGNOSIS — C16.0 GE JUNCTION CARCINOMA (HCC): Primary | ICD-10-CM

## 2019-03-07 DIAGNOSIS — C77.2 SECONDARY MALIGNANT NEOPLASM OF RETROPERITONEAL LYMPH NODES (HCC): Primary | ICD-10-CM

## 2019-03-07 DIAGNOSIS — C16.0 GE JUNCTION CARCINOMA (HCC): ICD-10-CM

## 2019-03-07 DIAGNOSIS — L73.9 FOLLICULITIS: ICD-10-CM

## 2019-03-07 LAB
ALBUMIN SERPL-MCNC: 3.1 G/DL (ref 3.4–5)
ALBUMIN/GLOB SERPL: 0.9 {RATIO} (ref 1–2)
ALP LIVER SERPL-CCNC: 106 U/L (ref 45–117)
ALT SERPL-CCNC: 32 U/L (ref 16–61)
ANION GAP SERPL CALC-SCNC: 8 MMOL/L (ref 0–18)
AST SERPL-CCNC: 22 U/L (ref 15–37)
BASOPHILS # BLD AUTO: 0.05 X10(3) UL (ref 0–0.2)
BASOPHILS NFR BLD AUTO: 3.4 %
BILIRUB SERPL-MCNC: 0.4 MG/DL (ref 0.1–2)
BUN BLD-MCNC: 8 MG/DL (ref 7–18)
BUN/CREAT SERPL: 11.4 (ref 10–20)
CALCIUM BLD-MCNC: 8.3 MG/DL (ref 8.5–10.1)
CANCER AG19-9 SERPL-ACNC: 89.7 U/ML (ref ?–37)
CEA SERPL-MCNC: 2.1 NG/ML (ref ?–5)
CHLORIDE SERPL-SCNC: 108 MMOL/L (ref 98–107)
CO2 SERPL-SCNC: 24 MMOL/L (ref 21–32)
CREAT BLD-MCNC: 0.7 MG/DL (ref 0.7–1.3)
DEPRECATED RDW RBC AUTO: 53.1 FL (ref 35.1–46.3)
EOSINOPHIL # BLD AUTO: 0.11 X10(3) UL (ref 0–0.7)
EOSINOPHIL NFR BLD AUTO: 7.5 %
ERYTHROCYTE [DISTWIDTH] IN BLOOD BY AUTOMATED COUNT: 17 % (ref 11–15)
GLOBULIN PLAS-MCNC: 3.6 G/DL (ref 2.8–4.4)
GLUCOSE BLD-MCNC: 94 MG/DL (ref 70–99)
HCT VFR BLD AUTO: 35.8 % (ref 39–53)
HGB BLD-MCNC: 11.4 G/DL (ref 13–17.5)
IMM GRANULOCYTES # BLD AUTO: 0.02 X10(3) UL (ref 0–1)
IMM GRANULOCYTES NFR BLD: 1.4 %
LYMPHOCYTES # BLD AUTO: 0.46 X10(3) UL (ref 1–4)
LYMPHOCYTES NFR BLD AUTO: 31.3 %
M PROTEIN MFR SERPL ELPH: 6.7 G/DL (ref 6.4–8.2)
MCH RBC QN AUTO: 28.5 PG (ref 26–34)
MCHC RBC AUTO-ENTMCNC: 31.8 G/DL (ref 31–37)
MCV RBC AUTO: 89.5 FL (ref 80–100)
MONOCYTES # BLD AUTO: 0.44 X10(3) UL (ref 0.1–1)
MONOCYTES NFR BLD AUTO: 29.9 %
MORPHOLOGY: NORMAL
NEUTROPHILS # BLD AUTO: 0.39 X10 (3) UL (ref 1.5–7.7)
NEUTROPHILS # BLD AUTO: 0.39 X10(3) UL (ref 1.5–7.7)
NEUTROPHILS NFR BLD AUTO: 26.5 %
OSMOLALITY SERPL CALC.SUM OF ELEC: 288 MOSM/KG (ref 275–295)
PLATELET # BLD AUTO: 180 10(3)UL (ref 150–450)
POTASSIUM SERPL-SCNC: 4.3 MMOL/L (ref 3.5–5.1)
RBC # BLD AUTO: 4 X10(6)UL (ref 4.3–5.7)
SODIUM SERPL-SCNC: 140 MMOL/L (ref 136–145)
WBC # BLD AUTO: 1.5 X10(3) UL (ref 4–11)

## 2019-03-07 PROCEDURE — 82378 CARCINOEMBRYONIC ANTIGEN: CPT

## 2019-03-07 PROCEDURE — 36591 DRAW BLOOD OFF VENOUS DEVICE: CPT

## 2019-03-07 PROCEDURE — 99214 OFFICE O/P EST MOD 30 MIN: CPT | Performed by: INTERNAL MEDICINE

## 2019-03-07 PROCEDURE — 85025 COMPLETE CBC W/AUTO DIFF WBC: CPT

## 2019-03-07 PROCEDURE — 86301 IMMUNOASSAY TUMOR CA 19-9: CPT

## 2019-03-07 PROCEDURE — 80053 COMPREHEN METABOLIC PANEL: CPT

## 2019-03-07 RX ORDER — DIPHENHYDRAMINE HYDROCHLORIDE 50 MG/ML
50 INJECTION INTRAMUSCULAR; INTRAVENOUS ONCE
Status: DISCONTINUED | OUTPATIENT
Start: 2019-03-07 | End: 2019-03-07

## 2019-03-07 RX ORDER — DEXAMETHASONE 4 MG/1
TABLET ORAL
Qty: 40 TABLET | Refills: 5 | Status: SHIPPED | OUTPATIENT
Start: 2019-03-07 | End: 2019-08-22

## 2019-03-07 RX ORDER — DIPHENHYDRAMINE HYDROCHLORIDE 50 MG/ML
50 INJECTION INTRAMUSCULAR; INTRAVENOUS ONCE
Status: CANCELLED | OUTPATIENT
Start: 2019-03-14

## 2019-03-07 NOTE — PATIENT INSTRUCTIONS
To reach Dr Ora sargent during business hours, please call [phone]. After hours, including weekends, evenings, and holidays, please call the main number [phone] for emergent needs.

## 2019-03-07 NOTE — PROGRESS NOTES
Patient is here for MD f/u and cycle 6 of chemo. Patient is feeling well. Denies pain. Appetite and energy level is fairly good. Occasional SOB with exertion. Patient has acne rash on back, on Doxycycline for this.        Education Record    Learner:  Nicole Tierney

## 2019-03-10 PROBLEM — E66.09 NON MORBID OBESITY DUE TO EXCESS CALORIES: Status: RESOLVED | Noted: 2017-01-09 | Resolved: 2019-03-10

## 2019-03-11 NOTE — PROGRESS NOTES
[facility]    PATIENT'S NAME: Parmernanci LUTZ   ATTENDING PHYSICIAN: Sharrie Councilman, M.D.    PATIENT ACCOUNT #: [de-identified] LOCATION: [address] [place] [address] RECORD #: [id number] YOB: 1990   DATE OF SERVICE: 03/07/2019       CANCER mg b.i.d. for 5 days after his chemotherapy, loperamide 2-4 mg q.i.d. p.r.n., lorazepam 1 mg q.6 h. p.r.n., olanzapine 5 mg at bedtime for 5 days after chemotherapy, omeprazole 40 mg daily, ondansetron 8 mg q.8 h. p.r.n., oxycodone which he is not taking, date.  Therefore, I am having him come back next week. We will keep him at the same dose. I will added pegylated filgrastim to his treatment, and we will see how he does.   Over time, we may increase the interval between treatments to 3-week intervals as

## 2019-03-14 ENCOUNTER — OFFICE VISIT (OUTPATIENT)
Dept: HEMATOLOGY/ONCOLOGY | Age: 29
End: 2019-03-14
Attending: INTERNAL MEDICINE
Payer: COMMERCIAL

## 2019-03-14 VITALS
BODY MASS INDEX: 30 KG/M2 | RESPIRATION RATE: 16 BRPM | WEIGHT: 262 LBS | HEART RATE: 75 BPM | OXYGEN SATURATION: 99 % | SYSTOLIC BLOOD PRESSURE: 122 MMHG | TEMPERATURE: 98 F | DIASTOLIC BLOOD PRESSURE: 82 MMHG

## 2019-03-14 DIAGNOSIS — C77.2 SECONDARY MALIGNANT NEOPLASM OF RETROPERITONEAL LYMPH NODES (HCC): Primary | ICD-10-CM

## 2019-03-14 DIAGNOSIS — C77.0 SECONDARY MALIGNANT NEOPLASM OF SUPRACLAVICULAR LYMPH NODE (HCC): ICD-10-CM

## 2019-03-14 DIAGNOSIS — C16.0 GE JUNCTION CARCINOMA (HCC): ICD-10-CM

## 2019-03-14 LAB
ALBUMIN SERPL-MCNC: 3.4 G/DL (ref 3.4–5)
ALBUMIN/GLOB SERPL: 1 {RATIO} (ref 1–2)
ALP LIVER SERPL-CCNC: 131 U/L (ref 45–117)
ALT SERPL-CCNC: 24 U/L (ref 16–61)
ANION GAP SERPL CALC-SCNC: 6 MMOL/L (ref 0–18)
AST SERPL-CCNC: 23 U/L (ref 15–37)
BASOPHILS # BLD AUTO: 0.07 X10(3) UL (ref 0–0.2)
BASOPHILS NFR BLD AUTO: 1.2 %
BILIRUB SERPL-MCNC: 0.3 MG/DL (ref 0.1–2)
BUN BLD-MCNC: 10 MG/DL (ref 7–18)
BUN/CREAT SERPL: 12.8 (ref 10–20)
CALCIUM BLD-MCNC: 8.5 MG/DL (ref 8.5–10.1)
CANCER AG19-9 SERPL-ACNC: 73.9 U/ML (ref ?–37)
CEA SERPL-MCNC: 1.9 NG/ML (ref ?–5)
CHLORIDE SERPL-SCNC: 108 MMOL/L (ref 98–107)
CO2 SERPL-SCNC: 25 MMOL/L (ref 21–32)
CREAT BLD-MCNC: 0.78 MG/DL (ref 0.7–1.3)
DEPRECATED RDW RBC AUTO: 54.4 FL (ref 35.1–46.3)
EOSINOPHIL # BLD AUTO: 0.33 X10(3) UL (ref 0–0.7)
EOSINOPHIL NFR BLD AUTO: 5.6 %
ERYTHROCYTE [DISTWIDTH] IN BLOOD BY AUTOMATED COUNT: 17.1 % (ref 11–15)
GLOBULIN PLAS-MCNC: 3.5 G/DL (ref 2.8–4.4)
GLUCOSE BLD-MCNC: 98 MG/DL (ref 70–99)
HCT VFR BLD AUTO: 37.9 % (ref 39–53)
HGB BLD-MCNC: 12 G/DL (ref 13–17.5)
IMM GRANULOCYTES # BLD AUTO: 0.06 X10(3) UL (ref 0–1)
IMM GRANULOCYTES NFR BLD: 1 %
LYMPHOCYTES # BLD AUTO: 1.04 X10(3) UL (ref 1–4)
LYMPHOCYTES NFR BLD AUTO: 17.5 %
M PROTEIN MFR SERPL ELPH: 6.9 G/DL (ref 6.4–8.2)
MCH RBC QN AUTO: 28.5 PG (ref 26–34)
MCHC RBC AUTO-ENTMCNC: 31.7 G/DL (ref 31–37)
MCV RBC AUTO: 90 FL (ref 80–100)
MONOCYTES # BLD AUTO: 0.76 X10(3) UL (ref 0.1–1)
MONOCYTES NFR BLD AUTO: 12.8 %
NEUTROPHILS # BLD AUTO: 3.67 X10 (3) UL (ref 1.5–7.7)
NEUTROPHILS # BLD AUTO: 3.67 X10(3) UL (ref 1.5–7.7)
NEUTROPHILS NFR BLD AUTO: 61.9 %
OSMOLALITY SERPL CALC.SUM OF ELEC: 287 MOSM/KG (ref 275–295)
PLATELET # BLD AUTO: 253 10(3)UL (ref 150–450)
POTASSIUM SERPL-SCNC: 4.1 MMOL/L (ref 3.5–5.1)
RBC # BLD AUTO: 4.21 X10(6)UL (ref 4.3–5.7)
SODIUM SERPL-SCNC: 139 MMOL/L (ref 136–145)
WBC # BLD AUTO: 5.9 X10(3) UL (ref 4–11)

## 2019-03-14 PROCEDURE — 85025 COMPLETE CBC W/AUTO DIFF WBC: CPT

## 2019-03-14 PROCEDURE — 96368 THER/DIAG CONCURRENT INF: CPT

## 2019-03-14 PROCEDURE — 96375 TX/PRO/DX INJ NEW DRUG ADDON: CPT

## 2019-03-14 PROCEDURE — 96417 CHEMO IV INFUS EACH ADDL SEQ: CPT

## 2019-03-14 PROCEDURE — 96377 APPLICATON ON-BODY INJECTOR: CPT

## 2019-03-14 PROCEDURE — 82378 CARCINOEMBRYONIC ANTIGEN: CPT

## 2019-03-14 PROCEDURE — 86301 IMMUNOASSAY TUMOR CA 19-9: CPT

## 2019-03-14 PROCEDURE — 80053 COMPREHEN METABOLIC PANEL: CPT

## 2019-03-14 PROCEDURE — S0028 INJECTION, FAMOTIDINE, 20 MG: HCPCS | Performed by: INTERNAL MEDICINE

## 2019-03-14 PROCEDURE — 96413 CHEMO IV INFUSION 1 HR: CPT

## 2019-03-14 PROCEDURE — 96367 TX/PROPH/DG ADDL SEQ IV INF: CPT

## 2019-03-14 RX ORDER — ATROPINE SULFATE 0.4 MG/ML
0.2 AMPUL (ML) INJECTION ONCE
Status: COMPLETED | OUTPATIENT
Start: 2019-03-14 | End: 2019-03-14

## 2019-03-14 RX ORDER — FLUOROURACIL 50 MG/ML
2400 INJECTION, SOLUTION INTRAVENOUS CONTINUOUS
Status: DISCONTINUED | OUTPATIENT
Start: 2019-03-14 | End: 2019-03-14

## 2019-03-14 RX ORDER — FAMOTIDINE 10 MG/ML
20 INJECTION, SOLUTION INTRAVENOUS ONCE
Status: COMPLETED | OUTPATIENT
Start: 2019-03-14 | End: 2019-03-14

## 2019-03-14 RX ORDER — DIPHENHYDRAMINE HYDROCHLORIDE 50 MG/ML
50 INJECTION INTRAMUSCULAR; INTRAVENOUS ONCE
Status: COMPLETED | OUTPATIENT
Start: 2019-03-14 | End: 2019-03-14

## 2019-03-14 RX ADMIN — FLUOROURACIL 6050 MG: 50 INJECTION, SOLUTION INTRAVENOUS at 13:27:00

## 2019-03-14 RX ADMIN — DIPHENHYDRAMINE HYDROCHLORIDE 50 MG: 50 INJECTION INTRAMUSCULAR; INTRAVENOUS at 09:46:00

## 2019-03-14 RX ADMIN — FAMOTIDINE 20 MG: 10 INJECTION, SOLUTION INTRAVENOUS at 09:48:00

## 2019-03-14 RX ADMIN — ATROPINE SULFATE 0.2 MG: 0.4 MG/ML AMPUL (ML) INJECTION at 11:42:00

## 2019-03-14 NOTE — PROGRESS NOTES
Pt here for C6D1.   Arrives Ambulating independently, accompanied by Family member           Modifications in dose or schedule: No     Frequency of blood return and site check throughout administration: Prior to administration and Prior to each drug   Disch

## 2019-03-21 ENCOUNTER — NURSE ONLY (OUTPATIENT)
Dept: HEMATOLOGY/ONCOLOGY | Age: 29
End: 2019-03-21
Attending: INTERNAL MEDICINE
Payer: COMMERCIAL

## 2019-03-21 PROCEDURE — 99211 OFF/OP EST MAY X REQ PHY/QHP: CPT

## 2019-03-28 ENCOUNTER — OFFICE VISIT (OUTPATIENT)
Dept: HEMATOLOGY/ONCOLOGY | Age: 29
End: 2019-03-28
Attending: INTERNAL MEDICINE
Payer: COMMERCIAL

## 2019-03-28 ENCOUNTER — NURSE ONLY (OUTPATIENT)
Dept: HEMATOLOGY/ONCOLOGY | Age: 29
End: 2019-03-28
Attending: INTERNAL MEDICINE
Payer: COMMERCIAL

## 2019-03-28 VITALS
WEIGHT: 265 LBS | BODY MASS INDEX: 30.66 KG/M2 | HEIGHT: 77.99 IN | OXYGEN SATURATION: 99 % | TEMPERATURE: 99 F | RESPIRATION RATE: 16 BRPM | SYSTOLIC BLOOD PRESSURE: 114 MMHG | HEART RATE: 89 BPM | DIASTOLIC BLOOD PRESSURE: 80 MMHG

## 2019-03-28 DIAGNOSIS — C77.2 SECONDARY MALIGNANT NEOPLASM OF RETROPERITONEAL LYMPH NODES (HCC): Primary | ICD-10-CM

## 2019-03-28 DIAGNOSIS — T45.1X5A ANEMIA DUE TO CHEMOTHERAPY: ICD-10-CM

## 2019-03-28 DIAGNOSIS — C77.0 SECONDARY MALIGNANT NEOPLASM OF SUPRACLAVICULAR LYMPH NODE (HCC): ICD-10-CM

## 2019-03-28 DIAGNOSIS — C15.5 MALIGNANT NEOPLASM OF LOWER THIRD OF ESOPHAGUS (HCC): Primary | ICD-10-CM

## 2019-03-28 DIAGNOSIS — D64.81 ANEMIA DUE TO CHEMOTHERAPY: ICD-10-CM

## 2019-03-28 DIAGNOSIS — C16.0 GE JUNCTION CARCINOMA (HCC): ICD-10-CM

## 2019-03-28 DIAGNOSIS — D64.9 NORMOCYTIC ANEMIA: ICD-10-CM

## 2019-03-28 DIAGNOSIS — C77.2 SECONDARY MALIGNANT NEOPLASM OF RETROPERITONEAL LYMPH NODES (HCC): ICD-10-CM

## 2019-03-28 LAB
ALBUMIN SERPL-MCNC: 3.2 G/DL (ref 3.4–5)
ALBUMIN/GLOB SERPL: 1 {RATIO} (ref 1–2)
ALP LIVER SERPL-CCNC: 109 U/L (ref 45–117)
ALT SERPL-CCNC: 50 U/L (ref 16–61)
ANION GAP SERPL CALC-SCNC: 8 MMOL/L (ref 0–18)
AST SERPL-CCNC: 29 U/L (ref 15–37)
BASOPHILS # BLD AUTO: 0.03 X10(3) UL (ref 0–0.2)
BASOPHILS NFR BLD AUTO: 0.3 %
BILIRUB SERPL-MCNC: 0.2 MG/DL (ref 0.1–2)
BUN BLD-MCNC: 8 MG/DL (ref 7–18)
BUN/CREAT SERPL: 11.3 (ref 10–20)
CALCIUM BLD-MCNC: 8.5 MG/DL (ref 8.5–10.1)
CANCER AG19-9 SERPL-ACNC: 43.9 U/ML (ref ?–37)
CEA SERPL-MCNC: 2 NG/ML (ref ?–5)
CHLORIDE SERPL-SCNC: 109 MMOL/L (ref 98–107)
CO2 SERPL-SCNC: 23 MMOL/L (ref 21–32)
CREAT BLD-MCNC: 0.71 MG/DL (ref 0.7–1.3)
DEPRECATED RDW RBC AUTO: 57.7 FL (ref 35.1–46.3)
EOSINOPHIL # BLD AUTO: 0.31 X10(3) UL (ref 0–0.7)
EOSINOPHIL NFR BLD AUTO: 3.3 %
ERYTHROCYTE [DISTWIDTH] IN BLOOD BY AUTOMATED COUNT: 17.5 % (ref 11–15)
GLOBULIN PLAS-MCNC: 3.3 G/DL (ref 2.8–4.4)
GLUCOSE BLD-MCNC: 87 MG/DL (ref 70–99)
HCT VFR BLD AUTO: 40 % (ref 39–53)
HGB BLD-MCNC: 12.9 G/DL (ref 13–17.5)
IMM GRANULOCYTES # BLD AUTO: 0.08 X10(3) UL (ref 0–1)
IMM GRANULOCYTES NFR BLD: 0.9 %
LYMPHOCYTES # BLD AUTO: 1.14 X10(3) UL (ref 1–4)
LYMPHOCYTES NFR BLD AUTO: 12.2 %
M PROTEIN MFR SERPL ELPH: 6.5 G/DL (ref 6.4–8.2)
MCH RBC QN AUTO: 29.6 PG (ref 26–34)
MCHC RBC AUTO-ENTMCNC: 32.3 G/DL (ref 31–37)
MCV RBC AUTO: 91.7 FL (ref 80–100)
MONOCYTES # BLD AUTO: 1.1 X10(3) UL (ref 0.1–1)
MONOCYTES NFR BLD AUTO: 11.8 %
NEUTROPHILS # BLD AUTO: 6.68 X10 (3) UL (ref 1.5–7.7)
NEUTROPHILS # BLD AUTO: 6.68 X10(3) UL (ref 1.5–7.7)
NEUTROPHILS NFR BLD AUTO: 71.5 %
OSMOLALITY SERPL CALC.SUM OF ELEC: 288 MOSM/KG (ref 275–295)
PLATELET # BLD AUTO: 94 10(3)UL (ref 150–450)
POTASSIUM SERPL-SCNC: 4.1 MMOL/L (ref 3.5–5.1)
RBC # BLD AUTO: 4.36 X10(6)UL (ref 4.3–5.7)
SODIUM SERPL-SCNC: 140 MMOL/L (ref 136–145)
WBC # BLD AUTO: 9.3 X10(3) UL (ref 4–11)

## 2019-03-28 PROCEDURE — 99214 OFFICE O/P EST MOD 30 MIN: CPT | Performed by: INTERNAL MEDICINE

## 2019-03-28 PROCEDURE — 86301 IMMUNOASSAY TUMOR CA 19-9: CPT

## 2019-03-28 PROCEDURE — 80053 COMPREHEN METABOLIC PANEL: CPT

## 2019-03-28 PROCEDURE — 96413 CHEMO IV INFUSION 1 HR: CPT

## 2019-03-28 PROCEDURE — 96417 CHEMO IV INFUS EACH ADDL SEQ: CPT

## 2019-03-28 PROCEDURE — 96375 TX/PRO/DX INJ NEW DRUG ADDON: CPT

## 2019-03-28 PROCEDURE — 96368 THER/DIAG CONCURRENT INF: CPT

## 2019-03-28 PROCEDURE — 82378 CARCINOEMBRYONIC ANTIGEN: CPT

## 2019-03-28 PROCEDURE — S0028 INJECTION, FAMOTIDINE, 20 MG: HCPCS | Performed by: INTERNAL MEDICINE

## 2019-03-28 PROCEDURE — 96367 TX/PROPH/DG ADDL SEQ IV INF: CPT

## 2019-03-28 PROCEDURE — 96377 APPLICATON ON-BODY INJECTOR: CPT

## 2019-03-28 PROCEDURE — 85025 COMPLETE CBC W/AUTO DIFF WBC: CPT

## 2019-03-28 RX ORDER — ONDANSETRON 2 MG/ML
8 INJECTION INTRAMUSCULAR; INTRAVENOUS EVERY 6 HOURS PRN
Status: CANCELLED | OUTPATIENT
Start: 2019-03-28

## 2019-03-28 RX ORDER — FAMOTIDINE 10 MG/ML
20 INJECTION, SOLUTION INTRAVENOUS ONCE
Status: COMPLETED | OUTPATIENT
Start: 2019-03-28 | End: 2019-03-28

## 2019-03-28 RX ORDER — LORAZEPAM 0.5 MG/1
TABLET ORAL EVERY 4 HOURS PRN
Status: CANCELLED | OUTPATIENT
Start: 2019-03-28

## 2019-03-28 RX ORDER — FLUOROURACIL 50 MG/ML
2400 INJECTION, SOLUTION INTRAVENOUS CONTINUOUS
Status: DISCONTINUED | OUTPATIENT
Start: 2019-03-28 | End: 2019-03-28

## 2019-03-28 RX ORDER — METOCLOPRAMIDE HYDROCHLORIDE 5 MG/ML
10 INJECTION INTRAMUSCULAR; INTRAVENOUS EVERY 6 HOURS PRN
Status: CANCELLED | OUTPATIENT
Start: 2019-03-28

## 2019-03-28 RX ORDER — CITALOPRAM 40 MG/1
40 TABLET ORAL DAILY
Qty: 30 TABLET | Refills: 11 | Status: SHIPPED | OUTPATIENT
Start: 2019-03-28 | End: 2021-01-01

## 2019-03-28 RX ORDER — PROCHLORPERAZINE MALEATE 10 MG
10 TABLET ORAL EVERY 6 HOURS PRN
Status: CANCELLED | OUTPATIENT
Start: 2019-03-28

## 2019-03-28 RX ORDER — FLUOROURACIL 50 MG/ML
2400 INJECTION, SOLUTION INTRAVENOUS CONTINUOUS
Status: CANCELLED | OUTPATIENT
Start: 2019-03-28

## 2019-03-28 RX ORDER — LORAZEPAM 2 MG/ML
INJECTION INTRAMUSCULAR EVERY 4 HOURS PRN
Status: CANCELLED | OUTPATIENT
Start: 2019-03-28

## 2019-03-28 RX ORDER — DIPHENHYDRAMINE HYDROCHLORIDE 50 MG/ML
50 INJECTION INTRAMUSCULAR; INTRAVENOUS ONCE
Status: COMPLETED | OUTPATIENT
Start: 2019-03-28 | End: 2019-03-28

## 2019-03-28 RX ORDER — ATROPINE SULFATE 0.4 MG/ML
0.2 AMPUL (ML) INJECTION ONCE
Status: COMPLETED | OUTPATIENT
Start: 2019-03-28 | End: 2019-03-28

## 2019-03-28 RX ADMIN — DIPHENHYDRAMINE HYDROCHLORIDE 50 MG: 50 INJECTION INTRAMUSCULAR; INTRAVENOUS at 11:33:00

## 2019-03-28 RX ADMIN — FAMOTIDINE 20 MG: 10 INJECTION, SOLUTION INTRAVENOUS at 11:38:00

## 2019-03-28 RX ADMIN — ATROPINE SULFATE 0.2 MG: 0.4 MG/ML AMPUL (ML) INJECTION at 13:28:00

## 2019-03-28 RX ADMIN — FLUOROURACIL 6050 MG: 50 INJECTION, SOLUTION INTRAVENOUS at 15:03:00

## 2019-03-28 NOTE — PROGRESS NOTES
Patient is here for cycle 7 of chemo. Patient has a rash on his face and arms. Taking Doxycyline daily. Appetite and energy level is fairly good. Occasional diarrhea but manageable with Imodium.      Education Record    Learner:  Patient    Disease / Magi Forbes

## 2019-03-28 NOTE — PATIENT INSTRUCTIONS
On-body Injector for Neulasta Patient Instructions       Your On-Body Injection device was applied on March 28 @ 3:00 pm    Your dose of medication will start on March 29 @ 6:00 pm    You may remove this device on March 29 @ 8:00 pm    Important informati

## 2019-04-01 DIAGNOSIS — C16.0 GE JUNCTION CARCINOMA (HCC): ICD-10-CM

## 2019-04-01 DIAGNOSIS — R11.2 NAUSEA AND VOMITING: ICD-10-CM

## 2019-04-02 RX ORDER — LORAZEPAM 1 MG/1
1 TABLET ORAL EVERY 4 HOURS PRN
Qty: 60 TABLET | Refills: 1 | Status: SHIPPED
Start: 2019-04-02 | End: 2019-04-05

## 2019-04-02 NOTE — PROGRESS NOTES
Nutrition F/U Note     Patient Name: Thierry Lomeli  YOB: 1990  Medical Record Number: [id number]      Account Number: [de-identified]  Dietitian: Sergei Paz RD     Date of visit: 02/07/19     Diet Rx: high protein/calorie, post-esophagect Rfl: 5  •  Loperamide HCl 2 MG Oral Cap, Take 2 mg by mouth 4 (four) times daily as needed for Diarrhea., Disp: , Rfl:   •  Omeprazole 40 MG Oral Capsule Delayed Release, Take 1 capsule (40 mg total) by mouth daily. , Disp: 30 capsule, Rfl: 0  •  acetaminop

## 2019-04-02 NOTE — PROGRESS NOTES
[facility]    PATIENT'S NAME: Luis Britt LUTZ   ATTENDING PHYSICIAN: Gabriel Jansen M.D.    PATIENT ACCOUNT #: [de-identified] LOCATION: [address] [place] [address] RECORD #: [id number] YOB: 1990   DATE OF SERVICE: 03/28/2019       CANCER q.i.d. p.r.n., lorazepam 1 tablet q.4 h. p.r.n., olanzapine 5 mg nightly for 5 days after chemotherapy, omeprazole 40 mg daily, ondansetron 8 mg q.8 h. p.r.n., oxycodone 5 mg p.r.n. which he is not taking, potassium chloride 20 mEq twice daily, prochlorper to 3-week intervals. I will still treat him again at the next time at 2-week intervals and then will make a decision, likely after his next CT scan.      Dictated By Julianne Morgan M.D.  d: 04/01/2019 15:14:38  t: 04/02/2019 00:28:55  Marah Rodriguez [id number]

## 2019-04-04 ENCOUNTER — NURSE ONLY (OUTPATIENT)
Dept: HEMATOLOGY/ONCOLOGY | Age: 29
End: 2019-04-04
Attending: INTERNAL MEDICINE
Payer: COMMERCIAL

## 2019-04-04 DIAGNOSIS — M79.602 BILATERAL ARM PAIN: Primary | ICD-10-CM

## 2019-04-04 DIAGNOSIS — M79.601 BILATERAL ARM PAIN: Primary | ICD-10-CM

## 2019-04-04 DIAGNOSIS — C16.0 GE JUNCTION CARCINOMA (HCC): ICD-10-CM

## 2019-04-04 PROCEDURE — 99211 OFF/OP EST MAY X REQ PHY/QHP: CPT

## 2019-04-04 RX ORDER — SODIUM CHLORIDE 9 MG/ML
1000 INJECTION, SOLUTION INTRAVENOUS ONCE
Status: CANCELLED
Start: 2019-04-04 | End: 2019-04-04

## 2019-04-04 RX ORDER — ONDANSETRON 2 MG/ML
8 INJECTION INTRAMUSCULAR; INTRAVENOUS ONCE
Status: CANCELLED
Start: 2019-04-04 | End: 2019-04-04

## 2019-04-04 RX ORDER — SODIUM CHLORIDE 0.9 % (FLUSH) 0.9 %
10 SYRINGE (ML) INJECTION ONCE
Status: CANCELLED | OUTPATIENT
Start: 2019-04-04

## 2019-04-04 NOTE — PROGRESS NOTES
Plan is for patient to have port placed on Wednesday, April 10th. [facility] to remove picc line today and an order was placed. Picc line was removed intact with no complications. Pressure and dressing was applied. No bleeding was noted.

## 2019-04-05 DIAGNOSIS — C16.0 GE JUNCTION CARCINOMA (HCC): ICD-10-CM

## 2019-04-05 DIAGNOSIS — R11.2 NAUSEA AND VOMITING: ICD-10-CM

## 2019-04-05 RX ORDER — LORAZEPAM 1 MG/1
TABLET ORAL
Qty: 60 TABLET | Refills: 0 | OUTPATIENT
Start: 2019-04-05 | End: 2019-07-01

## 2019-04-10 ENCOUNTER — HOSPITAL ENCOUNTER (OUTPATIENT)
Dept: INTERVENTIONAL RADIOLOGY/VASCULAR | Facility: HOSPITAL | Age: 29
Discharge: HOME | End: 2019-04-10
Attending: INTERNAL MEDICINE | Admitting: INTERNAL MEDICINE
Payer: COMMERCIAL

## 2019-04-10 VITALS
WEIGHT: 265 LBS | BODY MASS INDEX: 30.66 KG/M2 | DIASTOLIC BLOOD PRESSURE: 66 MMHG | TEMPERATURE: 99 F | SYSTOLIC BLOOD PRESSURE: 115 MMHG | HEIGHT: 78 IN | OXYGEN SATURATION: 94 % | HEART RATE: 64 BPM | RESPIRATION RATE: 13 BRPM

## 2019-04-10 DIAGNOSIS — C15.5 MALIGNANT NEOPLASM OF LOWER THIRD OF ESOPHAGUS (HCC): ICD-10-CM

## 2019-04-10 PROCEDURE — 99152 MOD SED SAME PHYS/QHP 5/>YRS: CPT

## 2019-04-10 PROCEDURE — 0JH60WZ INSERTION OF TOTALLY IMPLANTABLE VASCULAR ACCESS DEVICE INTO CHEST SUBCUTANEOUS TISSUE AND FASCIA, OPEN APPROACH: ICD-10-PCS | Performed by: RADIOLOGY

## 2019-04-10 PROCEDURE — 02HV33Z INSERTION OF INFUSION DEVICE INTO SUPERIOR VENA CAVA, PERCUTANEOUS APPROACH: ICD-10-PCS | Performed by: RADIOLOGY

## 2019-04-10 PROCEDURE — 77001 FLUOROGUIDE FOR VEIN DEVICE: CPT

## 2019-04-10 PROCEDURE — 76937 US GUIDE VASCULAR ACCESS: CPT

## 2019-04-10 PROCEDURE — 36561 INSERT TUNNELED CV CATH: CPT

## 2019-04-10 PROCEDURE — 85610 PROTHROMBIN TIME: CPT

## 2019-04-10 RX ORDER — CEFAZOLIN SODIUM 1 G/3ML
INJECTION, POWDER, FOR SOLUTION INTRAMUSCULAR; INTRAVENOUS
Status: COMPLETED
Start: 2019-04-10 | End: 2019-04-10

## 2019-04-10 RX ORDER — LIDOCAINE HYDROCHLORIDE 10 MG/ML
INJECTION, SOLUTION INFILTRATION; PERINEURAL
Status: COMPLETED
Start: 2019-04-10 | End: 2019-04-10

## 2019-04-10 RX ORDER — HEPARIN SODIUM 5000 [USP'U]/ML
INJECTION, SOLUTION INTRAVENOUS; SUBCUTANEOUS
Status: COMPLETED
Start: 2019-04-10 | End: 2019-04-10

## 2019-04-10 RX ORDER — MIDAZOLAM HYDROCHLORIDE 1 MG/ML
INJECTION INTRAMUSCULAR; INTRAVENOUS
Status: COMPLETED
Start: 2019-04-10 | End: 2019-04-10

## 2019-04-10 RX ORDER — SODIUM CHLORIDE 9 MG/ML
INJECTION, SOLUTION INTRAVENOUS CONTINUOUS
Status: DISCONTINUED | OUTPATIENT
Start: 2019-04-10 | End: 2019-04-10

## 2019-04-10 RX ADMIN — SODIUM CHLORIDE: 9 INJECTION, SOLUTION INTRAVENOUS at 07:30:00

## 2019-04-10 NOTE — H&P
[facility] Patient Status:  Outpatient in a Bed    10/3/1990 MRN [id number]   Location [facility] Attending Norman Louis MD   Hosp Day # 0 PCP Niko Later, DO     Admitting OF                            BREATH    Medications:  No current outpatient medications on file.     Physical Exam & Review of Systems:   Physical Exam:    BP (!) 139/92 (BP Location: Right arm)   Pulse 78   Temp 98.5 °F (36.9 °C) (Temporal)   Resp 17   Ht

## 2019-04-10 NOTE — PROGRESS NOTES
S/p PAC implant, pt in stable condition, A&Ox4, VS WNL, denies any pain/discomfort, voided. Discharge instructions reviewed, pt verbalized understanding. IV removed, fully intact. Pt discharged home, transported to [facility] via wheelchair.

## 2019-04-10 NOTE — PROCEDURES
[facility]  Procedure Note    [address] Patient Status:  Outpatient in a Bed    10/3/1990 MRN [id number]   Location [address] [facility] Attending Alex Vegas MD   Hosp Day # 0 PCP Ainsley Landis DO     Procedure: Arielle Dobbs

## 2019-04-11 ENCOUNTER — OFFICE VISIT (OUTPATIENT)
Dept: HEMATOLOGY/ONCOLOGY | Age: 29
End: 2019-04-11
Attending: INTERNAL MEDICINE
Payer: COMMERCIAL

## 2019-04-11 VITALS
HEIGHT: 77.99 IN | WEIGHT: 270.63 LBS | HEART RATE: 103 BPM | BODY MASS INDEX: 31.31 KG/M2 | TEMPERATURE: 99 F | SYSTOLIC BLOOD PRESSURE: 122 MMHG | OXYGEN SATURATION: 96 % | RESPIRATION RATE: 18 BRPM | DIASTOLIC BLOOD PRESSURE: 84 MMHG

## 2019-04-11 DIAGNOSIS — L73.9 FOLLICULITIS: ICD-10-CM

## 2019-04-11 DIAGNOSIS — C77.0 SECONDARY MALIGNANT NEOPLASM OF SUPRACLAVICULAR LYMPH NODE (HCC): ICD-10-CM

## 2019-04-11 DIAGNOSIS — C77.2 SECONDARY MALIGNANT NEOPLASM OF RETROPERITONEAL LYMPH NODES (HCC): Primary | ICD-10-CM

## 2019-04-11 DIAGNOSIS — C16.0 GE JUNCTION CARCINOMA (HCC): ICD-10-CM

## 2019-04-11 DIAGNOSIS — C16.0 GE JUNCTION CARCINOMA (HCC): Primary | ICD-10-CM

## 2019-04-11 DIAGNOSIS — C77.2 SECONDARY MALIGNANT NEOPLASM OF RETROPERITONEAL LYMPH NODES (HCC): ICD-10-CM

## 2019-04-11 DIAGNOSIS — D69.6 THROMBOCYTOPENIA (HCC): ICD-10-CM

## 2019-04-11 PROCEDURE — 82378 CARCINOEMBRYONIC ANTIGEN: CPT

## 2019-04-11 PROCEDURE — 96417 CHEMO IV INFUS EACH ADDL SEQ: CPT

## 2019-04-11 PROCEDURE — 85025 COMPLETE CBC W/AUTO DIFF WBC: CPT

## 2019-04-11 PROCEDURE — 96376 TX/PRO/DX INJ SAME DRUG ADON: CPT

## 2019-04-11 PROCEDURE — 96375 TX/PRO/DX INJ NEW DRUG ADDON: CPT

## 2019-04-11 PROCEDURE — 99214 OFFICE O/P EST MOD 30 MIN: CPT | Performed by: INTERNAL MEDICINE

## 2019-04-11 PROCEDURE — 96377 APPLICATON ON-BODY INJECTOR: CPT

## 2019-04-11 PROCEDURE — 96368 THER/DIAG CONCURRENT INF: CPT

## 2019-04-11 PROCEDURE — 80053 COMPREHEN METABOLIC PANEL: CPT

## 2019-04-11 PROCEDURE — 96413 CHEMO IV INFUSION 1 HR: CPT

## 2019-04-11 PROCEDURE — 96367 TX/PROPH/DG ADDL SEQ IV INF: CPT

## 2019-04-11 PROCEDURE — 86301 IMMUNOASSAY TUMOR CA 19-9: CPT

## 2019-04-11 PROCEDURE — S0028 INJECTION, FAMOTIDINE, 20 MG: HCPCS | Performed by: INTERNAL MEDICINE

## 2019-04-11 RX ORDER — ATROPINE SULFATE 0.4 MG/ML
0.2 AMPUL (ML) INJECTION ONCE
Status: COMPLETED | OUTPATIENT
Start: 2019-04-11 | End: 2019-04-11

## 2019-04-11 RX ORDER — ATROPINE SULFATE 0.4 MG/ML
0.2 AMPUL (ML) INJECTION ONCE
Status: CANCELLED | OUTPATIENT
Start: 2019-04-11

## 2019-04-11 RX ORDER — DIPHENHYDRAMINE HYDROCHLORIDE 50 MG/ML
50 INJECTION INTRAMUSCULAR; INTRAVENOUS ONCE
Status: CANCELLED
Start: 2019-04-11

## 2019-04-11 RX ORDER — FAMOTIDINE 10 MG/ML
20 INJECTION, SOLUTION INTRAVENOUS ONCE
Status: CANCELLED
Start: 2019-04-11

## 2019-04-11 RX ORDER — FLUOROURACIL 50 MG/ML
2400 INJECTION, SOLUTION INTRAVENOUS CONTINUOUS
Status: DISCONTINUED | OUTPATIENT
Start: 2019-04-11 | End: 2019-04-11

## 2019-04-11 RX ORDER — METOCLOPRAMIDE HYDROCHLORIDE 5 MG/ML
10 INJECTION INTRAMUSCULAR; INTRAVENOUS EVERY 6 HOURS PRN
Status: CANCELLED | OUTPATIENT
Start: 2019-04-11

## 2019-04-11 RX ORDER — LORAZEPAM 2 MG/ML
INJECTION INTRAMUSCULAR EVERY 4 HOURS PRN
Status: CANCELLED | OUTPATIENT
Start: 2019-04-11

## 2019-04-11 RX ORDER — LORAZEPAM 0.5 MG/1
TABLET ORAL EVERY 4 HOURS PRN
Status: CANCELLED | OUTPATIENT
Start: 2019-04-11

## 2019-04-11 RX ORDER — FLUOROURACIL 50 MG/ML
2400 INJECTION, SOLUTION INTRAVENOUS CONTINUOUS
Status: CANCELLED | OUTPATIENT
Start: 2019-04-11

## 2019-04-11 RX ORDER — ONDANSETRON 2 MG/ML
8 INJECTION INTRAMUSCULAR; INTRAVENOUS EVERY 6 HOURS PRN
Status: CANCELLED | OUTPATIENT
Start: 2019-04-11

## 2019-04-11 RX ORDER — DIPHENHYDRAMINE HYDROCHLORIDE 50 MG/ML
50 INJECTION INTRAMUSCULAR; INTRAVENOUS ONCE
Status: COMPLETED | OUTPATIENT
Start: 2019-04-11 | End: 2019-04-11

## 2019-04-11 RX ORDER — PROCHLORPERAZINE MALEATE 10 MG
10 TABLET ORAL EVERY 6 HOURS PRN
Status: CANCELLED | OUTPATIENT
Start: 2019-04-11

## 2019-04-11 RX ORDER — FAMOTIDINE 10 MG/ML
20 INJECTION, SOLUTION INTRAVENOUS ONCE
Status: COMPLETED | OUTPATIENT
Start: 2019-04-11 | End: 2019-04-11

## 2019-04-11 RX ADMIN — ATROPINE SULFATE 0.2 MG: 0.4 MG/ML AMPUL (ML) INJECTION at 15:40:00

## 2019-04-11 RX ADMIN — FLUOROURACIL 6050 MG: 50 INJECTION, SOLUTION INTRAVENOUS at 15:43:00

## 2019-04-11 RX ADMIN — DIPHENHYDRAMINE HYDROCHLORIDE 50 MG: 50 INJECTION INTRAMUSCULAR; INTRAVENOUS at 11:53:00

## 2019-04-11 RX ADMIN — FAMOTIDINE 20 MG: 10 INJECTION, SOLUTION INTRAVENOUS at 11:54:00

## 2019-04-11 RX ADMIN — ATROPINE SULFATE 0.2 MG: 0.4 MG/ML AMPUL (ML) INJECTION at 14:03:00

## 2019-04-11 NOTE — PATIENT INSTRUCTIONS
On-body Injector for Neulasta Patient Instructions       Your On-Body Injection device was applied on April 11 @ 3:30 pm    Your dose of medication will start on April 12 @ 6:30 pm    You may remove this device on April 12 @ 8:30 pm      Important informa

## 2019-04-11 NOTE — PROGRESS NOTES
Patient is here for MD f/vinod and cycle 8 of chemo. Patient had a port placement yesterday. Mild fatigue for a few days after chemo then back to normal. Eating well. No GI complaints. Mild pain in neck due to port placement but otherwise denies pain.      Educ

## 2019-04-11 NOTE — PROGRESS NOTES
Pt here for C8D1.   Arrives Ambulating independently, accompanied by Family member           Modifications in dose or schedule: No     Frequency of blood return and site check throughout administration: Prior to administration   Discharged to Home, [address]

## 2019-04-16 NOTE — PROGRESS NOTES
[facility]    PATIENT'S NAME: Kimmie LUTZ   ATTENDING PHYSICIAN: Malka Multani M.D.    PATIENT ACCOUNT #: [de-identified] LOCATION: [address] [place] [address] RECORD #: [id number] YOB: 1990   DATE OF SERVICE: 04/11/2019       CANCER oxycodone 5 mg q.4 h. p.r.n., prochlorperazine 10 mg q.6 h. p.r.n., and rivaroxaban 20 mg daily. PHYSICAL EXAMINATION:    GENERAL:  He is a well-developed, overweight male in no acute distress. VITAL SIGNS:  Performance status is 0.   Weight 270 pounds, M.D.  d: 04/15/2019 15:25:08  t: 04/15/2019 15:46:28  [facility] [id number]  /    cc: WILLIAM Rees Dr., M.D. Serge Rushing, M.D.

## 2019-04-22 ENCOUNTER — OFFICE VISIT (OUTPATIENT)
Dept: FAMILY MEDICINE CLINIC | Facility: CLINIC | Age: 29
End: 2019-04-22
Payer: COMMERCIAL

## 2019-04-22 VITALS
TEMPERATURE: 98 F | SYSTOLIC BLOOD PRESSURE: 120 MMHG | DIASTOLIC BLOOD PRESSURE: 88 MMHG | BODY MASS INDEX: 31 KG/M2 | WEIGHT: 269 LBS | HEART RATE: 80 BPM | OXYGEN SATURATION: 97 %

## 2019-04-22 DIAGNOSIS — R19.7 DIARRHEA, UNSPECIFIED TYPE: Primary | ICD-10-CM

## 2019-04-22 DIAGNOSIS — T45.1X5A CHEMOTHERAPY-INDUCED NAUSEA AND VOMITING: ICD-10-CM

## 2019-04-22 DIAGNOSIS — R11.2 CHEMOTHERAPY-INDUCED NAUSEA AND VOMITING: ICD-10-CM

## 2019-04-22 DIAGNOSIS — C16.0 GE JUNCTION CARCINOMA (HCC): ICD-10-CM

## 2019-04-22 PROCEDURE — 99214 OFFICE O/P EST MOD 30 MIN: CPT | Performed by: FAMILY MEDICINE

## 2019-04-22 NOTE — PATIENT INSTRUCTIONS
Discussed discontinuing pop in diet. Recommend discussing with oncologist.  Proper diet. Increase water intake. Call with questions or problems.

## 2019-04-22 NOTE — PROGRESS NOTES
HPI:    Patient ID: Romina Menard is a 33year old male. states frequently has a sense of urgency with moving bowels. Will occasionally soil himself. Needs to get to the bathroom quickly. Still undergoing chemotherapy.   He has not mentioned this Allergies:  Paclitaxel              PAIN, FACE FLUSHING, SHORTNESS OF                            BREATH   PHYSICAL EXAM:   Physical Exam   Constitutional: He appears well-developed and well-nourished. Neck: Normal range of motion. Neck supple.    Card

## 2019-04-29 NOTE — DIETARY NOTE
1230 [facility] ASSESSMENT    Pt does not meet malnutrition criteria    NUTRITION DIAGNOSIS/PROBLEM:    Inadequate oral intake related to altered GI structure and/or function as evidenced by need for J-tube feeds, s/p Esophagogastrec due to higher protein content and peptide base for better absorption with J-tube. ANTHROPOMETRICS:  Ht: 198.1 cm (6' 6\")  Wt: 128.5 kg (283 lb 3.2 oz). This is 132% of IBW  BMI: Body mass index is 32.73 kg/m².   IBW: 97.3 kg  Usual Body Wt: 130 kg    WE no

## 2019-05-02 ENCOUNTER — OFFICE VISIT (OUTPATIENT)
Dept: HEMATOLOGY/ONCOLOGY | Age: 29
End: 2019-05-02
Attending: INTERNAL MEDICINE
Payer: COMMERCIAL

## 2019-05-02 VITALS
SYSTOLIC BLOOD PRESSURE: 125 MMHG | WEIGHT: 269.5 LBS | HEART RATE: 110 BPM | DIASTOLIC BLOOD PRESSURE: 88 MMHG | TEMPERATURE: 99 F | BODY MASS INDEX: 31 KG/M2 | OXYGEN SATURATION: 97 % | RESPIRATION RATE: 18 BRPM

## 2019-05-02 DIAGNOSIS — C16.0 GE JUNCTION CARCINOMA (HCC): ICD-10-CM

## 2019-05-02 DIAGNOSIS — C77.0 SECONDARY MALIGNANT NEOPLASM OF SUPRACLAVICULAR LYMPH NODE (HCC): ICD-10-CM

## 2019-05-02 DIAGNOSIS — C77.2 SECONDARY MALIGNANT NEOPLASM OF RETROPERITONEAL LYMPH NODES (HCC): ICD-10-CM

## 2019-05-02 DIAGNOSIS — D50.0 IRON DEFICIENCY ANEMIA DUE TO CHRONIC BLOOD LOSS: ICD-10-CM

## 2019-05-02 DIAGNOSIS — D64.9 NORMOCYTIC ANEMIA: ICD-10-CM

## 2019-05-02 DIAGNOSIS — C15.5 MALIGNANT NEOPLASM OF LOWER THIRD OF ESOPHAGUS (HCC): Primary | ICD-10-CM

## 2019-05-02 DIAGNOSIS — C77.2 SECONDARY MALIGNANT NEOPLASM OF RETROPERITONEAL LYMPH NODES (HCC): Primary | ICD-10-CM

## 2019-05-02 DIAGNOSIS — Z51.11 ENCOUNTER FOR CHEMOTHERAPY MANAGEMENT: ICD-10-CM

## 2019-05-02 PROCEDURE — 82378 CARCINOEMBRYONIC ANTIGEN: CPT

## 2019-05-02 PROCEDURE — S0028 INJECTION, FAMOTIDINE, 20 MG: HCPCS | Performed by: INTERNAL MEDICINE

## 2019-05-02 PROCEDURE — 80053 COMPREHEN METABOLIC PANEL: CPT

## 2019-05-02 PROCEDURE — 96413 CHEMO IV INFUSION 1 HR: CPT

## 2019-05-02 PROCEDURE — 96417 CHEMO IV INFUS EACH ADDL SEQ: CPT

## 2019-05-02 PROCEDURE — 85025 COMPLETE CBC W/AUTO DIFF WBC: CPT

## 2019-05-02 PROCEDURE — 96367 TX/PROPH/DG ADDL SEQ IV INF: CPT

## 2019-05-02 PROCEDURE — 96377 APPLICATON ON-BODY INJECTOR: CPT

## 2019-05-02 PROCEDURE — 86301 IMMUNOASSAY TUMOR CA 19-9: CPT

## 2019-05-02 PROCEDURE — 96368 THER/DIAG CONCURRENT INF: CPT

## 2019-05-02 PROCEDURE — 96375 TX/PRO/DX INJ NEW DRUG ADDON: CPT

## 2019-05-02 PROCEDURE — 99214 OFFICE O/P EST MOD 30 MIN: CPT | Performed by: INTERNAL MEDICINE

## 2019-05-02 RX ORDER — DIPHENHYDRAMINE HYDROCHLORIDE 50 MG/ML
50 INJECTION INTRAMUSCULAR; INTRAVENOUS ONCE
Status: CANCELLED
Start: 2019-05-02

## 2019-05-02 RX ORDER — LORAZEPAM 0.5 MG/1
TABLET ORAL EVERY 4 HOURS PRN
Status: CANCELLED | OUTPATIENT
Start: 2019-05-02

## 2019-05-02 RX ORDER — PROCHLORPERAZINE MALEATE 10 MG
10 TABLET ORAL EVERY 6 HOURS PRN
Status: CANCELLED | OUTPATIENT
Start: 2019-05-02

## 2019-05-02 RX ORDER — LORAZEPAM 2 MG/ML
INJECTION INTRAMUSCULAR EVERY 4 HOURS PRN
Status: CANCELLED | OUTPATIENT
Start: 2019-05-02

## 2019-05-02 RX ORDER — FLUOROURACIL 50 MG/ML
2400 INJECTION, SOLUTION INTRAVENOUS CONTINUOUS
Status: DISCONTINUED | OUTPATIENT
Start: 2019-05-02 | End: 2019-05-02

## 2019-05-02 RX ORDER — ATROPINE SULFATE 0.4 MG/ML
0.2 AMPUL (ML) INJECTION ONCE
Status: CANCELLED | OUTPATIENT
Start: 2019-05-02

## 2019-05-02 RX ORDER — DIPHENHYDRAMINE HYDROCHLORIDE 50 MG/ML
50 INJECTION INTRAMUSCULAR; INTRAVENOUS ONCE
Status: COMPLETED | OUTPATIENT
Start: 2019-05-02 | End: 2019-05-02

## 2019-05-02 RX ORDER — ONDANSETRON 2 MG/ML
8 INJECTION INTRAMUSCULAR; INTRAVENOUS EVERY 6 HOURS PRN
Status: CANCELLED | OUTPATIENT
Start: 2019-05-02

## 2019-05-02 RX ORDER — FAMOTIDINE 10 MG/ML
20 INJECTION, SOLUTION INTRAVENOUS ONCE
Status: CANCELLED
Start: 2019-05-02

## 2019-05-02 RX ORDER — PROCHLORPERAZINE MALEATE 10 MG
10 TABLET ORAL EVERY 6 HOURS PRN
Qty: 90 TABLET | Refills: 5 | Status: SHIPPED | OUTPATIENT
Start: 2019-05-02 | End: 2020-01-01

## 2019-05-02 RX ORDER — METOCLOPRAMIDE HYDROCHLORIDE 5 MG/ML
10 INJECTION INTRAMUSCULAR; INTRAVENOUS EVERY 6 HOURS PRN
Status: CANCELLED | OUTPATIENT
Start: 2019-05-02

## 2019-05-02 RX ORDER — FLUOROURACIL 50 MG/ML
2400 INJECTION, SOLUTION INTRAVENOUS CONTINUOUS
Status: CANCELLED | OUTPATIENT
Start: 2019-05-02

## 2019-05-02 RX ORDER — FAMOTIDINE 10 MG/ML
20 INJECTION, SOLUTION INTRAVENOUS ONCE
Status: COMPLETED | OUTPATIENT
Start: 2019-05-02 | End: 2019-05-02

## 2019-05-02 RX ORDER — ATROPINE SULFATE 0.4 MG/ML
0.2 AMPUL (ML) INJECTION ONCE
Status: COMPLETED | OUTPATIENT
Start: 2019-05-02 | End: 2019-05-02

## 2019-05-02 RX ADMIN — FLUOROURACIL 6050 MG: 50 INJECTION, SOLUTION INTRAVENOUS at 15:29:00

## 2019-05-02 RX ADMIN — ATROPINE SULFATE 0.2 MG: 0.4 MG/ML AMPUL (ML) INJECTION at 13:47:00

## 2019-05-02 RX ADMIN — FAMOTIDINE 20 MG: 10 INJECTION, SOLUTION INTRAVENOUS at 11:41:00

## 2019-05-02 RX ADMIN — DIPHENHYDRAMINE HYDROCHLORIDE 50 MG: 50 INJECTION INTRAMUSCULAR; INTRAVENOUS at 11:39:00

## 2019-05-02 NOTE — PATIENT INSTRUCTIONS
On-body Injector for Neulasta Patient Instructions       Your On-Body Injection device was applied on this day:  5/2 at this time 3:05 pm     Your dose of medication will start on this day: 5/3 at this time 6:05

## 2019-05-02 NOTE — PROGRESS NOTES
Patient is here for MD f/u and cycle 9 of chemo. Patient c/o bowel urgency and occasional incontinence of stool. Occasional diarrhea but most days his stools are normal. Facial rash has improved. Appetite and energy level is good.        Education Record

## 2019-05-02 NOTE — PROGRESS NOTES
Pt here for C9D1.   Arrives Ambulating independently, accompanied by Family member           Modifications in dose or schedule: No     Frequency of blood return and site check throughout administration: Prior to administration and At completion of therapy

## 2019-05-03 NOTE — PROGRESS NOTES
[facility]    PATIENT'S NAME: Cheryl LUTZ   ATTENDING PHYSICIAN: Connie Foley M.D.    PATIENT ACCOUNT #: [de-identified] LOCATION: [address] [place] [address] RECORD #: [id number] YOB: 1990   DATE OF SERVICE: 05/02/2019       CANCER twice daily, loperamide 2 to 4 mg q.i.d. p.r.n., lorazepam 1 mg q.4 h. p.r.n., olanzapine 5 mg at bedtime, omeprazole 40 mg daily, oxycodone 5 mg q.4 h. p.r.n., prochlorperazine 10 mg q.6 h. p.r.n.   He previously had back pain and this has improved substan His blood pressure has been adequately controlled with the Cyramza, and they are going to continue to monitor this at home. He will go ahead and take Imodium on a p.r.n. basis because of his diarrhea.   This is potentially contributed to by the 5-FU and th

## 2019-05-09 ENCOUNTER — MED REC SCAN ONLY (OUTPATIENT)
Dept: FAMILY MEDICINE CLINIC | Facility: CLINIC | Age: 29
End: 2019-05-09

## 2019-05-13 ENCOUNTER — HOSPITAL ENCOUNTER (OUTPATIENT)
Dept: CT IMAGING | Age: 29
Discharge: HOME | End: 2019-05-13
Attending: INTERNAL MEDICINE
Payer: COMMERCIAL

## 2019-05-13 DIAGNOSIS — C15.5 MALIGNANT NEOPLASM OF LOWER THIRD OF ESOPHAGUS (HCC): ICD-10-CM

## 2019-05-13 PROCEDURE — 71260 CT THORAX DX C+: CPT | Performed by: INTERNAL MEDICINE

## 2019-05-13 PROCEDURE — 74177 CT ABD & PELVIS W/CONTRAST: CPT | Performed by: INTERNAL MEDICINE

## 2019-05-13 RX ORDER — DOXYCYCLINE HYCLATE 100 MG/1
CAPSULE ORAL
Qty: 60 CAPSULE | Refills: 2 | Status: SHIPPED | OUTPATIENT
Start: 2019-05-13 | End: 2019-10-30 | Stop reason: ALTCHOICE

## 2019-05-23 ENCOUNTER — OFFICE VISIT (OUTPATIENT)
Dept: HEMATOLOGY/ONCOLOGY | Age: 29
End: 2019-05-23
Attending: INTERNAL MEDICINE
Payer: COMMERCIAL

## 2019-05-23 VITALS
RESPIRATION RATE: 16 BRPM | BODY MASS INDEX: 31 KG/M2 | SYSTOLIC BLOOD PRESSURE: 124 MMHG | OXYGEN SATURATION: 95 % | HEART RATE: 88 BPM | TEMPERATURE: 99 F | WEIGHT: 269.5 LBS | DIASTOLIC BLOOD PRESSURE: 80 MMHG

## 2019-05-23 DIAGNOSIS — R11.2 NAUSEA AND VOMITING: ICD-10-CM

## 2019-05-23 DIAGNOSIS — C77.2 SECONDARY MALIGNANT NEOPLASM OF RETROPERITONEAL LYMPH NODES (HCC): Primary | ICD-10-CM

## 2019-05-23 DIAGNOSIS — C77.0 SECONDARY MALIGNANT NEOPLASM OF SUPRACLAVICULAR LYMPH NODE (HCC): ICD-10-CM

## 2019-05-23 DIAGNOSIS — C16.0 GE JUNCTION CARCINOMA (HCC): ICD-10-CM

## 2019-05-23 PROCEDURE — 96413 CHEMO IV INFUSION 1 HR: CPT

## 2019-05-23 PROCEDURE — 96377 APPLICATON ON-BODY INJECTOR: CPT

## 2019-05-23 PROCEDURE — 86301 IMMUNOASSAY TUMOR CA 19-9: CPT

## 2019-05-23 PROCEDURE — 85025 COMPLETE CBC W/AUTO DIFF WBC: CPT

## 2019-05-23 PROCEDURE — 96368 THER/DIAG CONCURRENT INF: CPT

## 2019-05-23 PROCEDURE — 96375 TX/PRO/DX INJ NEW DRUG ADDON: CPT

## 2019-05-23 PROCEDURE — 80053 COMPREHEN METABOLIC PANEL: CPT

## 2019-05-23 PROCEDURE — 96376 TX/PRO/DX INJ SAME DRUG ADON: CPT

## 2019-05-23 PROCEDURE — 96417 CHEMO IV INFUS EACH ADDL SEQ: CPT

## 2019-05-23 PROCEDURE — 96367 TX/PROPH/DG ADDL SEQ IV INF: CPT

## 2019-05-23 PROCEDURE — S0028 INJECTION, FAMOTIDINE, 20 MG: HCPCS | Performed by: INTERNAL MEDICINE

## 2019-05-23 PROCEDURE — 99214 OFFICE O/P EST MOD 30 MIN: CPT | Performed by: INTERNAL MEDICINE

## 2019-05-23 PROCEDURE — 82378 CARCINOEMBRYONIC ANTIGEN: CPT

## 2019-05-23 RX ORDER — ATROPINE SULFATE 0.4 MG/ML
0.2 AMPUL (ML) INJECTION ONCE
Status: CANCELLED | OUTPATIENT
Start: 2019-05-23

## 2019-05-23 RX ORDER — LORAZEPAM 2 MG/ML
INJECTION INTRAMUSCULAR EVERY 4 HOURS PRN
Status: CANCELLED | OUTPATIENT
Start: 2019-05-23

## 2019-05-23 RX ORDER — DIPHENHYDRAMINE HYDROCHLORIDE 50 MG/ML
50 INJECTION INTRAMUSCULAR; INTRAVENOUS ONCE
Status: CANCELLED
Start: 2019-06-13

## 2019-05-23 RX ORDER — ONDANSETRON 2 MG/ML
8 INJECTION INTRAMUSCULAR; INTRAVENOUS EVERY 6 HOURS PRN
Status: CANCELLED | OUTPATIENT
Start: 2019-06-13

## 2019-05-23 RX ORDER — FAMOTIDINE 10 MG/ML
20 INJECTION, SOLUTION INTRAVENOUS ONCE
Status: COMPLETED | OUTPATIENT
Start: 2019-05-23 | End: 2019-05-23

## 2019-05-23 RX ORDER — ATROPINE SULFATE 0.4 MG/ML
0.2 AMPUL (ML) INJECTION ONCE
Status: COMPLETED | OUTPATIENT
Start: 2019-05-23 | End: 2019-05-23

## 2019-05-23 RX ORDER — DIPHENHYDRAMINE HYDROCHLORIDE 50 MG/ML
50 INJECTION INTRAMUSCULAR; INTRAVENOUS ONCE
Status: CANCELLED
Start: 2019-05-23

## 2019-05-23 RX ORDER — PROCHLORPERAZINE MALEATE 10 MG
10 TABLET ORAL EVERY 6 HOURS PRN
Status: CANCELLED | OUTPATIENT
Start: 2019-05-23

## 2019-05-23 RX ORDER — METOCLOPRAMIDE HYDROCHLORIDE 5 MG/ML
10 INJECTION INTRAMUSCULAR; INTRAVENOUS EVERY 6 HOURS PRN
Status: CANCELLED | OUTPATIENT
Start: 2019-06-13

## 2019-05-23 RX ORDER — LORAZEPAM 1 MG/1
TABLET ORAL
Qty: 60 TABLET | Refills: 0 | Status: CANCELLED | OUTPATIENT
Start: 2019-05-23

## 2019-05-23 RX ORDER — LORAZEPAM 0.5 MG/1
TABLET ORAL EVERY 4 HOURS PRN
Status: CANCELLED | OUTPATIENT
Start: 2019-06-13

## 2019-05-23 RX ORDER — LORAZEPAM 2 MG/ML
INJECTION INTRAMUSCULAR EVERY 4 HOURS PRN
Status: CANCELLED | OUTPATIENT
Start: 2019-06-13

## 2019-05-23 RX ORDER — METOCLOPRAMIDE HYDROCHLORIDE 5 MG/ML
10 INJECTION INTRAMUSCULAR; INTRAVENOUS EVERY 6 HOURS PRN
Status: CANCELLED | OUTPATIENT
Start: 2019-05-23

## 2019-05-23 RX ORDER — FLUOROURACIL 50 MG/ML
2400 INJECTION, SOLUTION INTRAVENOUS CONTINUOUS
Status: CANCELLED | OUTPATIENT
Start: 2019-05-23

## 2019-05-23 RX ORDER — FAMOTIDINE 10 MG/ML
20 INJECTION, SOLUTION INTRAVENOUS ONCE
Status: CANCELLED
Start: 2019-05-23

## 2019-05-23 RX ORDER — DIPHENHYDRAMINE HYDROCHLORIDE 50 MG/ML
50 INJECTION INTRAMUSCULAR; INTRAVENOUS ONCE
Status: COMPLETED | OUTPATIENT
Start: 2019-05-23 | End: 2019-05-23

## 2019-05-23 RX ORDER — PROCHLORPERAZINE MALEATE 10 MG
10 TABLET ORAL EVERY 6 HOURS PRN
Status: CANCELLED | OUTPATIENT
Start: 2019-06-13

## 2019-05-23 RX ORDER — ONDANSETRON 2 MG/ML
8 INJECTION INTRAMUSCULAR; INTRAVENOUS EVERY 6 HOURS PRN
Status: CANCELLED | OUTPATIENT
Start: 2019-05-23

## 2019-05-23 RX ORDER — LORAZEPAM 0.5 MG/1
TABLET ORAL EVERY 4 HOURS PRN
Status: CANCELLED | OUTPATIENT
Start: 2019-05-23

## 2019-05-23 RX ORDER — FLUOROURACIL 50 MG/ML
2400 INJECTION, SOLUTION INTRAVENOUS CONTINUOUS
Status: DISCONTINUED | OUTPATIENT
Start: 2019-05-23 | End: 2019-05-23

## 2019-05-23 RX ORDER — FAMOTIDINE 10 MG/ML
20 INJECTION, SOLUTION INTRAVENOUS ONCE
Status: CANCELLED
Start: 2019-06-13

## 2019-05-23 RX ORDER — ATROPINE SULFATE 0.4 MG/ML
0.2 AMPUL (ML) INJECTION ONCE
Status: CANCELLED | OUTPATIENT
Start: 2019-06-13

## 2019-05-23 RX ORDER — FLUOROURACIL 50 MG/ML
2400 INJECTION, SOLUTION INTRAVENOUS CONTINUOUS
Status: CANCELLED | OUTPATIENT
Start: 2019-06-13

## 2019-05-23 RX ADMIN — ATROPINE SULFATE 0.2 MG: 0.4 MG/ML AMPUL (ML) INJECTION at 14:30:00

## 2019-05-23 RX ADMIN — DIPHENHYDRAMINE HYDROCHLORIDE 50 MG: 50 INJECTION INTRAMUSCULAR; INTRAVENOUS at 11:45:00

## 2019-05-23 RX ADMIN — FLUOROURACIL 6050 MG: 50 INJECTION, SOLUTION INTRAVENOUS at 15:29:00

## 2019-05-23 RX ADMIN — FAMOTIDINE 20 MG: 10 INJECTION, SOLUTION INTRAVENOUS at 11:47:00

## 2019-05-23 RX ADMIN — ATROPINE SULFATE 0.2 MG: 0.4 MG/ML AMPUL (ML) INJECTION at 13:46:00

## 2019-05-23 NOTE — PROGRESS NOTES
Md f/u for metastatic esophageal. Due for C10 folfiri/cyramza. Reports intermittent diarrhea that is managed well with imodium PRN. Denies any N/V; appetite is good. Pain is managed well and energy level is good. Denies any issues or concerns.      Educatio

## 2019-05-23 NOTE — PATIENT INSTRUCTIONS
To reach Dr Jass Root nurse during business hours, please call [phone]. After hours, including weekends, evenings, and holidays, please call the main number [phone] for emergent needs.     Lorazepam was called in to your pharmacy     On-body Injec placed in the trash.

## 2019-05-29 ENCOUNTER — TELEPHONE (OUTPATIENT)
Dept: HEMATOLOGY/ONCOLOGY | Facility: HOSPITAL | Age: 29
End: 2019-05-29

## 2019-05-29 NOTE — TELEPHONE ENCOUNTER
Pt is requesting a letter to return to work on Monday 6/3/19 and requesting restrictions to rest as needed. Letter entered and pt states he will retrieve from [address] [place] [address] [address].

## 2019-06-04 ENCOUNTER — OFFICE VISIT (OUTPATIENT)
Dept: FAMILY MEDICINE CLINIC | Facility: CLINIC | Age: 29
End: 2019-06-04
Payer: COMMERCIAL

## 2019-06-04 VITALS
SYSTOLIC BLOOD PRESSURE: 132 MMHG | HEART RATE: 102 BPM | DIASTOLIC BLOOD PRESSURE: 80 MMHG | WEIGHT: 273.19 LBS | TEMPERATURE: 100 F | BODY MASS INDEX: 32 KG/M2 | OXYGEN SATURATION: 95 %

## 2019-06-04 DIAGNOSIS — J02.9 PHARYNGITIS, UNSPECIFIED ETIOLOGY: Primary | ICD-10-CM

## 2019-06-04 DIAGNOSIS — C16.0 GE JUNCTION CARCINOMA (HCC): ICD-10-CM

## 2019-06-04 DIAGNOSIS — J40 BRONCHITIS: ICD-10-CM

## 2019-06-04 PROCEDURE — 99214 OFFICE O/P EST MOD 30 MIN: CPT | Performed by: FAMILY MEDICINE

## 2019-06-04 RX ORDER — PREDNISONE 20 MG/1
20 TABLET ORAL 2 TIMES DAILY
Qty: 10 TABLET | Refills: 0 | Status: SHIPPED | OUTPATIENT
Start: 2019-06-04 | End: 2019-06-09

## 2019-06-04 RX ORDER — AZITHROMYCIN 250 MG/1
TABLET, FILM COATED ORAL
Qty: 6 TABLET | Refills: 0 | Status: SHIPPED | OUTPATIENT
Start: 2019-06-04 | End: 2019-06-13

## 2019-06-04 NOTE — PROGRESS NOTES
HPI:    Patient ID: Cayetano Mackenzie is a 33year old male. yest cough / Bary Aisha / tiago  Sudden onset  Wife similar symptoms  HPI    Review of Systems   Constitutional: Positive for fatigue. Negative for chills and fever.    HENT: Positive for congestion, rh Right Ear: External ear normal.   Left Ear: External ear normal.   Mouth/Throat: No oropharyngeal exudate. Mild erythema post pharynx   Neck: Normal range of motion. Neck supple.    Cardiovascular: Normal rate, normal heart sounds and intact distal puls

## 2019-06-04 NOTE — PATIENT INSTRUCTIONS
call / f/u if cont. problems and or symptoms not resolving  gargle warm salt water,tylenol or ibuprofen for pain / fever. call no change or worsening.   call ? or problems  Susannah mishra x 5 days - discussed w/ pt

## 2019-06-11 NOTE — PROGRESS NOTES
[facility]    PATIENT'S NAME: Norma Samina LUTZ   ATTENDING PHYSICIAN: Zachary Lopez M.D.    PATIENT ACCOUNT #: [de-identified] LOCATION: [address] [place] [address] RECORD #: [id number] YOB: 1990   DATE OF SERVICE: 05/23/2019       CANCER mg q.6 h. p.r.n. PHYSICAL EXAMINATION:    GENERAL:  He is a well-developed, well-nourished male. He is in no acute distress. VITAL SIGNS:  Performance status is 1.   Weight 269 pounds, blood pressure 124/80, pulse 88, respiratory rate 20, temperature 9

## 2019-06-13 ENCOUNTER — OFFICE VISIT (OUTPATIENT)
Dept: HEMATOLOGY/ONCOLOGY | Age: 29
End: 2019-06-13
Attending: INTERNAL MEDICINE
Payer: COMMERCIAL

## 2019-06-13 VITALS
SYSTOLIC BLOOD PRESSURE: 116 MMHG | RESPIRATION RATE: 16 BRPM | HEART RATE: 75 BPM | BODY MASS INDEX: 31 KG/M2 | WEIGHT: 267 LBS | OXYGEN SATURATION: 95 % | DIASTOLIC BLOOD PRESSURE: 83 MMHG | TEMPERATURE: 98 F

## 2019-06-13 DIAGNOSIS — C77.2 SECONDARY MALIGNANT NEOPLASM OF RETROPERITONEAL LYMPH NODES (HCC): ICD-10-CM

## 2019-06-13 DIAGNOSIS — C77.0 SECONDARY MALIGNANT NEOPLASM OF SUPRACLAVICULAR LYMPH NODE (HCC): ICD-10-CM

## 2019-06-13 DIAGNOSIS — C16.0 GE JUNCTION CARCINOMA (HCC): ICD-10-CM

## 2019-06-13 DIAGNOSIS — C77.2 SECONDARY MALIGNANT NEOPLASM OF RETROPERITONEAL LYMPH NODES (HCC): Primary | ICD-10-CM

## 2019-06-13 DIAGNOSIS — C16.0 GE JUNCTION CARCINOMA (HCC): Primary | ICD-10-CM

## 2019-06-13 DIAGNOSIS — D69.6 THROMBOCYTOPENIA (HCC): ICD-10-CM

## 2019-06-13 PROCEDURE — 96376 TX/PRO/DX INJ SAME DRUG ADON: CPT

## 2019-06-13 PROCEDURE — 86301 IMMUNOASSAY TUMOR CA 19-9: CPT

## 2019-06-13 PROCEDURE — 80053 COMPREHEN METABOLIC PANEL: CPT

## 2019-06-13 PROCEDURE — 96413 CHEMO IV INFUSION 1 HR: CPT

## 2019-06-13 PROCEDURE — S0028 INJECTION, FAMOTIDINE, 20 MG: HCPCS | Performed by: INTERNAL MEDICINE

## 2019-06-13 PROCEDURE — 82378 CARCINOEMBRYONIC ANTIGEN: CPT

## 2019-06-13 PROCEDURE — 96377 APPLICATON ON-BODY INJECTOR: CPT

## 2019-06-13 PROCEDURE — 96417 CHEMO IV INFUS EACH ADDL SEQ: CPT

## 2019-06-13 PROCEDURE — 99214 OFFICE O/P EST MOD 30 MIN: CPT | Performed by: INTERNAL MEDICINE

## 2019-06-13 PROCEDURE — 85025 COMPLETE CBC W/AUTO DIFF WBC: CPT

## 2019-06-13 PROCEDURE — 96368 THER/DIAG CONCURRENT INF: CPT

## 2019-06-13 PROCEDURE — 96367 TX/PROPH/DG ADDL SEQ IV INF: CPT

## 2019-06-13 PROCEDURE — 96375 TX/PRO/DX INJ NEW DRUG ADDON: CPT

## 2019-06-13 RX ORDER — FLUOROURACIL 50 MG/ML
2400 INJECTION, SOLUTION INTRAVENOUS CONTINUOUS
Status: DISCONTINUED | OUTPATIENT
Start: 2019-06-13 | End: 2019-06-13

## 2019-06-13 RX ORDER — ATROPINE SULFATE 0.4 MG/ML
0.2 AMPUL (ML) INJECTION ONCE
Status: COMPLETED | OUTPATIENT
Start: 2019-06-13 | End: 2019-06-13

## 2019-06-13 RX ORDER — DIPHENHYDRAMINE HYDROCHLORIDE 50 MG/ML
50 INJECTION INTRAMUSCULAR; INTRAVENOUS ONCE
Status: COMPLETED | OUTPATIENT
Start: 2019-06-13 | End: 2019-06-13

## 2019-06-13 RX ORDER — FAMOTIDINE 10 MG/ML
20 INJECTION, SOLUTION INTRAVENOUS ONCE
Status: COMPLETED | OUTPATIENT
Start: 2019-06-13 | End: 2019-06-13

## 2019-06-13 RX ADMIN — FLUOROURACIL 6050 MG: 50 INJECTION, SOLUTION INTRAVENOUS at 14:58:00

## 2019-06-13 RX ADMIN — ATROPINE SULFATE 0.2 MG: 0.4 MG/ML AMPUL (ML) INJECTION at 13:13:00

## 2019-06-13 RX ADMIN — DIPHENHYDRAMINE HYDROCHLORIDE 50 MG: 50 INJECTION INTRAMUSCULAR; INTRAVENOUS at 11:12:00

## 2019-06-13 RX ADMIN — ATROPINE SULFATE 0.2 MG: 0.4 MG/ML AMPUL (ML) INJECTION at 14:37:00

## 2019-06-13 RX ADMIN — FAMOTIDINE 20 MG: 10 INJECTION, SOLUTION INTRAVENOUS at 11:14:00

## 2019-06-13 NOTE — PROGRESS NOTES
Md f/u for GE junction carcinoma. Due for C11 Cyramza/FOLFIRI. Pt went back to work and reports that he is feeling well. Energy level is good. Pain manged well with oxycodone. Has occasional diarrhea, but it is managed well with imodium. Denies N/V.  Appeti

## 2019-06-13 NOTE — PROGRESS NOTES
Pt here for C11D1.   Arrives Ambulating independently, accompanied by father        Modifications in dose or schedule: No     Frequency of blood return and site check throughout administration: Prior to administration   Discharged to Home, Ambulating indepe

## 2019-06-13 NOTE — PROGRESS NOTES
On-body Injector for Neulasta Patient Instructions       Your On-Body Injection device was applied on 6/13 230PM     Your dose of medication will start on 6/14 530PM    You may remove this device on 6/14 730PM      Important information to remember about

## 2019-06-13 NOTE — PATIENT INSTRUCTIONS
To reach Dr Ulisses Lees nurse during business hours, please call [phone]. After hours, including weekends, evenings, and holidays, please call the main number [phone] for emergent needs.

## 2019-06-18 NOTE — PROGRESS NOTES
[facility]    PATIENT'S NAME: Lientaylor LUTZ   ATTENDING PHYSICIAN: Astrid Jamison M.D.    PATIENT ACCOUNT #: [de-identified] LOCATION: [address] [place] [address] RECORD #: [id number] YOB: 1990   DATE OF SERVICE: 06/13/2019       CANCER PHYSICAL EXAMINATION:    GENERAL:  He is a well-developed, well-nourished male in no acute distress. VITAL SIGNS:  His performance status is 0. His weight is 267 pounds. Blood pressure is 116/83, pulse 75, respiratory rate 20. He is afebrile.   HEEN Hilario Hogue

## 2019-06-24 ENCOUNTER — TELEPHONE (OUTPATIENT)
Dept: HEMATOLOGY/ONCOLOGY | Facility: HOSPITAL | Age: 29
End: 2019-06-24

## 2019-06-24 RX ORDER — PREDNISONE 20 MG/1
20 TABLET ORAL DAILY
Qty: 30 TABLET | Refills: 0 | Status: SHIPPED | OUTPATIENT
Start: 2019-06-24 | End: 2019-07-25 | Stop reason: ALTCHOICE

## 2019-07-01 DIAGNOSIS — C16.0 GE JUNCTION CARCINOMA (HCC): ICD-10-CM

## 2019-07-01 DIAGNOSIS — R11.2 NAUSEA AND VOMITING: ICD-10-CM

## 2019-07-01 RX ORDER — LORAZEPAM 1 MG/1
TABLET ORAL
Qty: 60 TABLET | Refills: 1 | OUTPATIENT
Start: 2019-07-01 | End: 2019-08-22

## 2019-07-03 ENCOUNTER — APPOINTMENT (OUTPATIENT)
Dept: HEMATOLOGY/ONCOLOGY | Age: 29
End: 2019-07-03
Attending: INTERNAL MEDICINE
Payer: COMMERCIAL

## 2019-07-05 ENCOUNTER — TELEPHONE (OUTPATIENT)
Dept: HEMATOLOGY/ONCOLOGY | Facility: HOSPITAL | Age: 29
End: 2019-07-05

## 2019-07-05 ENCOUNTER — OFFICE VISIT (OUTPATIENT)
Dept: HEMATOLOGY/ONCOLOGY | Age: 29
End: 2019-07-05
Attending: INTERNAL MEDICINE
Payer: COMMERCIAL

## 2019-07-05 VITALS
WEIGHT: 269.63 LBS | BODY MASS INDEX: 31 KG/M2 | DIASTOLIC BLOOD PRESSURE: 84 MMHG | SYSTOLIC BLOOD PRESSURE: 126 MMHG | TEMPERATURE: 98 F | RESPIRATION RATE: 16 BRPM | OXYGEN SATURATION: 98 % | HEART RATE: 85 BPM

## 2019-07-05 DIAGNOSIS — C77.2 SECONDARY MALIGNANT NEOPLASM OF RETROPERITONEAL LYMPH NODES (HCC): ICD-10-CM

## 2019-07-05 DIAGNOSIS — R59.0 RETROPERITONEAL LYMPHADENOPATHY: ICD-10-CM

## 2019-07-05 DIAGNOSIS — K21.9 GASTROESOPHAGEAL REFLUX DISEASE WITHOUT ESOPHAGITIS: ICD-10-CM

## 2019-07-05 DIAGNOSIS — T45.1X5A CHEMOTHERAPY-INDUCED NAUSEA AND VOMITING: ICD-10-CM

## 2019-07-05 DIAGNOSIS — T45.1X5A ANEMIA DUE TO CHEMOTHERAPY: ICD-10-CM

## 2019-07-05 DIAGNOSIS — C77.0 SECONDARY MALIGNANT NEOPLASM OF SUPRACLAVICULAR LYMPH NODE (HCC): ICD-10-CM

## 2019-07-05 DIAGNOSIS — M79.602 BILATERAL ARM PAIN: ICD-10-CM

## 2019-07-05 DIAGNOSIS — F41.9 ANXIETY: ICD-10-CM

## 2019-07-05 DIAGNOSIS — M79.601 BILATERAL ARM PAIN: ICD-10-CM

## 2019-07-05 DIAGNOSIS — C77.0 SECONDARY MALIGNANT NEOPLASM LYMPH NODES OF HEAD, FACE AND NECK (HCC): ICD-10-CM

## 2019-07-05 DIAGNOSIS — D64.81 ANEMIA DUE TO CHEMOTHERAPY: ICD-10-CM

## 2019-07-05 DIAGNOSIS — C16.0 GE JUNCTION CARCINOMA (HCC): ICD-10-CM

## 2019-07-05 DIAGNOSIS — K52.1 CHEMOTHERAPY INDUCED DIARRHEA: ICD-10-CM

## 2019-07-05 DIAGNOSIS — C16.0 GE JUNCTION CARCINOMA (HCC): Primary | ICD-10-CM

## 2019-07-05 DIAGNOSIS — C77.2 SECONDARY MALIGNANT NEOPLASM OF RETROPERITONEAL LYMPH NODES (HCC): Primary | ICD-10-CM

## 2019-07-05 DIAGNOSIS — Z51.11 ENCOUNTER FOR CHEMOTHERAPY MANAGEMENT: ICD-10-CM

## 2019-07-05 DIAGNOSIS — R11.2 CHEMOTHERAPY-INDUCED NAUSEA AND VOMITING: ICD-10-CM

## 2019-07-05 DIAGNOSIS — T45.1X5A CHEMOTHERAPY INDUCED DIARRHEA: ICD-10-CM

## 2019-07-05 DIAGNOSIS — L27.0 DRUG RASH: ICD-10-CM

## 2019-07-05 LAB
ALBUMIN SERPL-MCNC: 3.2 G/DL (ref 3.4–5)
ALBUMIN/GLOB SERPL: 0.9 {RATIO} (ref 1–2)
ALP LIVER SERPL-CCNC: 93 U/L (ref 45–117)
ALT SERPL-CCNC: 29 U/L (ref 16–61)
ANION GAP SERPL CALC-SCNC: 7 MMOL/L (ref 0–18)
AST SERPL-CCNC: 23 U/L (ref 15–37)
BASOPHILS # BLD AUTO: 0.05 X10(3) UL (ref 0–0.2)
BASOPHILS NFR BLD AUTO: 1 %
BILIRUB SERPL-MCNC: 0.3 MG/DL (ref 0.1–2)
BUN BLD-MCNC: 12 MG/DL (ref 7–18)
BUN/CREAT SERPL: 15 (ref 10–20)
CALCIUM BLD-MCNC: 8.8 MG/DL (ref 8.5–10.1)
CANCER AG19-9 SERPL-ACNC: 71.1 U/ML (ref ?–37)
CEA SERPL-MCNC: 2.2 NG/ML (ref ?–5)
CHLORIDE SERPL-SCNC: 110 MMOL/L (ref 98–112)
CO2 SERPL-SCNC: 25 MMOL/L (ref 21–32)
CONTROL RUN WITHIN 24 HOURS?: YES
CREAT BLD-MCNC: 0.8 MG/DL (ref 0.7–1.3)
DEPRECATED RDW RBC AUTO: 55.3 FL (ref 35.1–46.3)
EOSINOPHIL # BLD AUTO: 0.13 X10(3) UL (ref 0–0.7)
EOSINOPHIL NFR BLD AUTO: 2.5 %
ERYTHROCYTE [DISTWIDTH] IN BLOOD BY AUTOMATED COUNT: 16.7 % (ref 11–15)
GLOBULIN PLAS-MCNC: 3.5 G/DL (ref 2.8–4.4)
GLUCOSE BLD-MCNC: 85 MG/DL (ref 70–99)
GLUCOSE URINE: NEGATIVE
HCT VFR BLD AUTO: 39.2 % (ref 39–53)
HGB BLD-MCNC: 12.5 G/DL (ref 13–17.5)
IMM GRANULOCYTES # BLD AUTO: 0.02 X10(3) UL (ref 0–1)
IMM GRANULOCYTES NFR BLD: 0.4 %
LEUKOCYTE ESTERASE URINE: NEGATIVE
LYMPHOCYTES # BLD AUTO: 0.98 X10(3) UL (ref 1–4)
LYMPHOCYTES NFR BLD AUTO: 19.1 %
M PROTEIN MFR SERPL ELPH: 6.7 G/DL (ref 6.4–8.2)
MCH RBC QN AUTO: 29.4 PG (ref 26–34)
MCHC RBC AUTO-ENTMCNC: 31.9 G/DL (ref 31–37)
MCV RBC AUTO: 92.2 FL (ref 80–100)
MONOCYTES # BLD AUTO: 0.52 X10(3) UL (ref 0.1–1)
MONOCYTES NFR BLD AUTO: 10.2 %
NEUTROPHILS # BLD AUTO: 3.42 X10 (3) UL (ref 1.5–7.7)
NEUTROPHILS # BLD AUTO: 3.42 X10(3) UL (ref 1.5–7.7)
NEUTROPHILS NFR BLD AUTO: 66.8 %
NITRITE URINE: NEGATIVE
OSMOLALITY SERPL CALC.SUM OF ELEC: 293 MOSM/KG (ref 275–295)
PLATELET # BLD AUTO: 106 10(3)UL (ref 150–450)
POTASSIUM SERPL-SCNC: 4 MMOL/L (ref 3.5–5.1)
PROTEIN URINE: NEGATIVE
RBC # BLD AUTO: 4.25 X10(6)UL (ref 4.3–5.7)
SODIUM SERPL-SCNC: 142 MMOL/L (ref 136–145)
URINE CLARITY: CLEAR
URINE COLOR: YELLOW
WBC # BLD AUTO: 5.1 X10(3) UL (ref 4–11)

## 2019-07-05 PROCEDURE — 86301 IMMUNOASSAY TUMOR CA 19-9: CPT

## 2019-07-05 PROCEDURE — 99215 OFFICE O/P EST HI 40 MIN: CPT | Performed by: CLINICAL NURSE SPECIALIST

## 2019-07-05 PROCEDURE — 96417 CHEMO IV INFUS EACH ADDL SEQ: CPT

## 2019-07-05 PROCEDURE — 96377 APPLICATON ON-BODY INJECTOR: CPT

## 2019-07-05 PROCEDURE — S0028 INJECTION, FAMOTIDINE, 20 MG: HCPCS | Performed by: CLINICAL NURSE SPECIALIST

## 2019-07-05 PROCEDURE — 96413 CHEMO IV INFUSION 1 HR: CPT

## 2019-07-05 PROCEDURE — 96368 THER/DIAG CONCURRENT INF: CPT

## 2019-07-05 PROCEDURE — 80053 COMPREHEN METABOLIC PANEL: CPT

## 2019-07-05 PROCEDURE — 96376 TX/PRO/DX INJ SAME DRUG ADON: CPT

## 2019-07-05 PROCEDURE — 85025 COMPLETE CBC W/AUTO DIFF WBC: CPT

## 2019-07-05 PROCEDURE — 96367 TX/PROPH/DG ADDL SEQ IV INF: CPT

## 2019-07-05 PROCEDURE — 82378 CARCINOEMBRYONIC ANTIGEN: CPT

## 2019-07-05 PROCEDURE — 96375 TX/PRO/DX INJ NEW DRUG ADDON: CPT

## 2019-07-05 RX ORDER — ATROPINE SULFATE 0.4 MG/ML
0.2 AMPUL (ML) INJECTION ONCE
Status: COMPLETED | OUTPATIENT
Start: 2019-07-05 | End: 2019-07-05

## 2019-07-05 RX ORDER — DIPHENHYDRAMINE HYDROCHLORIDE 50 MG/ML
50 INJECTION INTRAMUSCULAR; INTRAVENOUS ONCE
Status: COMPLETED | OUTPATIENT
Start: 2019-07-05 | End: 2019-07-05

## 2019-07-05 RX ORDER — FLUOROURACIL 50 MG/ML
2400 INJECTION, SOLUTION INTRAVENOUS CONTINUOUS
Status: CANCELLED | OUTPATIENT
Start: 2019-07-05

## 2019-07-05 RX ORDER — OMEPRAZOLE 40 MG/1
40 CAPSULE, DELAYED RELEASE ORAL 2 TIMES DAILY
Qty: 60 CAPSULE | Refills: 0 | Status: SHIPPED | OUTPATIENT
Start: 2019-07-05

## 2019-07-05 RX ORDER — PROCHLORPERAZINE MALEATE 10 MG
10 TABLET ORAL EVERY 6 HOURS PRN
Status: CANCELLED | OUTPATIENT
Start: 2019-07-05

## 2019-07-05 RX ORDER — LORAZEPAM 2 MG/ML
INJECTION INTRAMUSCULAR EVERY 4 HOURS PRN
Status: CANCELLED | OUTPATIENT
Start: 2019-07-05

## 2019-07-05 RX ORDER — ONDANSETRON 2 MG/ML
8 INJECTION INTRAMUSCULAR; INTRAVENOUS EVERY 6 HOURS PRN
Status: CANCELLED | OUTPATIENT
Start: 2019-07-05

## 2019-07-05 RX ORDER — FAMOTIDINE 10 MG/ML
20 INJECTION, SOLUTION INTRAVENOUS ONCE
Status: COMPLETED | OUTPATIENT
Start: 2019-07-05 | End: 2019-07-05

## 2019-07-05 RX ORDER — DIPHENOXYLATE HYDROCHLORIDE AND ATROPINE SULFATE 2.5; .025 MG/1; MG/1
1-2 TABLET ORAL 4 TIMES DAILY PRN
Qty: 60 TABLET | Refills: 1 | Status: SHIPPED | OUTPATIENT
Start: 2019-07-05 | End: 2019-10-03

## 2019-07-05 RX ORDER — FAMOTIDINE 10 MG/ML
20 INJECTION, SOLUTION INTRAVENOUS ONCE
Status: CANCELLED
Start: 2019-07-05

## 2019-07-05 RX ORDER — LORAZEPAM 0.5 MG/1
TABLET ORAL EVERY 4 HOURS PRN
Status: CANCELLED | OUTPATIENT
Start: 2019-07-05

## 2019-07-05 RX ORDER — DIPHENHYDRAMINE HYDROCHLORIDE 50 MG/ML
50 INJECTION INTRAMUSCULAR; INTRAVENOUS ONCE
Status: CANCELLED
Start: 2019-07-05

## 2019-07-05 RX ORDER — FLUOROURACIL 50 MG/ML
2400 INJECTION, SOLUTION INTRAVENOUS CONTINUOUS
Status: DISCONTINUED | OUTPATIENT
Start: 2019-07-05 | End: 2019-07-05

## 2019-07-05 RX ORDER — METOCLOPRAMIDE HYDROCHLORIDE 5 MG/ML
10 INJECTION INTRAMUSCULAR; INTRAVENOUS EVERY 6 HOURS PRN
Status: CANCELLED | OUTPATIENT
Start: 2019-07-05

## 2019-07-05 RX ORDER — ATROPINE SULFATE 0.4 MG/ML
0.2 AMPUL (ML) INJECTION ONCE
Status: CANCELLED | OUTPATIENT
Start: 2019-07-05

## 2019-07-05 RX ADMIN — FLUOROURACIL 6050 MG: 50 INJECTION, SOLUTION INTRAVENOUS at 13:31:00

## 2019-07-05 RX ADMIN — DIPHENHYDRAMINE HYDROCHLORIDE 50 MG: 50 INJECTION INTRAMUSCULAR; INTRAVENOUS at 09:33:00

## 2019-07-05 RX ADMIN — ATROPINE SULFATE 0.2 MG: 0.4 MG/ML AMPUL (ML) INJECTION at 11:35:00

## 2019-07-05 RX ADMIN — ATROPINE SULFATE 0.2 MG: 0.4 MG/ML AMPUL (ML) INJECTION at 12:52:00

## 2019-07-05 RX ADMIN — FAMOTIDINE 20 MG: 10 INJECTION, SOLUTION INTRAVENOUS at 09:34:00

## 2019-07-05 NOTE — PROGRESS NOTES
ANP Visit Note    Patient Name: Dagmar Courtney   YOB: 1990   Medical Record Number: [id number]   CSN: [id number]   Date of visit: 7/5/2019       Chief Complaint/Reason for Visit:  Patient presents with:   Follow - Up: for 3 week follow-up Personal history of antineoplastic chemotherapy     03/01/2018 - No Port   • Sleep disturbance    • Uncomfortable fullness after meals    • Vomiting    • Vomiting blood        Surgical History:  Past Surgical History:   Procedure Laterality Date   • INSERT Social connections:        Talks on phone: Not on file        Gets together: Not on file        Attends Restorationist service: Not on file        Active member of club or organization: Not on file        Attends meetings of clubs or organizations: Not on file Rfl: 2  •  Loperamide HCl 2 MG Oral Cap, Take 2 mg by mouth 4 (four) times daily as needed for Diarrhea., Disp: , Rfl:   •  OxyCODONE HCl 5 MG Oral Tab, Take 1 tablet (5 mg total) by mouth every 4 (four) hours as needed. , Disp: 60 tablet, Rfl: 0    Ariane Gamez Calcium, Total 8.8 8.5 - 10.1 mg/dL    Calculated Osmolality 293 275 - 295 mOsm/kg    GFR, Non- 121 >=60    GFR, -American 140 >=60    AST 23 15 - 37 U/L    ALT 29 16 - 61 U/L    Alkaline Phosphatase 93 45 - 117 U/L    Bilirubin, to be done prior to next cycle per Dr Sheela Rodriguez. 2. Drug Rash: continue Clinda lotion  3. GERD: CPM, will reorder Omeprazole   4. Chemotherapy induced N/V: continue Olanzapine   5. Anxiety: Lorazepam PRN  6. Arm pain: Prednisone PRN   7.  Chemotherapy

## 2019-07-05 NOTE — PROGRESS NOTES
Pt here for C12D1.   Arrives Ambulating independently, accompanied by Family member           Modifications in dose or schedule: No     Frequency of blood return and site check throughout administration: Prior to administration   Discharged to Home, [place]

## 2019-07-05 NOTE — PATIENT INSTRUCTIONS
On-body Injector for Neulasta Patient Instructions       Your On-Body Injection device was applied on this day:  7/5/19  at this time 1:30pm    Your dose of medication will start on this day: 7/6/19 at this time

## 2019-07-05 NOTE — PROGRESS NOTES
Outpatient Oncology Care Plan   Problem list:   diarrhea  knowledge deficit   Problems related to:   chemotherapy  disease/disease progression   Interventions:   monitor lab values   Expected outcomes:   symptoms relieved/minimized  verbalize how to care f

## 2019-07-11 ENCOUNTER — TELEPHONE (OUTPATIENT)
Dept: HEMATOLOGY/ONCOLOGY | Facility: HOSPITAL | Age: 29
End: 2019-07-11

## 2019-07-11 NOTE — TELEPHONE ENCOUNTER
Called with increased back pain similar to when diagnosed, was not able to go to work because could not get out of bed. Increase Oxycodone to 10 mg q 4 hours, if no relief or worsens to call office. He has scan scheduled for 7/22, Dr Peng Younger aware.

## 2019-07-18 ENCOUNTER — HOSPITAL ENCOUNTER (OUTPATIENT)
Dept: CT IMAGING | Age: 29
Discharge: HOME | End: 2019-07-18
Attending: CLINICAL NURSE SPECIALIST
Payer: COMMERCIAL

## 2019-07-18 DIAGNOSIS — C77.0 SECONDARY MALIGNANT NEOPLASM OF SUPRACLAVICULAR LYMPH NODE (HCC): ICD-10-CM

## 2019-07-18 DIAGNOSIS — R59.0 RETROPERITONEAL LYMPHADENOPATHY: ICD-10-CM

## 2019-07-18 DIAGNOSIS — C77.0 SECONDARY MALIGNANT NEOPLASM LYMPH NODES OF HEAD, FACE AND NECK (HCC): ICD-10-CM

## 2019-07-18 DIAGNOSIS — C77.2 SECONDARY MALIGNANT NEOPLASM OF RETROPERITONEAL LYMPH NODES (HCC): ICD-10-CM

## 2019-07-18 DIAGNOSIS — C16.0 GE JUNCTION CARCINOMA (HCC): ICD-10-CM

## 2019-07-18 PROCEDURE — 74177 CT ABD & PELVIS W/CONTRAST: CPT | Performed by: CLINICAL NURSE SPECIALIST

## 2019-07-18 PROCEDURE — 71260 CT THORAX DX C+: CPT | Performed by: CLINICAL NURSE SPECIALIST

## 2019-07-22 ENCOUNTER — NURSE ONLY (OUTPATIENT)
Dept: HEMATOLOGY/ONCOLOGY | Age: 29
End: 2019-07-22
Attending: INTERNAL MEDICINE
Payer: COMMERCIAL

## 2019-07-22 DIAGNOSIS — C16.0 GE JUNCTION CARCINOMA (HCC): Primary | ICD-10-CM

## 2019-07-22 DIAGNOSIS — M79.601 BILATERAL ARM PAIN: ICD-10-CM

## 2019-07-22 DIAGNOSIS — C77.2 SECONDARY MALIGNANT NEOPLASM OF RETROPERITONEAL LYMPH NODES (HCC): ICD-10-CM

## 2019-07-22 DIAGNOSIS — M79.602 BILATERAL ARM PAIN: ICD-10-CM

## 2019-07-22 LAB
ALBUMIN SERPL-MCNC: 3.3 G/DL (ref 3.4–5)
ALBUMIN/GLOB SERPL: 1 {RATIO} (ref 1–2)
ALP LIVER SERPL-CCNC: 117 U/L (ref 45–117)
ALT SERPL-CCNC: 32 U/L (ref 16–61)
ANION GAP SERPL CALC-SCNC: 7 MMOL/L (ref 0–18)
AST SERPL-CCNC: 22 U/L (ref 15–37)
BASOPHILS # BLD AUTO: 0.07 X10(3) UL (ref 0–0.2)
BASOPHILS NFR BLD AUTO: 1 %
BILIRUB SERPL-MCNC: 0.3 MG/DL (ref 0.1–2)
BUN BLD-MCNC: 13 MG/DL (ref 7–18)
BUN/CREAT SERPL: 14.8 (ref 10–20)
CALCIUM BLD-MCNC: 8.8 MG/DL (ref 8.5–10.1)
CANCER AG19-9 SERPL-ACNC: 87.8 U/ML (ref ?–37)
CEA SERPL-MCNC: 2.2 NG/ML (ref ?–5)
CHLORIDE SERPL-SCNC: 110 MMOL/L (ref 98–112)
CO2 SERPL-SCNC: 24 MMOL/L (ref 21–32)
CREAT BLD-MCNC: 0.88 MG/DL (ref 0.7–1.3)
DEPRECATED RDW RBC AUTO: 58.9 FL (ref 35.1–46.3)
EOSINOPHIL # BLD AUTO: 0.14 X10(3) UL (ref 0–0.7)
EOSINOPHIL NFR BLD AUTO: 1.9 %
ERYTHROCYTE [DISTWIDTH] IN BLOOD BY AUTOMATED COUNT: 17.2 % (ref 11–15)
GLOBULIN PLAS-MCNC: 3.4 G/DL (ref 2.8–4.4)
GLUCOSE BLD-MCNC: 104 MG/DL (ref 70–99)
HCT VFR BLD AUTO: 40.4 % (ref 39–53)
HGB BLD-MCNC: 12.9 G/DL (ref 13–17.5)
IMM GRANULOCYTES # BLD AUTO: 0.02 X10(3) UL (ref 0–1)
IMM GRANULOCYTES NFR BLD: 0.3 %
LYMPHOCYTES # BLD AUTO: 1.16 X10(3) UL (ref 1–4)
LYMPHOCYTES NFR BLD AUTO: 15.9 %
M PROTEIN MFR SERPL ELPH: 6.7 G/DL (ref 6.4–8.2)
MCH RBC QN AUTO: 29.7 PG (ref 26–34)
MCHC RBC AUTO-ENTMCNC: 31.9 G/DL (ref 31–37)
MCV RBC AUTO: 93.1 FL (ref 80–100)
MONOCYTES # BLD AUTO: 0.65 X10(3) UL (ref 0.1–1)
MONOCYTES NFR BLD AUTO: 8.9 %
NEUTROPHILS # BLD AUTO: 5.26 X10 (3) UL (ref 1.5–7.7)
NEUTROPHILS # BLD AUTO: 5.26 X10(3) UL (ref 1.5–7.7)
NEUTROPHILS NFR BLD AUTO: 72 %
OSMOLALITY SERPL CALC.SUM OF ELEC: 292 MOSM/KG (ref 275–295)
PLATELET # BLD AUTO: 102 10(3)UL (ref 150–450)
POTASSIUM SERPL-SCNC: 4.1 MMOL/L (ref 3.5–5.1)
RBC # BLD AUTO: 4.34 X10(6)UL (ref 4.3–5.7)
SODIUM SERPL-SCNC: 141 MMOL/L (ref 136–145)
WBC # BLD AUTO: 7.3 X10(3) UL (ref 4–11)

## 2019-07-22 PROCEDURE — 82378 CARCINOEMBRYONIC ANTIGEN: CPT

## 2019-07-22 PROCEDURE — 80053 COMPREHEN METABOLIC PANEL: CPT

## 2019-07-22 PROCEDURE — 36591 DRAW BLOOD OFF VENOUS DEVICE: CPT

## 2019-07-22 PROCEDURE — 85025 COMPLETE CBC W/AUTO DIFF WBC: CPT

## 2019-07-22 PROCEDURE — 86301 IMMUNOASSAY TUMOR CA 19-9: CPT

## 2019-07-22 RX ORDER — SODIUM CHLORIDE 0.9 % (FLUSH) 0.9 %
10 SYRINGE (ML) INJECTION ONCE
Status: COMPLETED | OUTPATIENT
Start: 2019-07-22 | End: 2019-07-22

## 2019-07-22 RX ORDER — SODIUM CHLORIDE 0.9 % (FLUSH) 0.9 %
10 SYRINGE (ML) INJECTION ONCE
Status: CANCELLED | OUTPATIENT
Start: 2019-07-22

## 2019-07-22 RX ADMIN — SODIUM CHLORIDE 0.9 % (FLUSH) 10 ML: 0.9 % SYRINGE (ML) INJECTION at 10:30:00

## 2019-07-25 ENCOUNTER — OFFICE VISIT (OUTPATIENT)
Dept: HEMATOLOGY/ONCOLOGY | Age: 29
End: 2019-07-25
Attending: INTERNAL MEDICINE
Payer: COMMERCIAL

## 2019-07-25 VITALS
DIASTOLIC BLOOD PRESSURE: 85 MMHG | HEART RATE: 80 BPM | SYSTOLIC BLOOD PRESSURE: 125 MMHG | OXYGEN SATURATION: 97 % | HEIGHT: 77.99 IN | RESPIRATION RATE: 16 BRPM | TEMPERATURE: 98 F | BODY MASS INDEX: 30.85 KG/M2 | WEIGHT: 266.63 LBS

## 2019-07-25 DIAGNOSIS — C16.0 GE JUNCTION CARCINOMA (HCC): ICD-10-CM

## 2019-07-25 DIAGNOSIS — L27.0 DRUG RASH: ICD-10-CM

## 2019-07-25 DIAGNOSIS — C77.2 SECONDARY MALIGNANT NEOPLASM OF RETROPERITONEAL LYMPH NODES (HCC): Primary | ICD-10-CM

## 2019-07-25 DIAGNOSIS — C77.2 SECONDARY MALIGNANT NEOPLASM OF RETROPERITONEAL LYMPH NODES (HCC): ICD-10-CM

## 2019-07-25 DIAGNOSIS — R97.8 ELEVATED CA 19-9 LEVEL: ICD-10-CM

## 2019-07-25 DIAGNOSIS — C15.5 MALIGNANT NEOPLASM OF LOWER THIRD OF ESOPHAGUS (HCC): ICD-10-CM

## 2019-07-25 DIAGNOSIS — M54.50 ACUTE LOW BACK PAIN WITHOUT SCIATICA, UNSPECIFIED BACK PAIN LATERALITY: Primary | ICD-10-CM

## 2019-07-25 DIAGNOSIS — C77.0 SECONDARY MALIGNANT NEOPLASM OF SUPRACLAVICULAR LYMPH NODE (HCC): ICD-10-CM

## 2019-07-25 PROCEDURE — 96417 CHEMO IV INFUS EACH ADDL SEQ: CPT

## 2019-07-25 PROCEDURE — 96377 APPLICATON ON-BODY INJECTOR: CPT

## 2019-07-25 PROCEDURE — 99214 OFFICE O/P EST MOD 30 MIN: CPT | Performed by: INTERNAL MEDICINE

## 2019-07-25 PROCEDURE — 96368 THER/DIAG CONCURRENT INF: CPT

## 2019-07-25 PROCEDURE — 96375 TX/PRO/DX INJ NEW DRUG ADDON: CPT

## 2019-07-25 PROCEDURE — S0028 INJECTION, FAMOTIDINE, 20 MG: HCPCS | Performed by: INTERNAL MEDICINE

## 2019-07-25 PROCEDURE — 96367 TX/PROPH/DG ADDL SEQ IV INF: CPT

## 2019-07-25 PROCEDURE — 96413 CHEMO IV INFUSION 1 HR: CPT

## 2019-07-25 PROCEDURE — 96376 TX/PRO/DX INJ SAME DRUG ADON: CPT

## 2019-07-25 RX ORDER — ATROPINE SULFATE 0.4 MG/ML
0.2 AMPUL (ML) INJECTION ONCE
Status: COMPLETED | OUTPATIENT
Start: 2019-07-25 | End: 2019-07-25

## 2019-07-25 RX ORDER — MELOXICAM 15 MG/1
15 TABLET ORAL DAILY
Qty: 30 TABLET | Refills: 5 | Status: SHIPPED | OUTPATIENT
Start: 2019-07-25 | End: 2019-10-17

## 2019-07-25 RX ORDER — ATROPINE SULFATE 0.4 MG/ML
0.2 AMPUL (ML) INJECTION ONCE
Status: CANCELLED | OUTPATIENT
Start: 2019-07-25

## 2019-07-25 RX ORDER — PROCHLORPERAZINE MALEATE 10 MG
10 TABLET ORAL EVERY 6 HOURS PRN
Status: CANCELLED | OUTPATIENT
Start: 2019-07-25

## 2019-07-25 RX ORDER — FLUOROURACIL 50 MG/ML
2400 INJECTION, SOLUTION INTRAVENOUS CONTINUOUS
Status: DISCONTINUED | OUTPATIENT
Start: 2019-07-25 | End: 2019-07-25

## 2019-07-25 RX ORDER — METOCLOPRAMIDE HYDROCHLORIDE 5 MG/ML
10 INJECTION INTRAMUSCULAR; INTRAVENOUS EVERY 6 HOURS PRN
Status: CANCELLED | OUTPATIENT
Start: 2019-07-25

## 2019-07-25 RX ORDER — LORAZEPAM 2 MG/ML
INJECTION INTRAMUSCULAR EVERY 4 HOURS PRN
Status: CANCELLED | OUTPATIENT
Start: 2019-07-25

## 2019-07-25 RX ORDER — FAMOTIDINE 10 MG/ML
20 INJECTION, SOLUTION INTRAVENOUS ONCE
Status: COMPLETED | OUTPATIENT
Start: 2019-07-25 | End: 2019-07-25

## 2019-07-25 RX ORDER — FLUOROURACIL 50 MG/ML
2400 INJECTION, SOLUTION INTRAVENOUS CONTINUOUS
Status: CANCELLED | OUTPATIENT
Start: 2019-07-25

## 2019-07-25 RX ORDER — DIPHENHYDRAMINE HYDROCHLORIDE 50 MG/ML
50 INJECTION INTRAMUSCULAR; INTRAVENOUS ONCE
Status: CANCELLED
Start: 2019-07-25

## 2019-07-25 RX ORDER — LORAZEPAM 0.5 MG/1
TABLET ORAL EVERY 4 HOURS PRN
Status: CANCELLED | OUTPATIENT
Start: 2019-07-25

## 2019-07-25 RX ORDER — DIPHENHYDRAMINE HYDROCHLORIDE 50 MG/ML
50 INJECTION INTRAMUSCULAR; INTRAVENOUS ONCE
Status: COMPLETED | OUTPATIENT
Start: 2019-07-25 | End: 2019-07-25

## 2019-07-25 RX ORDER — ONDANSETRON 2 MG/ML
8 INJECTION INTRAMUSCULAR; INTRAVENOUS EVERY 6 HOURS PRN
Status: CANCELLED | OUTPATIENT
Start: 2019-07-25

## 2019-07-25 RX ORDER — FAMOTIDINE 10 MG/ML
20 INJECTION, SOLUTION INTRAVENOUS ONCE
Status: CANCELLED
Start: 2019-07-25

## 2019-07-25 RX ADMIN — FAMOTIDINE 20 MG: 10 INJECTION, SOLUTION INTRAVENOUS at 11:33:00

## 2019-07-25 RX ADMIN — DIPHENHYDRAMINE HYDROCHLORIDE 50 MG: 50 INJECTION INTRAMUSCULAR; INTRAVENOUS at 11:31:00

## 2019-07-25 RX ADMIN — ATROPINE SULFATE 0.2 MG: 0.4 MG/ML AMPUL (ML) INJECTION at 13:33:00

## 2019-07-25 RX ADMIN — ATROPINE SULFATE 0.2 MG: 0.4 MG/ML AMPUL (ML) INJECTION at 14:48:00

## 2019-07-25 RX ADMIN — FLUOROURACIL 6050 MG: 50 INJECTION, SOLUTION INTRAVENOUS at 15:22:00

## 2019-07-25 NOTE — PATIENT INSTRUCTIONS
On-body Injector for Neulasta Patient Instructions       Your On-Body Injection device was applied on this day:  7/26/19 at this time 3:30pm     Your dose of medication will start on this day: 7/27/19 at this louisa

## 2019-07-25 NOTE — PROGRESS NOTES
Pt here for C13D1.   Arrives Ambulating independently, accompanied by Spouse           Modifications in dose or schedule: No     Frequency of blood return and site check throughout administration: Prior to administration and At completion of therapy   Disch

## 2019-07-25 NOTE — PROGRESS NOTES
Patient is here for MD f/u and cycle 13 of chemo. Patient c/o lower back pain x few days. Denies any GI or urinary complaints. Appetite is good. Denies fatigue or SOB. Patient had a CT scan on 7/18 and labs on 7/22.        Education Record    Learner:  Nini Gambino

## 2019-07-30 NOTE — PROGRESS NOTES
[facility]    PATIENT'S NAME: Kimmie LUTZ   ATTENDING PHYSICIAN: Malka Multani M.D.    PATIENT ACCOUNT #: [de-identified] LOCATION: [address] [place] [address] RECORD #: [id number] YOB: 1990   DATE OF SERVICE: 07/25/2019       CANCER wondering about my interpretation for this. He still has a mild rash on his face related to the 5-FU based therapy, and he does have some acneiform rash but it is manageable.   His current medications include citalopram 40 mg daily, clindamycin topical sol my worry is that if his marker continues to rise we will have to consider this to be progression in the near future. We are proceeding with another cycle of treatment today.   I have asked him to come and see me again in 3 weeks and be reassessed for anoth

## 2019-08-05 ENCOUNTER — HOSPITAL ENCOUNTER (OUTPATIENT)
Dept: MRI IMAGING | Age: 29
End: 2019-08-05
Attending: INTERNAL MEDICINE
Payer: COMMERCIAL

## 2019-08-05 ENCOUNTER — TELEPHONE (OUTPATIENT)
Dept: HEMATOLOGY/ONCOLOGY | Facility: HOSPITAL | Age: 29
End: 2019-08-05

## 2019-08-05 ENCOUNTER — OFFICE VISIT (OUTPATIENT)
Dept: HEMATOLOGY/ONCOLOGY | Age: 29
End: 2019-08-05
Attending: INTERNAL MEDICINE
Payer: COMMERCIAL

## 2019-08-05 ENCOUNTER — HOSPITAL ENCOUNTER (OUTPATIENT)
Dept: MRI IMAGING | Age: 29
Discharge: HOME | End: 2019-08-05
Attending: INTERNAL MEDICINE
Payer: COMMERCIAL

## 2019-08-05 VITALS
TEMPERATURE: 99 F | BODY MASS INDEX: 32 KG/M2 | DIASTOLIC BLOOD PRESSURE: 83 MMHG | HEART RATE: 83 BPM | OXYGEN SATURATION: 96 % | WEIGHT: 277.38 LBS | RESPIRATION RATE: 20 BRPM | SYSTOLIC BLOOD PRESSURE: 130 MMHG

## 2019-08-05 DIAGNOSIS — R53.0 NEOPLASTIC MALIGNANT RELATED FATIGUE: ICD-10-CM

## 2019-08-05 DIAGNOSIS — C77.2 SECONDARY MALIGNANT NEOPLASM OF RETROPERITONEAL LYMPH NODES (HCC): Primary | ICD-10-CM

## 2019-08-05 DIAGNOSIS — C77.0 SECONDARY MALIGNANT NEOPLASM OF SUPRACLAVICULAR LYMPH NODE (HCC): ICD-10-CM

## 2019-08-05 DIAGNOSIS — L73.9 FOLLICULITIS: ICD-10-CM

## 2019-08-05 DIAGNOSIS — M54.50 ACUTE LOW BACK PAIN WITHOUT SCIATICA, UNSPECIFIED BACK PAIN LATERALITY: ICD-10-CM

## 2019-08-05 DIAGNOSIS — C15.5 MALIGNANT NEOPLASM OF LOWER THIRD OF ESOPHAGUS (HCC): ICD-10-CM

## 2019-08-05 DIAGNOSIS — K21.9 GASTROESOPHAGEAL REFLUX DISEASE WITHOUT ESOPHAGITIS: ICD-10-CM

## 2019-08-05 DIAGNOSIS — C77.0 SECONDARY MALIGNANT NEOPLASM LYMPH NODES OF HEAD, FACE AND NECK (HCC): ICD-10-CM

## 2019-08-05 DIAGNOSIS — C16.0 GE JUNCTION CARCINOMA (HCC): ICD-10-CM

## 2019-08-05 PROCEDURE — 99214 OFFICE O/P EST MOD 30 MIN: CPT | Performed by: CLINICAL NURSE SPECIALIST

## 2019-08-05 PROCEDURE — 72158 MRI LUMBAR SPINE W/O & W/DYE: CPT | Performed by: INTERNAL MEDICINE

## 2019-08-05 PROCEDURE — A9575 INJ GADOTERATE MEGLUMI 0.1ML: HCPCS | Performed by: INTERNAL MEDICINE

## 2019-08-05 RX ORDER — SUCRALFATE 1 G/1
1 TABLET ORAL
Qty: 120 TABLET | Refills: 0 | Status: SHIPPED | OUTPATIENT
Start: 2019-08-05 | End: 2019-10-30 | Stop reason: ALTCHOICE

## 2019-08-05 RX ORDER — OXYCODONE AND ACETAMINOPHEN 10; 325 MG/1; MG/1
1 TABLET ORAL EVERY 4 HOURS PRN
Qty: 60 TABLET | Refills: 0 | Status: SHIPPED | OUTPATIENT
Start: 2019-08-05 | End: 2019-08-05

## 2019-08-05 RX ORDER — OXYCODONE AND ACETAMINOPHEN 10; 325 MG/1; MG/1
1 TABLET ORAL EVERY 4 HOURS PRN
Qty: 60 TABLET | Refills: 0 | Status: SHIPPED | OUTPATIENT
Start: 2019-08-05 | End: 2020-02-20

## 2019-08-05 NOTE — PROGRESS NOTES
ANP Visit Note    Patient Name: Ana Paula Bello   YOB: 1990   Medical Record Number: [id number]   CSN: [id number]   Date of visit: 8/5/2019       Chief Complaint/Reason for Visit:  Patient presents with:   Follow - Up: 2 week   Metastatic E Indigestion    • Loss of appetite    • Personal history of antineoplastic chemotherapy     03/01/2018 - No Port   • Sleep disturbance    • Uncomfortable fullness after meals    • Vomiting    • Vomiting blood        Surgical History:  Past Surgical History: file      Stress: Not on file    Relationships      Social connections:        Talks on phone: Not on file        Gets together: Not on file        Attends Yazdanism service: Not on file        Active member of club or organization: Not on file        Atte Hydrobromide 40 MG Oral Tab, Take 1 tablet (40 mg total) by mouth daily. , Disp: 30 tablet, Rfl: 11  •  dexamethasone (DECADRON) 4 MG tablet, 2 tabs twice daily for 5 days starting after each chemo, Disp: 40 tablet, Rfl: 5  •  Clindamycin Phosphate 1 % Exte for shortened work days. Emotional Well Being:  I have assessed the patient's emotional well-being and any concerns about anxiety or depression.   We discussed issues of distress, coping difficulties and social support systems and currently there are no

## 2019-08-07 ENCOUNTER — TELEPHONE (OUTPATIENT)
Dept: HEMATOLOGY/ONCOLOGY | Facility: HOSPITAL | Age: 29
End: 2019-08-07

## 2019-08-07 ENCOUNTER — PATIENT MESSAGE (OUTPATIENT)
Dept: HEMATOLOGY/ONCOLOGY | Facility: HOSPITAL | Age: 29
End: 2019-08-07

## 2019-08-07 NOTE — TELEPHONE ENCOUNTER
PT called LVM stating since his MRI was good and his tumor markers are going up if he is going to be getting stronger doses of chemo? If it will change from every 3 weeks to every 2 weeks? Pt asking MD for the plan.

## 2019-08-08 ENCOUNTER — OFFICE VISIT (OUTPATIENT)
Dept: HEMATOLOGY/ONCOLOGY | Age: 29
End: 2019-08-08
Attending: INTERNAL MEDICINE
Payer: COMMERCIAL

## 2019-08-08 ENCOUNTER — NURSE ONLY (OUTPATIENT)
Dept: HEMATOLOGY/ONCOLOGY | Age: 29
End: 2019-08-08
Attending: INTERNAL MEDICINE
Payer: COMMERCIAL

## 2019-08-08 ENCOUNTER — SOCIAL WORK SERVICES (OUTPATIENT)
Dept: HEMATOLOGY/ONCOLOGY | Facility: HOSPITAL | Age: 29
End: 2019-08-08

## 2019-08-08 VITALS
BODY MASS INDEX: 31 KG/M2 | DIASTOLIC BLOOD PRESSURE: 85 MMHG | RESPIRATION RATE: 16 BRPM | HEART RATE: 76 BPM | WEIGHT: 271 LBS | TEMPERATURE: 99 F | OXYGEN SATURATION: 98 % | SYSTOLIC BLOOD PRESSURE: 125 MMHG

## 2019-08-08 DIAGNOSIS — T45.1X5A CHEMOTHERAPY INDUCED DIARRHEA: ICD-10-CM

## 2019-08-08 DIAGNOSIS — F41.9 ANXIETY: ICD-10-CM

## 2019-08-08 DIAGNOSIS — C16.0 GE JUNCTION CARCINOMA (HCC): ICD-10-CM

## 2019-08-08 DIAGNOSIS — L73.9 FOLLICULITIS: ICD-10-CM

## 2019-08-08 DIAGNOSIS — T45.1X5A CHEMOTHERAPY-INDUCED NAUSEA AND VOMITING: ICD-10-CM

## 2019-08-08 DIAGNOSIS — C77.2 SECONDARY MALIGNANT NEOPLASM OF RETROPERITONEAL LYMPH NODES (HCC): Primary | ICD-10-CM

## 2019-08-08 DIAGNOSIS — C77.2 SECONDARY MALIGNANT NEOPLASM OF RETROPERITONEAL LYMPH NODES (HCC): ICD-10-CM

## 2019-08-08 DIAGNOSIS — T45.1X5A ANEMIA DUE TO CHEMOTHERAPY: ICD-10-CM

## 2019-08-08 DIAGNOSIS — C77.0 SECONDARY MALIGNANT NEOPLASM OF SUPRACLAVICULAR LYMPH NODE (HCC): ICD-10-CM

## 2019-08-08 DIAGNOSIS — K21.9 GASTROESOPHAGEAL REFLUX DISEASE WITHOUT ESOPHAGITIS: ICD-10-CM

## 2019-08-08 DIAGNOSIS — R53.0 NEOPLASTIC MALIGNANT RELATED FATIGUE: ICD-10-CM

## 2019-08-08 DIAGNOSIS — Z51.11 ENCOUNTER FOR CHEMOTHERAPY MANAGEMENT: ICD-10-CM

## 2019-08-08 DIAGNOSIS — R97.8 ELEVATED CA 19-9 LEVEL: ICD-10-CM

## 2019-08-08 DIAGNOSIS — C77.0 SECONDARY MALIGNANT NEOPLASM LYMPH NODES OF HEAD, FACE AND NECK (HCC): ICD-10-CM

## 2019-08-08 DIAGNOSIS — K52.1 CHEMOTHERAPY INDUCED DIARRHEA: ICD-10-CM

## 2019-08-08 DIAGNOSIS — M54.50 ACUTE LOW BACK PAIN WITHOUT SCIATICA, UNSPECIFIED BACK PAIN LATERALITY: ICD-10-CM

## 2019-08-08 DIAGNOSIS — R11.2 CHEMOTHERAPY-INDUCED NAUSEA AND VOMITING: ICD-10-CM

## 2019-08-08 DIAGNOSIS — D69.6 THROMBOCYTOPENIA (HCC): ICD-10-CM

## 2019-08-08 DIAGNOSIS — D64.81 ANEMIA DUE TO CHEMOTHERAPY: ICD-10-CM

## 2019-08-08 DIAGNOSIS — C16.0 GE JUNCTION CARCINOMA (HCC): Primary | ICD-10-CM

## 2019-08-08 LAB
ALBUMIN SERPL-MCNC: 3.1 G/DL (ref 3.4–5)
ALBUMIN/GLOB SERPL: 1 {RATIO} (ref 1–2)
ALP LIVER SERPL-CCNC: 111 U/L (ref 45–117)
ALT SERPL-CCNC: 35 U/L (ref 16–61)
ANION GAP SERPL CALC-SCNC: 7 MMOL/L (ref 0–18)
AST SERPL-CCNC: 23 U/L (ref 15–37)
BASOPHILS # BLD AUTO: 0.03 X10(3) UL (ref 0–0.2)
BASOPHILS NFR BLD AUTO: 0.5 %
BILIRUB SERPL-MCNC: 0.2 MG/DL (ref 0.1–2)
BUN BLD-MCNC: 8 MG/DL (ref 7–18)
BUN/CREAT SERPL: 12.5 (ref 10–20)
CALCIUM BLD-MCNC: 8.8 MG/DL (ref 8.5–10.1)
CANCER AG19-9 SERPL-ACNC: 72 U/ML (ref ?–37)
CEA SERPL-MCNC: 2.2 NG/ML (ref ?–5)
CHLORIDE SERPL-SCNC: 110 MMOL/L (ref 98–112)
CO2 SERPL-SCNC: 26 MMOL/L (ref 21–32)
CONTROL RUN WITHIN 24 HOURS?: YES
CREAT BLD-MCNC: 0.64 MG/DL (ref 0.7–1.3)
DEPRECATED RDW RBC AUTO: 59.9 FL (ref 35.1–46.3)
EOSINOPHIL # BLD AUTO: 0.08 X10(3) UL (ref 0–0.7)
EOSINOPHIL NFR BLD AUTO: 1.3 %
ERYTHROCYTE [DISTWIDTH] IN BLOOD BY AUTOMATED COUNT: 17.8 % (ref 11–15)
GLOBULIN PLAS-MCNC: 3.1 G/DL (ref 2.8–4.4)
GLUCOSE BLD-MCNC: 69 MG/DL (ref 70–99)
GLUCOSE URINE: NEGATIVE
HCT VFR BLD AUTO: 38.7 % (ref 39–53)
HGB BLD-MCNC: 12.3 G/DL (ref 13–17.5)
IMM GRANULOCYTES # BLD AUTO: 0.02 X10(3) UL (ref 0–1)
IMM GRANULOCYTES NFR BLD: 0.3 %
LEUKOCYTE ESTERASE URINE: NEGATIVE
LYMPHOCYTES # BLD AUTO: 1 X10(3) UL (ref 1–4)
LYMPHOCYTES NFR BLD AUTO: 15.7 %
M PROTEIN MFR SERPL ELPH: 6.2 G/DL (ref 6.4–8.2)
MCH RBC QN AUTO: 29.6 PG (ref 26–34)
MCHC RBC AUTO-ENTMCNC: 31.8 G/DL (ref 31–37)
MCV RBC AUTO: 93.3 FL (ref 80–100)
MONOCYTES # BLD AUTO: 0.8 X10(3) UL (ref 0.1–1)
MONOCYTES NFR BLD AUTO: 12.6 %
NEUTROPHILS # BLD AUTO: 4.44 X10 (3) UL (ref 1.5–7.7)
NEUTROPHILS # BLD AUTO: 4.44 X10(3) UL (ref 1.5–7.7)
NEUTROPHILS NFR BLD AUTO: 69.6 %
NITRITE URINE: NEGATIVE
OSMOLALITY SERPL CALC.SUM OF ELEC: 293 MOSM/KG (ref 275–295)
PLATELET # BLD AUTO: 67 10(3)UL (ref 150–450)
POTASSIUM SERPL-SCNC: 4.4 MMOL/L (ref 3.5–5.1)
PROTEIN URINE: NEGATIVE
RBC # BLD AUTO: 4.15 X10(6)UL (ref 4.3–5.7)
SODIUM SERPL-SCNC: 143 MMOL/L (ref 136–145)
URINE CLARITY: CLEAR
WBC # BLD AUTO: 6.4 X10(3) UL (ref 4–11)

## 2019-08-08 PROCEDURE — 96375 TX/PRO/DX INJ NEW DRUG ADDON: CPT

## 2019-08-08 PROCEDURE — 82378 CARCINOEMBRYONIC ANTIGEN: CPT

## 2019-08-08 PROCEDURE — 96368 THER/DIAG CONCURRENT INF: CPT

## 2019-08-08 PROCEDURE — 96377 APPLICATON ON-BODY INJECTOR: CPT

## 2019-08-08 PROCEDURE — 80053 COMPREHEN METABOLIC PANEL: CPT

## 2019-08-08 PROCEDURE — 86301 IMMUNOASSAY TUMOR CA 19-9: CPT

## 2019-08-08 PROCEDURE — 96413 CHEMO IV INFUSION 1 HR: CPT

## 2019-08-08 PROCEDURE — 96417 CHEMO IV INFUS EACH ADDL SEQ: CPT

## 2019-08-08 PROCEDURE — S0028 INJECTION, FAMOTIDINE, 20 MG: HCPCS | Performed by: INTERNAL MEDICINE

## 2019-08-08 PROCEDURE — 85025 COMPLETE CBC W/AUTO DIFF WBC: CPT

## 2019-08-08 PROCEDURE — 99215 OFFICE O/P EST HI 40 MIN: CPT | Performed by: CLINICAL NURSE SPECIALIST

## 2019-08-08 PROCEDURE — 96367 TX/PROPH/DG ADDL SEQ IV INF: CPT

## 2019-08-08 PROCEDURE — 96372 THER/PROPH/DIAG INJ SC/IM: CPT

## 2019-08-08 RX ORDER — ATROPINE SULFATE 0.4 MG/ML
0.2 AMPUL (ML) INJECTION ONCE
Status: COMPLETED | OUTPATIENT
Start: 2019-08-08 | End: 2019-08-08

## 2019-08-08 RX ORDER — FAMOTIDINE 10 MG/ML
20 INJECTION, SOLUTION INTRAVENOUS ONCE
Status: COMPLETED | OUTPATIENT
Start: 2019-08-08 | End: 2019-08-08

## 2019-08-08 RX ORDER — DIPHENHYDRAMINE HYDROCHLORIDE 50 MG/ML
50 INJECTION INTRAMUSCULAR; INTRAVENOUS ONCE
Status: COMPLETED | OUTPATIENT
Start: 2019-08-08 | End: 2019-08-08

## 2019-08-08 RX ORDER — ATROPINE SULFATE 0.4 MG/ML
0.2 AMPUL (ML) INJECTION ONCE
Status: CANCELLED
Start: 2019-08-08

## 2019-08-08 RX ORDER — FLUOROURACIL 50 MG/ML
2400 INJECTION, SOLUTION INTRAVENOUS CONTINUOUS
Status: DISCONTINUED | OUTPATIENT
Start: 2019-08-08 | End: 2019-08-08

## 2019-08-08 RX ORDER — SODIUM CHLORIDE 0.9 % (FLUSH) 0.9 %
10 SYRINGE (ML) INJECTION ONCE
Status: CANCELLED | OUTPATIENT
Start: 2019-08-08

## 2019-08-08 RX ADMIN — DIPHENHYDRAMINE HYDROCHLORIDE 50 MG: 50 INJECTION INTRAMUSCULAR; INTRAVENOUS at 10:12:00

## 2019-08-08 RX ADMIN — FAMOTIDINE 20 MG: 10 INJECTION, SOLUTION INTRAVENOUS at 10:15:00

## 2019-08-08 RX ADMIN — ATROPINE SULFATE 0.2 MG: 0.4 MG/ML AMPUL (ML) INJECTION at 13:03:00

## 2019-08-08 RX ADMIN — FLUOROURACIL 6050 MG: 50 INJECTION, SOLUTION INTRAVENOUS at 14:43:00

## 2019-08-08 RX ADMIN — ATROPINE SULFATE 0.2 MG: 0.4 MG/ML AMPUL (ML) INJECTION at 14:15:00

## 2019-08-08 NOTE — PROGRESS NOTES
ANP Visit Note    Patient Name: Aleksandr Jimenes   YOB: 1990   Medical Record Number: [id number]   CSN: [id number]   Date of visit: 8/8/2019   Arian Hu DO   No primary care provider on file.      Chief Complaint/Reason for Visit:  Rachel Danielle Surgical History:   Procedure Laterality Date   • INSERTION OF ACCESS PORT  04/10/2019   • LAPAROSCOPIC ESOPHAGOGASTRECTOMY N/A 4/27/2018    Performed by Manny Morales MD at [facility] MAIN OR   • LAPAROSCOPIC ESOPHAGOGASTRECTOMY Right 4/27/2018    Performed by L file        Attends meetings of clubs or organizations: Not on file        Relationship status: Not on file      Intimate partner violence:        Fear of current or ex partner: Not on file        Emotionally abused: Not on file        Physically abused: N % External Lotion, Apply 1 Application topically 2 (two) times daily. , Disp: 60 mL, Rfl: 2  •  Loperamide HCl 2 MG Oral Cap, Take 2 mg by mouth 4 (four) times daily as needed for Diarrhea., Disp: , Rfl:     Narcotic [place] in [place]     Revie 6.4 - 8.2 g/dL    Albumin 3.1 (L) 3.4 - 5.0 g/dL    Globulin  3.1 2.8 - 4.4 g/dL    A/G Ratio 1.0 1.0 - 2.0   CARBOHYDRATE ANTIGEN 19-9    Collection Time: 08/08/19  9:00 AM   Result Value Ref Range    Carbohydrate Ag 19-9 72.0 (H) <=37.0 U/mL   CEA    Col CHEST+ABDOMEN+PELVIS(ALL CNTRST ONLY)(CPT=71260/08103), 7/18/2019, 11:32. INDICATIONS:  C15.5 Malignant neoplasm of lower third of esophagus M54.5 Low back pain     TECHNIQUE:  Multiplanar T1 and T2 weighted images including fat suppression sequences. bone destruction. Post radiation/treatment changes involve the vertebral bodies T6 through T12.  2. There is a small central disc protrusion T6-7, small left of midline disc protrusion T11-12.   3. There is an annular bulging disc and osteophyte complex at

## 2019-08-08 NOTE — PROGRESS NOTES
Pt here for C14D1.   Arrives Ambulating independently, accompanied by father           Modifications in dose or schedule: No     Frequency of blood return and site check throughout administration: Prior to administration   Discharged to Home, Oliver Sanchez

## 2019-08-08 NOTE — PROGRESS NOTES
Nutrition F/U Note     Patient Name: Thierry Traore  YOB: 1990  Medical Record Number: [id number]      Account Number: [de-identified]  Dietitian: Aidan García RD     Date of visit: 08/08/19     Diet Rx: high protein/calorie, post-esophagect

## 2019-08-08 NOTE — PROGRESS NOTES
ELIO met with patient and father this am during treatment. Patient has a new treatment regimen and he will need to be off work starting 8/12. ELIO completed STD paperwork and emailed it to Jonny Perrin at [employer]. [email or web address].   Mata Jansen provided Justin Boyd

## 2019-08-15 ENCOUNTER — OFFICE VISIT (OUTPATIENT)
Dept: HEMATOLOGY/ONCOLOGY | Age: 29
End: 2019-08-15
Attending: INTERNAL MEDICINE
Payer: COMMERCIAL

## 2019-08-15 ENCOUNTER — APPOINTMENT (OUTPATIENT)
Dept: HEMATOLOGY/ONCOLOGY | Age: 29
End: 2019-08-15
Attending: INTERNAL MEDICINE
Payer: COMMERCIAL

## 2019-08-22 ENCOUNTER — OFFICE VISIT (OUTPATIENT)
Dept: HEMATOLOGY/ONCOLOGY | Age: 29
End: 2019-08-22
Attending: INTERNAL MEDICINE
Payer: COMMERCIAL

## 2019-08-22 ENCOUNTER — NURSE ONLY (OUTPATIENT)
Dept: HEMATOLOGY/ONCOLOGY | Age: 29
End: 2019-08-22
Attending: INTERNAL MEDICINE
Payer: COMMERCIAL

## 2019-08-22 ENCOUNTER — SOCIAL WORK SERVICES (OUTPATIENT)
Dept: HEMATOLOGY/ONCOLOGY | Facility: HOSPITAL | Age: 29
End: 2019-08-22

## 2019-08-22 VITALS
TEMPERATURE: 99 F | HEART RATE: 77 BPM | SYSTOLIC BLOOD PRESSURE: 127 MMHG | DIASTOLIC BLOOD PRESSURE: 88 MMHG | RESPIRATION RATE: 16 BRPM | BODY MASS INDEX: 30.95 KG/M2 | WEIGHT: 267.5 LBS | HEIGHT: 77.99 IN | OXYGEN SATURATION: 96 %

## 2019-08-22 DIAGNOSIS — R23.8 EASY BRUISABILITY: Primary | ICD-10-CM

## 2019-08-22 DIAGNOSIS — C77.0 SECONDARY MALIGNANT NEOPLASM OF SUPRACLAVICULAR LYMPH NODE (HCC): ICD-10-CM

## 2019-08-22 DIAGNOSIS — R11.2 NAUSEA AND VOMITING: ICD-10-CM

## 2019-08-22 DIAGNOSIS — C16.0 GE JUNCTION CARCINOMA (HCC): ICD-10-CM

## 2019-08-22 DIAGNOSIS — C77.2 SECONDARY MALIGNANT NEOPLASM OF RETROPERITONEAL LYMPH NODES (HCC): ICD-10-CM

## 2019-08-22 DIAGNOSIS — C77.2 SECONDARY MALIGNANT NEOPLASM OF RETROPERITONEAL LYMPH NODES (HCC): Primary | ICD-10-CM

## 2019-08-22 DIAGNOSIS — R23.8 EASY BRUISABILITY: ICD-10-CM

## 2019-08-22 LAB
ALBUMIN SERPL-MCNC: 3.3 G/DL (ref 3.4–5)
ALBUMIN/GLOB SERPL: 1 {RATIO} (ref 1–2)
ALP LIVER SERPL-CCNC: 110 U/L (ref 45–117)
ALT SERPL-CCNC: 33 U/L (ref 16–61)
ANION GAP SERPL CALC-SCNC: 3 MMOL/L (ref 0–18)
AST SERPL-CCNC: 19 U/L (ref 15–37)
BASOPHILS # BLD AUTO: 0.03 X10(3) UL (ref 0–0.2)
BASOPHILS NFR BLD AUTO: 0.5 %
BILIRUB SERPL-MCNC: 0.2 MG/DL (ref 0.1–2)
BUN BLD-MCNC: 6 MG/DL (ref 7–18)
BUN/CREAT SERPL: 7.5 (ref 10–20)
CALCIUM BLD-MCNC: 8.7 MG/DL (ref 8.5–10.1)
CANCER AG19-9 SERPL-ACNC: 58.6 U/ML (ref ?–37)
CEA SERPL-MCNC: 2.3 NG/ML (ref ?–5)
CHLORIDE SERPL-SCNC: 111 MMOL/L (ref 98–112)
CO2 SERPL-SCNC: 28 MMOL/L (ref 21–32)
CREAT BLD-MCNC: 0.8 MG/DL (ref 0.7–1.3)
DEPRECATED RDW RBC AUTO: 60.5 FL (ref 35.1–46.3)
EOSINOPHIL # BLD AUTO: 0.18 X10(3) UL (ref 0–0.7)
EOSINOPHIL NFR BLD AUTO: 3.2 %
ERYTHROCYTE [DISTWIDTH] IN BLOOD BY AUTOMATED COUNT: 17.6 % (ref 11–15)
GLOBULIN PLAS-MCNC: 3.2 G/DL (ref 2.8–4.4)
GLUCOSE BLD-MCNC: 101 MG/DL (ref 70–99)
HCT VFR BLD AUTO: 39 % (ref 39–53)
HGB BLD-MCNC: 12.3 G/DL (ref 13–17.5)
IMM GRANULOCYTES # BLD AUTO: 0.03 X10(3) UL (ref 0–1)
IMM GRANULOCYTES NFR BLD: 0.5 %
INR BLD: 0.94 (ref 0.9–1.1)
LYMPHOCYTES # BLD AUTO: 0.88 X10(3) UL (ref 1–4)
LYMPHOCYTES NFR BLD AUTO: 15.6 %
M PROTEIN MFR SERPL ELPH: 6.5 G/DL (ref 6.4–8.2)
MCH RBC QN AUTO: 29.4 PG (ref 26–34)
MCHC RBC AUTO-ENTMCNC: 31.5 G/DL (ref 31–37)
MCV RBC AUTO: 93.3 FL (ref 80–100)
MONOCYTES # BLD AUTO: 0.54 X10(3) UL (ref 0.1–1)
MONOCYTES NFR BLD AUTO: 9.6 %
NEUTROPHILS # BLD AUTO: 3.99 X10 (3) UL (ref 1.5–7.7)
NEUTROPHILS # BLD AUTO: 3.99 X10(3) UL (ref 1.5–7.7)
NEUTROPHILS NFR BLD AUTO: 70.6 %
OSMOLALITY SERPL CALC.SUM OF ELEC: 292 MOSM/KG (ref 275–295)
PLATELET # BLD AUTO: 120 10(3)UL (ref 150–450)
POTASSIUM SERPL-SCNC: 4.1 MMOL/L (ref 3.5–5.1)
PSA SERPL DL<=0.01 NG/ML-MCNC: 12.7 SECONDS (ref 12.2–14.4)
RBC # BLD AUTO: 4.18 X10(6)UL (ref 4.3–5.7)
SODIUM SERPL-SCNC: 142 MMOL/L (ref 136–145)
WBC # BLD AUTO: 5.7 X10(3) UL (ref 4–11)

## 2019-08-22 PROCEDURE — 96377 APPLICATON ON-BODY INJECTOR: CPT

## 2019-08-22 PROCEDURE — 85025 COMPLETE CBC W/AUTO DIFF WBC: CPT

## 2019-08-22 PROCEDURE — 96367 TX/PROPH/DG ADDL SEQ IV INF: CPT

## 2019-08-22 PROCEDURE — 96413 CHEMO IV INFUSION 1 HR: CPT

## 2019-08-22 PROCEDURE — 85610 PROTHROMBIN TIME: CPT

## 2019-08-22 PROCEDURE — 80053 COMPREHEN METABOLIC PANEL: CPT

## 2019-08-22 PROCEDURE — 99214 OFFICE O/P EST MOD 30 MIN: CPT | Performed by: INTERNAL MEDICINE

## 2019-08-22 PROCEDURE — 86301 IMMUNOASSAY TUMOR CA 19-9: CPT

## 2019-08-22 PROCEDURE — 96417 CHEMO IV INFUS EACH ADDL SEQ: CPT

## 2019-08-22 PROCEDURE — 96368 THER/DIAG CONCURRENT INF: CPT

## 2019-08-22 PROCEDURE — S0028 INJECTION, FAMOTIDINE, 20 MG: HCPCS | Performed by: INTERNAL MEDICINE

## 2019-08-22 PROCEDURE — 82378 CARCINOEMBRYONIC ANTIGEN: CPT

## 2019-08-22 PROCEDURE — 96375 TX/PRO/DX INJ NEW DRUG ADDON: CPT

## 2019-08-22 PROCEDURE — 96376 TX/PRO/DX INJ SAME DRUG ADON: CPT

## 2019-08-22 RX ORDER — LORAZEPAM 2 MG/ML
INJECTION INTRAMUSCULAR EVERY 4 HOURS PRN
Status: CANCELLED | OUTPATIENT
Start: 2019-08-22

## 2019-08-22 RX ORDER — METOCLOPRAMIDE HYDROCHLORIDE 5 MG/ML
10 INJECTION INTRAMUSCULAR; INTRAVENOUS EVERY 6 HOURS PRN
Status: CANCELLED | OUTPATIENT
Start: 2019-08-22

## 2019-08-22 RX ORDER — PROCHLORPERAZINE MALEATE 10 MG
10 TABLET ORAL EVERY 6 HOURS PRN
Status: CANCELLED | OUTPATIENT
Start: 2019-08-22

## 2019-08-22 RX ORDER — DIPHENHYDRAMINE HYDROCHLORIDE 50 MG/ML
50 INJECTION INTRAMUSCULAR; INTRAVENOUS ONCE
Status: CANCELLED
Start: 2019-08-22

## 2019-08-22 RX ORDER — FLUOROURACIL 50 MG/ML
2400 INJECTION, SOLUTION INTRAVENOUS CONTINUOUS
Status: CANCELLED | OUTPATIENT
Start: 2019-08-22

## 2019-08-22 RX ORDER — ATROPINE SULFATE 0.4 MG/ML
0.2 AMPUL (ML) INJECTION ONCE
Status: COMPLETED | OUTPATIENT
Start: 2019-08-22 | End: 2019-08-22

## 2019-08-22 RX ORDER — LORAZEPAM 1 MG/1
TABLET ORAL
Qty: 60 TABLET | Refills: 5 | Status: SHIPPED | OUTPATIENT
Start: 2019-08-22 | End: 2019-08-22

## 2019-08-22 RX ORDER — DEXAMETHASONE 4 MG/1
TABLET ORAL
Qty: 40 TABLET | Refills: 5 | Status: SHIPPED | OUTPATIENT
Start: 2019-08-22 | End: 2020-01-01

## 2019-08-22 RX ORDER — DICYCLOMINE HCL 20 MG
20 TABLET ORAL 4 TIMES DAILY PRN
Qty: 30 TABLET | Refills: 3 | Status: SHIPPED | OUTPATIENT
Start: 2019-08-22 | End: 2019-10-30 | Stop reason: ALTCHOICE

## 2019-08-22 RX ORDER — DIPHENHYDRAMINE HYDROCHLORIDE 50 MG/ML
50 INJECTION INTRAMUSCULAR; INTRAVENOUS ONCE
Status: COMPLETED | OUTPATIENT
Start: 2019-08-22 | End: 2019-08-22

## 2019-08-22 RX ORDER — ATROPINE SULFATE 0.4 MG/ML
0.2 AMPUL (ML) INJECTION ONCE
Status: CANCELLED | OUTPATIENT
Start: 2019-08-22

## 2019-08-22 RX ORDER — ONDANSETRON 2 MG/ML
8 INJECTION INTRAMUSCULAR; INTRAVENOUS EVERY 6 HOURS PRN
Status: CANCELLED | OUTPATIENT
Start: 2019-08-22

## 2019-08-22 RX ORDER — FAMOTIDINE 10 MG/ML
20 INJECTION, SOLUTION INTRAVENOUS ONCE
Status: CANCELLED
Start: 2019-08-22

## 2019-08-22 RX ORDER — ATROPINE SULFATE 0.4 MG/ML
0.2 AMPUL (ML) INJECTION ONCE
Status: CANCELLED
Start: 2019-08-22

## 2019-08-22 RX ORDER — FAMOTIDINE 10 MG/ML
20 INJECTION, SOLUTION INTRAVENOUS ONCE
Status: COMPLETED | OUTPATIENT
Start: 2019-08-22 | End: 2019-08-22

## 2019-08-22 RX ORDER — FLUOROURACIL 50 MG/ML
2400 INJECTION, SOLUTION INTRAVENOUS CONTINUOUS
Status: DISCONTINUED | OUTPATIENT
Start: 2019-08-22 | End: 2019-08-22

## 2019-08-22 RX ORDER — LORAZEPAM 0.5 MG/1
TABLET ORAL EVERY 4 HOURS PRN
Status: CANCELLED | OUTPATIENT
Start: 2019-08-22

## 2019-08-22 RX ORDER — LORAZEPAM 1 MG/1
TABLET ORAL
Qty: 60 TABLET | Refills: 5 | Status: SHIPPED | OUTPATIENT
Start: 2019-08-22 | End: 2020-01-09

## 2019-08-22 RX ADMIN — ATROPINE SULFATE 0.2 MG: 0.4 MG/ML AMPUL (ML) INJECTION at 13:00:00

## 2019-08-22 RX ADMIN — ATROPINE SULFATE 0.2 MG: 0.4 MG/ML AMPUL (ML) INJECTION at 14:40:00

## 2019-08-22 RX ADMIN — DIPHENHYDRAMINE HYDROCHLORIDE 50 MG: 50 INJECTION INTRAMUSCULAR; INTRAVENOUS at 11:02:00

## 2019-08-22 RX ADMIN — FLUOROURACIL 6050 MG: 50 INJECTION, SOLUTION INTRAVENOUS at 14:42:00

## 2019-08-22 RX ADMIN — FAMOTIDINE 20 MG: 10 INJECTION, SOLUTION INTRAVENOUS at 11:04:00

## 2019-08-22 NOTE — PATIENT INSTRUCTIONS
On-body Injector for Neulasta Patient Instructions       Your On-Body Injection device was applied on 8/22    Your dose of medication will start on 8/23 at 545pm    You may remove this device on 745pm      Important information to remember about the [facility] WHITE

## 2019-08-22 NOTE — PROGRESS NOTES
Nutrition F/U Note     Patient Name: Thierry Koch  YOB: 1990  Medical Record Number: [id number]      Account Number: [de-identified]  Dietitian: Clotilde Mckay RD     Date of visit: 08/08/19     Diet Rx: high protein/calorie, post-esophagect

## 2019-08-22 NOTE — PROGRESS NOTES
Pt here for C15D1.   Arrives Ambulating independently, accompanied by father          Modifications in dose or schedule: No     Frequency of blood return and site check throughout administration: Prior to administration   Discharged to Home, Ambulating inde

## 2019-08-22 NOTE — PROGRESS NOTES
checked in with Patient in treatment room. Patient stated that he is feeling better this week than he did last week due to negative side effects.  He confirmed that he is off work at this time and received the confirmation letter from [employer]

## 2019-08-22 NOTE — PROGRESS NOTES
Patient is here for MD f/u and cycle 15 of chemo. After last cycle, patient had a lot of abdominal cramping and nausea. Symptoms have since subsided. Patient c/o right forearm bruising and pain without known injury. Appetite is good.  No diarrhea or constip

## 2019-08-24 NOTE — PROGRESS NOTES
[facility]    PATIENT'S NAME: Rodrigonanci LUTZ   ATTENDING PHYSICIAN: Dari Walters M.D.    PATIENT ACCOUNT #: [de-identified] LOCATION: [address] [place] [address] RECORD #: [id number] YOB: 1990   DATE OF SERVICE: 08/22/2019       CANCER to 6 h. p.r.n. anxiety; meloxicam, which he is not taking; omeprazole 40 mg b.i.d.; oxycodone 10/325 one to two tablets q.i.d. p.r.n.; prochlorperazine 10 mg q.6 h. p.r.n.; Carafate 1 g q.i.d. p.r.n.     PHYSICAL EXAMINATION:    GENERAL:  He is a well-devel M.D.  d: 08/23/2019 07:42:59  t: 08/23/2019 20:50:26  [facility] [id number]  TP/    cc: WILLIAM Newman Dr.

## 2019-08-27 ENCOUNTER — TELEPHONE (OUTPATIENT)
Dept: HEMATOLOGY/ONCOLOGY | Facility: HOSPITAL | Age: 29
End: 2019-08-27

## 2019-08-27 ENCOUNTER — NURSE ONLY (OUTPATIENT)
Dept: HEMATOLOGY/ONCOLOGY | Age: 29
End: 2019-08-27
Attending: INTERNAL MEDICINE
Payer: COMMERCIAL

## 2019-08-27 ENCOUNTER — OFFICE VISIT (OUTPATIENT)
Dept: HEMATOLOGY/ONCOLOGY | Age: 29
End: 2019-08-27
Attending: INTERNAL MEDICINE
Payer: COMMERCIAL

## 2019-08-27 VITALS
SYSTOLIC BLOOD PRESSURE: 122 MMHG | TEMPERATURE: 98 F | DIASTOLIC BLOOD PRESSURE: 81 MMHG | BODY MASS INDEX: 31 KG/M2 | OXYGEN SATURATION: 96 % | RESPIRATION RATE: 16 BRPM | HEART RATE: 80 BPM | WEIGHT: 267.5 LBS

## 2019-08-27 DIAGNOSIS — C16.0 GE JUNCTION CARCINOMA (HCC): Primary | ICD-10-CM

## 2019-08-27 DIAGNOSIS — C77.0 SECONDARY MALIGNANT NEOPLASM OF SUPRACLAVICULAR LYMPH NODE (HCC): ICD-10-CM

## 2019-08-27 DIAGNOSIS — C77.2 SECONDARY MALIGNANT NEOPLASM OF RETROPERITONEAL LYMPH NODES (HCC): ICD-10-CM

## 2019-08-27 DIAGNOSIS — C77.0 SECONDARY MALIGNANT NEOPLASM LYMPH NODES OF HEAD, FACE AND NECK (HCC): ICD-10-CM

## 2019-08-27 DIAGNOSIS — C16.0 GE JUNCTION CARCINOMA (HCC): ICD-10-CM

## 2019-08-27 DIAGNOSIS — R23.8 EASY BRUISABILITY: ICD-10-CM

## 2019-08-27 LAB
ALBUMIN SERPL-MCNC: 3.4 G/DL (ref 3.4–5)
ALBUMIN/GLOB SERPL: 1.1 {RATIO} (ref 1–2)
ALP LIVER SERPL-CCNC: 149 U/L (ref 45–117)
ALT SERPL-CCNC: 44 U/L (ref 16–61)
ANION GAP SERPL CALC-SCNC: 7 MMOL/L (ref 0–18)
AST SERPL-CCNC: 18 U/L (ref 15–37)
BASOPHILS # BLD: 0 X10(3) UL (ref 0–0.2)
BASOPHILS NFR BLD: 0 %
BILIRUB SERPL-MCNC: 0.3 MG/DL (ref 0.1–2)
BUN BLD-MCNC: 18 MG/DL (ref 7–18)
BUN/CREAT SERPL: 21.4 (ref 10–20)
CALCIUM BLD-MCNC: 8.6 MG/DL (ref 8.5–10.1)
CHLORIDE SERPL-SCNC: 105 MMOL/L (ref 98–112)
CO2 SERPL-SCNC: 26 MMOL/L (ref 21–32)
CREAT BLD-MCNC: 0.84 MG/DL (ref 0.7–1.3)
DEPRECATED RDW RBC AUTO: 57.8 FL (ref 35.1–46.3)
EOSINOPHIL # BLD: 0 X10(3) UL (ref 0–0.7)
EOSINOPHIL NFR BLD: 0 %
ERYTHROCYTE [DISTWIDTH] IN BLOOD BY AUTOMATED COUNT: 17.2 % (ref 11–15)
GLOBULIN PLAS-MCNC: 3.2 G/DL (ref 2.8–4.4)
GLUCOSE BLD-MCNC: 108 MG/DL (ref 70–99)
HCT VFR BLD AUTO: 40 % (ref 39–53)
HGB BLD-MCNC: 12.8 G/DL (ref 13–17.5)
LYMPHOCYTES NFR BLD: 1.74 X10(3) UL (ref 1–4)
LYMPHOCYTES NFR BLD: 4 %
M PROTEIN MFR SERPL ELPH: 6.6 G/DL (ref 6.4–8.2)
MCH RBC QN AUTO: 29.5 PG (ref 26–34)
MCHC RBC AUTO-ENTMCNC: 32 G/DL (ref 31–37)
MCV RBC AUTO: 92.2 FL (ref 80–100)
MONOCYTES # BLD: 1.3 X10(3) UL (ref 0.1–1)
MONOCYTES NFR BLD: 3 %
MORPHOLOGY: NORMAL
NEUTROPHILS # BLD AUTO: 40.58 X10 (3) UL (ref 1.5–7.7)
NEUTROPHILS NFR BLD: 87 %
NEUTS BAND NFR BLD: 6 %
NEUTS HYPERSEG # BLD: 40.36 X10(3) UL (ref 1.5–7.7)
OSMOLALITY SERPL CALC.SUM OF ELEC: 288 MOSM/KG (ref 275–295)
PLATELET # BLD AUTO: 148 10(3)UL (ref 150–450)
PLATELET MORPHOLOGY: NORMAL
POTASSIUM SERPL-SCNC: 4 MMOL/L (ref 3.5–5.1)
RBC # BLD AUTO: 4.34 X10(6)UL (ref 4.3–5.7)
SODIUM SERPL-SCNC: 138 MMOL/L (ref 136–145)
TOTAL CELLS COUNTED: 100
WBC # BLD AUTO: 43.4 X10(3) UL (ref 4–11)

## 2019-08-27 PROCEDURE — 99212 OFFICE O/P EST SF 10 MIN: CPT | Performed by: CLINICAL NURSE SPECIALIST

## 2019-08-27 PROCEDURE — 85025 COMPLETE CBC W/AUTO DIFF WBC: CPT

## 2019-08-27 PROCEDURE — 85007 BL SMEAR W/DIFF WBC COUNT: CPT

## 2019-08-27 PROCEDURE — 80053 COMPREHEN METABOLIC PANEL: CPT

## 2019-08-27 PROCEDURE — 85027 COMPLETE CBC AUTOMATED: CPT

## 2019-08-27 PROCEDURE — 36591 DRAW BLOOD OFF VENOUS DEVICE: CPT

## 2019-08-27 NOTE — PROGRESS NOTES
Pt here for NP assessment for new bruising on right inner knee area. Pt notes no injury, notes ache in that area causing a slight limp today. No other changes since last visti.      Outpatient Oncology Care Plan  Problem list:  fatigue  knowledge deficit

## 2019-08-27 NOTE — PROGRESS NOTES
ANP Visit Note    Patient Name: Kaleb Mistry   YOB: 1990   Medical Record Number: [id number]   CSN: [id number]   Date of visit: 8/27/2019   Saran Salgado DO   No primary care provider on file.      Chief Complaint/Reason for Visit:  Ryan Chaves Procedure Laterality Date   • INSERTION OF ACCESS PORT  04/10/2019   • LAPAROSCOPIC ESOPHAGOGASTRECTOMY N/A 4/27/2018    Performed by Carmela Villaseñor MD at [address]   • LAPAROSCOPIC ESOPHAGOGASTRECTOMY Right 4/27/2018    Performed by Christen Barrientos., Rishabh Swanson meetings of clubs or organizations: Not on file        Relationship status: Not on file      Intimate partner violence:        Fear of current or ex partner: Not on file        Emotionally abused: Not on file        Physically abused: Not on file        Fo Hydrobromide 40 MG Oral Tab, Take 1 tablet (40 mg total) by mouth daily. , Disp: 30 tablet, Rfl: 11  •  Clindamycin Phosphate 1 % External Lotion, Apply 1 Application topically 2 (two) times daily. , Disp: 60 mL, Rfl: 2  •  Loperamide HCl 2 MG Oral Cap, Take American 118 >=60    GFR, -American 137 >=60    AST 18 15 - 37 U/L    ALT 44 16 - 61 U/L    Alkaline Phosphatase 149 (H) 45 - 117 U/L    Bilirubin, Total 0.3 0.1 - 2.0 mg/dL    Total Protein 6.6 6.4 - 8.2 g/dL    Albumin 3.4 3.4 - 5.0 g/dL    Huanul

## 2019-09-05 ENCOUNTER — OFFICE VISIT (OUTPATIENT)
Dept: HEMATOLOGY/ONCOLOGY | Age: 29
End: 2019-09-05
Attending: INTERNAL MEDICINE
Payer: COMMERCIAL

## 2019-09-05 VITALS
WEIGHT: 267.5 LBS | SYSTOLIC BLOOD PRESSURE: 127 MMHG | TEMPERATURE: 99 F | HEART RATE: 88 BPM | DIASTOLIC BLOOD PRESSURE: 83 MMHG | BODY MASS INDEX: 30.95 KG/M2 | HEIGHT: 77.99 IN | OXYGEN SATURATION: 97 % | RESPIRATION RATE: 18 BRPM

## 2019-09-05 DIAGNOSIS — C77.0 SECONDARY MALIGNANT NEOPLASM OF SUPRACLAVICULAR LYMPH NODE (HCC): ICD-10-CM

## 2019-09-05 DIAGNOSIS — C77.0 SECONDARY MALIGNANT NEOPLASM LYMPH NODES OF HEAD, FACE AND NECK (HCC): ICD-10-CM

## 2019-09-05 DIAGNOSIS — C77.2 SECONDARY MALIGNANT NEOPLASM OF RETROPERITONEAL LYMPH NODES (HCC): Primary | ICD-10-CM

## 2019-09-05 DIAGNOSIS — C16.0 GE JUNCTION CARCINOMA (HCC): Primary | ICD-10-CM

## 2019-09-05 DIAGNOSIS — C77.2 SECONDARY MALIGNANT NEOPLASM OF RETROPERITONEAL LYMPH NODES (HCC): ICD-10-CM

## 2019-09-05 DIAGNOSIS — C16.0 GE JUNCTION CARCINOMA (HCC): ICD-10-CM

## 2019-09-05 LAB
ALBUMIN SERPL-MCNC: 2.9 G/DL (ref 3.4–5)
ALBUMIN/GLOB SERPL: 0.9 {RATIO} (ref 1–2)
ALP LIVER SERPL-CCNC: 103 U/L (ref 45–117)
ALT SERPL-CCNC: 28 U/L (ref 16–61)
ANION GAP SERPL CALC-SCNC: 4 MMOL/L (ref 0–18)
AST SERPL-CCNC: 17 U/L (ref 15–37)
BASOPHILS # BLD AUTO: 0.02 X10(3) UL (ref 0–0.2)
BASOPHILS NFR BLD AUTO: 0.4 %
BILIRUB SERPL-MCNC: 0.2 MG/DL (ref 0.1–2)
BILIRUB UR QL STRIP.AUTO: NEGATIVE
BUN BLD-MCNC: 7 MG/DL (ref 7–18)
BUN/CREAT SERPL: 9.5 (ref 10–20)
CALCIUM BLD-MCNC: 8.2 MG/DL (ref 8.5–10.1)
CANCER AG19-9 SERPL-ACNC: 41.7 U/ML (ref ?–37)
CEA SERPL-MCNC: 2.2 NG/ML (ref ?–5)
CHLORIDE SERPL-SCNC: 111 MMOL/L (ref 98–112)
CLARITY UR REFRACT.AUTO: CLEAR
CO2 SERPL-SCNC: 26 MMOL/L (ref 21–32)
COLOR UR AUTO: YELLOW
CREAT BLD-MCNC: 0.74 MG/DL (ref 0.7–1.3)
DEPRECATED RDW RBC AUTO: 58.1 FL (ref 35.1–46.3)
EOSINOPHIL # BLD AUTO: 0.1 X10(3) UL (ref 0–0.7)
EOSINOPHIL NFR BLD AUTO: 1.8 %
ERYTHROCYTE [DISTWIDTH] IN BLOOD BY AUTOMATED COUNT: 17.2 % (ref 11–15)
GLOBULIN PLAS-MCNC: 3.1 G/DL (ref 2.8–4.4)
GLUCOSE BLD-MCNC: 84 MG/DL (ref 70–99)
GLUCOSE UR STRIP.AUTO-MCNC: NEGATIVE MG/DL
HCT VFR BLD AUTO: 37.5 % (ref 39–53)
HGB BLD-MCNC: 12.1 G/DL (ref 13–17.5)
IMM GRANULOCYTES # BLD AUTO: 0.03 X10(3) UL (ref 0–1)
IMM GRANULOCYTES NFR BLD: 0.6 %
KETONES UR STRIP.AUTO-MCNC: NEGATIVE MG/DL
LEUKOCYTE ESTERASE UR QL STRIP.AUTO: NEGATIVE
LYMPHOCYTES # BLD AUTO: 0.89 X10(3) UL (ref 1–4)
LYMPHOCYTES NFR BLD AUTO: 16.4 %
M PROTEIN MFR SERPL ELPH: 6 G/DL (ref 6.4–8.2)
MCH RBC QN AUTO: 29.8 PG (ref 26–34)
MCHC RBC AUTO-ENTMCNC: 32.3 G/DL (ref 31–37)
MCV RBC AUTO: 92.4 FL (ref 80–100)
MONOCYTES # BLD AUTO: 0.74 X10(3) UL (ref 0.1–1)
MONOCYTES NFR BLD AUTO: 13.7 %
NEUTROPHILS # BLD AUTO: 3.64 X10 (3) UL (ref 1.5–7.7)
NEUTROPHILS # BLD AUTO: 3.64 X10(3) UL (ref 1.5–7.7)
NEUTROPHILS NFR BLD AUTO: 67.1 %
NITRITE UR QL STRIP.AUTO: NEGATIVE
OSMOLALITY SERPL CALC.SUM OF ELEC: 289 MOSM/KG (ref 275–295)
PH UR STRIP.AUTO: 6 [PH] (ref 4.5–8)
PLATELET # BLD AUTO: 80 10(3)UL (ref 150–450)
POTASSIUM SERPL-SCNC: 4.1 MMOL/L (ref 3.5–5.1)
PROT UR STRIP.AUTO-MCNC: NEGATIVE MG/DL
RBC # BLD AUTO: 4.06 X10(6)UL (ref 4.3–5.7)
RBC UR QL AUTO: NEGATIVE
SODIUM SERPL-SCNC: 141 MMOL/L (ref 136–145)
SP GR UR STRIP.AUTO: 1.01 (ref 1–1.03)
UROBILINOGEN UR STRIP.AUTO-MCNC: 0.2 MG/DL
WBC # BLD AUTO: 5.4 X10(3) UL (ref 4–11)

## 2019-09-05 PROCEDURE — 81003 URINALYSIS AUTO W/O SCOPE: CPT

## 2019-09-05 PROCEDURE — S0028 INJECTION, FAMOTIDINE, 20 MG: HCPCS | Performed by: INTERNAL MEDICINE

## 2019-09-05 PROCEDURE — 86301 IMMUNOASSAY TUMOR CA 19-9: CPT

## 2019-09-05 PROCEDURE — 96377 APPLICATON ON-BODY INJECTOR: CPT

## 2019-09-05 PROCEDURE — 80053 COMPREHEN METABOLIC PANEL: CPT

## 2019-09-05 PROCEDURE — 96413 CHEMO IV INFUSION 1 HR: CPT

## 2019-09-05 PROCEDURE — 99214 OFFICE O/P EST MOD 30 MIN: CPT | Performed by: INTERNAL MEDICINE

## 2019-09-05 PROCEDURE — 96417 CHEMO IV INFUS EACH ADDL SEQ: CPT

## 2019-09-05 PROCEDURE — 85025 COMPLETE CBC W/AUTO DIFF WBC: CPT

## 2019-09-05 PROCEDURE — 96368 THER/DIAG CONCURRENT INF: CPT

## 2019-09-05 PROCEDURE — 96375 TX/PRO/DX INJ NEW DRUG ADDON: CPT

## 2019-09-05 PROCEDURE — 96367 TX/PROPH/DG ADDL SEQ IV INF: CPT

## 2019-09-05 PROCEDURE — 82378 CARCINOEMBRYONIC ANTIGEN: CPT

## 2019-09-05 RX ORDER — ATROPINE SULFATE 0.4 MG/ML
0.2 AMPUL (ML) INJECTION ONCE
Status: CANCELLED
Start: 2019-09-05

## 2019-09-05 RX ORDER — ATROPINE SULFATE 0.4 MG/ML
0.2 AMPUL (ML) INJECTION ONCE
Status: COMPLETED | OUTPATIENT
Start: 2019-09-05 | End: 2019-09-05

## 2019-09-05 RX ORDER — FLUOROURACIL 50 MG/ML
2400 INJECTION, SOLUTION INTRAVENOUS CONTINUOUS
Status: DISCONTINUED | OUTPATIENT
Start: 2019-09-05 | End: 2019-09-05

## 2019-09-05 RX ORDER — DIPHENHYDRAMINE HYDROCHLORIDE 50 MG/ML
50 INJECTION INTRAMUSCULAR; INTRAVENOUS ONCE
Status: CANCELLED
Start: 2019-09-05

## 2019-09-05 RX ORDER — FAMOTIDINE 10 MG/ML
20 INJECTION, SOLUTION INTRAVENOUS ONCE
Status: COMPLETED | OUTPATIENT
Start: 2019-09-05 | End: 2019-09-05

## 2019-09-05 RX ORDER — LORAZEPAM 2 MG/ML
INJECTION INTRAMUSCULAR EVERY 4 HOURS PRN
Status: CANCELLED | OUTPATIENT
Start: 2019-09-05

## 2019-09-05 RX ORDER — LORAZEPAM 0.5 MG/1
TABLET ORAL EVERY 4 HOURS PRN
Status: CANCELLED | OUTPATIENT
Start: 2019-09-05

## 2019-09-05 RX ORDER — FLUOROURACIL 50 MG/ML
2400 INJECTION, SOLUTION INTRAVENOUS CONTINUOUS
Status: CANCELLED | OUTPATIENT
Start: 2019-09-05

## 2019-09-05 RX ORDER — METOCLOPRAMIDE HYDROCHLORIDE 5 MG/ML
10 INJECTION INTRAMUSCULAR; INTRAVENOUS EVERY 6 HOURS PRN
Status: CANCELLED | OUTPATIENT
Start: 2019-09-05

## 2019-09-05 RX ORDER — DIPHENHYDRAMINE HYDROCHLORIDE 50 MG/ML
50 INJECTION INTRAMUSCULAR; INTRAVENOUS ONCE
Status: COMPLETED | OUTPATIENT
Start: 2019-09-05 | End: 2019-09-05

## 2019-09-05 RX ORDER — FAMOTIDINE 10 MG/ML
20 INJECTION, SOLUTION INTRAVENOUS ONCE
Status: CANCELLED
Start: 2019-09-05

## 2019-09-05 RX ORDER — ATROPINE SULFATE 0.4 MG/ML
0.2 AMPUL (ML) INJECTION ONCE
Status: CANCELLED | OUTPATIENT
Start: 2019-09-05

## 2019-09-05 RX ORDER — ONDANSETRON 2 MG/ML
8 INJECTION INTRAMUSCULAR; INTRAVENOUS EVERY 6 HOURS PRN
Status: CANCELLED | OUTPATIENT
Start: 2019-09-05

## 2019-09-05 RX ORDER — PROCHLORPERAZINE MALEATE 10 MG
10 TABLET ORAL EVERY 6 HOURS PRN
Status: CANCELLED | OUTPATIENT
Start: 2019-09-05

## 2019-09-05 RX ADMIN — DIPHENHYDRAMINE HYDROCHLORIDE 50 MG: 50 INJECTION INTRAMUSCULAR; INTRAVENOUS at 10:55:00

## 2019-09-05 RX ADMIN — FLUOROURACIL 6050 MG: 50 INJECTION, SOLUTION INTRAVENOUS at 15:16:00

## 2019-09-05 RX ADMIN — ATROPINE SULFATE 0.2 MG: 0.4 MG/ML AMPUL (ML) INJECTION at 13:31:00

## 2019-09-05 RX ADMIN — FAMOTIDINE 20 MG: 10 INJECTION, SOLUTION INTRAVENOUS at 11:07:00

## 2019-09-05 NOTE — PROGRESS NOTES
Patient is here for MD f/u and cycle 16 of chemo. Patient reports he has been bruising easily. This has improved this week. Denies hematuria, epitaxis or blood in stools. He is eating well. Mild fatigue. Sleeping well at night. Ongoing rash on face.  Sheryle Margo

## 2019-09-05 NOTE — PROGRESS NOTES
Pt here for C16D1.   Arrives Ambulating independently, accompanied by Family member           Patient denies possible pregnancy since last therapy cycle: Not Applicable    Modifications in dose or schedule: No-Okay to treat Pl[address] Ct [address] per MD.     Frequency o

## 2019-09-05 NOTE — PATIENT INSTRUCTIONS
On-body Injector for Neulasta Patient Instructions       Your On-Body Injection device was applied on this day:  9/5 at this time 3:20pm    Your dose of medication will start on this day: 9/6 at this time 6:20pm

## 2019-09-06 NOTE — PROGRESS NOTES
[facility]    PATIENT'S NAME: Herbert Vidal   ATTENDING PHYSICIAN: Dina Giron M.D.    PATIENT ACCOUNT #: [de-identified] LOCATION: [address] [place] [address] RECORD #: [id number] YOB: 1990   DATE OF SERVICE: 09/05/2019       CANCER dicyclomine 20 mg q.i.d. p.r.n., Lomotil 1 to 2 tablets q.i.d. p.r.n., doxycycline 100 mg twice daily, loperamide 2 to 4 mg q.i.d. p.r.n., lorazepam 1 mg q.6 h. p.r.n., meloxicam 15 mg daily, omeprazole 40 mg twice daily, oxycodone and acetaminophen 10/325 Sue Greenberg.

## 2019-09-11 ENCOUNTER — DIETICIAN VISIT (OUTPATIENT)
Dept: HEMATOLOGY/ONCOLOGY | Facility: HOSPITAL | Age: 29
End: 2019-09-11

## 2019-09-11 NOTE — PROGRESS NOTES
Brief Nutrition F/U Note as per chart reveiw     Patient Name: Thierry Jones  YOB: 1990  Medical Record Number: [id number]      Account Number: [de-identified]  Dietitian: Rafa Rodriguez RD     Date of review: 09/11/19     Diet Rx: high prote

## 2019-09-13 ENCOUNTER — TELEPHONE (OUTPATIENT)
Dept: HEMATOLOGY/ONCOLOGY | Facility: HOSPITAL | Age: 29
End: 2019-09-13

## 2019-09-13 NOTE — TELEPHONE ENCOUNTER
Kristie calling stating he has a cough for a few days after chemo, he takes mucinex and it helps but if he stops it comes back, instructed to take OTC Zyrtec daily and if not effective to call office back.

## 2019-09-19 ENCOUNTER — OFFICE VISIT (OUTPATIENT)
Dept: HEMATOLOGY/ONCOLOGY | Age: 29
End: 2019-09-19
Attending: INTERNAL MEDICINE
Payer: COMMERCIAL

## 2019-09-19 VITALS
WEIGHT: 268 LBS | DIASTOLIC BLOOD PRESSURE: 82 MMHG | BODY MASS INDEX: 31 KG/M2 | HEART RATE: 81 BPM | SYSTOLIC BLOOD PRESSURE: 120 MMHG | OXYGEN SATURATION: 96 % | TEMPERATURE: 99 F | RESPIRATION RATE: 16 BRPM

## 2019-09-19 DIAGNOSIS — C77.2 SECONDARY MALIGNANT NEOPLASM OF RETROPERITONEAL LYMPH NODES (HCC): Primary | ICD-10-CM

## 2019-09-19 DIAGNOSIS — C16.0 GE JUNCTION CARCINOMA (HCC): ICD-10-CM

## 2019-09-19 DIAGNOSIS — Z51.11 ENCOUNTER FOR CHEMOTHERAPY MANAGEMENT: ICD-10-CM

## 2019-09-19 DIAGNOSIS — C77.0 SECONDARY MALIGNANT NEOPLASM LYMPH NODES OF HEAD, FACE AND NECK (HCC): ICD-10-CM

## 2019-09-19 DIAGNOSIS — D64.81 ANEMIA DUE TO CHEMOTHERAPY: ICD-10-CM

## 2019-09-19 DIAGNOSIS — C77.0 SECONDARY MALIGNANT NEOPLASM OF SUPRACLAVICULAR LYMPH NODE (HCC): ICD-10-CM

## 2019-09-19 DIAGNOSIS — R11.2 CHEMOTHERAPY-INDUCED NAUSEA AND VOMITING: ICD-10-CM

## 2019-09-19 DIAGNOSIS — T45.1X5A ANEMIA DUE TO CHEMOTHERAPY: ICD-10-CM

## 2019-09-19 DIAGNOSIS — T45.1X5A CHEMOTHERAPY-INDUCED NAUSEA AND VOMITING: ICD-10-CM

## 2019-09-19 DIAGNOSIS — K21.9 GASTROESOPHAGEAL REFLUX DISEASE WITHOUT ESOPHAGITIS: ICD-10-CM

## 2019-09-19 LAB
ALBUMIN SERPL-MCNC: 3.1 G/DL (ref 3.4–5)
ALBUMIN/GLOB SERPL: 1 {RATIO} (ref 1–2)
ALP LIVER SERPL-CCNC: 119 U/L (ref 45–117)
ALT SERPL-CCNC: 33 U/L (ref 16–61)
ANION GAP SERPL CALC-SCNC: 8 MMOL/L (ref 0–18)
AST SERPL-CCNC: 21 U/L (ref 15–37)
BASOPHILS # BLD AUTO: 0.04 X10(3) UL (ref 0–0.2)
BASOPHILS NFR BLD AUTO: 0.7 %
BILIRUB SERPL-MCNC: 0.3 MG/DL (ref 0.1–2)
BUN BLD-MCNC: 8 MG/DL (ref 7–18)
BUN/CREAT SERPL: 9.6 (ref 10–20)
CALCIUM BLD-MCNC: 8.6 MG/DL (ref 8.5–10.1)
CANCER AG19-9 SERPL-ACNC: 38.7 U/ML (ref ?–37)
CEA SERPL-MCNC: 2.3 NG/ML (ref ?–5)
CHLORIDE SERPL-SCNC: 108 MMOL/L (ref 98–112)
CO2 SERPL-SCNC: 26 MMOL/L (ref 21–32)
CREAT BLD-MCNC: 0.83 MG/DL (ref 0.7–1.3)
DEPRECATED RDW RBC AUTO: 59.3 FL (ref 35.1–46.3)
EOSINOPHIL # BLD AUTO: 0.15 X10(3) UL (ref 0–0.7)
EOSINOPHIL NFR BLD AUTO: 2.6 %
ERYTHROCYTE [DISTWIDTH] IN BLOOD BY AUTOMATED COUNT: 18 % (ref 11–15)
GLOBULIN PLAS-MCNC: 3.2 G/DL (ref 2.8–4.4)
GLUCOSE BLD-MCNC: 119 MG/DL (ref 70–99)
HCT VFR BLD AUTO: 38 % (ref 39–53)
HGB BLD-MCNC: 11.9 G/DL (ref 13–17.5)
IMM GRANULOCYTES # BLD AUTO: 0.03 X10(3) UL (ref 0–1)
IMM GRANULOCYTES NFR BLD: 0.5 %
LYMPHOCYTES # BLD AUTO: 1.02 X10(3) UL (ref 1–4)
LYMPHOCYTES NFR BLD AUTO: 17.5 %
M PROTEIN MFR SERPL ELPH: 6.3 G/DL (ref 6.4–8.2)
MCH RBC QN AUTO: 29.1 PG (ref 26–34)
MCHC RBC AUTO-ENTMCNC: 31.3 G/DL (ref 31–37)
MCV RBC AUTO: 92.9 FL (ref 80–100)
MONOCYTES # BLD AUTO: 0.58 X10(3) UL (ref 0.1–1)
MONOCYTES NFR BLD AUTO: 10 %
NEUTROPHILS # BLD AUTO: 4 X10 (3) UL (ref 1.5–7.7)
NEUTROPHILS # BLD AUTO: 4 X10(3) UL (ref 1.5–7.7)
NEUTROPHILS NFR BLD AUTO: 68.7 %
OSMOLALITY SERPL CALC.SUM OF ELEC: 293 MOSM/KG (ref 275–295)
PLATELET # BLD AUTO: 92 10(3)UL (ref 150–450)
POTASSIUM SERPL-SCNC: 3.9 MMOL/L (ref 3.5–5.1)
RBC # BLD AUTO: 4.09 X10(6)UL (ref 4.3–5.7)
SODIUM SERPL-SCNC: 142 MMOL/L (ref 136–145)
WBC # BLD AUTO: 5.8 X10(3) UL (ref 4–11)

## 2019-09-19 PROCEDURE — 86301 IMMUNOASSAY TUMOR CA 19-9: CPT

## 2019-09-19 PROCEDURE — 96367 TX/PROPH/DG ADDL SEQ IV INF: CPT

## 2019-09-19 PROCEDURE — 85025 COMPLETE CBC W/AUTO DIFF WBC: CPT

## 2019-09-19 PROCEDURE — 80053 COMPREHEN METABOLIC PANEL: CPT

## 2019-09-19 PROCEDURE — 96417 CHEMO IV INFUS EACH ADDL SEQ: CPT

## 2019-09-19 PROCEDURE — 96377 APPLICATON ON-BODY INJECTOR: CPT

## 2019-09-19 PROCEDURE — S0028 INJECTION, FAMOTIDINE, 20 MG: HCPCS | Performed by: CLINICAL NURSE SPECIALIST

## 2019-09-19 PROCEDURE — 99215 OFFICE O/P EST HI 40 MIN: CPT | Performed by: CLINICAL NURSE SPECIALIST

## 2019-09-19 PROCEDURE — 82378 CARCINOEMBRYONIC ANTIGEN: CPT

## 2019-09-19 PROCEDURE — 96413 CHEMO IV INFUSION 1 HR: CPT

## 2019-09-19 PROCEDURE — 96376 TX/PRO/DX INJ SAME DRUG ADON: CPT

## 2019-09-19 PROCEDURE — 96368 THER/DIAG CONCURRENT INF: CPT

## 2019-09-19 PROCEDURE — 96375 TX/PRO/DX INJ NEW DRUG ADDON: CPT

## 2019-09-19 RX ORDER — PROCHLORPERAZINE MALEATE 10 MG
10 TABLET ORAL EVERY 6 HOURS PRN
Status: CANCELLED | OUTPATIENT
Start: 2019-09-19

## 2019-09-19 RX ORDER — DIPHENHYDRAMINE HYDROCHLORIDE 50 MG/ML
50 INJECTION INTRAMUSCULAR; INTRAVENOUS ONCE
Status: CANCELLED
Start: 2019-09-19

## 2019-09-19 RX ORDER — ATROPINE SULFATE 0.4 MG/ML
0.2 AMPUL (ML) INJECTION ONCE
Status: CANCELLED | OUTPATIENT
Start: 2019-09-19

## 2019-09-19 RX ORDER — ATROPINE SULFATE 0.4 MG/ML
0.2 AMPUL (ML) INJECTION ONCE
Status: CANCELLED
Start: 2019-09-19

## 2019-09-19 RX ORDER — METOCLOPRAMIDE HYDROCHLORIDE 5 MG/ML
10 INJECTION INTRAMUSCULAR; INTRAVENOUS EVERY 6 HOURS PRN
Status: CANCELLED | OUTPATIENT
Start: 2019-09-19

## 2019-09-19 RX ORDER — FLUOROURACIL 50 MG/ML
2400 INJECTION, SOLUTION INTRAVENOUS CONTINUOUS
Status: DISCONTINUED | OUTPATIENT
Start: 2019-09-19 | End: 2019-09-19

## 2019-09-19 RX ORDER — FAMOTIDINE 10 MG/ML
20 INJECTION, SOLUTION INTRAVENOUS ONCE
Status: CANCELLED
Start: 2019-09-19

## 2019-09-19 RX ORDER — ATROPINE SULFATE 0.4 MG/ML
0.2 AMPUL (ML) INJECTION ONCE
Status: COMPLETED | OUTPATIENT
Start: 2019-09-19 | End: 2019-09-19

## 2019-09-19 RX ORDER — ONDANSETRON 2 MG/ML
8 INJECTION INTRAMUSCULAR; INTRAVENOUS EVERY 6 HOURS PRN
Status: CANCELLED | OUTPATIENT
Start: 2019-09-19

## 2019-09-19 RX ORDER — FAMOTIDINE 10 MG/ML
20 INJECTION, SOLUTION INTRAVENOUS ONCE
Status: COMPLETED | OUTPATIENT
Start: 2019-09-19 | End: 2019-09-19

## 2019-09-19 RX ORDER — DIPHENHYDRAMINE HYDROCHLORIDE 50 MG/ML
50 INJECTION INTRAMUSCULAR; INTRAVENOUS ONCE
Status: COMPLETED | OUTPATIENT
Start: 2019-09-19 | End: 2019-09-19

## 2019-09-19 RX ORDER — LORAZEPAM 0.5 MG/1
TABLET ORAL EVERY 4 HOURS PRN
Status: CANCELLED | OUTPATIENT
Start: 2019-09-19

## 2019-09-19 RX ORDER — FLUOROURACIL 50 MG/ML
2400 INJECTION, SOLUTION INTRAVENOUS CONTINUOUS
Status: CANCELLED | OUTPATIENT
Start: 2019-09-19

## 2019-09-19 RX ORDER — LORAZEPAM 2 MG/ML
INJECTION INTRAMUSCULAR EVERY 4 HOURS PRN
Status: CANCELLED | OUTPATIENT
Start: 2019-09-19

## 2019-09-19 RX ADMIN — ATROPINE SULFATE 0.2 MG: 0.4 MG/ML AMPUL (ML) INJECTION at 13:00:00

## 2019-09-19 RX ADMIN — DIPHENHYDRAMINE HYDROCHLORIDE 50 MG: 50 INJECTION INTRAMUSCULAR; INTRAVENOUS at 09:45:00

## 2019-09-19 RX ADMIN — FLUOROURACIL 6050 MG: 50 INJECTION, SOLUTION INTRAVENOUS at 13:24:00

## 2019-09-19 RX ADMIN — FAMOTIDINE 20 MG: 10 INJECTION, SOLUTION INTRAVENOUS at 09:47:00

## 2019-09-19 RX ADMIN — ATROPINE SULFATE 0.2 MG: 0.4 MG/ML AMPUL (ML) INJECTION at 11:41:00

## 2019-09-19 NOTE — PATIENT INSTRUCTIONS
On-body Injector for Neulasta Patient Instructions       Your On-Body Injection device was applied on [employer]    Your dose of medication will start on 430pm    You may remove this device on 630pm      Important information to remember about the Neulast

## 2019-09-19 NOTE — PROGRESS NOTES
Pt here for C17D1.   Arrives Ambulating independently, accompanied by father          Modifications in dose or schedule: No     Frequency of blood return and site check throughout administration: Prior to administration   Discharged to Home, Ambulating inde

## 2019-09-19 NOTE — PROGRESS NOTES
ANP Visit Note    Patient Name: Ruchi Hung   YOB: 1990   Medical Record Number: [id number]   CSN: [id number]   Date of visit: 9/19/2019   Christa Donahue DO   No primary care provider on file.      Chief Complaint/Reason for Visit:  Rea Esquivel Sleep disturbance    • Uncomfortable fullness after meals    • Vomiting    • Vomiting blood        Surgical History:  Past Surgical History:   Procedure Laterality Date   • INSERTION OF ACCESS PORT  04/10/2019   • LAPAROSCOPIC ESOPHAGOGASTRECTOMY N/A 4/27/ together: Not on file        Attends Restorationism service: Not on file        Active member of club or organization: Not on file        Attends meetings of clubs or organizations: Not on file        Relationship status: Not on file      Intimate partner viole (COMPAZINE) 10 mg tablet, Take 1 tablet (10 mg total) by mouth every 6 (six) hours as needed for Nausea., Disp: 90 tablet, Rfl: 5  •  Citalopram Hydrobromide 40 MG Oral Tab, Take 1 tablet (40 mg total) by mouth daily. , Disp: 30 tablet, Rfl: 11  •  Clindamy 8.6 8.5 - 10.1 mg/dL    Calculated Osmolality 293 275 - 295 mOsm/kg    GFR, Non- 119 >=60    GFR, -American 137 >=60    AST 21 15 - 37 U/L    ALT 33 16 - 61 U/L    Alkaline Phosphatase 119 (H) 45 - 117 U/L    Bilirubin, Total 0.3 0.1 topical antibiotic       Emotional Well Being:  I have assessed the patient's emotional well-being and any concerns about anxiety or depression.   We discussed issues of distress, coping difficulties and social support systems and currently there are no act

## 2019-10-03 ENCOUNTER — OFFICE VISIT (OUTPATIENT)
Dept: HEMATOLOGY/ONCOLOGY | Age: 29
End: 2019-10-03
Attending: INTERNAL MEDICINE
Payer: COMMERCIAL

## 2019-10-03 VITALS
SYSTOLIC BLOOD PRESSURE: 121 MMHG | HEIGHT: 77.99 IN | OXYGEN SATURATION: 97 % | DIASTOLIC BLOOD PRESSURE: 87 MMHG | TEMPERATURE: 98 F | WEIGHT: 271 LBS | BODY MASS INDEX: 31.35 KG/M2 | RESPIRATION RATE: 18 BRPM | HEART RATE: 90 BPM

## 2019-10-03 DIAGNOSIS — Z51.11 ENCOUNTER FOR CHEMOTHERAPY MANAGEMENT: ICD-10-CM

## 2019-10-03 DIAGNOSIS — T45.1X5A ANEMIA DUE TO CHEMOTHERAPY: ICD-10-CM

## 2019-10-03 DIAGNOSIS — C77.2 SECONDARY MALIGNANT NEOPLASM OF RETROPERITONEAL LYMPH NODES (HCC): Primary | ICD-10-CM

## 2019-10-03 DIAGNOSIS — R59.0 RETROPERITONEAL LYMPHADENOPATHY: ICD-10-CM

## 2019-10-03 DIAGNOSIS — M79.601 RIGHT ARM PAIN: ICD-10-CM

## 2019-10-03 DIAGNOSIS — T45.1X5A CHEMOTHERAPY INDUCED DIARRHEA: ICD-10-CM

## 2019-10-03 DIAGNOSIS — K52.1 CHEMOTHERAPY INDUCED DIARRHEA: ICD-10-CM

## 2019-10-03 DIAGNOSIS — C77.0 SECONDARY MALIGNANT NEOPLASM OF SUPRACLAVICULAR LYMPH NODE (HCC): ICD-10-CM

## 2019-10-03 DIAGNOSIS — C16.0 GE JUNCTION CARCINOMA (HCC): ICD-10-CM

## 2019-10-03 DIAGNOSIS — D69.6 THROMBOCYTOPENIA (HCC): ICD-10-CM

## 2019-10-03 DIAGNOSIS — F41.9 ANXIETY: ICD-10-CM

## 2019-10-03 DIAGNOSIS — C77.0 SECONDARY MALIGNANT NEOPLASM LYMPH NODES OF HEAD, FACE AND NECK (HCC): ICD-10-CM

## 2019-10-03 DIAGNOSIS — D64.81 ANEMIA DUE TO CHEMOTHERAPY: ICD-10-CM

## 2019-10-03 DIAGNOSIS — L73.9 FOLLICULITIS: ICD-10-CM

## 2019-10-03 PROCEDURE — 96376 TX/PRO/DX INJ SAME DRUG ADON: CPT

## 2019-10-03 PROCEDURE — 96377 APPLICATON ON-BODY INJECTOR: CPT

## 2019-10-03 PROCEDURE — 96368 THER/DIAG CONCURRENT INF: CPT

## 2019-10-03 PROCEDURE — 96417 CHEMO IV INFUS EACH ADDL SEQ: CPT

## 2019-10-03 PROCEDURE — 96367 TX/PROPH/DG ADDL SEQ IV INF: CPT

## 2019-10-03 PROCEDURE — 86301 IMMUNOASSAY TUMOR CA 19-9: CPT

## 2019-10-03 PROCEDURE — 96413 CHEMO IV INFUSION 1 HR: CPT

## 2019-10-03 PROCEDURE — S0028 INJECTION, FAMOTIDINE, 20 MG: HCPCS | Performed by: CLINICAL NURSE SPECIALIST

## 2019-10-03 PROCEDURE — 82378 CARCINOEMBRYONIC ANTIGEN: CPT

## 2019-10-03 PROCEDURE — 96375 TX/PRO/DX INJ NEW DRUG ADDON: CPT

## 2019-10-03 PROCEDURE — 80053 COMPREHEN METABOLIC PANEL: CPT

## 2019-10-03 PROCEDURE — 85025 COMPLETE CBC W/AUTO DIFF WBC: CPT

## 2019-10-03 PROCEDURE — 99215 OFFICE O/P EST HI 40 MIN: CPT | Performed by: CLINICAL NURSE SPECIALIST

## 2019-10-03 RX ORDER — FLUOROURACIL 50 MG/ML
2400 INJECTION, SOLUTION INTRAVENOUS CONTINUOUS
Status: CANCELLED | OUTPATIENT
Start: 2019-10-03

## 2019-10-03 RX ORDER — DIPHENHYDRAMINE HYDROCHLORIDE 50 MG/ML
50 INJECTION INTRAMUSCULAR; INTRAVENOUS ONCE
Status: CANCELLED
Start: 2019-10-03

## 2019-10-03 RX ORDER — ATROPINE SULFATE 0.4 MG/ML
0.2 AMPUL (ML) INJECTION ONCE
Status: CANCELLED | OUTPATIENT
Start: 2019-10-03

## 2019-10-03 RX ORDER — ATROPINE SULFATE 0.4 MG/ML
0.2 AMPUL (ML) INJECTION ONCE
Status: COMPLETED | OUTPATIENT
Start: 2019-10-03 | End: 2019-10-03

## 2019-10-03 RX ORDER — FAMOTIDINE 10 MG/ML
20 INJECTION, SOLUTION INTRAVENOUS ONCE
Status: COMPLETED | OUTPATIENT
Start: 2019-10-03 | End: 2019-10-03

## 2019-10-03 RX ORDER — LORAZEPAM 0.5 MG/1
TABLET ORAL EVERY 4 HOURS PRN
Status: CANCELLED | OUTPATIENT
Start: 2019-10-03

## 2019-10-03 RX ORDER — FLUOROURACIL 50 MG/ML
2400 INJECTION, SOLUTION INTRAVENOUS CONTINUOUS
Status: DISCONTINUED | OUTPATIENT
Start: 2019-10-03 | End: 2019-10-03

## 2019-10-03 RX ORDER — OLANZAPINE 5 MG/1
5 TABLET ORAL NIGHTLY
Refills: 5 | COMMUNITY
Start: 2019-09-03 | End: 2020-03-11

## 2019-10-03 RX ORDER — ATROPINE SULFATE 0.4 MG/ML
0.2 AMPUL (ML) INJECTION ONCE
Status: CANCELLED
Start: 2019-10-03

## 2019-10-03 RX ORDER — METOCLOPRAMIDE HYDROCHLORIDE 5 MG/ML
10 INJECTION INTRAMUSCULAR; INTRAVENOUS EVERY 6 HOURS PRN
Status: CANCELLED | OUTPATIENT
Start: 2019-10-03

## 2019-10-03 RX ORDER — ONDANSETRON 2 MG/ML
8 INJECTION INTRAMUSCULAR; INTRAVENOUS EVERY 6 HOURS PRN
Status: CANCELLED | OUTPATIENT
Start: 2019-10-03

## 2019-10-03 RX ORDER — LORAZEPAM 2 MG/ML
INJECTION INTRAMUSCULAR EVERY 4 HOURS PRN
Status: CANCELLED | OUTPATIENT
Start: 2019-10-03

## 2019-10-03 RX ORDER — DIPHENHYDRAMINE HYDROCHLORIDE 50 MG/ML
50 INJECTION INTRAMUSCULAR; INTRAVENOUS ONCE
Status: COMPLETED | OUTPATIENT
Start: 2019-10-03 | End: 2019-10-03

## 2019-10-03 RX ORDER — DIPHENOXYLATE HYDROCHLORIDE AND ATROPINE SULFATE 2.5; .025 MG/1; MG/1
1-2 TABLET ORAL 4 TIMES DAILY PRN
Qty: 60 TABLET | Refills: 1 | Status: SHIPPED | OUTPATIENT
Start: 2019-10-03 | End: 2020-02-24

## 2019-10-03 RX ORDER — PROCHLORPERAZINE MALEATE 10 MG
10 TABLET ORAL EVERY 6 HOURS PRN
Status: CANCELLED | OUTPATIENT
Start: 2019-10-03

## 2019-10-03 RX ORDER — FAMOTIDINE 10 MG/ML
20 INJECTION, SOLUTION INTRAVENOUS ONCE
Status: CANCELLED
Start: 2019-10-03

## 2019-10-03 RX ADMIN — ATROPINE SULFATE 0.2 MG: 0.4 MG/ML AMPUL (ML) INJECTION at 12:52:00

## 2019-10-03 RX ADMIN — DIPHENHYDRAMINE HYDROCHLORIDE 50 MG: 50 INJECTION INTRAMUSCULAR; INTRAVENOUS at 09:45:00

## 2019-10-03 RX ADMIN — FLUOROURACIL 6050 MG: 50 INJECTION, SOLUTION INTRAVENOUS at 13:35:00

## 2019-10-03 RX ADMIN — FAMOTIDINE 20 MG: 10 INJECTION, SOLUTION INTRAVENOUS at 09:49:00

## 2019-10-03 RX ADMIN — ATROPINE SULFATE 0.2 MG: 0.4 MG/ML AMPUL (ML) INJECTION at 11:48:00

## 2019-10-03 NOTE — PROGRESS NOTES
ANP Visit Note    Patient Name: Seferino Sheppard   YOB: 1990   Medical Record Number: [id number]   CSN: [id number]   Date of visit: 10/3/2019   Gilmer Ortega DO   No primary care provider on file.      Chief Complaint/Reason for Visit:  Ladeananci Monday Flatulence/gas pain/belching    • Frequent urination    • GE junction carcinoma (HCC)    • Indigestion    • Loss of appetite    • Personal history of antineoplastic chemotherapy     03/01/2018 - No Port   • Sleep disturbance    • Uncomfortable fullness aft Physical activity:        Days per week: Not on file        Minutes per session: Not on file      Stress: Not on file    Relationships      Social connections:        Talks on phone: Not on file        Gets together: Not on file        Attends Worship se Capsule Delayed Release, Take 1 capsule (40 mg total) by mouth 2 (two) times daily. , Disp: 60 capsule, Rfl: 0  •  DOXYCYCLINE HYCLATE 100 MG Oral Cap, TAKE 1 CAPSULE(100 MG) BY MOUTH TWICE DAILY, Disp: 60 capsule, Rfl: 2  •  Prochlorperazine Maleate (LILLY (L) 150.0 - 450.0 10(3)uL    MCV 91.8 80.0 - 100.0 fL    MCH 28.7 26.0 - 34.0 pg    MCHC 31.3 31.0 - 37.0 g/dL    RDW 17.7 (H) 11.0 - 15.0 %    RDW-SD 57.9 (H) 35.1 - 46.3 fL    Neutrophil Absolute Prelim 4.54 1.50 - 7.70 x10 (3) uL    Neutrophil Absolute

## 2019-10-03 NOTE — PROGRESS NOTES
Patient is here for APN assessment and cycle 18 of chemo. Patient states pain in right bicep returned last week. Started Prednisone at home. Pain is still persistent. Ongoing diarrhea. Patient needs a refill on Lomotil today.          Education Record    Cristal Bello

## 2019-10-03 NOTE — PATIENT INSTRUCTIONS
On-body Injector for Neulasta Patient Instructions       Your On-Body Injection device was applied on this day:  10/3 at this time 1:30 pm    Your dose of medication will start on this day: 10/4 at this time 4:30

## 2019-10-03 NOTE — PROGRESS NOTES
Pt here for C18D8.   Arrives Ambulating independently, accompanied by Self           Patient denies possible pregnancy since last therapy cycle: Not Applicable    Modifications in dose or schedule: No-okay to treat per Nori (plt ct 93)     Frequency of blood

## 2019-10-07 ENCOUNTER — OFFICE VISIT (OUTPATIENT)
Dept: HEMATOLOGY/ONCOLOGY | Age: 29
End: 2019-10-07
Attending: INTERNAL MEDICINE
Payer: COMMERCIAL

## 2019-10-07 ENCOUNTER — TELEPHONE (OUTPATIENT)
Dept: HEMATOLOGY/ONCOLOGY | Facility: HOSPITAL | Age: 29
End: 2019-10-07

## 2019-10-07 VITALS
RESPIRATION RATE: 20 BRPM | BODY MASS INDEX: 32 KG/M2 | WEIGHT: 274.69 LBS | TEMPERATURE: 98 F | DIASTOLIC BLOOD PRESSURE: 78 MMHG | SYSTOLIC BLOOD PRESSURE: 111 MMHG | OXYGEN SATURATION: 96 % | HEART RATE: 105 BPM

## 2019-10-07 DIAGNOSIS — L08.9 SKIN INFECTION: ICD-10-CM

## 2019-10-07 DIAGNOSIS — C16.0 GE JUNCTION CARCINOMA (HCC): ICD-10-CM

## 2019-10-07 DIAGNOSIS — C77.2 SECONDARY MALIGNANT NEOPLASM OF RETROPERITONEAL LYMPH NODES (HCC): Primary | ICD-10-CM

## 2019-10-07 DIAGNOSIS — C77.0 SECONDARY MALIGNANT NEOPLASM OF SUPRACLAVICULAR LYMPH NODE (HCC): ICD-10-CM

## 2019-10-07 PROCEDURE — 99213 OFFICE O/P EST LOW 20 MIN: CPT | Performed by: CLINICAL NURSE SPECIALIST

## 2019-10-07 RX ORDER — CEPHALEXIN 500 MG/1
500 CAPSULE ORAL 4 TIMES DAILY
Qty: 40 CAPSULE | Refills: 0 | Status: SHIPPED | OUTPATIENT
Start: 2019-10-07 | End: 2019-10-30 | Stop reason: ALTCHOICE

## 2019-10-07 NOTE — PROGRESS NOTES
ANP Visit Note    Patient Name: Keri Winston   YOB: 1990   Medical Record Number: [id number]   CSN: [id number]   Date of visit: 10/7/2019   Laine Jones, DO   No primary care provider on file.      Chief Complaint/Reason for Visit:  Serena Sigala Procedure Laterality Date   • INSERTION OF ACCESS PORT  04/10/2019   • LAPAROSCOPIC ESOPHAGOGASTRECTOMY N/A 4/27/2018    Performed by Desiree Fragoso MD at [address] [place] [address]   • LAPAROSCOPIC ESOPHAGOGASTRECTOMY Right 4/27/2018    Performed by Rubens Rodriguez, Braden Pierce meetings of clubs or organizations: Not on file        Relationship status: Not on file      Intimate partner violence:        Fear of current or ex partner: Not on file        Emotionally abused: Not on file        Physically abused: Not on file        Fo Prochlorperazine Maleate (COMPAZINE) 10 mg tablet, Take 1 tablet (10 mg total) by mouth every 6 (six) hours as needed for Nausea., Disp: 90 tablet, Rfl: 5  •  Citalopram Hydrobromide 40 MG Oral Tab, Take 1 tablet (40 mg total) by mouth daily. , Disp: 30 tab Risk Level: High: Esophageal Cancer with PAC site skin infection     Electronically Signed by:    Tapan Walden ANP-BC  Nurse Practitioner  [address]

## 2019-10-10 ENCOUNTER — OFFICE VISIT (OUTPATIENT)
Dept: HEMATOLOGY/ONCOLOGY | Age: 29
End: 2019-10-10
Attending: INTERNAL MEDICINE
Payer: COMMERCIAL

## 2019-10-10 VITALS
OXYGEN SATURATION: 96 % | TEMPERATURE: 98 F | SYSTOLIC BLOOD PRESSURE: 117 MMHG | RESPIRATION RATE: 20 BRPM | HEIGHT: 77.99 IN | DIASTOLIC BLOOD PRESSURE: 81 MMHG | BODY MASS INDEX: 30.91 KG/M2 | WEIGHT: 267.13 LBS | HEART RATE: 82 BPM

## 2019-10-10 DIAGNOSIS — R11.2 CHEMOTHERAPY-INDUCED NAUSEA AND VOMITING: ICD-10-CM

## 2019-10-10 DIAGNOSIS — F41.9 ANXIETY: ICD-10-CM

## 2019-10-10 DIAGNOSIS — C77.0 SECONDARY MALIGNANT NEOPLASM LYMPH NODES OF HEAD, FACE AND NECK (HCC): ICD-10-CM

## 2019-10-10 DIAGNOSIS — C77.0 SECONDARY MALIGNANT NEOPLASM OF SUPRACLAVICULAR LYMPH NODE (HCC): ICD-10-CM

## 2019-10-10 DIAGNOSIS — D69.6 THROMBOCYTOPENIA (HCC): ICD-10-CM

## 2019-10-10 DIAGNOSIS — C16.0 GE JUNCTION CARCINOMA (HCC): ICD-10-CM

## 2019-10-10 DIAGNOSIS — C77.2 SECONDARY MALIGNANT NEOPLASM OF RETROPERITONEAL LYMPH NODES (HCC): Primary | ICD-10-CM

## 2019-10-10 DIAGNOSIS — Z51.11 ENCOUNTER FOR CHEMOTHERAPY MANAGEMENT: ICD-10-CM

## 2019-10-10 DIAGNOSIS — K52.1 CHEMOTHERAPY INDUCED DIARRHEA: ICD-10-CM

## 2019-10-10 DIAGNOSIS — T45.1X5A ANEMIA DUE TO CHEMOTHERAPY: ICD-10-CM

## 2019-10-10 DIAGNOSIS — D64.81 ANEMIA DUE TO CHEMOTHERAPY: ICD-10-CM

## 2019-10-10 DIAGNOSIS — T45.1X5A CHEMOTHERAPY-INDUCED NAUSEA AND VOMITING: ICD-10-CM

## 2019-10-10 DIAGNOSIS — S29.011A STRAIN OF LEFT PECTORALIS MUSCLE, INITIAL ENCOUNTER: ICD-10-CM

## 2019-10-10 DIAGNOSIS — T45.1X5A CHEMOTHERAPY INDUCED DIARRHEA: ICD-10-CM

## 2019-10-10 PROCEDURE — 99213 OFFICE O/P EST LOW 20 MIN: CPT | Performed by: CLINICAL NURSE SPECIALIST

## 2019-10-10 NOTE — PROGRESS NOTES
ANP Visit Note    Patient Name: Shaan Parekh   YOB: 1990   Medical Record Number: [id number]   CSN: [id number]   Date of visit: 10/10/2019   Winter Vyas DO   No primary care provider on file.      Chief Complaint/Reason for Visit:  [facility] History:  Past Surgical History:   Procedure Laterality Date   • INSERTION OF ACCESS PORT  04/10/2019   • LAPAROSCOPIC ESOPHAGOGASTRECTOMY N/A 4/27/2018    Performed by Rukhsana So MD at [facility] MAIN OR   • LAPAROSCOPIC ESOPHAGOGASTRECTOMY Right 4/27/2018 organization: Not on file        Attends meetings of clubs or organizations: Not on file        Relationship status: Not on file      Intimate partner violence:        Fear of current or ex partner: Not on file        Emotionally abused: Not on file DAILY, Disp: 60 capsule, Rfl: 2  •  Prochlorperazine Maleate (COMPAZINE) 10 mg tablet, Take 1 tablet (10 mg total) by mouth every 6 (six) hours as needed for Nausea., Disp: 90 tablet, Rfl: 5  •  Citalopram Hydrobromide 40 MG Oral Tab, Take 1 tablet (40 mg Well Being:  I have assessed the patient's emotional well-being and any concerns about anxiety or depression. We discussed issues of distress, coping difficulties and social support systems and currently there are no active problems.     Planned Follow Up:

## 2019-10-14 RX ORDER — PREDNISONE 20 MG/1
20 TABLET ORAL DAILY
Qty: 30 TABLET | Refills: 0 | Status: SHIPPED | OUTPATIENT
Start: 2019-10-14 | End: 2019-10-30 | Stop reason: ALTCHOICE

## 2019-10-17 ENCOUNTER — OFFICE VISIT (OUTPATIENT)
Dept: HEMATOLOGY/ONCOLOGY | Age: 29
End: 2019-10-17
Attending: INTERNAL MEDICINE
Payer: COMMERCIAL

## 2019-10-17 ENCOUNTER — SOCIAL WORK SERVICES (OUTPATIENT)
Dept: HEMATOLOGY/ONCOLOGY | Facility: HOSPITAL | Age: 29
End: 2019-10-17

## 2019-10-17 VITALS
HEART RATE: 72 BPM | WEIGHT: 269.5 LBS | SYSTOLIC BLOOD PRESSURE: 122 MMHG | BODY MASS INDEX: 31 KG/M2 | TEMPERATURE: 98 F | OXYGEN SATURATION: 97 % | RESPIRATION RATE: 18 BRPM | DIASTOLIC BLOOD PRESSURE: 84 MMHG

## 2019-10-17 DIAGNOSIS — C77.2 SECONDARY MALIGNANT NEOPLASM OF RETROPERITONEAL LYMPH NODES (HCC): ICD-10-CM

## 2019-10-17 DIAGNOSIS — C77.0 SECONDARY MALIGNANT NEOPLASM LYMPH NODES OF HEAD, FACE AND NECK (HCC): ICD-10-CM

## 2019-10-17 DIAGNOSIS — C77.2 SECONDARY MALIGNANT NEOPLASM OF RETROPERITONEAL LYMPH NODES (HCC): Primary | ICD-10-CM

## 2019-10-17 DIAGNOSIS — T45.1X5A ANEMIA DUE TO CHEMOTHERAPY: ICD-10-CM

## 2019-10-17 DIAGNOSIS — C16.0 GE JUNCTION CARCINOMA (HCC): Primary | ICD-10-CM

## 2019-10-17 DIAGNOSIS — D64.81 ANEMIA DUE TO CHEMOTHERAPY: ICD-10-CM

## 2019-10-17 DIAGNOSIS — C16.0 GE JUNCTION CARCINOMA (HCC): ICD-10-CM

## 2019-10-17 DIAGNOSIS — C77.0 SECONDARY MALIGNANT NEOPLASM OF SUPRACLAVICULAR LYMPH NODE (HCC): ICD-10-CM

## 2019-10-17 DIAGNOSIS — D69.6 THROMBOCYTOPENIA (HCC): ICD-10-CM

## 2019-10-17 PROCEDURE — 86301 IMMUNOASSAY TUMOR CA 19-9: CPT

## 2019-10-17 PROCEDURE — 96367 TX/PROPH/DG ADDL SEQ IV INF: CPT

## 2019-10-17 PROCEDURE — 96375 TX/PRO/DX INJ NEW DRUG ADDON: CPT

## 2019-10-17 PROCEDURE — 85025 COMPLETE CBC W/AUTO DIFF WBC: CPT

## 2019-10-17 PROCEDURE — 96417 CHEMO IV INFUS EACH ADDL SEQ: CPT

## 2019-10-17 PROCEDURE — 80053 COMPREHEN METABOLIC PANEL: CPT

## 2019-10-17 PROCEDURE — 82378 CARCINOEMBRYONIC ANTIGEN: CPT

## 2019-10-17 PROCEDURE — 96377 APPLICATON ON-BODY INJECTOR: CPT

## 2019-10-17 PROCEDURE — S0028 INJECTION, FAMOTIDINE, 20 MG: HCPCS | Performed by: INTERNAL MEDICINE

## 2019-10-17 PROCEDURE — 96413 CHEMO IV INFUSION 1 HR: CPT

## 2019-10-17 PROCEDURE — 96368 THER/DIAG CONCURRENT INF: CPT

## 2019-10-17 PROCEDURE — 99214 OFFICE O/P EST MOD 30 MIN: CPT | Performed by: INTERNAL MEDICINE

## 2019-10-17 RX ORDER — ATROPINE SULFATE 0.4 MG/ML
0.2 AMPUL (ML) INJECTION ONCE
Status: CANCELLED | OUTPATIENT
Start: 2019-10-17

## 2019-10-17 RX ORDER — DIPHENHYDRAMINE HYDROCHLORIDE 50 MG/ML
50 INJECTION INTRAMUSCULAR; INTRAVENOUS ONCE
Status: CANCELLED
Start: 2019-10-17

## 2019-10-17 RX ORDER — ATROPINE SULFATE 0.4 MG/ML
0.2 AMPUL (ML) INJECTION ONCE
Status: COMPLETED | OUTPATIENT
Start: 2019-10-17 | End: 2019-10-17

## 2019-10-17 RX ORDER — LORAZEPAM 2 MG/ML
INJECTION INTRAMUSCULAR EVERY 4 HOURS PRN
Status: CANCELLED | OUTPATIENT
Start: 2019-10-17

## 2019-10-17 RX ORDER — PROCHLORPERAZINE MALEATE 10 MG
10 TABLET ORAL EVERY 6 HOURS PRN
Status: CANCELLED | OUTPATIENT
Start: 2019-10-17

## 2019-10-17 RX ORDER — LORAZEPAM 0.5 MG/1
TABLET ORAL EVERY 4 HOURS PRN
Status: CANCELLED | OUTPATIENT
Start: 2019-10-17

## 2019-10-17 RX ORDER — FAMOTIDINE 10 MG/ML
20 INJECTION, SOLUTION INTRAVENOUS ONCE
Status: CANCELLED
Start: 2019-10-17

## 2019-10-17 RX ORDER — FLUOROURACIL 50 MG/ML
2400 INJECTION, SOLUTION INTRAVENOUS CONTINUOUS
Status: DISCONTINUED | OUTPATIENT
Start: 2019-10-17 | End: 2019-10-17

## 2019-10-17 RX ORDER — FLUOROURACIL 50 MG/ML
2400 INJECTION, SOLUTION INTRAVENOUS CONTINUOUS
Status: CANCELLED | OUTPATIENT
Start: 2019-10-17

## 2019-10-17 RX ORDER — DIPHENHYDRAMINE HYDROCHLORIDE 50 MG/ML
50 INJECTION INTRAMUSCULAR; INTRAVENOUS ONCE
Status: COMPLETED | OUTPATIENT
Start: 2019-10-17 | End: 2019-10-17

## 2019-10-17 RX ORDER — METOCLOPRAMIDE HYDROCHLORIDE 5 MG/ML
10 INJECTION INTRAMUSCULAR; INTRAVENOUS EVERY 6 HOURS PRN
Status: CANCELLED | OUTPATIENT
Start: 2019-10-17

## 2019-10-17 RX ORDER — FAMOTIDINE 10 MG/ML
20 INJECTION, SOLUTION INTRAVENOUS ONCE
Status: COMPLETED | OUTPATIENT
Start: 2019-10-17 | End: 2019-10-17

## 2019-10-17 RX ORDER — ONDANSETRON 2 MG/ML
8 INJECTION INTRAMUSCULAR; INTRAVENOUS EVERY 6 HOURS PRN
Status: CANCELLED | OUTPATIENT
Start: 2019-10-17

## 2019-10-17 RX ORDER — ATROPINE SULFATE 0.4 MG/ML
0.2 AMPUL (ML) INJECTION ONCE
Status: CANCELLED
Start: 2019-10-17

## 2019-10-17 RX ORDER — PREDNISONE 1 MG/1
TABLET ORAL
Qty: 60 TABLET | Refills: 2 | Status: SHIPPED | OUTPATIENT
Start: 2019-10-17 | End: 2019-11-07 | Stop reason: ALTCHOICE

## 2019-10-17 RX ADMIN — ATROPINE SULFATE 0.2 MG: 0.4 MG/ML AMPUL (ML) INJECTION at 12:33:00

## 2019-10-17 RX ADMIN — FAMOTIDINE 20 MG: 10 INJECTION, SOLUTION INTRAVENOUS at 10:35:00

## 2019-10-17 RX ADMIN — DIPHENHYDRAMINE HYDROCHLORIDE 50 MG: 50 INJECTION INTRAMUSCULAR; INTRAVENOUS at 10:34:00

## 2019-10-17 RX ADMIN — FLUOROURACIL 6050 MG: 50 INJECTION, SOLUTION INTRAVENOUS at 14:21:00

## 2019-10-17 NOTE — PATIENT INSTRUCTIONS
On-body Injector for Neulasta Patient Instructions       Your On-Body Injection device was applied on this day:  Thursday, October 17th at this time 2:30pm    Your dose of medication will start on this day: Ladan Factor

## 2019-10-17 NOTE — PROGRESS NOTES
Patient is here for her two-week postoperative visit. She had hysterectomy on 82. She has been doing very well but picked up her laundry basket and feels like there is some incisional tenderness. She has no fevers or chills she is eating well moving well and not taking any narcotics. Her bowels and bladder are well.    Physical exam alert female no acute distress her abdomen is soft and nontender she has no CVA tenderness all of her incisions are clean dry and intact and well-healing I feel no hernias were no masses    Impression stable postop hysterectomy with endometriosis pathology reviewed    Plan follow-up 4 weeks discussed return to work   [facility]    PATIENT'S NAME: Deepti LUTZ   ATTENDING PHYSICIAN: Audra Richard M.D.    PATIENT ACCOUNT #: [de-identified] LOCATION: [address] [place] [address] RECORD #: [id number] YOB: 1990   DATE OF SERVICE: 10/17/2019       CANCER He is accompanied by a family member today.     MEDICATIONS:  His current medications include cephalexin 500 mg q.i.d., citalopram 40 mg daily, clindamycin topical lotion b.i.d., dexamethasone 8 mg twice daily for 5 days with each chemotherapy cycle, dicyc of the chemotherapy including weakness, fatigue, rash, diarrhea and some nausea. He has modest chronic cytopenia which is not getting worse, so it is not a limit to him staying on the current medications.   We will see him again this time in 3 weeks unless

## 2019-10-17 NOTE — PROGRESS NOTES
Pt here for C19D1.   Arrives Ambulating independently, accompanied by Self and Family member           Patient denies possible pregnancy since last therapy cycle: Not Applicable    Modifications in dose or schedule: Yes- per MD Matos patient will be on a s

## 2019-10-17 NOTE — PROGRESS NOTES
Patient is here for MD f/u and cycle 19 of chemo. Patient states right arm pain has improved with Prednisone 20 mg daily. Rash has improved on face and chest. Currently on antibiotics for redness around port a cath. This has improved. No GI complaints.  Eat

## 2019-10-21 ENCOUNTER — LAB ENCOUNTER (OUTPATIENT)
Dept: LAB | Facility: HOSPITAL | Age: 29
End: 2019-10-21
Attending: INTERNAL MEDICINE
Payer: COMMERCIAL

## 2019-10-21 DIAGNOSIS — C80.1 ADENOCARCINOMA (HCC): Primary | ICD-10-CM

## 2019-10-30 ENCOUNTER — OFFICE VISIT (OUTPATIENT)
Dept: FAMILY MEDICINE CLINIC | Facility: CLINIC | Age: 29
End: 2019-10-30
Payer: COMMERCIAL

## 2019-10-30 VITALS
BODY MASS INDEX: 31 KG/M2 | OXYGEN SATURATION: 97 % | SYSTOLIC BLOOD PRESSURE: 112 MMHG | TEMPERATURE: 98 F | WEIGHT: 271 LBS | HEART RATE: 94 BPM | DIASTOLIC BLOOD PRESSURE: 80 MMHG

## 2019-10-30 DIAGNOSIS — C16.0 GE JUNCTION CARCINOMA (HCC): ICD-10-CM

## 2019-10-30 DIAGNOSIS — J02.9 PHARYNGITIS, UNSPECIFIED ETIOLOGY: Primary | ICD-10-CM

## 2019-10-30 DIAGNOSIS — L84 CORN OF FOOT: ICD-10-CM

## 2019-10-30 PROCEDURE — 99214 OFFICE O/P EST MOD 30 MIN: CPT | Performed by: FAMILY MEDICINE

## 2019-10-30 PROCEDURE — 87081 CULTURE SCREEN ONLY: CPT | Performed by: FAMILY MEDICINE

## 2019-10-30 RX ORDER — PREDNISONE 20 MG/1
40 TABLET ORAL DAILY
Qty: 6 TABLET | Refills: 0 | Status: SHIPPED | OUTPATIENT
Start: 2019-10-30 | End: 2019-11-07 | Stop reason: ALTCHOICE

## 2019-10-30 RX ORDER — AZITHROMYCIN 250 MG/1
TABLET, FILM COATED ORAL
Qty: 6 TABLET | Refills: 0 | Status: SHIPPED | OUTPATIENT
Start: 2019-10-30 | End: 2019-11-07 | Stop reason: ALTCHOICE

## 2019-10-30 NOTE — PROGRESS NOTES
HPI:    Patient ID: Suman Broderick is a 26year old male.  + head tiago / ST / cough - occas  X 3 days  Cancer treatment stable per patient. HPI    Review of Systems   Constitutional: Positive for chills and fever. HENT: Positive for sore throat. BREATH   PHYSICAL EXAM:   Physical Exam   Constitutional: He is oriented to person, place, and time. He appears well-developed and well-nourished. No distress.    HENT:   Right Ear: External ear normal.   Left Ear: External ear normal.   No

## 2019-10-30 NOTE — PATIENT INSTRUCTIONS
REST,FLUIDS,ADVIL / TYLENOL PRN fever / body aches  CALL NO CHANGE WORSENING  DISCUSSED WARNING SIGNS  F/U No change 2-3 days  Discussed Compound W, soaking, duct tape for foot, corn. Continue follow-up with oncology. Call with questions or problems.   Wa

## 2019-11-07 ENCOUNTER — OFFICE VISIT (OUTPATIENT)
Dept: HEMATOLOGY/ONCOLOGY | Age: 29
End: 2019-11-07
Attending: INTERNAL MEDICINE
Payer: COMMERCIAL

## 2019-11-07 VITALS
HEART RATE: 97 BPM | SYSTOLIC BLOOD PRESSURE: 123 MMHG | WEIGHT: 270 LBS | RESPIRATION RATE: 16 BRPM | TEMPERATURE: 98 F | BODY MASS INDEX: 31 KG/M2 | OXYGEN SATURATION: 99 % | DIASTOLIC BLOOD PRESSURE: 83 MMHG

## 2019-11-07 DIAGNOSIS — C16.0 GE JUNCTION CARCINOMA (HCC): ICD-10-CM

## 2019-11-07 DIAGNOSIS — C77.0 SECONDARY MALIGNANT NEOPLASM LYMPH NODES OF HEAD, FACE AND NECK (HCC): ICD-10-CM

## 2019-11-07 DIAGNOSIS — C77.0 SECONDARY MALIGNANT NEOPLASM OF SUPRACLAVICULAR LYMPH NODE (HCC): ICD-10-CM

## 2019-11-07 DIAGNOSIS — C77.2 SECONDARY MALIGNANT NEOPLASM OF RETROPERITONEAL LYMPH NODES (HCC): ICD-10-CM

## 2019-11-07 DIAGNOSIS — C15.5 MALIGNANT NEOPLASM OF LOWER THIRD OF ESOPHAGUS (HCC): Primary | ICD-10-CM

## 2019-11-07 DIAGNOSIS — C77.2 SECONDARY MALIGNANT NEOPLASM OF RETROPERITONEAL LYMPH NODES (HCC): Primary | ICD-10-CM

## 2019-11-07 PROCEDURE — 96375 TX/PRO/DX INJ NEW DRUG ADDON: CPT

## 2019-11-07 PROCEDURE — 96377 APPLICATON ON-BODY INJECTOR: CPT

## 2019-11-07 PROCEDURE — 80053 COMPREHEN METABOLIC PANEL: CPT

## 2019-11-07 PROCEDURE — 96367 TX/PROPH/DG ADDL SEQ IV INF: CPT

## 2019-11-07 PROCEDURE — 96368 THER/DIAG CONCURRENT INF: CPT

## 2019-11-07 PROCEDURE — 85025 COMPLETE CBC W/AUTO DIFF WBC: CPT

## 2019-11-07 PROCEDURE — 96413 CHEMO IV INFUSION 1 HR: CPT

## 2019-11-07 PROCEDURE — 96417 CHEMO IV INFUS EACH ADDL SEQ: CPT

## 2019-11-07 PROCEDURE — 99214 OFFICE O/P EST MOD 30 MIN: CPT | Performed by: INTERNAL MEDICINE

## 2019-11-07 PROCEDURE — 86301 IMMUNOASSAY TUMOR CA 19-9: CPT

## 2019-11-07 PROCEDURE — S0028 INJECTION, FAMOTIDINE, 20 MG: HCPCS | Performed by: INTERNAL MEDICINE

## 2019-11-07 PROCEDURE — 82378 CARCINOEMBRYONIC ANTIGEN: CPT

## 2019-11-07 RX ORDER — LORAZEPAM 0.5 MG/1
TABLET ORAL EVERY 4 HOURS PRN
Status: CANCELLED | OUTPATIENT
Start: 2019-11-07

## 2019-11-07 RX ORDER — FLUOROURACIL 50 MG/ML
2400 INJECTION, SOLUTION INTRAVENOUS CONTINUOUS
Status: CANCELLED | OUTPATIENT
Start: 2019-11-07

## 2019-11-07 RX ORDER — LORAZEPAM 2 MG/ML
INJECTION INTRAMUSCULAR EVERY 4 HOURS PRN
Status: CANCELLED | OUTPATIENT
Start: 2019-11-07

## 2019-11-07 RX ORDER — CEPHALEXIN 500 MG/1
500 CAPSULE ORAL 4 TIMES DAILY
Qty: 40 CAPSULE | Refills: 0 | Status: SHIPPED | OUTPATIENT
Start: 2019-11-07 | End: 2019-12-26 | Stop reason: ALTCHOICE

## 2019-11-07 RX ORDER — ONDANSETRON 2 MG/ML
8 INJECTION INTRAMUSCULAR; INTRAVENOUS EVERY 6 HOURS PRN
Status: CANCELLED | OUTPATIENT
Start: 2019-11-07

## 2019-11-07 RX ORDER — ATROPINE SULFATE 0.4 MG/ML
0.2 AMPUL (ML) INJECTION ONCE
Status: CANCELLED
Start: 2019-11-07

## 2019-11-07 RX ORDER — ATROPINE SULFATE 0.4 MG/ML
0.2 AMPUL (ML) INJECTION ONCE
Status: CANCELLED | OUTPATIENT
Start: 2019-11-07

## 2019-11-07 RX ORDER — FAMOTIDINE 10 MG/ML
20 INJECTION, SOLUTION INTRAVENOUS ONCE
Status: COMPLETED | OUTPATIENT
Start: 2019-11-07 | End: 2019-11-07

## 2019-11-07 RX ORDER — METOCLOPRAMIDE HYDROCHLORIDE 5 MG/ML
10 INJECTION INTRAMUSCULAR; INTRAVENOUS EVERY 6 HOURS PRN
Status: CANCELLED | OUTPATIENT
Start: 2019-11-07

## 2019-11-07 RX ORDER — PROCHLORPERAZINE MALEATE 10 MG
10 TABLET ORAL EVERY 6 HOURS PRN
Status: CANCELLED | OUTPATIENT
Start: 2019-11-07

## 2019-11-07 RX ORDER — FLUOROURACIL 50 MG/ML
2400 INJECTION, SOLUTION INTRAVENOUS CONTINUOUS
Status: DISCONTINUED | OUTPATIENT
Start: 2019-11-07 | End: 2019-11-07

## 2019-11-07 RX ORDER — ATROPINE SULFATE 0.4 MG/ML
0.2 AMPUL (ML) INJECTION ONCE
Status: COMPLETED | OUTPATIENT
Start: 2019-11-07 | End: 2019-11-07

## 2019-11-07 RX ORDER — DIPHENHYDRAMINE HYDROCHLORIDE 50 MG/ML
50 INJECTION INTRAMUSCULAR; INTRAVENOUS ONCE
Status: CANCELLED
Start: 2019-11-07

## 2019-11-07 RX ORDER — DIPHENHYDRAMINE HYDROCHLORIDE 50 MG/ML
50 INJECTION INTRAMUSCULAR; INTRAVENOUS ONCE
Status: COMPLETED | OUTPATIENT
Start: 2019-11-07 | End: 2019-11-07

## 2019-11-07 RX ORDER — FAMOTIDINE 10 MG/ML
20 INJECTION, SOLUTION INTRAVENOUS ONCE
Status: CANCELLED
Start: 2019-11-07

## 2019-11-07 RX ADMIN — ATROPINE SULFATE 0.2 MG: 0.4 MG/ML AMPUL (ML) INJECTION at 12:22:00

## 2019-11-07 RX ADMIN — DIPHENHYDRAMINE HYDROCHLORIDE 50 MG: 50 INJECTION INTRAMUSCULAR; INTRAVENOUS at 10:11:00

## 2019-11-07 RX ADMIN — FLUOROURACIL 6050 MG: 50 INJECTION, SOLUTION INTRAVENOUS at 14:09:00

## 2019-11-07 RX ADMIN — FAMOTIDINE 20 MG: 10 INJECTION, SOLUTION INTRAVENOUS at 10:12:00

## 2019-11-07 NOTE — PROGRESS NOTES
Pt here for C20D1.   Arrives Ambulating independently, accompanied by Family member           Patient denies possible pregnancy since last therapy cycle: Not Applicable    Modifications in dose or schedule: No     Frequency of blood return and site check th

## 2019-11-07 NOTE — PROGRESS NOTES
Pt here for MD f/u visit and due for chemotherapy. Pt was seen last week by PCP for cold symptoms- cough, congestion. Completed course of antibiotics. Strep was negative. Energy level and appetite fair. Occasional diarrhea. Denies pain.    Education Record

## 2019-11-07 NOTE — PROGRESS NOTES
[facility]    PATIENT'S NAME: Deepti Rolacesilia LUTZ   ATTENDING PHYSICIAN: Audra Richard M.D.    PATIENT ACCOUNT #: [de-identified] LOCATION: [address] [place] [address] RECORD #: [id number] YOB: 1990   DATE OF SERVICE: 11/07/2019       CANCER nightly the week after chemotherapy, omeprazole 40 mg b.i.d., oxycodone/acetaminophen 10/325 one tablet q.4 h. p.r.n., prochlorperazine 10 mg q.6 h. p.r.n. PHYSICAL EXAMINATION:    GENERAL:  He is a relatively well-appearing male.   He is in no acute dis

## 2019-11-08 ENCOUNTER — TELEPHONE (OUTPATIENT)
Dept: HEMATOLOGY/ONCOLOGY | Facility: HOSPITAL | Age: 29
End: 2019-11-08

## 2019-11-08 ENCOUNTER — TELEPHONE (OUTPATIENT)
Dept: FAMILY MEDICINE CLINIC | Facility: CLINIC | Age: 29
End: 2019-11-08

## 2019-11-08 NOTE — TELEPHONE ENCOUNTER
ARUN REPORTS STARTED WITH COUGH ON SUN, 11/3/2019. IT IS WORSE AT NIGHT. NO FEVER. HE HAD CHEM TREATMENT YESTERDAY. ADVISED- DIRECTED TO CALL DR SIERRA'S NURSE AND REPORT TO HER HIS SYMPTOMS AND FIND OUT WHAT HE CAN TAKE.    MAIKEL/TIEN

## 2019-11-08 NOTE — TELEPHONE ENCOUNTER
Pt c/o cough, pt originally went to PCP for this and was on an antbx. Pt saw MD yesterday and was prescribed Cephalexin. Pt has not started yet. Pt denies any fevers or coughing up yellow/green sputum.  Pt aware to call if cough gets worse or develops a fev

## 2019-11-11 ENCOUNTER — TELEPHONE (OUTPATIENT)
Dept: HEMATOLOGY/ONCOLOGY | Facility: HOSPITAL | Age: 29
End: 2019-11-11

## 2019-11-11 RX ORDER — GUAIFENESIN AND CODEINE PHOSPHATE 100; 10 MG/5ML; MG/5ML
10 SOLUTION ORAL EVERY 4 HOURS PRN
Qty: 236 ML | Refills: 0 | Status: SHIPPED | OUTPATIENT
Start: 2019-11-11 | End: 2019-11-25

## 2019-11-11 NOTE — TELEPHONE ENCOUNTER
Kristie calling that cough persists, he continues the Cephalexin. He denies any fever or chills. He has been taking Mucinex.

## 2019-11-19 ENCOUNTER — HOSPITAL ENCOUNTER (OUTPATIENT)
Dept: CT IMAGING | Age: 29
Discharge: HOME | End: 2019-11-19
Attending: INTERNAL MEDICINE
Payer: COMMERCIAL

## 2019-11-19 DIAGNOSIS — C15.5 MALIGNANT NEOPLASM OF LOWER THIRD OF ESOPHAGUS (HCC): ICD-10-CM

## 2019-11-19 PROCEDURE — 71260 CT THORAX DX C+: CPT | Performed by: INTERNAL MEDICINE

## 2019-11-19 PROCEDURE — 74177 CT ABD & PELVIS W/CONTRAST: CPT | Performed by: INTERNAL MEDICINE

## 2019-12-02 ENCOUNTER — OFFICE VISIT (OUTPATIENT)
Dept: HEMATOLOGY/ONCOLOGY | Age: 29
End: 2019-12-02
Attending: INTERNAL MEDICINE
Payer: COMMERCIAL

## 2019-12-02 VITALS
SYSTOLIC BLOOD PRESSURE: 124 MMHG | DIASTOLIC BLOOD PRESSURE: 81 MMHG | RESPIRATION RATE: 16 BRPM | TEMPERATURE: 99 F | OXYGEN SATURATION: 99 % | BODY MASS INDEX: 31.53 KG/M2 | HEART RATE: 109 BPM | WEIGHT: 272.5 LBS | HEIGHT: 77.99 IN

## 2019-12-02 DIAGNOSIS — C77.2 SECONDARY MALIGNANT NEOPLASM OF RETROPERITONEAL LYMPH NODES (HCC): Primary | ICD-10-CM

## 2019-12-02 DIAGNOSIS — C77.0 SECONDARY MALIGNANT NEOPLASM LYMPH NODES OF HEAD, FACE AND NECK (HCC): ICD-10-CM

## 2019-12-02 DIAGNOSIS — D64.81 ANEMIA DUE TO CHEMOTHERAPY: ICD-10-CM

## 2019-12-02 DIAGNOSIS — R11.2 CHEMOTHERAPY-INDUCED NAUSEA AND VOMITING: ICD-10-CM

## 2019-12-02 DIAGNOSIS — T45.1X5A ANEMIA DUE TO CHEMOTHERAPY: ICD-10-CM

## 2019-12-02 DIAGNOSIS — L73.9 FOLLICULITIS: ICD-10-CM

## 2019-12-02 DIAGNOSIS — C16.0 GE JUNCTION CARCINOMA (HCC): ICD-10-CM

## 2019-12-02 DIAGNOSIS — T45.1X5A CHEMOTHERAPY INDUCED DIARRHEA: ICD-10-CM

## 2019-12-02 DIAGNOSIS — C77.0 SECONDARY MALIGNANT NEOPLASM OF SUPRACLAVICULAR LYMPH NODE (HCC): ICD-10-CM

## 2019-12-02 DIAGNOSIS — D69.6 THROMBOCYTOPENIA (HCC): ICD-10-CM

## 2019-12-02 DIAGNOSIS — M79.602 BILATERAL ARM PAIN: ICD-10-CM

## 2019-12-02 DIAGNOSIS — T45.1X5A CHEMOTHERAPY-INDUCED NAUSEA AND VOMITING: ICD-10-CM

## 2019-12-02 DIAGNOSIS — K52.1 CHEMOTHERAPY INDUCED DIARRHEA: ICD-10-CM

## 2019-12-02 DIAGNOSIS — Z51.11 ENCOUNTER FOR CHEMOTHERAPY MANAGEMENT: ICD-10-CM

## 2019-12-02 DIAGNOSIS — M79.601 BILATERAL ARM PAIN: ICD-10-CM

## 2019-12-02 PROCEDURE — 82378 CARCINOEMBRYONIC ANTIGEN: CPT

## 2019-12-02 PROCEDURE — 96368 THER/DIAG CONCURRENT INF: CPT

## 2019-12-02 PROCEDURE — 96375 TX/PRO/DX INJ NEW DRUG ADDON: CPT

## 2019-12-02 PROCEDURE — 96376 TX/PRO/DX INJ SAME DRUG ADON: CPT

## 2019-12-02 PROCEDURE — 85025 COMPLETE CBC W/AUTO DIFF WBC: CPT

## 2019-12-02 PROCEDURE — 96367 TX/PROPH/DG ADDL SEQ IV INF: CPT

## 2019-12-02 PROCEDURE — 96377 APPLICATON ON-BODY INJECTOR: CPT

## 2019-12-02 PROCEDURE — 96417 CHEMO IV INFUS EACH ADDL SEQ: CPT

## 2019-12-02 PROCEDURE — 86301 IMMUNOASSAY TUMOR CA 19-9: CPT

## 2019-12-02 PROCEDURE — 80053 COMPREHEN METABOLIC PANEL: CPT

## 2019-12-02 PROCEDURE — 96413 CHEMO IV INFUSION 1 HR: CPT

## 2019-12-02 PROCEDURE — 99215 OFFICE O/P EST HI 40 MIN: CPT | Performed by: CLINICAL NURSE SPECIALIST

## 2019-12-02 PROCEDURE — S0028 INJECTION, FAMOTIDINE, 20 MG: HCPCS | Performed by: INTERNAL MEDICINE

## 2019-12-02 RX ORDER — LORAZEPAM 0.5 MG/1
TABLET ORAL EVERY 4 HOURS PRN
Status: CANCELLED | OUTPATIENT
Start: 2019-12-02

## 2019-12-02 RX ORDER — FLUOROURACIL 50 MG/ML
2400 INJECTION, SOLUTION INTRAVENOUS CONTINUOUS
Status: CANCELLED | OUTPATIENT
Start: 2019-12-02

## 2019-12-02 RX ORDER — DIPHENHYDRAMINE HYDROCHLORIDE 50 MG/ML
50 INJECTION INTRAMUSCULAR; INTRAVENOUS ONCE
Status: COMPLETED | OUTPATIENT
Start: 2019-12-02 | End: 2019-12-02

## 2019-12-02 RX ORDER — PROCHLORPERAZINE MALEATE 10 MG
10 TABLET ORAL EVERY 6 HOURS PRN
Status: CANCELLED | OUTPATIENT
Start: 2019-12-02

## 2019-12-02 RX ORDER — FAMOTIDINE 10 MG/ML
20 INJECTION, SOLUTION INTRAVENOUS ONCE
Status: COMPLETED | OUTPATIENT
Start: 2019-12-02 | End: 2019-12-02

## 2019-12-02 RX ORDER — ONDANSETRON 2 MG/ML
8 INJECTION INTRAMUSCULAR; INTRAVENOUS EVERY 6 HOURS PRN
Status: CANCELLED | OUTPATIENT
Start: 2019-12-02

## 2019-12-02 RX ORDER — LORAZEPAM 2 MG/ML
INJECTION INTRAMUSCULAR EVERY 4 HOURS PRN
Status: CANCELLED | OUTPATIENT
Start: 2019-12-02

## 2019-12-02 RX ORDER — ATROPINE SULFATE 0.4 MG/ML
0.2 AMPUL (ML) INJECTION ONCE
Status: CANCELLED
Start: 2019-12-02

## 2019-12-02 RX ORDER — FLUOROURACIL 50 MG/ML
2400 INJECTION, SOLUTION INTRAVENOUS CONTINUOUS
Status: DISCONTINUED | OUTPATIENT
Start: 2019-12-02 | End: 2019-12-02

## 2019-12-02 RX ORDER — METOCLOPRAMIDE HYDROCHLORIDE 5 MG/ML
10 INJECTION INTRAMUSCULAR; INTRAVENOUS EVERY 6 HOURS PRN
Status: CANCELLED | OUTPATIENT
Start: 2019-12-02

## 2019-12-02 RX ORDER — ATROPINE SULFATE 0.4 MG/ML
0.2 AMPUL (ML) INJECTION ONCE
Status: COMPLETED | OUTPATIENT
Start: 2019-12-02 | End: 2019-12-02

## 2019-12-02 RX ADMIN — FAMOTIDINE 20 MG: 10 INJECTION, SOLUTION INTRAVENOUS at 10:48:00

## 2019-12-02 RX ADMIN — ATROPINE SULFATE 0.2 MG: 0.4 MG/ML AMPUL (ML) INJECTION at 13:59:00

## 2019-12-02 RX ADMIN — FLUOROURACIL 6050 MG: 50 INJECTION, SOLUTION INTRAVENOUS at 14:36:00

## 2019-12-02 RX ADMIN — DIPHENHYDRAMINE HYDROCHLORIDE 50 MG: 50 INJECTION INTRAMUSCULAR; INTRAVENOUS at 10:46:00

## 2019-12-02 RX ADMIN — ATROPINE SULFATE 0.2 MG: 0.4 MG/ML AMPUL (ML) INJECTION at 12:52:00

## 2019-12-02 NOTE — PATIENT INSTRUCTIONS
On-body Injector for Neulasta Patient Instructions       Your On-Body Injection device was applied on 12/2 2pm    Your dose of medication will start on 12/3 5pm    You may remove this device on 12/3 7pm      Important information to remember about the Josette

## 2019-12-02 NOTE — PROGRESS NOTES
ANP Visit Note    Patient Name: Gregory Alvares   YOB: 1990   Medical Record Number: [id number]   CSN: [id number]   Date of visit: 12/2/2019   Eric Dickinson DO   No primary care provider on file.      Chief Complaint/Reason for Visit:  Ilene Miles Uncomfortable fullness after meals    • Vomiting    • Vomiting blood        Surgical History:  Past Surgical History:   Procedure Laterality Date   • INSERTION OF ACCESS PORT  04/10/2019   • LAPAROSCOPIC ESOPHAGOGASTRECTOMY N/A 4/27/2018    Performed by Elier Ramirez Attends Christian service: Not on file        Active member of club or organization: Not on file        Attends meetings of clubs or organizations: Not on file        Relationship status: Not on file      Intimate partner violence:        Fear of curren 11  •  Clindamycin Phosphate 1 % External Lotion, Apply 1 Application topically 2 (two) times daily. , Disp: 60 mL, Rfl: 2  •  Loperamide HCl 2 MG Oral Cap, Take 2 mg by mouth 4 (four) times daily as needed for Diarrhea., Disp: , Rfl:     Narcotic prescript 6.4 - 8.2 g/dL    Albumin 3.1 (L) 3.4 - 5.0 g/dL    Globulin  3.0 2.8 - 4.4 g/dL    A/G Ratio 1.0 1.0 - 2.0    FASTING No    CBC W/ DIFFERENTIAL    Collection Time: 12/02/19 10:30 AM   Result Value Ref Range    WBC 7.4 4.0 - 11.0 x10(3) uL    RBC 4.02 (L) 2019, measuring 26 x 15 x 20 mm oblique transverse, oblique AP, and oblique cephalocaudal dimensions series 305, image 40, series 602,   image 98. The borders of this are irregular.   Another much smaller subpleural density in the right upper lobe on image FOLFIRI and Cyramza today, will then go to q 3 weeks (will be 2.5 weeks as will get back on Thursday schedule)  Reviewed CT in detail with patient and sister   2. Bilateral Arm Pain: unsure of etiology, will take the Dex for next 5 days   3.  Nausea: CPM

## 2019-12-02 NOTE — PROGRESS NOTES
Pt here for C21D1.   Arrives Ambulating independently, accompanied by Spouse           Modifications in dose or schedule: No     Frequency of blood return and site check throughout administration: Prior to administration   Discharged to Home, Christine Manrique

## 2019-12-17 NOTE — CONSULTS
Miguelina called said that the medication change did not work and to forget about the pain she just needs something that will keep her awake  Patient would like a call back        Upcoming appt 2/5/20      Contact# [phone] Ramiro Timmy    PATIENT'S NAME: Jovita Uribe   CONSULTING PHYSICIAN: Dina Giron MD   PATIENT ACCOUNT #: [de-identified] LOCATION: [address] [place] [address] RECORD #: [id number] YOB: 1990   CONSULTATION DATE: 01/17/2018       CANCER was on the 12th. I was called to get him in and we made arrangements to get him in today to be seen. He is able to swallow liquids. He is able to swallow soft foods. He states that the biggest issue is that he has been having pain.   He was given hydroc problems. He denies any dysuria or hematuria. He denies any history of renal calculi. He denies any musculoskeletal or arthritic complaints. He denies any prior history of anemia or blood disorders. He denies any history of neurologic disorders.   The PET scan, assuming that his CT scan shows no evidence of metastatic disease. We talked with him about the differences between stage I, II, and III esophageal cancer and that seen with stage IV.   In the latter, the goals of treatment are extension of life

## 2019-12-19 ENCOUNTER — OFFICE VISIT (OUTPATIENT)
Dept: HEMATOLOGY/ONCOLOGY | Age: 29
End: 2019-12-19
Attending: INTERNAL MEDICINE
Payer: COMMERCIAL

## 2019-12-19 ENCOUNTER — SOCIAL WORK SERVICES (OUTPATIENT)
Dept: HEMATOLOGY/ONCOLOGY | Facility: HOSPITAL | Age: 29
End: 2019-12-19

## 2019-12-19 VITALS
HEART RATE: 95 BPM | SYSTOLIC BLOOD PRESSURE: 118 MMHG | BODY MASS INDEX: 31 KG/M2 | DIASTOLIC BLOOD PRESSURE: 85 MMHG | OXYGEN SATURATION: 97 % | RESPIRATION RATE: 16 BRPM | WEIGHT: 265 LBS | TEMPERATURE: 99 F

## 2019-12-19 DIAGNOSIS — R11.2 CHEMOTHERAPY-INDUCED NAUSEA AND VOMITING: ICD-10-CM

## 2019-12-19 DIAGNOSIS — C16.0 GE JUNCTION CARCINOMA (HCC): ICD-10-CM

## 2019-12-19 DIAGNOSIS — C77.0 SECONDARY MALIGNANT NEOPLASM LYMPH NODES OF HEAD, FACE AND NECK (HCC): ICD-10-CM

## 2019-12-19 DIAGNOSIS — L73.9 FOLLICULITIS: ICD-10-CM

## 2019-12-19 DIAGNOSIS — T45.1X5A ANEMIA DUE TO CHEMOTHERAPY: ICD-10-CM

## 2019-12-19 DIAGNOSIS — C77.0 SECONDARY MALIGNANT NEOPLASM OF SUPRACLAVICULAR LYMPH NODE (HCC): ICD-10-CM

## 2019-12-19 DIAGNOSIS — M79.602 BILATERAL ARM PAIN: ICD-10-CM

## 2019-12-19 DIAGNOSIS — C77.2 SECONDARY MALIGNANT NEOPLASM OF RETROPERITONEAL LYMPH NODES (HCC): Primary | ICD-10-CM

## 2019-12-19 DIAGNOSIS — K52.1 CHEMOTHERAPY INDUCED DIARRHEA: ICD-10-CM

## 2019-12-19 DIAGNOSIS — M54.50 ACUTE LEFT-SIDED LOW BACK PAIN WITHOUT SCIATICA: ICD-10-CM

## 2019-12-19 DIAGNOSIS — Z51.11 ENCOUNTER FOR CHEMOTHERAPY MANAGEMENT: ICD-10-CM

## 2019-12-19 DIAGNOSIS — D64.81 ANEMIA DUE TO CHEMOTHERAPY: ICD-10-CM

## 2019-12-19 DIAGNOSIS — D69.6 THROMBOCYTOPENIA (HCC): ICD-10-CM

## 2019-12-19 DIAGNOSIS — M79.601 BILATERAL ARM PAIN: ICD-10-CM

## 2019-12-19 DIAGNOSIS — T45.1X5A CHEMOTHERAPY-INDUCED NAUSEA AND VOMITING: ICD-10-CM

## 2019-12-19 DIAGNOSIS — T45.1X5A CHEMOTHERAPY INDUCED DIARRHEA: ICD-10-CM

## 2019-12-19 PROCEDURE — 96413 CHEMO IV INFUSION 1 HR: CPT

## 2019-12-19 PROCEDURE — S0028 INJECTION, FAMOTIDINE, 20 MG: HCPCS | Performed by: CLINICAL NURSE SPECIALIST

## 2019-12-19 PROCEDURE — 96375 TX/PRO/DX INJ NEW DRUG ADDON: CPT

## 2019-12-19 PROCEDURE — 82378 CARCINOEMBRYONIC ANTIGEN: CPT

## 2019-12-19 PROCEDURE — 96367 TX/PROPH/DG ADDL SEQ IV INF: CPT

## 2019-12-19 PROCEDURE — 99215 OFFICE O/P EST HI 40 MIN: CPT | Performed by: CLINICAL NURSE SPECIALIST

## 2019-12-19 PROCEDURE — 96377 APPLICATON ON-BODY INJECTOR: CPT

## 2019-12-19 PROCEDURE — 96417 CHEMO IV INFUS EACH ADDL SEQ: CPT

## 2019-12-19 PROCEDURE — 85025 COMPLETE CBC W/AUTO DIFF WBC: CPT

## 2019-12-19 PROCEDURE — 80053 COMPREHEN METABOLIC PANEL: CPT

## 2019-12-19 PROCEDURE — 86301 IMMUNOASSAY TUMOR CA 19-9: CPT

## 2019-12-19 RX ORDER — LORAZEPAM 0.5 MG/1
TABLET ORAL EVERY 4 HOURS PRN
Status: CANCELLED | OUTPATIENT
Start: 2019-12-19

## 2019-12-19 RX ORDER — FAMOTIDINE 10 MG/ML
20 INJECTION, SOLUTION INTRAVENOUS ONCE
Status: CANCELLED
Start: 2019-12-19

## 2019-12-19 RX ORDER — FAMOTIDINE 10 MG/ML
20 INJECTION, SOLUTION INTRAVENOUS ONCE
Status: COMPLETED | OUTPATIENT
Start: 2019-12-19 | End: 2019-12-19

## 2019-12-19 RX ORDER — DIPHENHYDRAMINE HYDROCHLORIDE 50 MG/ML
50 INJECTION INTRAMUSCULAR; INTRAVENOUS ONCE
Status: COMPLETED | OUTPATIENT
Start: 2019-12-19 | End: 2019-12-19

## 2019-12-19 RX ORDER — LORAZEPAM 2 MG/ML
INJECTION INTRAMUSCULAR EVERY 4 HOURS PRN
Status: CANCELLED | OUTPATIENT
Start: 2019-12-19

## 2019-12-19 RX ORDER — FLUOROURACIL 50 MG/ML
2400 INJECTION, SOLUTION INTRAVENOUS CONTINUOUS
Status: CANCELLED | OUTPATIENT
Start: 2019-12-19

## 2019-12-19 RX ORDER — ATROPINE SULFATE 0.4 MG/ML
0.2 AMPUL (ML) INJECTION ONCE
Status: CANCELLED
Start: 2019-12-19

## 2019-12-19 RX ORDER — ATROPINE SULFATE 0.4 MG/ML
0.2 AMPUL (ML) INJECTION ONCE
Status: COMPLETED | OUTPATIENT
Start: 2019-12-19 | End: 2019-12-19

## 2019-12-19 RX ORDER — GUAIFENESIN AND CODEINE PHOSPHATE 100; 10 MG/5ML; MG/5ML
10 SOLUTION ORAL NIGHTLY
Qty: 236 ML | Refills: 0 | Status: SHIPPED | OUTPATIENT
Start: 2019-12-19 | End: 2020-01-09

## 2019-12-19 RX ORDER — METOCLOPRAMIDE HYDROCHLORIDE 5 MG/ML
10 INJECTION INTRAMUSCULAR; INTRAVENOUS EVERY 6 HOURS PRN
Status: CANCELLED | OUTPATIENT
Start: 2019-12-19

## 2019-12-19 RX ORDER — PROCHLORPERAZINE MALEATE 10 MG
10 TABLET ORAL EVERY 6 HOURS PRN
Status: CANCELLED | OUTPATIENT
Start: 2019-12-19

## 2019-12-19 RX ORDER — ATROPINE SULFATE 0.4 MG/ML
0.2 AMPUL (ML) INJECTION ONCE
Status: CANCELLED | OUTPATIENT
Start: 2019-12-19

## 2019-12-19 RX ORDER — DIPHENHYDRAMINE HYDROCHLORIDE 50 MG/ML
50 INJECTION INTRAMUSCULAR; INTRAVENOUS ONCE
Status: CANCELLED
Start: 2019-12-19

## 2019-12-19 RX ORDER — ONDANSETRON 2 MG/ML
8 INJECTION INTRAMUSCULAR; INTRAVENOUS EVERY 6 HOURS PRN
Status: CANCELLED | OUTPATIENT
Start: 2019-12-19

## 2019-12-19 RX ORDER — FLUOROURACIL 50 MG/ML
2400 INJECTION, SOLUTION INTRAVENOUS CONTINUOUS
Status: DISCONTINUED | OUTPATIENT
Start: 2019-12-19 | End: 2019-12-19

## 2019-12-19 RX ADMIN — FLUOROURACIL 6050 MG: 50 INJECTION, SOLUTION INTRAVENOUS at 13:28:00

## 2019-12-19 RX ADMIN — FAMOTIDINE 20 MG: 10 INJECTION, SOLUTION INTRAVENOUS at 09:45:00

## 2019-12-19 RX ADMIN — DIPHENHYDRAMINE HYDROCHLORIDE 50 MG: 50 INJECTION INTRAMUSCULAR; INTRAVENOUS at 09:44:00

## 2019-12-19 RX ADMIN — ATROPINE SULFATE 0.2 MG: 0.4 MG/ML AMPUL (ML) INJECTION at 11:38:00

## 2019-12-19 NOTE — PROGRESS NOTES
ANP Visit Note    Patient Name: Karlee Vann   YOB: 1990   Medical Record Number: [id number]   CSN: [id number]   Date of visit: 12/19/2019   Jill Stevenson DO   No primary care provider on file.      Chief Complaint/Reason for Visit:  [facility] ([facility]) History:  Past Surgical History:   Procedure Laterality Date   • INSERTION OF ACCESS PORT  04/10/2019   • LAPAROSCOPIC ESOPHAGOGASTRECTOMY N/A 4/27/2018    Performed by Howard Reyes MD at [facility] MAIN OR   • LAPAROSCOPIC ESOPHAGOGASTRECTOMY Right 4/27/2018 organization: Not on file        Attends meetings of clubs or organizations: Not on file        Relationship status: Not on file      Intimate partner violence:        Fear of current or ex partner: Not on file        Emotionally abused: Not on file topically 2 (two) times daily. , Disp: 60 mL, Rfl: 2  •  Loperamide HCl 2 MG Oral Cap, Take 2 mg by mouth 4 (four) times daily as needed for Diarrhea., Disp: , Rfl:     [place]arc[place] in [place]     Review of Systems:  A comprehensive 14 point 2. 0    FASTING No    CBC W/ DIFFERENTIAL    Collection Time: 12/19/19  8:47 AM   Result Value Ref Range    WBC 6.3 4.0 - 11.0 x10(3) uL    RBC 4.48 4.30 - 5.70 x10(6)uL    HGB 12.4 (L) 13.0 - 17.5 g/dL    HCT 40.3 39.0 - 53.0 %    .0 (L) 150.0 - 450 concerns about anxiety or depression. We discussed issues of distress, coping difficulties and social support systems and currently there are no active problems.     Planned Follow Up: 3 weeks, with labs, MD, chemo    Risk Level: High: Metastatic Esophagea

## 2019-12-19 NOTE — PROGRESS NOTES
Pt here for C22D1.   Arrives Ambulating independently, accompanied by Family member           Patient reports possible pregnancy since last therapy cycle: Not Applicable    Modifications in dose or schedule: No     Frequency of blood return and site check t

## 2019-12-19 NOTE — PROGRESS NOTES
Sw met with patient. Patient had a tax form that he requested assistance with. Sw completed and returned signed to patient. Patient reports that he has been feeling well and has plans for Benjamin. Patient reported no other needs at this time.

## 2019-12-26 ENCOUNTER — OFFICE VISIT (OUTPATIENT)
Dept: FAMILY MEDICINE CLINIC | Facility: CLINIC | Age: 29
End: 2019-12-26
Payer: COMMERCIAL

## 2019-12-26 VITALS
DIASTOLIC BLOOD PRESSURE: 80 MMHG | HEART RATE: 66 BPM | TEMPERATURE: 97 F | WEIGHT: 267 LBS | BODY MASS INDEX: 31 KG/M2 | RESPIRATION RATE: 12 BRPM | SYSTOLIC BLOOD PRESSURE: 120 MMHG

## 2019-12-26 DIAGNOSIS — N45.1 EPIDIDYMITIS, LEFT: Primary | ICD-10-CM

## 2019-12-26 PROCEDURE — 99214 OFFICE O/P EST MOD 30 MIN: CPT | Performed by: FAMILY MEDICINE

## 2019-12-26 RX ORDER — DOXYCYCLINE 100 MG/1
100 CAPSULE ORAL 2 TIMES DAILY
Qty: 20 CAPSULE | Refills: 0 | Status: SHIPPED | OUTPATIENT
Start: 2019-12-26 | End: 2020-01-09 | Stop reason: ALTCHOICE

## 2019-12-26 NOTE — PROGRESS NOTES
HPI:    Patient ID: Isela Cobb is a 26year old male.   X couple days L testicle pain  Occas dysuria - relief w/ urinating  + hx epididymitis   Does not freq drink enough water  HPI    Review of Systems   Constitutional: Negative for chills and fev BREATH   PHYSICAL EXAM:   Physical Exam   Constitutional: He appears well-developed and well-nourished. Cardiovascular: Normal rate, normal heart sounds and intact distal pulses.    Pulmonary/Chest: Effort normal and breath sounds normal.   Abd

## 2020-01-01 ENCOUNTER — TELEPHONE (OUTPATIENT)
Dept: HEMATOLOGY/ONCOLOGY | Facility: HOSPITAL | Age: 30
End: 2020-01-01

## 2020-01-01 ENCOUNTER — HOSPITAL ENCOUNTER (OUTPATIENT)
Dept: RADIATION ONCOLOGY | Facility: HOSPITAL | Age: 30
Discharge: HOME | End: 2020-01-01
Attending: INTERNAL MEDICINE
Payer: COMMERCIAL

## 2020-01-01 ENCOUNTER — MED REC SCAN ONLY (OUTPATIENT)
Dept: FAMILY MEDICINE CLINIC | Facility: CLINIC | Age: 30
End: 2020-01-01

## 2020-01-01 ENCOUNTER — OFFICE VISIT (OUTPATIENT)
Dept: HEMATOLOGY/ONCOLOGY | Age: 30
End: 2020-01-01
Attending: INTERNAL MEDICINE
Payer: COMMERCIAL

## 2020-01-01 ENCOUNTER — OFFICE VISIT (OUTPATIENT)
Dept: PAIN CLINIC | Facility: CLINIC | Age: 30
End: 2020-01-01
Payer: COMMERCIAL

## 2020-01-01 ENCOUNTER — TELEPHONE (OUTPATIENT)
Dept: SURGERY | Facility: CLINIC | Age: 30
End: 2020-01-01

## 2020-01-01 ENCOUNTER — HOSPITAL ENCOUNTER (OUTPATIENT)
Dept: CT IMAGING | Age: 30
Discharge: HOME | End: 2020-01-01
Attending: INTERNAL MEDICINE
Payer: COMMERCIAL

## 2020-01-01 ENCOUNTER — APPOINTMENT (OUTPATIENT)
Dept: GENERAL RADIOLOGY | Facility: HOSPITAL | Age: 30
End: 2020-01-01
Attending: EMERGENCY MEDICINE
Payer: COMMERCIAL

## 2020-01-01 ENCOUNTER — OFFICE VISIT (OUTPATIENT)
Dept: SURGERY | Facility: CLINIC | Age: 30
End: 2020-01-01
Payer: COMMERCIAL

## 2020-01-01 ENCOUNTER — TELEPHONE (OUTPATIENT)
Dept: FAMILY MEDICINE CLINIC | Facility: CLINIC | Age: 30
End: 2020-01-01

## 2020-01-01 ENCOUNTER — TELEPHONE (OUTPATIENT)
Dept: PAIN CLINIC | Facility: CLINIC | Age: 30
End: 2020-01-01

## 2020-01-01 ENCOUNTER — NURSE ONLY (OUTPATIENT)
Dept: HEMATOLOGY/ONCOLOGY | Facility: HOSPITAL | Age: 30
End: 2020-01-01
Attending: INTERNAL MEDICINE
Payer: COMMERCIAL

## 2020-01-01 ENCOUNTER — HOSPITAL ENCOUNTER (OUTPATIENT)
Facility: HOSPITAL | Age: 30
Setting detail: OBSERVATION
Discharge: HOME | End: 2020-01-01
Attending: EMERGENCY MEDICINE | Admitting: INTERNAL MEDICINE
Payer: COMMERCIAL

## 2020-01-01 ENCOUNTER — HOSPITAL ENCOUNTER (EMERGENCY)
Facility: HOSPITAL | Age: 30
Discharge: HOME | End: 2020-01-01
Attending: EMERGENCY MEDICINE
Payer: COMMERCIAL

## 2020-01-01 ENCOUNTER — APPOINTMENT (OUTPATIENT)
Dept: HEMATOLOGY/ONCOLOGY | Age: 30
End: 2020-01-01
Attending: INTERNAL MEDICINE
Payer: COMMERCIAL

## 2020-01-01 ENCOUNTER — SOCIAL WORK SERVICES (OUTPATIENT)
Dept: HEMATOLOGY/ONCOLOGY | Facility: HOSPITAL | Age: 30
End: 2020-01-01

## 2020-01-01 ENCOUNTER — HOSPITAL ENCOUNTER (OUTPATIENT)
Dept: INTERVENTIONAL RADIOLOGY/VASCULAR | Facility: HOSPITAL | Age: 30
Discharge: HOME | End: 2020-01-01
Attending: INTERNAL MEDICINE | Admitting: INTERNAL MEDICINE
Payer: COMMERCIAL

## 2020-01-01 ENCOUNTER — OFFICE VISIT (OUTPATIENT)
Dept: FAMILY MEDICINE CLINIC | Facility: CLINIC | Age: 30
End: 2020-01-01
Payer: COMMERCIAL

## 2020-01-01 ENCOUNTER — DIETICIAN VISIT (OUTPATIENT)
Dept: HEMATOLOGY/ONCOLOGY | Facility: HOSPITAL | Age: 30
End: 2020-01-01

## 2020-01-01 ENCOUNTER — APPOINTMENT (OUTPATIENT)
Dept: GENERAL RADIOLOGY | Facility: HOSPITAL | Age: 30
End: 2020-01-01
Attending: ANESTHESIOLOGY
Payer: COMMERCIAL

## 2020-01-01 ENCOUNTER — NURSE ONLY (OUTPATIENT)
Dept: HEMATOLOGY/ONCOLOGY | Age: 30
End: 2020-01-01
Attending: INTERNAL MEDICINE
Payer: COMMERCIAL

## 2020-01-01 ENCOUNTER — APPOINTMENT (OUTPATIENT)
Dept: CT IMAGING | Facility: HOSPITAL | Age: 30
End: 2020-01-01
Attending: EMERGENCY MEDICINE
Payer: COMMERCIAL

## 2020-01-01 ENCOUNTER — LAB ENCOUNTER (OUTPATIENT)
Dept: LAB | Facility: HOSPITAL | Age: 30
End: 2020-01-01
Attending: NEUROLOGICAL SURGERY
Payer: COMMERCIAL

## 2020-01-01 ENCOUNTER — ANESTHESIA EVENT (OUTPATIENT)
Dept: SURGERY | Facility: HOSPITAL | Age: 30
End: 2020-01-01
Payer: COMMERCIAL

## 2020-01-01 ENCOUNTER — APPOINTMENT (OUTPATIENT)
Dept: LAB | Age: 30
End: 2020-01-01
Attending: ANESTHESIOLOGY
Payer: COMMERCIAL

## 2020-01-01 ENCOUNTER — DOCUMENTATION ONLY (OUTPATIENT)
Dept: RADIATION ONCOLOGY | Facility: HOSPITAL | Age: 30
End: 2020-01-01

## 2020-01-01 ENCOUNTER — HOSPITAL ENCOUNTER (OUTPATIENT)
Dept: GENERAL RADIOLOGY | Age: 30
Discharge: HOME | End: 2020-01-01
Payer: COMMERCIAL

## 2020-01-01 ENCOUNTER — APPOINTMENT (OUTPATIENT)
Dept: LAB | Age: 30
End: 2020-01-01
Attending: NEUROLOGICAL SURGERY
Payer: COMMERCIAL

## 2020-01-01 ENCOUNTER — APPOINTMENT (OUTPATIENT)
Dept: GENERAL RADIOLOGY | Facility: HOSPITAL | Age: 30
DRG: 177 | End: 2020-01-01
Attending: EMERGENCY MEDICINE
Payer: COMMERCIAL

## 2020-01-01 ENCOUNTER — HOSPITAL ENCOUNTER (OUTPATIENT)
Facility: HOSPITAL | Age: 30
Discharge: HOME | End: 2021-01-01
Attending: NEUROLOGICAL SURGERY | Admitting: NEUROLOGICAL SURGERY
Payer: COMMERCIAL

## 2020-01-01 ENCOUNTER — HOSPITAL ENCOUNTER (INPATIENT)
Facility: HOSPITAL | Age: 30
LOS: 3 days | Discharge: HOME | DRG: 177 | End: 2020-01-01
Attending: EMERGENCY MEDICINE | Admitting: HOSPITALIST
Payer: COMMERCIAL

## 2020-01-01 ENCOUNTER — APPOINTMENT (OUTPATIENT)
Dept: CT IMAGING | Facility: HOSPITAL | Age: 30
DRG: 177 | End: 2020-01-01
Attending: EMERGENCY MEDICINE
Payer: COMMERCIAL

## 2020-01-01 ENCOUNTER — ANESTHESIA (OUTPATIENT)
Dept: SURGERY | Facility: HOSPITAL | Age: 30
End: 2020-01-01
Payer: COMMERCIAL

## 2020-01-01 ENCOUNTER — HOSPITAL ENCOUNTER (OUTPATIENT)
Facility: HOSPITAL | Age: 30
Setting detail: OBSERVATION
Discharge: HOME | End: 2020-01-01
Attending: ANESTHESIOLOGY | Admitting: ANESTHESIOLOGY
Payer: COMMERCIAL

## 2020-01-01 ENCOUNTER — APPOINTMENT (OUTPATIENT)
Dept: ULTRASOUND IMAGING | Facility: HOSPITAL | Age: 30
DRG: 177 | End: 2020-01-01
Attending: INTERNAL MEDICINE
Payer: COMMERCIAL

## 2020-01-01 VITALS
WEIGHT: 246.5 LBS | SYSTOLIC BLOOD PRESSURE: 129 MMHG | HEART RATE: 105 BPM | OXYGEN SATURATION: 95 % | RESPIRATION RATE: 16 BRPM | BODY MASS INDEX: 28 KG/M2 | TEMPERATURE: 97 F | DIASTOLIC BLOOD PRESSURE: 88 MMHG

## 2020-01-01 VITALS
OXYGEN SATURATION: 100 % | DIASTOLIC BLOOD PRESSURE: 80 MMHG | SYSTOLIC BLOOD PRESSURE: 124 MMHG | TEMPERATURE: 99 F | HEART RATE: 100 BPM | RESPIRATION RATE: 20 BRPM

## 2020-01-01 VITALS
OXYGEN SATURATION: 100 % | DIASTOLIC BLOOD PRESSURE: 85 MMHG | WEIGHT: 232.5 LBS | TEMPERATURE: 97 F | HEART RATE: 85 BPM | SYSTOLIC BLOOD PRESSURE: 127 MMHG | TEMPERATURE: 99 F | RESPIRATION RATE: 16 BRPM | HEART RATE: 101 BPM | BODY MASS INDEX: 29 KG/M2 | DIASTOLIC BLOOD PRESSURE: 83 MMHG | WEIGHT: 252.63 LBS | SYSTOLIC BLOOD PRESSURE: 136 MMHG | OXYGEN SATURATION: 98 % | RESPIRATION RATE: 29 BRPM | BODY MASS INDEX: 27 KG/M2

## 2020-01-01 VITALS
HEIGHT: 77.99 IN | TEMPERATURE: 98 F | WEIGHT: 237 LBS | DIASTOLIC BLOOD PRESSURE: 79 MMHG | SYSTOLIC BLOOD PRESSURE: 119 MMHG | RESPIRATION RATE: 18 BRPM | BODY MASS INDEX: 27.42 KG/M2 | OXYGEN SATURATION: 100 % | HEART RATE: 79 BPM

## 2020-01-01 VITALS
RESPIRATION RATE: 20 BRPM | TEMPERATURE: 98 F | HEART RATE: 110 BPM | HEIGHT: 77.99 IN | SYSTOLIC BLOOD PRESSURE: 119 MMHG | OXYGEN SATURATION: 96 % | BODY MASS INDEX: 27.42 KG/M2 | WEIGHT: 237 LBS | DIASTOLIC BLOOD PRESSURE: 79 MMHG

## 2020-01-01 VITALS
SYSTOLIC BLOOD PRESSURE: 117 MMHG | TEMPERATURE: 99 F | BODY MASS INDEX: 28 KG/M2 | RESPIRATION RATE: 18 BRPM | HEART RATE: 109 BPM | OXYGEN SATURATION: 98 % | WEIGHT: 239.5 LBS | DIASTOLIC BLOOD PRESSURE: 80 MMHG

## 2020-01-01 VITALS
RESPIRATION RATE: 16 BRPM | TEMPERATURE: 99 F | WEIGHT: 234 LBS | HEART RATE: 88 BPM | BODY MASS INDEX: 27 KG/M2 | DIASTOLIC BLOOD PRESSURE: 91 MMHG | SYSTOLIC BLOOD PRESSURE: 137 MMHG | OXYGEN SATURATION: 94 %

## 2020-01-01 VITALS
HEART RATE: 72 BPM | SYSTOLIC BLOOD PRESSURE: 143 MMHG | RESPIRATION RATE: 18 BRPM | DIASTOLIC BLOOD PRESSURE: 88 MMHG | TEMPERATURE: 97 F | OXYGEN SATURATION: 98 %

## 2020-01-01 VITALS
BODY MASS INDEX: 27.54 KG/M2 | HEART RATE: 94 BPM | SYSTOLIC BLOOD PRESSURE: 118 MMHG | WEIGHT: 238 LBS | OXYGEN SATURATION: 95 % | DIASTOLIC BLOOD PRESSURE: 82 MMHG | HEIGHT: 78 IN

## 2020-01-01 VITALS
WEIGHT: 245.63 LBS | SYSTOLIC BLOOD PRESSURE: 123 MMHG | DIASTOLIC BLOOD PRESSURE: 81 MMHG | RESPIRATION RATE: 20 BRPM | HEART RATE: 94 BPM | OXYGEN SATURATION: 95 % | BODY MASS INDEX: 28 KG/M2

## 2020-01-01 VITALS
RESPIRATION RATE: 20 BRPM | HEIGHT: 77.99 IN | OXYGEN SATURATION: 97 % | DIASTOLIC BLOOD PRESSURE: 78 MMHG | BODY MASS INDEX: 27.77 KG/M2 | SYSTOLIC BLOOD PRESSURE: 128 MMHG | HEART RATE: 91 BPM | TEMPERATURE: 97 F | WEIGHT: 240 LBS

## 2020-01-01 VITALS
BODY MASS INDEX: 28 KG/M2 | OXYGEN SATURATION: 97 % | HEART RATE: 103 BPM | WEIGHT: 245 LBS | RESPIRATION RATE: 16 BRPM | TEMPERATURE: 98 F

## 2020-01-01 VITALS
TEMPERATURE: 98 F | SYSTOLIC BLOOD PRESSURE: 125 MMHG | OXYGEN SATURATION: 97 % | DIASTOLIC BLOOD PRESSURE: 83 MMHG | RESPIRATION RATE: 18 BRPM | HEART RATE: 99 BPM

## 2020-01-01 VITALS
HEART RATE: 77 BPM | TEMPERATURE: 99 F | DIASTOLIC BLOOD PRESSURE: 69 MMHG | BODY MASS INDEX: 25.94 KG/M2 | HEIGHT: 78 IN | OXYGEN SATURATION: 100 % | RESPIRATION RATE: 18 BRPM | WEIGHT: 224.19 LBS | SYSTOLIC BLOOD PRESSURE: 138 MMHG

## 2020-01-01 VITALS
SYSTOLIC BLOOD PRESSURE: 128 MMHG | BODY MASS INDEX: 27 KG/M2 | WEIGHT: 235 LBS | TEMPERATURE: 98 F | DIASTOLIC BLOOD PRESSURE: 78 MMHG | HEART RATE: 82 BPM

## 2020-01-01 VITALS
OXYGEN SATURATION: 99 % | SYSTOLIC BLOOD PRESSURE: 129 MMHG | DIASTOLIC BLOOD PRESSURE: 82 MMHG | RESPIRATION RATE: 16 BRPM | WEIGHT: 243 LBS | HEIGHT: 78 IN | BODY MASS INDEX: 28.11 KG/M2 | HEART RATE: 126 BPM

## 2020-01-01 VITALS
TEMPERATURE: 100 F | RESPIRATION RATE: 18 BRPM | SYSTOLIC BLOOD PRESSURE: 134 MMHG | HEART RATE: 94 BPM | DIASTOLIC BLOOD PRESSURE: 79 MMHG | OXYGEN SATURATION: 97 %

## 2020-01-01 VITALS
SYSTOLIC BLOOD PRESSURE: 134 MMHG | HEART RATE: 96 BPM | RESPIRATION RATE: 18 BRPM | WEIGHT: 254.81 LBS | OXYGEN SATURATION: 97 % | TEMPERATURE: 99 F | BODY MASS INDEX: 29.48 KG/M2 | HEIGHT: 77.99 IN | DIASTOLIC BLOOD PRESSURE: 87 MMHG

## 2020-01-01 VITALS
SYSTOLIC BLOOD PRESSURE: 131 MMHG | DIASTOLIC BLOOD PRESSURE: 84 MMHG | HEART RATE: 111 BPM | HEIGHT: 77.99 IN | RESPIRATION RATE: 18 BRPM | BODY MASS INDEX: 27.77 KG/M2 | TEMPERATURE: 97 F | WEIGHT: 240 LBS | OXYGEN SATURATION: 94 %

## 2020-01-01 VITALS
OXYGEN SATURATION: 95 % | BODY MASS INDEX: 27.54 KG/M2 | SYSTOLIC BLOOD PRESSURE: 141 MMHG | WEIGHT: 238 LBS | RESPIRATION RATE: 18 BRPM | DIASTOLIC BLOOD PRESSURE: 92 MMHG | HEIGHT: 77.99 IN | TEMPERATURE: 99 F | HEART RATE: 90 BPM

## 2020-01-01 VITALS
HEART RATE: 108 BPM | HEIGHT: 77.99 IN | WEIGHT: 241.38 LBS | RESPIRATION RATE: 20 BRPM | OXYGEN SATURATION: 97 % | SYSTOLIC BLOOD PRESSURE: 121 MMHG | DIASTOLIC BLOOD PRESSURE: 81 MMHG | BODY MASS INDEX: 27.93 KG/M2 | TEMPERATURE: 98 F

## 2020-01-01 VITALS
DIASTOLIC BLOOD PRESSURE: 80 MMHG | HEART RATE: 64 BPM | WEIGHT: 234 LBS | HEIGHT: 78 IN | BODY MASS INDEX: 27.07 KG/M2 | SYSTOLIC BLOOD PRESSURE: 108 MMHG

## 2020-01-01 VITALS
RESPIRATION RATE: 20 BRPM | TEMPERATURE: 97 F | SYSTOLIC BLOOD PRESSURE: 129 MMHG | OXYGEN SATURATION: 98 % | DIASTOLIC BLOOD PRESSURE: 96 MMHG | HEART RATE: 91 BPM

## 2020-01-01 VITALS
OXYGEN SATURATION: 99 % | DIASTOLIC BLOOD PRESSURE: 71 MMHG | HEART RATE: 79 BPM | RESPIRATION RATE: 16 BRPM | SYSTOLIC BLOOD PRESSURE: 110 MMHG | TEMPERATURE: 97 F

## 2020-01-01 VITALS
OXYGEN SATURATION: 100 % | SYSTOLIC BLOOD PRESSURE: 123 MMHG | DIASTOLIC BLOOD PRESSURE: 78 MMHG | TEMPERATURE: 98 F | HEART RATE: 102 BPM | RESPIRATION RATE: 18 BRPM

## 2020-01-01 VITALS — WEIGHT: 236.19 LBS | BODY MASS INDEX: 27 KG/M2

## 2020-01-01 DIAGNOSIS — C16.0 GE JUNCTION CARCINOMA (HCC): Primary | ICD-10-CM

## 2020-01-01 DIAGNOSIS — C77.2 SECONDARY MALIGNANT NEOPLASM OF RETROPERITONEAL LYMPH NODES (HCC): ICD-10-CM

## 2020-01-01 DIAGNOSIS — F32.9 REACTIVE DEPRESSION: ICD-10-CM

## 2020-01-01 DIAGNOSIS — T45.1X5A CINV (CHEMOTHERAPY-INDUCED NAUSEA AND VOMITING): Primary | ICD-10-CM

## 2020-01-01 DIAGNOSIS — R11.2 NAUSEA AND VOMITING: ICD-10-CM

## 2020-01-01 DIAGNOSIS — M79.602 BILATERAL ARM PAIN: ICD-10-CM

## 2020-01-01 DIAGNOSIS — C77.0 SECONDARY MALIGNANT NEOPLASM OF SUPRACLAVICULAR LYMPH NODE (HCC): ICD-10-CM

## 2020-01-01 DIAGNOSIS — M79.601 BILATERAL ARM PAIN: ICD-10-CM

## 2020-01-01 DIAGNOSIS — R10.9 ABDOMINAL PAIN OF UNKNOWN ETIOLOGY: ICD-10-CM

## 2020-01-01 DIAGNOSIS — C16.0 GE JUNCTION CARCINOMA (HCC): ICD-10-CM

## 2020-01-01 DIAGNOSIS — D64.9 NORMOCYTIC ANEMIA: ICD-10-CM

## 2020-01-01 DIAGNOSIS — R11.2 CHEMOTHERAPY-INDUCED NAUSEA AND VOMITING: ICD-10-CM

## 2020-01-01 DIAGNOSIS — T45.1X5A CHEMOTHERAPY INDUCED NEUTROPENIA (HCC): ICD-10-CM

## 2020-01-01 DIAGNOSIS — Z51.11 ENCOUNTER FOR CHEMOTHERAPY MANAGEMENT: ICD-10-CM

## 2020-01-01 DIAGNOSIS — C15.9: Primary | ICD-10-CM

## 2020-01-01 DIAGNOSIS — F41.8 DEPRESSION WITH ANXIETY: ICD-10-CM

## 2020-01-01 DIAGNOSIS — F41.9 ANXIETY: ICD-10-CM

## 2020-01-01 DIAGNOSIS — C77.1: Primary | ICD-10-CM

## 2020-01-01 DIAGNOSIS — G89.29 CHRONIC BILATERAL LOW BACK PAIN WITHOUT SCIATICA: ICD-10-CM

## 2020-01-01 DIAGNOSIS — T45.1X5A CHEMOTHERAPY INDUCED DIARRHEA: ICD-10-CM

## 2020-01-01 DIAGNOSIS — C15.9 ESOPHAGEAL CANCER, STAGE IV (HCC): ICD-10-CM

## 2020-01-01 DIAGNOSIS — G89.3 NEOPLASM RELATED PAIN: Primary | ICD-10-CM

## 2020-01-01 DIAGNOSIS — R11.0 NAUSEA: ICD-10-CM

## 2020-01-01 DIAGNOSIS — R53.1 WEAKNESS GENERALIZED: Primary | ICD-10-CM

## 2020-01-01 DIAGNOSIS — R11.2 CINV (CHEMOTHERAPY-INDUCED NAUSEA AND VOMITING): Primary | ICD-10-CM

## 2020-01-01 DIAGNOSIS — C15.9: ICD-10-CM

## 2020-01-01 DIAGNOSIS — G89.3 CANCER ASSOCIATED PAIN: Primary | ICD-10-CM

## 2020-01-01 DIAGNOSIS — U07.1 PNEUMONIA DUE TO COVID-19 VIRUS: Primary | ICD-10-CM

## 2020-01-01 DIAGNOSIS — G89.3 CANCER ASSOCIATED PAIN: ICD-10-CM

## 2020-01-01 DIAGNOSIS — C77.2 SECONDARY MALIGNANT NEOPLASM OF RETROPERITONEAL LYMPH NODES (HCC): Primary | ICD-10-CM

## 2020-01-01 DIAGNOSIS — R11.2 CINV (CHEMOTHERAPY-INDUCED NAUSEA AND VOMITING): ICD-10-CM

## 2020-01-01 DIAGNOSIS — C77.1: ICD-10-CM

## 2020-01-01 DIAGNOSIS — D50.0 IRON DEFICIENCY ANEMIA DUE TO CHRONIC BLOOD LOSS: ICD-10-CM

## 2020-01-01 DIAGNOSIS — C79.9 METASTATIC MALIGNANT NEOPLASM, UNSPECIFIED SITE (HCC): ICD-10-CM

## 2020-01-01 DIAGNOSIS — M54.2 NECK PAIN ON LEFT SIDE: ICD-10-CM

## 2020-01-01 DIAGNOSIS — T45.1X5A CINV (CHEMOTHERAPY-INDUCED NAUSEA AND VOMITING): ICD-10-CM

## 2020-01-01 DIAGNOSIS — D64.81 ANEMIA DUE TO CHEMOTHERAPY: ICD-10-CM

## 2020-01-01 DIAGNOSIS — Z71.9 ENCOUNTER FOR HEALTH EDUCATION: ICD-10-CM

## 2020-01-01 DIAGNOSIS — T45.1X5A CHEMOTHERAPY-INDUCED NAUSEA AND VOMITING: ICD-10-CM

## 2020-01-01 DIAGNOSIS — M25.512 ACUTE PAIN OF LEFT SHOULDER: Primary | ICD-10-CM

## 2020-01-01 DIAGNOSIS — T45.1X5A CHEMOTHERAPY INDUCED NEUTROPENIA (HCC): Primary | ICD-10-CM

## 2020-01-01 DIAGNOSIS — C15.9 ESOPHAGEAL ADENOCARCINOMA (HCC): ICD-10-CM

## 2020-01-01 DIAGNOSIS — G89.3 CHRONIC PAIN DUE TO MALIGNANT NEOPLASTIC DISEASE: ICD-10-CM

## 2020-01-01 DIAGNOSIS — R11.2 NAUSEA AND VOMITING IN ADULT: Primary | ICD-10-CM

## 2020-01-01 DIAGNOSIS — I26.99 BILATERAL PULMONARY EMBOLISM (HCC): ICD-10-CM

## 2020-01-01 DIAGNOSIS — C16.0 CARCINOMA OF GASTROESOPHAGEAL JUNCTION (HCC): Primary | ICD-10-CM

## 2020-01-01 DIAGNOSIS — D70.1 CHEMOTHERAPY INDUCED NEUTROPENIA (HCC): ICD-10-CM

## 2020-01-01 DIAGNOSIS — M54.50 CHRONIC BILATERAL LOW BACK PAIN WITHOUT SCIATICA: ICD-10-CM

## 2020-01-01 DIAGNOSIS — T45.1X5A ANEMIA DUE TO CHEMOTHERAPY: ICD-10-CM

## 2020-01-01 DIAGNOSIS — I26.99 OTHER ACUTE PULMONARY EMBOLISM WITHOUT ACUTE COR PULMONALE (HCC): Primary | ICD-10-CM

## 2020-01-01 DIAGNOSIS — D70.1 CHEMOTHERAPY INDUCED NEUTROPENIA (HCC): Primary | ICD-10-CM

## 2020-01-01 DIAGNOSIS — I26.99 OTHER ACUTE PULMONARY EMBOLISM WITHOUT ACUTE COR PULMONALE (HCC): ICD-10-CM

## 2020-01-01 DIAGNOSIS — K52.1 CHEMOTHERAPY INDUCED DIARRHEA: ICD-10-CM

## 2020-01-01 DIAGNOSIS — D69.6 THROMBOCYTOPENIA (HCC): ICD-10-CM

## 2020-01-01 DIAGNOSIS — G44.219 FREQUENT EPISODIC TENSION-TYPE HEADACHE: ICD-10-CM

## 2020-01-01 DIAGNOSIS — C15.9 ESOPHAGEAL ADENOCARCINOMA (HCC): Primary | ICD-10-CM

## 2020-01-01 DIAGNOSIS — G89.3 NEOPLASM RELATED PAIN: ICD-10-CM

## 2020-01-01 DIAGNOSIS — J12.82 PNEUMONIA DUE TO COVID-19 VIRUS: Primary | ICD-10-CM

## 2020-01-01 LAB
ALBUMIN SERPL-MCNC: 3.1 G/DL (ref 3.4–5)
ALBUMIN SERPL-MCNC: 3.3 G/DL (ref 3.4–5)
ALBUMIN SERPL-MCNC: 3.3 G/DL (ref 3.4–5)
ALBUMIN SERPL-MCNC: 3.5 G/DL (ref 3.4–5)
ALBUMIN/GLOB SERPL: 0.9 {RATIO} (ref 1–2)
ALP LIVER SERPL-CCNC: 106 U/L (ref 45–117)
ALP LIVER SERPL-CCNC: 115 U/L (ref 45–117)
ALP LIVER SERPL-CCNC: 133 U/L (ref 45–117)
ALP LIVER SERPL-CCNC: 93 U/L (ref 45–117)
ALT SERPL-CCNC: 16 U/L (ref 16–61)
ALT SERPL-CCNC: 21 U/L (ref 16–61)
ALT SERPL-CCNC: 22 U/L (ref 16–61)
ALT SERPL-CCNC: 34 U/L (ref 16–61)
ANION GAP SERPL CALC-SCNC: 0 MMOL/L (ref 0–18)
ANION GAP SERPL CALC-SCNC: 4 MMOL/L (ref 0–18)
ANION GAP SERPL CALC-SCNC: 6 MMOL/L (ref 0–18)
ANION GAP SERPL CALC-SCNC: 7 MMOL/L (ref 0–18)
AST SERPL-CCNC: 12 U/L (ref 15–37)
AST SERPL-CCNC: 18 U/L (ref 15–37)
AST SERPL-CCNC: 21 U/L (ref 15–37)
AST SERPL-CCNC: 26 U/L (ref 15–37)
BASOPHILS # BLD AUTO: 0.03 X10(3) UL (ref 0–0.2)
BASOPHILS # BLD AUTO: 0.03 X10(3) UL (ref 0–0.2)
BASOPHILS # BLD AUTO: 0.04 X10(3) UL (ref 0–0.2)
BASOPHILS # BLD AUTO: 0.04 X10(3) UL (ref 0–0.2)
BASOPHILS NFR BLD AUTO: 0.4 %
BASOPHILS NFR BLD AUTO: 0.5 %
BASOPHILS NFR BLD AUTO: 0.6 %
BASOPHILS NFR BLD AUTO: 0.9 %
BILIRUB SERPL-MCNC: 0.3 MG/DL (ref 0.1–2)
BILIRUB SERPL-MCNC: 0.4 MG/DL (ref 0.1–2)
BILIRUB SERPL-MCNC: 0.4 MG/DL (ref 0.1–2)
BILIRUB SERPL-MCNC: 0.5 MG/DL (ref 0.1–2)
BUN BLD-MCNC: 10 MG/DL (ref 7–18)
BUN BLD-MCNC: 12 MG/DL (ref 7–18)
BUN BLD-MCNC: 13 MG/DL (ref 7–18)
BUN BLD-MCNC: 8 MG/DL (ref 7–18)
BUN/CREAT SERPL: 15.9 (ref 10–20)
BUN/CREAT SERPL: 16.5 (ref 10–20)
BUN/CREAT SERPL: 18.8 (ref 10–20)
BUN/CREAT SERPL: 9.8 (ref 10–20)
CALCIUM BLD-MCNC: 8.7 MG/DL (ref 8.5–10.1)
CALCIUM BLD-MCNC: 8.8 MG/DL (ref 8.5–10.1)
CALCIUM BLD-MCNC: 8.9 MG/DL (ref 8.5–10.1)
CALCIUM BLD-MCNC: 9.1 MG/DL (ref 8.5–10.1)
CANCER AG19-9 SERPL-ACNC: 2281.1 U/ML (ref ?–37)
CANCER AG19-9 SERPL-ACNC: 3458.9 U/ML (ref ?–37)
CANCER AG19-9 SERPL-ACNC: 5532.1 U/ML (ref ?–37)
CHLORIDE SERPL-SCNC: 105 MMOL/L (ref 98–112)
CHLORIDE SERPL-SCNC: 105 MMOL/L (ref 98–112)
CHLORIDE SERPL-SCNC: 107 MMOL/L (ref 98–112)
CHLORIDE SERPL-SCNC: 112 MMOL/L (ref 98–112)
CO2 SERPL-SCNC: 25 MMOL/L (ref 21–32)
CO2 SERPL-SCNC: 28 MMOL/L (ref 21–32)
CO2 SERPL-SCNC: 29 MMOL/L (ref 21–32)
CO2 SERPL-SCNC: 29 MMOL/L (ref 21–32)
CREAT BLD-MCNC: 0.63 MG/DL (ref 0.7–1.3)
CREAT BLD-MCNC: 0.64 MG/DL (ref 0.7–1.3)
CREAT BLD-MCNC: 0.79 MG/DL (ref 0.7–1.3)
CREAT BLD-MCNC: 0.82 MG/DL (ref 0.7–1.3)
DEPRECATED RDW RBC AUTO: 43.5 FL (ref 35.1–46.3)
DEPRECATED RDW RBC AUTO: 45.1 FL (ref 35.1–46.3)
DEPRECATED RDW RBC AUTO: 46.5 FL (ref 35.1–46.3)
DEPRECATED RDW RBC AUTO: 47.8 FL (ref 35.1–46.3)
EOSINOPHIL # BLD AUTO: 0.15 X10(3) UL (ref 0–0.7)
EOSINOPHIL # BLD AUTO: 0.2 X10(3) UL (ref 0–0.7)
EOSINOPHIL # BLD AUTO: 0.21 X10(3) UL (ref 0–0.7)
EOSINOPHIL # BLD AUTO: 0.4 X10(3) UL (ref 0–0.7)
EOSINOPHIL NFR BLD AUTO: 2.1 %
EOSINOPHIL NFR BLD AUTO: 2.9 %
EOSINOPHIL NFR BLD AUTO: 3.5 %
EOSINOPHIL NFR BLD AUTO: 8.5 %
ERYTHROCYTE [DISTWIDTH] IN BLOOD BY AUTOMATED COUNT: 14.4 % (ref 11–15)
ERYTHROCYTE [DISTWIDTH] IN BLOOD BY AUTOMATED COUNT: 14.7 % (ref 11–15)
ERYTHROCYTE [DISTWIDTH] IN BLOOD BY AUTOMATED COUNT: 15 % (ref 11–15)
ERYTHROCYTE [DISTWIDTH] IN BLOOD BY AUTOMATED COUNT: 15 % (ref 11–15)
GLOBULIN PLAS-MCNC: 3.4 G/DL (ref 2.8–4.4)
GLOBULIN PLAS-MCNC: 3.7 G/DL (ref 2.8–4.4)
GLOBULIN PLAS-MCNC: 3.8 G/DL (ref 2.8–4.4)
GLOBULIN PLAS-MCNC: 3.8 G/DL (ref 2.8–4.4)
GLUCOSE BLD-MCNC: 100 MG/DL (ref 70–99)
GLUCOSE BLD-MCNC: 113 MG/DL (ref 70–99)
GLUCOSE BLD-MCNC: 173 MG/DL (ref 70–99)
GLUCOSE BLD-MCNC: 86 MG/DL (ref 70–99)
HCT VFR BLD AUTO: 33.8 % (ref 39–53)
HCT VFR BLD AUTO: 36.2 % (ref 39–53)
HCT VFR BLD AUTO: 37.7 % (ref 39–53)
HCT VFR BLD AUTO: 37.9 % (ref 39–53)
HGB BLD-MCNC: 10.8 G/DL (ref 13–17.5)
HGB BLD-MCNC: 11.9 G/DL (ref 13–17.5)
HGB BLD-MCNC: 12.1 G/DL (ref 13–17.5)
HGB BLD-MCNC: 12.4 G/DL (ref 13–17.5)
IMM GRANULOCYTES # BLD AUTO: 0.01 X10(3) UL (ref 0–1)
IMM GRANULOCYTES # BLD AUTO: 0.01 X10(3) UL (ref 0–1)
IMM GRANULOCYTES # BLD AUTO: 0.02 X10(3) UL (ref 0–1)
IMM GRANULOCYTES # BLD AUTO: 0.03 X10(3) UL (ref 0–1)
IMM GRANULOCYTES NFR BLD: 0.1 %
IMM GRANULOCYTES NFR BLD: 0.2 %
IMM GRANULOCYTES NFR BLD: 0.3 %
IMM GRANULOCYTES NFR BLD: 0.4 %
INR: 1.1 (ref 0.8–1.3)
LYMPHOCYTES # BLD AUTO: 0.47 X10(3) UL (ref 1–4)
LYMPHOCYTES # BLD AUTO: 0.55 X10(3) UL (ref 1–4)
LYMPHOCYTES # BLD AUTO: 0.87 X10(3) UL (ref 1–4)
LYMPHOCYTES # BLD AUTO: 0.91 X10(3) UL (ref 1–4)
LYMPHOCYTES NFR BLD AUTO: 10 %
LYMPHOCYTES NFR BLD AUTO: 12.6 %
LYMPHOCYTES NFR BLD AUTO: 12.8 %
LYMPHOCYTES NFR BLD AUTO: 9.2 %
M PROTEIN MFR SERPL ELPH: 6.5 G/DL (ref 6.4–8.2)
M PROTEIN MFR SERPL ELPH: 7 G/DL (ref 6.4–8.2)
M PROTEIN MFR SERPL ELPH: 7.1 G/DL (ref 6.4–8.2)
M PROTEIN MFR SERPL ELPH: 7.3 G/DL (ref 6.4–8.2)
MCH RBC QN AUTO: 27.1 PG (ref 26–34)
MCH RBC QN AUTO: 27.4 PG (ref 26–34)
MCH RBC QN AUTO: 27.7 PG (ref 26–34)
MCH RBC QN AUTO: 27.7 PG (ref 26–34)
MCHC RBC AUTO-ENTMCNC: 32 G/DL (ref 31–37)
MCHC RBC AUTO-ENTMCNC: 32.1 G/DL (ref 31–37)
MCHC RBC AUTO-ENTMCNC: 32.7 G/DL (ref 31–37)
MCHC RBC AUTO-ENTMCNC: 32.9 G/DL (ref 31–37)
MCV RBC AUTO: 83.4 FL (ref 80–100)
MCV RBC AUTO: 84.5 FL (ref 80–100)
MCV RBC AUTO: 84.6 FL (ref 80–100)
MCV RBC AUTO: 86.7 FL (ref 80–100)
MONOCYTES # BLD AUTO: 0.38 X10(3) UL (ref 0.1–1)
MONOCYTES # BLD AUTO: 0.51 X10(3) UL (ref 0.1–1)
MONOCYTES # BLD AUTO: 0.59 X10(3) UL (ref 0.1–1)
MONOCYTES # BLD AUTO: 0.63 X10(3) UL (ref 0.1–1)
MONOCYTES NFR BLD AUTO: 7.2 %
MONOCYTES NFR BLD AUTO: 8.1 %
MONOCYTES NFR BLD AUTO: 9.1 %
MONOCYTES NFR BLD AUTO: 9.8 %
NEUTROPHILS # BLD AUTO: 3.38 X10 (3) UL (ref 1.5–7.7)
NEUTROPHILS # BLD AUTO: 3.38 X10(3) UL (ref 1.5–7.7)
NEUTROPHILS # BLD AUTO: 4.61 X10 (3) UL (ref 1.5–7.7)
NEUTROPHILS # BLD AUTO: 4.61 X10(3) UL (ref 1.5–7.7)
NEUTROPHILS # BLD AUTO: 5.17 X10 (3) UL (ref 1.5–7.7)
NEUTROPHILS # BLD AUTO: 5.17 X10(3) UL (ref 1.5–7.7)
NEUTROPHILS # BLD AUTO: 5.49 X10 (3) UL (ref 1.5–7.7)
NEUTROPHILS # BLD AUTO: 5.49 X10(3) UL (ref 1.5–7.7)
NEUTROPHILS NFR BLD AUTO: 72.3 %
NEUTROPHILS NFR BLD AUTO: 74.7 %
NEUTROPHILS NFR BLD AUTO: 76.7 %
NEUTROPHILS NFR BLD AUTO: 77.1 %
OSMOLALITY SERPL CALC.SUM OF ELEC: 285 MOSM/KG (ref 275–295)
OSMOLALITY SERPL CALC.SUM OF ELEC: 288 MOSM/KG (ref 275–295)
OSMOLALITY SERPL CALC.SUM OF ELEC: 290 MOSM/KG (ref 275–295)
OSMOLALITY SERPL CALC.SUM OF ELEC: 292 MOSM/KG (ref 275–295)
PATIENT FASTING Y/N/NP: NO
PATIENT FASTING Y/N/NP: NO
PLATELET # BLD AUTO: 107 10(3)UL (ref 150–450)
PLATELET # BLD AUTO: 124 10(3)UL (ref 150–450)
PLATELET # BLD AUTO: 138 10(3)UL (ref 150–450)
PLATELET # BLD AUTO: 147 10(3)UL (ref 150–450)
POTASSIUM SERPL-SCNC: 3.3 MMOL/L (ref 3.5–5.1)
POTASSIUM SERPL-SCNC: 3.6 MMOL/L (ref 3.5–5.1)
POTASSIUM SERPL-SCNC: 4 MMOL/L (ref 3.5–5.1)
POTASSIUM SERPL-SCNC: 4 MMOL/L (ref 3.5–5.1)
RBC # BLD AUTO: 3.9 X10(6)UL (ref 4.3–5.7)
RBC # BLD AUTO: 4.34 X10(6)UL (ref 4.3–5.7)
RBC # BLD AUTO: 4.46 X10(6)UL (ref 4.3–5.7)
RBC # BLD AUTO: 4.48 X10(6)UL (ref 4.3–5.7)
SODIUM SERPL-SCNC: 137 MMOL/L (ref 136–145)
SODIUM SERPL-SCNC: 138 MMOL/L (ref 136–145)
SODIUM SERPL-SCNC: 141 MMOL/L (ref 136–145)
SODIUM SERPL-SCNC: 141 MMOL/L (ref 136–145)
TSI SER-ACNC: 1.47 MIU/ML (ref 0.36–3.74)
TSI SER-ACNC: 2.11 MIU/ML (ref 0.36–3.74)
WBC # BLD AUTO: 4.7 X10(3) UL (ref 4–11)
WBC # BLD AUTO: 6 X10(3) UL (ref 4–11)
WBC # BLD AUTO: 6.9 X10(3) UL (ref 4–11)
WBC # BLD AUTO: 7.1 X10(3) UL (ref 4–11)

## 2020-01-01 PROCEDURE — 71260 CT THORAX DX C+: CPT | Performed by: INTERNAL MEDICINE

## 2020-01-01 PROCEDURE — 99072 ADDL SUPL MATRL&STAF TM PHE: CPT | Performed by: FAMILY MEDICINE

## 2020-01-01 PROCEDURE — 86301 IMMUNOASSAY TUMOR CA 19-9: CPT

## 2020-01-01 PROCEDURE — 85610 PROTHROMBIN TIME: CPT | Performed by: NEUROLOGICAL SURGERY

## 2020-01-01 PROCEDURE — 77412 RADIATION TX DELIVERY LVL 3: CPT | Performed by: INTERNAL MEDICINE

## 2020-01-01 PROCEDURE — 82378 CARCINOEMBRYONIC ANTIGEN: CPT

## 2020-01-01 PROCEDURE — 84443 ASSAY THYROID STIM HORMONE: CPT

## 2020-01-01 PROCEDURE — 3079F DIAST BP 80-89 MM HG: CPT | Performed by: HOSPITALIST

## 2020-01-01 PROCEDURE — 85025 COMPLETE CBC W/AUTO DIFF WBC: CPT

## 2020-01-01 PROCEDURE — 80053 COMPREHEN METABOLIC PANEL: CPT

## 2020-01-01 PROCEDURE — 99284 EMERGENCY DEPT VISIT MOD MDM: CPT

## 2020-01-01 PROCEDURE — 3074F SYST BP LT 130 MM HG: CPT | Performed by: ANESTHESIOLOGY

## 2020-01-01 PROCEDURE — XW033E5 INTRODUCTION OF REMDESIVIR ANTI-INFECTIVE INTO PERIPHERAL VEIN, PERCUTANEOUS APPROACH, NEW TECHNOLOGY GROUP 5: ICD-10-PCS | Performed by: INTERNAL MEDICINE

## 2020-01-01 PROCEDURE — 96374 THER/PROPH/DIAG INJ IV PUSH: CPT

## 2020-01-01 PROCEDURE — 99215 OFFICE O/P EST HI 40 MIN: CPT | Performed by: NURSE PRACTITIONER

## 2020-01-01 PROCEDURE — 96376 TX/PRO/DX INJ SAME DRUG ADON: CPT

## 2020-01-01 PROCEDURE — 99214 OFFICE O/P EST MOD 30 MIN: CPT | Performed by: FAMILY MEDICINE

## 2020-01-01 PROCEDURE — 96361 HYDRATE IV INFUSION ADD-ON: CPT

## 2020-01-01 PROCEDURE — 77331 SPECIAL RADIATION DOSIMETRY: CPT | Performed by: INTERNAL MEDICINE

## 2020-01-01 PROCEDURE — 99214 OFFICE O/P EST MOD 30 MIN: CPT | Performed by: NURSE PRACTITIONER

## 2020-01-01 PROCEDURE — 99239 HOSP IP/OBS DSCHRG MGMT >30: CPT | Performed by: STUDENT IN AN ORGANIZED HEALTH CARE EDUCATION/TRAINING PROGRAM

## 2020-01-01 PROCEDURE — 99225 SUBSEQUENT OBSERVATION CARE: CPT | Performed by: OTHER

## 2020-01-01 PROCEDURE — 99214 OFFICE O/P EST MOD 30 MIN: CPT | Performed by: CLINICAL NURSE SPECIALIST

## 2020-01-01 PROCEDURE — 99204 OFFICE O/P NEW MOD 45 MIN: CPT | Performed by: ANESTHESIOLOGY

## 2020-01-01 PROCEDURE — 36591 DRAW BLOOD OFF VENOUS DEVICE: CPT

## 2020-01-01 PROCEDURE — 99214 OFFICE O/P EST MOD 30 MIN: CPT | Performed by: INTERNAL MEDICINE

## 2020-01-01 PROCEDURE — 77387 GUIDANCE FOR RADJ TX DLVR: CPT | Performed by: INTERNAL MEDICINE

## 2020-01-01 PROCEDURE — 06H03DZ INSERTION OF INTRALUMINAL DEVICE INTO INFERIOR VENA CAVA, PERCUTANEOUS APPROACH: ICD-10-PCS | Performed by: RADIOLOGY

## 2020-01-01 PROCEDURE — 99231 SBSQ HOSP IP/OBS SF/LOW 25: CPT | Performed by: NURSE PRACTITIONER

## 2020-01-01 PROCEDURE — 77334 RADIATION TREATMENT AID(S): CPT | Performed by: INTERNAL MEDICINE

## 2020-01-01 PROCEDURE — 96375 TX/PRO/DX INJ NEW DRUG ADDON: CPT

## 2020-01-01 PROCEDURE — 3008F BODY MASS INDEX DOCD: CPT | Performed by: PHYSICIAN ASSISTANT

## 2020-01-01 PROCEDURE — 74177 CT ABD & PELVIS W/CONTRAST: CPT | Performed by: INTERNAL MEDICINE

## 2020-01-01 PROCEDURE — 37191 INS ENDOVAS VENA CAVA FILTR: CPT

## 2020-01-01 PROCEDURE — 77300 RADIATION THERAPY DOSE PLAN: CPT | Performed by: INTERNAL MEDICINE

## 2020-01-01 PROCEDURE — 99225 SUBSEQUENT OBSERVATION CARE: CPT | Performed by: INTERNAL MEDICINE

## 2020-01-01 PROCEDURE — 71045 X-RAY EXAM CHEST 1 VIEW: CPT | Performed by: EMERGENCY MEDICINE

## 2020-01-01 PROCEDURE — 99213 OFFICE O/P EST LOW 20 MIN: CPT | Performed by: ANESTHESIOLOGY

## 2020-01-01 PROCEDURE — 1111F DSCHRG MED/CURRENT MED MERGE: CPT | Performed by: FAMILY MEDICINE

## 2020-01-01 PROCEDURE — 99220 INITIAL OBSERVATION CARE,LEVL III: CPT | Performed by: INTERNAL MEDICINE

## 2020-01-01 PROCEDURE — 99213 OFFICE O/P EST LOW 20 MIN: CPT | Performed by: INTERNAL MEDICINE

## 2020-01-01 PROCEDURE — 80053 COMPREHEN METABOLIC PANEL: CPT | Performed by: EMERGENCY MEDICINE

## 2020-01-01 PROCEDURE — 99213 OFFICE O/P EST LOW 20 MIN: CPT | Performed by: NURSE PRACTITIONER

## 2020-01-01 PROCEDURE — 99225 SUBSEQUENT OBSERVATION CARE: CPT | Performed by: HOSPITALIST

## 2020-01-01 PROCEDURE — 00HU03Z INSERTION OF INFUSION DEVICE INTO SPINAL CANAL, OPEN APPROACH: ICD-10-PCS | Performed by: NEUROLOGICAL SURGERY

## 2020-01-01 PROCEDURE — 99212 OFFICE O/P EST SF 10 MIN: CPT | Performed by: PHYSICIAN ASSISTANT

## 2020-01-01 PROCEDURE — 99214 OFFICE O/P EST MOD 30 MIN: CPT | Performed by: HOSPITALIST

## 2020-01-01 PROCEDURE — 85610 PROTHROMBIN TIME: CPT

## 2020-01-01 PROCEDURE — XW13325 TRANSFUSION OF CONVALESCENT PLASMA (NONAUTOLOGOUS) INTO PERIPHERAL VEIN, PERCUTANEOUS APPROACH, NEW TECHNOLOGY GROUP 5: ICD-10-PCS | Performed by: INTERNAL MEDICINE

## 2020-01-01 PROCEDURE — 74177 CT ABD & PELVIS W/CONTRAST: CPT | Performed by: EMERGENCY MEDICINE

## 2020-01-01 PROCEDURE — 3008F BODY MASS INDEX DOCD: CPT | Performed by: ANESTHESIOLOGY

## 2020-01-01 PROCEDURE — 0JH80VZ INSERTION OF INFUSION PUMP INTO ABDOMEN SUBCUTANEOUS TISSUE AND FASCIA, OPEN APPROACH: ICD-10-PCS | Performed by: NEUROLOGICAL SURGERY

## 2020-01-01 PROCEDURE — 99232 SBSQ HOSP IP/OBS MODERATE 35: CPT | Performed by: INTERNAL MEDICINE

## 2020-01-01 PROCEDURE — 93970 EXTREMITY STUDY: CPT | Performed by: INTERNAL MEDICINE

## 2020-01-01 PROCEDURE — 99232 SBSQ HOSP IP/OBS MODERATE 35: CPT | Performed by: STUDENT IN AN ORGANIZED HEALTH CARE EDUCATION/TRAINING PROGRAM

## 2020-01-01 PROCEDURE — 77336 RADIATION PHYSICS CONSULT: CPT | Performed by: INTERNAL MEDICINE

## 2020-01-01 PROCEDURE — 99255 IP/OBS CONSLTJ NEW/EST HI 80: CPT | Performed by: INTERNAL MEDICINE

## 2020-01-01 PROCEDURE — 96523 IRRIG DRUG DELIVERY DEVICE: CPT

## 2020-01-01 PROCEDURE — 71046 X-RAY EXAM CHEST 2 VIEWS: CPT

## 2020-01-01 PROCEDURE — 99072 ADDL SUPL MATRL&STAF TM PHE: CPT | Performed by: ANESTHESIOLOGY

## 2020-01-01 PROCEDURE — 3074F SYST BP LT 130 MM HG: CPT | Performed by: FAMILY MEDICINE

## 2020-01-01 PROCEDURE — 3E0R3BZ INTRODUCTION OF ANESTHETIC AGENT INTO SPINAL CANAL, PERCUTANEOUS APPROACH: ICD-10-PCS | Performed by: ANESTHESIOLOGY

## 2020-01-01 PROCEDURE — 77295 3-D RADIOTHERAPY PLAN: CPT | Performed by: INTERNAL MEDICINE

## 2020-01-01 PROCEDURE — 3075F SYST BP GE 130 - 139MM HG: CPT | Performed by: HOSPITALIST

## 2020-01-01 PROCEDURE — 99152 MOD SED SAME PHYS/QHP 5/>YRS: CPT

## 2020-01-01 PROCEDURE — 98927 OSTEOPATH MANJ 5-6 REGIONS: CPT | Performed by: FAMILY MEDICINE

## 2020-01-01 PROCEDURE — 77280 THER RAD SIMULAJ FIELD SMPL: CPT | Performed by: INTERNAL MEDICINE

## 2020-01-01 PROCEDURE — 3008F BODY MASS INDEX DOCD: CPT | Performed by: HOSPITALIST

## 2020-01-01 PROCEDURE — 99217 OBSERVATION CARE DISCHARGE: CPT | Performed by: HOSPITALIST

## 2020-01-01 PROCEDURE — 3079F DIAST BP 80-89 MM HG: CPT | Performed by: ANESTHESIOLOGY

## 2020-01-01 PROCEDURE — 90792 PSYCH DIAG EVAL W/MED SRVCS: CPT | Performed by: OTHER

## 2020-01-01 PROCEDURE — 3079F DIAST BP 80-89 MM HG: CPT | Performed by: PHYSICIAN ASSISTANT

## 2020-01-01 PROCEDURE — 77290 THER RAD SIMULAJ FIELD CPLX: CPT | Performed by: INTERNAL MEDICINE

## 2020-01-01 PROCEDURE — 3078F DIAST BP <80 MM HG: CPT | Performed by: HOSPITALIST

## 2020-01-01 PROCEDURE — 83735 ASSAY OF MAGNESIUM: CPT

## 2020-01-01 PROCEDURE — 77470 SPECIAL RADIATION TREATMENT: CPT | Performed by: INTERNAL MEDICINE

## 2020-01-01 PROCEDURE — 3074F SYST BP LT 130 MM HG: CPT | Performed by: PHYSICIAN ASSISTANT

## 2020-01-01 PROCEDURE — 71275 CT ANGIOGRAPHY CHEST: CPT | Performed by: EMERGENCY MEDICINE

## 2020-01-01 PROCEDURE — 3074F SYST BP LT 130 MM HG: CPT | Performed by: HOSPITALIST

## 2020-01-01 PROCEDURE — 99223 1ST HOSP IP/OBS HIGH 75: CPT | Performed by: HOSPITALIST

## 2020-01-01 PROCEDURE — 99219 INITIAL OBSERVATION CARE,LEVL II: CPT | Performed by: HOSPITALIST

## 2020-01-01 PROCEDURE — 96413 CHEMO IV INFUSION 1 HR: CPT

## 2020-01-01 PROCEDURE — 85730 THROMBOPLASTIN TIME PARTIAL: CPT | Performed by: NEUROLOGICAL SURGERY

## 2020-01-01 PROCEDURE — 70491 CT SOFT TISSUE NECK W/DYE: CPT | Performed by: INTERNAL MEDICINE

## 2020-01-01 PROCEDURE — 85025 COMPLETE CBC W/AUTO DIFF WBC: CPT | Performed by: EMERGENCY MEDICINE

## 2020-01-01 PROCEDURE — 99215 OFFICE O/P EST HI 40 MIN: CPT | Performed by: CLINICAL NURSE SPECIALIST

## 2020-01-01 PROCEDURE — 3078F DIAST BP <80 MM HG: CPT | Performed by: FAMILY MEDICINE

## 2020-01-01 DEVICE — INTRATHECAL PUMP 8637-40: Type: IMPLANTABLE DEVICE | Site: ABDOMEN | Status: FUNCTIONAL

## 2020-01-01 DEVICE — CATH IT 2 PC CNCT PIN: Type: IMPLANTABLE DEVICE | Site: ABDOMEN | Status: FUNCTIONAL

## 2020-01-01 RX ORDER — PROCHLORPERAZINE MALEATE 10 MG
10 TABLET ORAL EVERY 6 HOURS PRN
Status: DISCONTINUED | OUTPATIENT
Start: 2020-01-01 | End: 2021-01-01

## 2020-01-01 RX ORDER — ONDANSETRON 2 MG/ML
4 INJECTION INTRAMUSCULAR; INTRAVENOUS ONCE
Status: COMPLETED | OUTPATIENT
Start: 2020-01-01 | End: 2020-01-01

## 2020-01-01 RX ORDER — DIPHENHYDRAMINE HYDROCHLORIDE 50 MG/ML
INJECTION INTRAMUSCULAR; INTRAVENOUS
Status: COMPLETED
Start: 2020-01-01 | End: 2020-01-01

## 2020-01-01 RX ORDER — MORPHINE SULFATE 2 MG/ML
1 INJECTION, SOLUTION INTRAMUSCULAR; INTRAVENOUS EVERY 2 HOUR PRN
Status: DISCONTINUED | OUTPATIENT
Start: 2020-01-01 | End: 2021-01-01

## 2020-01-01 RX ORDER — SODIUM CHLORIDE 9 MG/ML
1000 INJECTION, SOLUTION INTRAVENOUS ONCE
Status: COMPLETED | OUTPATIENT
Start: 2020-01-01 | End: 2020-01-01

## 2020-01-01 RX ORDER — PANTOPRAZOLE SODIUM 40 MG/1
40 TABLET, DELAYED RELEASE ORAL
Status: DISCONTINUED | OUTPATIENT
Start: 2020-01-01 | End: 2021-01-01

## 2020-01-01 RX ORDER — LORAZEPAM 2 MG/ML
1 INJECTION INTRAMUSCULAR ONCE
Status: COMPLETED | OUTPATIENT
Start: 2020-01-01 | End: 2020-01-01

## 2020-01-01 RX ORDER — OLANZAPINE 5 MG/1
5 TABLET ORAL NIGHTLY
Qty: 30 TABLET | Refills: 0 | Status: SHIPPED | OUTPATIENT
Start: 2020-01-01 | End: 2020-01-01

## 2020-01-01 RX ORDER — ONDANSETRON 2 MG/ML
4 INJECTION INTRAMUSCULAR; INTRAVENOUS EVERY 4 HOURS PRN
Status: DISPENSED | OUTPATIENT
Start: 2020-01-01 | End: 2021-01-01

## 2020-01-01 RX ORDER — DIPHENOXYLATE HYDROCHLORIDE AND ATROPINE SULFATE 2.5; .025 MG/1; MG/1
1 TABLET ORAL 4 TIMES DAILY PRN
Status: DISCONTINUED | OUTPATIENT
Start: 2020-01-01 | End: 2020-01-01

## 2020-01-01 RX ORDER — PROCHLORPERAZINE EDISYLATE 5 MG/ML
5 INJECTION INTRAMUSCULAR; INTRAVENOUS EVERY 8 HOURS PRN
Status: DISCONTINUED | OUTPATIENT
Start: 2020-01-01 | End: 2020-01-01

## 2020-01-01 RX ORDER — MORPHINE SULFATE 30 MG/1
30 TABLET, FILM COATED, EXTENDED RELEASE ORAL EVERY 12 HOURS SCHEDULED
COMMUNITY
End: 2021-01-01

## 2020-01-01 RX ORDER — OXYCODONE HYDROCHLORIDE AND ACETAMINOPHEN 5; 325 MG/1; MG/1
2 TABLET ORAL
Status: DISCONTINUED | OUTPATIENT
Start: 2020-01-01 | End: 2020-01-01

## 2020-01-01 RX ORDER — ONDANSETRON 2 MG/ML
4 INJECTION INTRAMUSCULAR; INTRAVENOUS EVERY 4 HOURS PRN
Status: DISCONTINUED | OUTPATIENT
Start: 2020-01-01 | End: 2020-01-01

## 2020-01-01 RX ORDER — DIPHENHYDRAMINE HYDROCHLORIDE 50 MG/ML
25 INJECTION INTRAMUSCULAR; INTRAVENOUS EVERY 4 HOURS PRN
Status: DISCONTINUED | OUTPATIENT
Start: 2020-01-01 | End: 2021-01-01

## 2020-01-01 RX ORDER — MORPHINE SULFATE 30 MG/1
30 TABLET, FILM COATED, EXTENDED RELEASE ORAL EVERY 12 HOURS SCHEDULED
Status: DISCONTINUED | OUTPATIENT
Start: 2020-01-01 | End: 2020-01-01

## 2020-01-01 RX ORDER — OXYCODONE HYDROCHLORIDE AND ACETAMINOPHEN 5; 325 MG/1; MG/1
1-2 TABLET ORAL EVERY 4 HOURS PRN
Qty: 150 TABLET | Refills: 0 | Status: SHIPPED | OUTPATIENT
Start: 2020-01-01 | End: 2020-01-01

## 2020-01-01 RX ORDER — DIPHENHYDRAMINE HCL 25 MG
25 CAPSULE ORAL EVERY 4 HOURS PRN
Status: DISCONTINUED | OUTPATIENT
Start: 2020-01-01 | End: 2021-01-01

## 2020-01-01 RX ORDER — SENNOSIDES 8.6 MG
17.2 TABLET ORAL NIGHTLY
Status: DISCONTINUED | OUTPATIENT
Start: 2020-01-01 | End: 2021-01-01

## 2020-01-01 RX ORDER — MEPERIDINE HYDROCHLORIDE 25 MG/ML
12.5 INJECTION INTRAMUSCULAR; INTRAVENOUS; SUBCUTANEOUS AS NEEDED
Status: DISCONTINUED | OUTPATIENT
Start: 2020-01-01 | End: 2020-01-01 | Stop reason: HOSPADM

## 2020-01-01 RX ORDER — LORAZEPAM 1 MG/1
1 TABLET ORAL EVERY 4 HOURS PRN
Qty: 60 TABLET | Refills: 0 | Status: SHIPPED | OUTPATIENT
Start: 2020-01-01 | End: 2020-01-01

## 2020-01-01 RX ORDER — MORPHINE SULFATE 100 MG/1
100 TABLET ORAL EVERY 12 HOURS SCHEDULED
Qty: 60 TABLET | Refills: 0 | Status: SHIPPED | OUTPATIENT
Start: 2020-01-01 | End: 2021-01-01

## 2020-01-01 RX ORDER — PANTOPRAZOLE SODIUM 40 MG/1
40 TABLET, DELAYED RELEASE ORAL
Status: DISCONTINUED | OUTPATIENT
Start: 2020-01-01 | End: 2020-01-01

## 2020-01-01 RX ORDER — SODIUM CHLORIDE 0.9 % (FLUSH) 0.9 %
10 SYRINGE (ML) INJECTION ONCE
Status: CANCELLED | OUTPATIENT
Start: 2020-01-01

## 2020-01-01 RX ORDER — MIDAZOLAM HYDROCHLORIDE 1 MG/ML
1 INJECTION INTRAMUSCULAR; INTRAVENOUS EVERY 5 MIN PRN
Status: DISCONTINUED | OUTPATIENT
Start: 2020-01-01 | End: 2020-01-01 | Stop reason: HOSPADM

## 2020-01-01 RX ORDER — HYDROMORPHONE HYDROCHLORIDE 4 MG/1
8 TABLET ORAL EVERY 4 HOURS PRN
Status: DISCONTINUED | OUTPATIENT
Start: 2020-01-01 | End: 2020-01-01

## 2020-01-01 RX ORDER — MORPHINE SULFATE 2 MG/ML
2 INJECTION, SOLUTION INTRAMUSCULAR; INTRAVENOUS EVERY 2 HOUR PRN
Status: DISCONTINUED | OUTPATIENT
Start: 2020-01-01 | End: 2021-01-01

## 2020-01-01 RX ORDER — DIPHENOXYLATE HYDROCHLORIDE AND ATROPINE SULFATE 2.5; .025 MG/1; MG/1
2 TABLET ORAL 4 TIMES DAILY PRN
Status: DISCONTINUED | OUTPATIENT
Start: 2020-01-01 | End: 2021-01-01

## 2020-01-01 RX ORDER — PROCHLORPERAZINE EDISYLATE 5 MG/ML
10 INJECTION INTRAMUSCULAR; INTRAVENOUS EVERY 6 HOURS PRN
Status: DISCONTINUED | OUTPATIENT
Start: 2020-01-01 | End: 2021-01-01

## 2020-01-01 RX ORDER — NALOXONE HYDROCHLORIDE 0.4 MG/ML
80 INJECTION, SOLUTION INTRAMUSCULAR; INTRAVENOUS; SUBCUTANEOUS AS NEEDED
Status: DISCONTINUED | OUTPATIENT
Start: 2020-01-01 | End: 2020-01-01 | Stop reason: HOSPADM

## 2020-01-01 RX ORDER — LORAZEPAM 2 MG/ML
1 INJECTION INTRAMUSCULAR ONCE
Status: CANCELLED
Start: 2020-01-01 | End: 2020-01-01

## 2020-01-01 RX ORDER — ACETAMINOPHEN 500 MG
1000 TABLET ORAL ONCE
Status: DISCONTINUED | OUTPATIENT
Start: 2020-01-01 | End: 2020-01-01

## 2020-01-01 RX ORDER — BUPIVACAINE HYDROCHLORIDE AND EPINEPHRINE 2.5; 5 MG/ML; UG/ML
INJECTION, SOLUTION EPIDURAL; INFILTRATION; INTRACAUDAL; PERINEURAL AS NEEDED
Status: DISCONTINUED | OUTPATIENT
Start: 2020-01-01 | End: 2020-01-01 | Stop reason: HOSPADM

## 2020-01-01 RX ORDER — SCOLOPAMINE TRANSDERMAL SYSTEM 1 MG/1
1 PATCH, EXTENDED RELEASE TRANSDERMAL
Status: DISCONTINUED | OUTPATIENT
Start: 2020-01-01 | End: 2020-01-01

## 2020-01-01 RX ORDER — GABAPENTIN 300 MG/1
300 CAPSULE ORAL NIGHTLY
Status: DISCONTINUED | OUTPATIENT
Start: 2020-01-01 | End: 2020-01-01

## 2020-01-01 RX ORDER — LOPERAMIDE HYDROCHLORIDE 2 MG/1
2 CAPSULE ORAL 4 TIMES DAILY PRN
Status: DISCONTINUED | OUTPATIENT
Start: 2020-01-01 | End: 2020-01-01

## 2020-01-01 RX ORDER — HYDROMORPHONE HYDROCHLORIDE 1 MG/ML
0.2 INJECTION, SOLUTION INTRAMUSCULAR; INTRAVENOUS; SUBCUTANEOUS EVERY 2 HOUR PRN
Status: DISCONTINUED | OUTPATIENT
Start: 2020-01-01 | End: 2020-01-01

## 2020-01-01 RX ORDER — OLANZAPINE 10 MG/1
10 TABLET ORAL NIGHTLY
Status: DISCONTINUED | OUTPATIENT
Start: 2020-01-01 | End: 2020-01-01

## 2020-01-01 RX ORDER — MORPHINE SULFATE 100 MG/1
100 TABLET ORAL EVERY 12 HOURS SCHEDULED
Qty: 60 TABLET | Refills: 0 | Status: SHIPPED | OUTPATIENT
Start: 2020-01-01 | End: 2020-01-01

## 2020-01-01 RX ORDER — ONDANSETRON 2 MG/ML
4 INJECTION INTRAMUSCULAR; INTRAVENOUS EVERY 6 HOURS PRN
Status: DISCONTINUED | OUTPATIENT
Start: 2020-01-01 | End: 2020-01-01

## 2020-01-01 RX ORDER — BUSPIRONE HYDROCHLORIDE 10 MG/1
10 TABLET ORAL 3 TIMES DAILY
Qty: 90 TABLET | Refills: 2 | Status: SHIPPED | OUTPATIENT
Start: 2020-01-01

## 2020-01-01 RX ORDER — LORAZEPAM 1 MG/1
TABLET ORAL
Qty: 60 TABLET | Refills: 0 | Status: SHIPPED | OUTPATIENT
Start: 2020-01-01 | End: 2020-01-01

## 2020-01-01 RX ORDER — LORAZEPAM 2 MG/1
2 TABLET ORAL EVERY 6 HOURS PRN
Qty: 90 TABLET | Refills: 0 | Status: SHIPPED | OUTPATIENT
Start: 2020-01-01 | End: 2021-01-01

## 2020-01-01 RX ORDER — ONDANSETRON 2 MG/ML
8 INJECTION INTRAMUSCULAR; INTRAVENOUS ONCE
Status: COMPLETED | OUTPATIENT
Start: 2020-01-01 | End: 2020-01-01

## 2020-01-01 RX ORDER — PREDNISONE 1 MG/1
5 TABLET ORAL DAILY
Qty: 30 TABLET | Refills: 2 | Status: SHIPPED | OUTPATIENT
Start: 2020-01-01 | End: 2020-01-01

## 2020-01-01 RX ORDER — SODIUM CHLORIDE 0.9 % (FLUSH) 0.9 %
10 SYRINGE (ML) INJECTION ONCE
Status: COMPLETED | OUTPATIENT
Start: 2020-01-01 | End: 2020-01-01

## 2020-01-01 RX ORDER — HYDROMORPHONE HYDROCHLORIDE 1 MG/ML
0.5 INJECTION, SOLUTION INTRAMUSCULAR; INTRAVENOUS; SUBCUTANEOUS EVERY 5 MIN PRN
Status: DISCONTINUED | OUTPATIENT
Start: 2020-01-01 | End: 2020-01-01 | Stop reason: HOSPADM

## 2020-01-01 RX ORDER — HYDROMORPHONE HYDROCHLORIDE 4 MG/1
8 TABLET ORAL
Status: DISCONTINUED | OUTPATIENT
Start: 2020-01-01 | End: 2020-01-01

## 2020-01-01 RX ORDER — GABAPENTIN 300 MG/1
300 CAPSULE ORAL NIGHTLY
Qty: 90 CAPSULE | Refills: 0 | Status: SHIPPED | OUTPATIENT
Start: 2020-01-01

## 2020-01-01 RX ORDER — SODIUM CHLORIDE 9 MG/ML
INJECTION, SOLUTION INTRAVENOUS CONTINUOUS
Status: DISCONTINUED | OUTPATIENT
Start: 2020-01-01 | End: 2020-01-01

## 2020-01-01 RX ORDER — PROCHLORPERAZINE EDISYLATE 5 MG/ML
INJECTION INTRAMUSCULAR; INTRAVENOUS
Status: COMPLETED
Start: 2020-01-01 | End: 2020-01-01

## 2020-01-01 RX ORDER — MIRTAZAPINE 15 MG/1
15 TABLET, FILM COATED ORAL NIGHTLY
Status: DISCONTINUED | OUTPATIENT
Start: 2020-01-01 | End: 2020-01-01

## 2020-01-01 RX ORDER — METOCLOPRAMIDE HYDROCHLORIDE 5 MG/ML
10 INJECTION INTRAMUSCULAR; INTRAVENOUS ONCE
Status: COMPLETED | OUTPATIENT
Start: 2020-01-01 | End: 2020-01-01

## 2020-01-01 RX ORDER — CYCLOBENZAPRINE HCL 5 MG
5 TABLET ORAL 3 TIMES DAILY PRN
Status: DISCONTINUED | OUTPATIENT
Start: 2020-01-01 | End: 2021-01-01

## 2020-01-01 RX ORDER — SENNA AND DOCUSATE SODIUM 50; 8.6 MG/1; MG/1
TABLET, FILM COATED ORAL
Qty: 60 TABLET | Refills: 2 | Status: SHIPPED | OUTPATIENT
Start: 2020-01-01 | End: 2021-01-01

## 2020-01-01 RX ORDER — HYDROMORPHONE HYDROCHLORIDE 1 MG/ML
0.8 INJECTION, SOLUTION INTRAMUSCULAR; INTRAVENOUS; SUBCUTANEOUS EVERY 2 HOUR PRN
Status: DISCONTINUED | OUTPATIENT
Start: 2020-01-01 | End: 2020-01-01

## 2020-01-01 RX ORDER — DIPHENOXYLATE HYDROCHLORIDE AND ATROPINE SULFATE 2.5; .025 MG/1; MG/1
1-2 TABLET ORAL 4 TIMES DAILY PRN
Status: DISCONTINUED | OUTPATIENT
Start: 2020-01-01 | End: 2020-01-01 | Stop reason: SDUPTHER

## 2020-01-01 RX ORDER — MIRTAZAPINE 15 MG/1
15 TABLET, FILM COATED ORAL NIGHTLY
Status: DISCONTINUED | OUTPATIENT
Start: 2020-01-01 | End: 2021-01-01

## 2020-01-01 RX ORDER — LIDOCAINE HYDROCHLORIDE 10 MG/ML
INJECTION, SOLUTION EPIDURAL; INFILTRATION; INTRACAUDAL; PERINEURAL AS NEEDED
Status: DISCONTINUED | OUTPATIENT
Start: 2020-01-01 | End: 2020-01-01 | Stop reason: HOSPADM

## 2020-01-01 RX ORDER — POLYETHYLENE GLYCOL 3350 17 G/17G
17 POWDER, FOR SOLUTION ORAL DAILY PRN
Status: DISCONTINUED | OUTPATIENT
Start: 2020-01-01 | End: 2020-01-01

## 2020-01-01 RX ORDER — HYDROMORPHONE HYDROCHLORIDE 1 MG/ML
0.5 INJECTION, SOLUTION INTRAMUSCULAR; INTRAVENOUS; SUBCUTANEOUS EVERY 30 MIN PRN
Status: ACTIVE | OUTPATIENT
Start: 2020-01-01 | End: 2020-01-01

## 2020-01-01 RX ORDER — KETAMINE HYDROCHLORIDE 50 MG/ML
INJECTION, SOLUTION, CONCENTRATE INTRAMUSCULAR; INTRAVENOUS AS NEEDED
Status: DISCONTINUED | OUTPATIENT
Start: 2020-01-01 | End: 2020-01-01 | Stop reason: SURG

## 2020-01-01 RX ORDER — HYDROMORPHONE HYDROCHLORIDE 8 MG/1
8 TABLET ORAL EVERY 4 HOURS PRN
Status: DISCONTINUED | OUTPATIENT
Start: 2020-01-01 | End: 2020-01-01

## 2020-01-01 RX ORDER — SODIUM PHOSPHATE, DIBASIC AND SODIUM PHOSPHATE, MONOBASIC 7; 19 G/133ML; G/133ML
1 ENEMA RECTAL ONCE AS NEEDED
Status: DISCONTINUED | OUTPATIENT
Start: 2020-01-01 | End: 2020-01-01

## 2020-01-01 RX ORDER — ACETAMINOPHEN 325 MG/1
650 TABLET ORAL EVERY 6 HOURS PRN
Status: DISCONTINUED | OUTPATIENT
Start: 2020-01-01 | End: 2020-01-01

## 2020-01-01 RX ORDER — CEFAZOLIN SODIUM/WATER 2 G/20 ML
2 SYRINGE (ML) INTRAVENOUS EVERY 8 HOURS
Status: COMPLETED | OUTPATIENT
Start: 2020-01-01 | End: 2020-01-01

## 2020-01-01 RX ORDER — DIPHENHYDRAMINE HYDROCHLORIDE 50 MG/ML
12.5 INJECTION INTRAMUSCULAR; INTRAVENOUS AS NEEDED
Status: DISCONTINUED | OUTPATIENT
Start: 2020-01-01 | End: 2020-01-01 | Stop reason: HOSPADM

## 2020-01-01 RX ORDER — HEPARIN SODIUM 5000 [USP'U]/ML
INJECTION, SOLUTION INTRAVENOUS; SUBCUTANEOUS
Status: COMPLETED
Start: 2020-01-01 | End: 2020-01-01

## 2020-01-01 RX ORDER — ONDANSETRON 2 MG/ML
4 INJECTION INTRAMUSCULAR; INTRAVENOUS ONCE AS NEEDED
Status: DISCONTINUED | OUTPATIENT
Start: 2020-01-01 | End: 2020-01-01 | Stop reason: HOSPADM

## 2020-01-01 RX ORDER — HYDROCODONE BITARTRATE AND ACETAMINOPHEN 5; 325 MG/1; MG/1
1 TABLET ORAL AS NEEDED
Status: DISCONTINUED | OUTPATIENT
Start: 2020-01-01 | End: 2020-01-01 | Stop reason: HOSPADM

## 2020-01-01 RX ORDER — HEPARIN SODIUM 5000 [USP'U]/ML
5000 INJECTION, SOLUTION INTRAVENOUS; SUBCUTANEOUS EVERY 8 HOURS SCHEDULED
Status: DISCONTINUED | OUTPATIENT
Start: 2020-01-01 | End: 2020-01-01

## 2020-01-01 RX ORDER — ACETAMINOPHEN 500 MG
1000 TABLET ORAL ONCE AS NEEDED
Status: DISCONTINUED | OUTPATIENT
Start: 2020-01-01 | End: 2020-01-01 | Stop reason: HOSPADM

## 2020-01-01 RX ORDER — SODIUM CHLORIDE, SODIUM LACTATE, POTASSIUM CHLORIDE, CALCIUM CHLORIDE 600; 310; 30; 20 MG/100ML; MG/100ML; MG/100ML; MG/100ML
100 INJECTION, SOLUTION INTRAVENOUS CONTINUOUS
Status: DISCONTINUED | OUTPATIENT
Start: 2020-01-01 | End: 2020-01-01

## 2020-01-01 RX ORDER — SCOLOPAMINE TRANSDERMAL SYSTEM 1 MG/1
1 PATCH, EXTENDED RELEASE TRANSDERMAL
Qty: 10 PATCH | Refills: 3 | Status: SHIPPED | OUTPATIENT
Start: 2020-01-01 | End: 2021-01-01

## 2020-01-01 RX ORDER — BUSPIRONE HYDROCHLORIDE 5 MG/1
10 TABLET ORAL 3 TIMES DAILY
Status: DISCONTINUED | OUTPATIENT
Start: 2020-01-01 | End: 2020-01-01

## 2020-01-01 RX ORDER — SENNA AND DOCUSATE SODIUM 50; 8.6 MG/1; MG/1
1 TABLET, FILM COATED ORAL
Status: DISCONTINUED | OUTPATIENT
Start: 2020-01-01 | End: 2021-01-01

## 2020-01-01 RX ORDER — LIDOCAINE HYDROCHLORIDE 10 MG/ML
INJECTION, SOLUTION INFILTRATION; PERINEURAL
Status: COMPLETED
Start: 2020-01-01 | End: 2020-01-01

## 2020-01-01 RX ORDER — ONDANSETRON 2 MG/ML
4 INJECTION INTRAMUSCULAR; INTRAVENOUS AS NEEDED
Status: DISCONTINUED | OUTPATIENT
Start: 2020-01-01 | End: 2020-01-01 | Stop reason: HOSPADM

## 2020-01-01 RX ORDER — ACETAMINOPHEN 325 MG/1
650 TABLET ORAL EVERY 6 HOURS PRN
Status: DISCONTINUED | OUTPATIENT
Start: 2020-01-01 | End: 2021-01-01

## 2020-01-01 RX ORDER — CITALOPRAM 20 MG/1
20 TABLET ORAL DAILY
Qty: 90 TABLET | Refills: 3 | Status: SHIPPED | OUTPATIENT
Start: 2020-01-01 | End: 2020-01-01 | Stop reason: DRUGHIGH

## 2020-01-01 RX ORDER — MORPHINE SULFATE 30 MG/1
30 TABLET, FILM COATED, EXTENDED RELEASE ORAL EVERY 12 HOURS SCHEDULED
Qty: 60 TABLET | Refills: 0 | Status: SHIPPED | OUTPATIENT
Start: 2020-01-01 | End: 2020-01-01

## 2020-01-01 RX ORDER — MIDAZOLAM HYDROCHLORIDE 1 MG/ML
INJECTION INTRAMUSCULAR; INTRAVENOUS AS NEEDED
Status: DISCONTINUED | OUTPATIENT
Start: 2020-01-01 | End: 2020-01-01 | Stop reason: SURG

## 2020-01-01 RX ORDER — BISACODYL 10 MG
10 SUPPOSITORY, RECTAL RECTAL
Status: DISCONTINUED | OUTPATIENT
Start: 2020-01-01 | End: 2020-01-01

## 2020-01-01 RX ORDER — PREDNISONE 1 MG/1
10 TABLET ORAL DAILY
Qty: 30 TABLET | Refills: 1 | Status: SHIPPED | OUTPATIENT
Start: 2020-01-01 | End: 2020-01-01

## 2020-01-01 RX ORDER — MIDAZOLAM HYDROCHLORIDE 1 MG/ML
INJECTION INTRAMUSCULAR; INTRAVENOUS AS NEEDED
Status: DISCONTINUED | OUTPATIENT
Start: 2020-01-01 | End: 2020-01-01 | Stop reason: HOSPADM

## 2020-01-01 RX ORDER — MORPHINE SULFATE 100 MG/1
100 TABLET ORAL EVERY 12 HOURS SCHEDULED
Status: DISCONTINUED | OUTPATIENT
Start: 2020-01-01 | End: 2020-01-01

## 2020-01-01 RX ORDER — SODIUM CHLORIDE 0.9 % (FLUSH) 0.9 %
10 SYRINGE (ML) INJECTION ONCE
Status: ACTIVE | OUTPATIENT
Start: 2020-01-01

## 2020-01-01 RX ORDER — BACITRACIN 50000 [USP'U]/1
INJECTION, POWDER, LYOPHILIZED, FOR SOLUTION INTRAMUSCULAR AS NEEDED
Status: DISCONTINUED | OUTPATIENT
Start: 2020-01-01 | End: 2020-01-01 | Stop reason: HOSPADM

## 2020-01-01 RX ORDER — SODIUM CHLORIDE 0.9 % (FLUSH) 0.9 %
10 SYRINGE (ML) INJECTION ONCE
Status: DISCONTINUED | OUTPATIENT
Start: 2020-01-01 | End: 2020-01-01

## 2020-01-01 RX ORDER — ONDANSETRON 4 MG/1
8 TABLET, ORALLY DISINTEGRATING ORAL EVERY 8 HOURS PRN
Status: DISCONTINUED | OUTPATIENT
Start: 2020-01-01 | End: 2021-01-01

## 2020-01-01 RX ORDER — SODIUM PHOSPHATE, DIBASIC AND SODIUM PHOSPHATE, MONOBASIC 7; 19 G/133ML; G/133ML
1 ENEMA RECTAL ONCE AS NEEDED
Status: DISCONTINUED | OUTPATIENT
Start: 2020-01-01 | End: 2021-01-01

## 2020-01-01 RX ORDER — SODIUM CHLORIDE 9 MG/ML
1000 INJECTION, SOLUTION INTRAVENOUS ONCE
Status: CANCELLED
Start: 2020-01-01 | End: 2020-01-01

## 2020-01-01 RX ORDER — DOCUSATE SODIUM 100 MG/1
100 CAPSULE, LIQUID FILLED ORAL 2 TIMES DAILY
Status: DISCONTINUED | OUTPATIENT
Start: 2020-01-01 | End: 2021-01-01

## 2020-01-01 RX ORDER — BUSPIRONE HYDROCHLORIDE 10 MG/1
10 TABLET ORAL
Status: DISCONTINUED | OUTPATIENT
Start: 2020-01-01 | End: 2021-01-01

## 2020-01-01 RX ORDER — LORAZEPAM 2 MG/ML
0.5 INJECTION INTRAMUSCULAR ONCE
Status: COMPLETED | OUTPATIENT
Start: 2020-01-01 | End: 2020-01-01

## 2020-01-01 RX ORDER — FOLIC ACID 1 MG/1
1 TABLET ORAL DAILY
Status: DISCONTINUED | OUTPATIENT
Start: 2020-01-01 | End: 2020-01-01

## 2020-01-01 RX ORDER — HYDROCODONE BITARTRATE AND ACETAMINOPHEN 10; 325 MG/1; MG/1
1 TABLET ORAL EVERY 6 HOURS PRN
Qty: 20 TABLET | Refills: 0 | Status: SHIPPED | OUTPATIENT
Start: 2020-01-01 | End: 2021-01-01

## 2020-01-01 RX ORDER — OXYCODONE HYDROCHLORIDE AND ACETAMINOPHEN 5; 325 MG/1; MG/1
1 TABLET ORAL EVERY 4 HOURS PRN
Status: DISCONTINUED | OUTPATIENT
Start: 2020-01-01 | End: 2020-01-01

## 2020-01-01 RX ORDER — MIRTAZAPINE 15 MG/1
15 TABLET, FILM COATED ORAL NIGHTLY
Qty: 30 TABLET | Refills: 3 | Status: SHIPPED | OUTPATIENT
Start: 2020-01-01

## 2020-01-01 RX ORDER — HYDROCODONE BITARTRATE AND ACETAMINOPHEN 10; 325 MG/1; MG/1
1 TABLET ORAL EVERY 6 HOURS PRN
Status: DISCONTINUED | OUTPATIENT
Start: 2020-01-01 | End: 2021-01-01

## 2020-01-01 RX ORDER — BUSPIRONE HYDROCHLORIDE 10 MG/1
10 TABLET ORAL 3 TIMES DAILY
Status: DISCONTINUED | OUTPATIENT
Start: 2020-01-01 | End: 2020-01-01

## 2020-01-01 RX ORDER — HEPARIN SODIUM AND DEXTROSE 10000; 5 [USP'U]/100ML; G/100ML
18 INJECTION INTRAVENOUS ONCE
Status: COMPLETED | OUTPATIENT
Start: 2020-01-01 | End: 2020-01-01

## 2020-01-01 RX ORDER — LORAZEPAM 1 MG/1
2 TABLET ORAL EVERY 6 HOURS PRN
Status: DISCONTINUED | OUTPATIENT
Start: 2020-01-01 | End: 2021-01-01

## 2020-01-01 RX ORDER — HYDROCODONE BITARTRATE AND ACETAMINOPHEN 5; 325 MG/1; MG/1
2 TABLET ORAL AS NEEDED
Status: DISCONTINUED | OUTPATIENT
Start: 2020-01-01 | End: 2020-01-01 | Stop reason: HOSPADM

## 2020-01-01 RX ORDER — ONDANSETRON 8 MG/1
8 TABLET, ORALLY DISINTEGRATING ORAL EVERY 8 HOURS PRN
Qty: 30 TABLET | Refills: 0 | Status: SHIPPED | OUTPATIENT
Start: 2020-01-01 | End: 2021-01-01

## 2020-01-01 RX ORDER — HYDROMORPHONE HYDROCHLORIDE 4 MG/1
TABLET ORAL EVERY 4 HOURS PRN
Qty: 120 TABLET | Refills: 0 | Status: SHIPPED | OUTPATIENT
Start: 2020-01-01 | End: 2020-01-01

## 2020-01-01 RX ORDER — OLANZAPINE 10 MG/1
10 TABLET ORAL NIGHTLY
Status: DISCONTINUED | OUTPATIENT
Start: 2020-01-01 | End: 2021-01-01

## 2020-01-01 RX ORDER — LORAZEPAM 0.5 MG/1
2 TABLET ORAL EVERY 6 HOURS PRN
Status: DISCONTINUED | OUTPATIENT
Start: 2020-01-01 | End: 2020-01-01

## 2020-01-01 RX ORDER — DIPHENHYDRAMINE HYDROCHLORIDE 50 MG/ML
25 INJECTION INTRAMUSCULAR; INTRAVENOUS ONCE
Status: COMPLETED | OUTPATIENT
Start: 2020-01-01 | End: 2020-01-01

## 2020-01-01 RX ORDER — NEOSTIGMINE METHYLSULFATE 1 MG/ML
INJECTION INTRAVENOUS AS NEEDED
Status: DISCONTINUED | OUTPATIENT
Start: 2020-01-01 | End: 2020-01-01 | Stop reason: SURG

## 2020-01-01 RX ORDER — DIPHENHYDRAMINE HYDROCHLORIDE 50 MG/ML
12.5 INJECTION INTRAMUSCULAR; INTRAVENOUS ONCE
Status: COMPLETED | OUTPATIENT
Start: 2020-01-01 | End: 2020-01-01

## 2020-01-01 RX ORDER — POTASSIUM CHLORIDE 14.9 MG/ML
20 INJECTION INTRAVENOUS ONCE
Status: COMPLETED | OUTPATIENT
Start: 2020-01-01 | End: 2020-01-01

## 2020-01-01 RX ORDER — SENNA AND DOCUSATE SODIUM 50; 8.6 MG/1; MG/1
2 TABLET, FILM COATED ORAL
Status: DISCONTINUED | OUTPATIENT
Start: 2020-01-01 | End: 2020-01-01

## 2020-01-01 RX ORDER — SCOLOPAMINE TRANSDERMAL SYSTEM 1 MG/1
PATCH, EXTENDED RELEASE TRANSDERMAL
Qty: 10 PATCH | Refills: 0 | Status: SHIPPED | OUTPATIENT
Start: 2020-01-01 | End: 2020-01-01

## 2020-01-01 RX ORDER — MORPHINE SULFATE 4 MG/ML
4 INJECTION, SOLUTION INTRAMUSCULAR; INTRAVENOUS EVERY 2 HOUR PRN
Status: DISCONTINUED | OUTPATIENT
Start: 2020-01-01 | End: 2021-01-01

## 2020-01-01 RX ORDER — GABAPENTIN 300 MG/1
300 CAPSULE ORAL NIGHTLY
Status: DISCONTINUED | OUTPATIENT
Start: 2020-01-01 | End: 2021-01-01

## 2020-01-01 RX ORDER — CYCLOBENZAPRINE HCL 10 MG
10 TABLET ORAL 3 TIMES DAILY PRN
Status: DISCONTINUED | OUTPATIENT
Start: 2020-01-01 | End: 2021-01-01

## 2020-01-01 RX ORDER — OXYCODONE HYDROCHLORIDE AND ACETAMINOPHEN 5; 325 MG/1; MG/1
1-2 TABLET ORAL EVERY 4 HOURS PRN
Qty: 90 TABLET | Refills: 0 | Status: SHIPPED | OUTPATIENT
Start: 2020-01-01 | End: 2020-01-01

## 2020-01-01 RX ORDER — ONDANSETRON 2 MG/ML
INJECTION INTRAMUSCULAR; INTRAVENOUS AS NEEDED
Status: DISCONTINUED | OUTPATIENT
Start: 2020-01-01 | End: 2020-01-01 | Stop reason: SURG

## 2020-01-01 RX ORDER — LOPERAMIDE HYDROCHLORIDE 2 MG/1
2 CAPSULE ORAL 4 TIMES DAILY PRN
Status: DISCONTINUED | OUTPATIENT
Start: 2020-01-01 | End: 2021-01-01

## 2020-01-01 RX ORDER — LORAZEPAM 1 MG/1
1 TABLET ORAL 2 TIMES DAILY PRN
Qty: 60 TABLET | Refills: 0 | Status: SHIPPED | OUTPATIENT
Start: 2020-01-01 | End: 2020-01-01

## 2020-01-01 RX ORDER — MORPHINE SULFATE 100 MG/1
130 TABLET ORAL EVERY 12 HOURS SCHEDULED
COMMUNITY
End: 2020-01-01

## 2020-01-01 RX ORDER — SODIUM CHLORIDE, SODIUM LACTATE, POTASSIUM CHLORIDE, CALCIUM CHLORIDE 600; 310; 30; 20 MG/100ML; MG/100ML; MG/100ML; MG/100ML
INJECTION, SOLUTION INTRAVENOUS CONTINUOUS
Status: DISCONTINUED | OUTPATIENT
Start: 2020-01-01 | End: 2021-01-01

## 2020-01-01 RX ORDER — DIPHENOXYLATE HYDROCHLORIDE AND ATROPINE SULFATE 2.5; .025 MG/1; MG/1
1 TABLET ORAL 4 TIMES DAILY PRN
Status: DISCONTINUED | OUTPATIENT
Start: 2020-01-01 | End: 2021-01-01

## 2020-01-01 RX ORDER — HYDROMORPHONE HYDROCHLORIDE 1 MG/ML
INJECTION, SOLUTION INTRAMUSCULAR; INTRAVENOUS; SUBCUTANEOUS
Status: COMPLETED
Start: 2020-01-01 | End: 2020-01-01

## 2020-01-01 RX ORDER — MIDAZOLAM HYDROCHLORIDE 1 MG/ML
INJECTION INTRAMUSCULAR; INTRAVENOUS
Status: COMPLETED
Start: 2020-01-01 | End: 2020-01-01

## 2020-01-01 RX ORDER — ESCITALOPRAM OXALATE 10 MG/1
10 TABLET ORAL DAILY
Refills: 11 | Status: DISCONTINUED | OUTPATIENT
Start: 2020-01-01 | End: 2020-01-01

## 2020-01-01 RX ORDER — ESCITALOPRAM OXALATE 20 MG/1
20 TABLET ORAL DAILY
Status: DISCONTINUED | OUTPATIENT
Start: 2020-01-01 | End: 2021-01-01

## 2020-01-01 RX ORDER — PROCHLORPERAZINE MALEATE 10 MG
10 TABLET ORAL EVERY 6 HOURS PRN
Qty: 90 TABLET | Refills: 5 | Status: SHIPPED | OUTPATIENT
Start: 2020-01-01 | End: 2020-01-01

## 2020-01-01 RX ORDER — DEXAMETHASONE SODIUM PHOSPHATE 4 MG/ML
VIAL (ML) INJECTION AS NEEDED
Status: DISCONTINUED | OUTPATIENT
Start: 2020-01-01 | End: 2020-01-01 | Stop reason: SURG

## 2020-01-01 RX ORDER — ONDANSETRON 2 MG/ML
8 INJECTION INTRAMUSCULAR; INTRAVENOUS ONCE
Status: CANCELLED
Start: 2020-01-01 | End: 2020-01-01

## 2020-01-01 RX ORDER — LORAZEPAM 1 MG/1
2 TABLET ORAL EVERY 6 HOURS PRN
Status: DISCONTINUED | OUTPATIENT
Start: 2020-01-01 | End: 2020-01-01

## 2020-01-01 RX ORDER — CEFAZOLIN SODIUM/WATER 2 G/20 ML
2 SYRINGE (ML) INTRAVENOUS ONCE
Status: COMPLETED | OUTPATIENT
Start: 2020-01-01 | End: 2020-01-01

## 2020-01-01 RX ORDER — HEPARIN SODIUM AND DEXTROSE 10000; 5 [USP'U]/100ML; G/100ML
INJECTION INTRAVENOUS CONTINUOUS
Status: DISCONTINUED | OUTPATIENT
Start: 2020-01-01 | End: 2020-01-01

## 2020-01-01 RX ORDER — LIDOCAINE HYDROCHLORIDE 10 MG/ML
INJECTION, SOLUTION EPIDURAL; INFILTRATION; INTRACAUDAL; PERINEURAL AS NEEDED
Status: DISCONTINUED | OUTPATIENT
Start: 2020-01-01 | End: 2020-01-01 | Stop reason: SURG

## 2020-01-01 RX ORDER — ESCITALOPRAM OXALATE 20 MG/1
20 TABLET ORAL DAILY
Status: DISCONTINUED | OUTPATIENT
Start: 2020-01-01 | End: 2020-01-01

## 2020-01-01 RX ORDER — BISACODYL 10 MG
10 SUPPOSITORY, RECTAL RECTAL
Status: DISCONTINUED | OUTPATIENT
Start: 2020-01-01 | End: 2021-01-01

## 2020-01-01 RX ORDER — HYDROMORPHONE HYDROCHLORIDE 1 MG/ML
0.4 INJECTION, SOLUTION INTRAMUSCULAR; INTRAVENOUS; SUBCUTANEOUS EVERY 2 HOUR PRN
Status: DISCONTINUED | OUTPATIENT
Start: 2020-01-01 | End: 2020-01-01

## 2020-01-01 RX ORDER — ROCURONIUM BROMIDE 10 MG/ML
INJECTION, SOLUTION INTRAVENOUS AS NEEDED
Status: DISCONTINUED | OUTPATIENT
Start: 2020-01-01 | End: 2020-01-01 | Stop reason: SURG

## 2020-01-01 RX ORDER — OXYCODONE HYDROCHLORIDE AND ACETAMINOPHEN 5; 325 MG/1; MG/1
1-2 TABLET ORAL EVERY 4 HOURS PRN
Qty: 150 TABLET | Refills: 0 | Status: SHIPPED | OUTPATIENT
Start: 2020-01-01 | End: 2021-01-01

## 2020-01-01 RX ORDER — PROCHLORPERAZINE MALEATE 10 MG
10 TABLET ORAL EVERY 6 HOURS PRN
Qty: 90 TABLET | Refills: 5 | Status: SHIPPED | OUTPATIENT
Start: 2020-01-01

## 2020-01-01 RX ORDER — HYDROMORPHONE HYDROCHLORIDE 4 MG/1
TABLET ORAL EVERY 4 HOURS PRN
Qty: 120 TABLET | Refills: 0 | Status: SHIPPED | OUTPATIENT
Start: 2020-01-01 | End: 2021-01-01

## 2020-01-01 RX ORDER — GLYCOPYRROLATE 0.2 MG/ML
INJECTION, SOLUTION INTRAMUSCULAR; INTRAVENOUS AS NEEDED
Status: DISCONTINUED | OUTPATIENT
Start: 2020-01-01 | End: 2020-01-01 | Stop reason: SURG

## 2020-01-01 RX ORDER — HEPARIN SODIUM 5000 [USP'U]/ML
80 INJECTION INTRAVENOUS; SUBCUTANEOUS ONCE
Status: COMPLETED | OUTPATIENT
Start: 2020-01-01 | End: 2020-01-01

## 2020-01-01 RX ORDER — DEXAMETHASONE 4 MG/1
8 TABLET ORAL
Qty: 30 TABLET | Refills: 1 | Status: SHIPPED | OUTPATIENT
Start: 2020-01-01 | End: 2020-01-01

## 2020-01-01 RX ORDER — HYDROMORPHONE HYDROCHLORIDE 1 MG/ML
1 INJECTION, SOLUTION INTRAMUSCULAR; INTRAVENOUS; SUBCUTANEOUS EVERY 2 HOUR PRN
Status: DISCONTINUED | OUTPATIENT
Start: 2020-01-01 | End: 2020-01-01

## 2020-01-01 RX ORDER — SODIUM CHLORIDE 9 MG/ML
INJECTION, SOLUTION INTRAVENOUS CONTINUOUS
Status: ACTIVE | OUTPATIENT
Start: 2020-01-01 | End: 2020-01-01

## 2020-01-01 RX ORDER — SODIUM CHLORIDE 9 MG/ML
INJECTION, SOLUTION INTRAVENOUS CONTINUOUS
Status: DISCONTINUED | OUTPATIENT
Start: 2020-01-01 | End: 2021-01-01

## 2020-01-01 RX ORDER — CYCLOBENZAPRINE HCL 5 MG
5 TABLET ORAL 3 TIMES DAILY PRN
Status: DISCONTINUED | OUTPATIENT
Start: 2020-01-01 | End: 2020-01-01

## 2020-01-01 RX ORDER — PANTOPRAZOLE SODIUM 20 MG/1
20 TABLET, DELAYED RELEASE ORAL
Refills: 0 | Status: DISCONTINUED | OUTPATIENT
Start: 2020-01-01 | End: 2020-01-01

## 2020-01-01 RX ORDER — DIPHENHYDRAMINE HYDROCHLORIDE 50 MG/ML
50 INJECTION INTRAMUSCULAR; INTRAVENOUS ONCE AS NEEDED
Status: DISCONTINUED | OUTPATIENT
Start: 2020-01-01 | End: 2020-01-01 | Stop reason: HOSPADM

## 2020-01-01 RX ORDER — PROCHLORPERAZINE MALEATE 10 MG
10 TABLET ORAL EVERY 6 HOURS PRN
Status: DISCONTINUED | OUTPATIENT
Start: 2020-01-01 | End: 2020-01-01

## 2020-01-01 RX ORDER — OLANZAPINE 10 MG/1
10 TABLET ORAL NIGHTLY
Qty: 30 TABLET | Refills: 2 | Status: SHIPPED | OUTPATIENT
Start: 2020-01-01

## 2020-01-01 RX ORDER — OLANZAPINE 5 MG/1
10 TABLET ORAL NIGHTLY
Status: DISCONTINUED | OUTPATIENT
Start: 2020-01-01 | End: 2020-01-01

## 2020-01-01 RX ORDER — PROCHLORPERAZINE EDISYLATE 5 MG/ML
10 INJECTION INTRAMUSCULAR; INTRAVENOUS ONCE
Status: COMPLETED | OUTPATIENT
Start: 2020-01-01 | End: 2020-01-01

## 2020-01-01 RX ORDER — METOCLOPRAMIDE HYDROCHLORIDE 5 MG/ML
10 INJECTION INTRAMUSCULAR; INTRAVENOUS AS NEEDED
Status: DISCONTINUED | OUTPATIENT
Start: 2020-01-01 | End: 2020-01-01 | Stop reason: HOSPADM

## 2020-01-01 RX ORDER — SODIUM CHLORIDE, SODIUM LACTATE, POTASSIUM CHLORIDE, CALCIUM CHLORIDE 600; 310; 30; 20 MG/100ML; MG/100ML; MG/100ML; MG/100ML
INJECTION, SOLUTION INTRAVENOUS CONTINUOUS
Status: DISCONTINUED | OUTPATIENT
Start: 2020-01-01 | End: 2020-01-01 | Stop reason: HOSPADM

## 2020-01-01 RX ORDER — SCOLOPAMINE TRANSDERMAL SYSTEM 1 MG/1
1 PATCH, EXTENDED RELEASE TRANSDERMAL
Status: DISCONTINUED | OUTPATIENT
Start: 2020-01-01 | End: 2021-01-01

## 2020-01-01 RX ORDER — SODIUM CHLORIDE 9 MG/ML
INJECTION INTRAVENOUS AS NEEDED
Status: DISCONTINUED | OUTPATIENT
Start: 2020-01-01 | End: 2020-01-01 | Stop reason: HOSPADM

## 2020-01-01 RX ORDER — POLYETHYLENE GLYCOL 3350 17 G/17G
17 POWDER, FOR SOLUTION ORAL DAILY PRN
Status: DISCONTINUED | OUTPATIENT
Start: 2020-01-01 | End: 2021-01-01

## 2020-01-01 RX ORDER — MORPHINE SULFATE 0.5 MG/ML
INJECTION, SOLUTION EPIDURAL; INTRATHECAL; INTRAVENOUS AS NEEDED
Status: DISCONTINUED | OUTPATIENT
Start: 2020-01-01 | End: 2020-01-01 | Stop reason: HOSPADM

## 2020-01-01 RX ADMIN — SODIUM CHLORIDE 1000 ML: 9 INJECTION, SOLUTION INTRAVENOUS at 11:45:00

## 2020-01-01 RX ADMIN — MIDAZOLAM HYDROCHLORIDE 2 MG: 1 INJECTION INTRAMUSCULAR; INTRAVENOUS at 07:23:00

## 2020-01-01 RX ADMIN — LIDOCAINE HYDROCHLORIDE 50 MG: 10 INJECTION, SOLUTION EPIDURAL; INFILTRATION; INTRACAUDAL; PERINEURAL at 07:32:00

## 2020-01-01 RX ADMIN — SODIUM CHLORIDE 1000 ML: 9 INJECTION, SOLUTION INTRAVENOUS at 10:44:00

## 2020-01-01 RX ADMIN — GLYCOPYRROLATE 0.6 MG: 0.2 INJECTION, SOLUTION INTRAMUSCULAR; INTRAVENOUS at 08:51:00

## 2020-01-01 RX ADMIN — ONDANSETRON 8 MG: 2 INJECTION INTRAMUSCULAR; INTRAVENOUS at 15:08:00

## 2020-01-01 RX ADMIN — LORAZEPAM 1 MG: 2 INJECTION INTRAMUSCULAR at 16:19:00

## 2020-01-01 RX ADMIN — ROCURONIUM BROMIDE 10 MG: 10 INJECTION, SOLUTION INTRAVENOUS at 07:40:00

## 2020-01-01 RX ADMIN — SODIUM CHLORIDE, SODIUM LACTATE, POTASSIUM CHLORIDE, CALCIUM CHLORIDE: 600; 310; 30; 20 INJECTION, SOLUTION INTRAVENOUS at 09:25:00

## 2020-01-01 RX ADMIN — SODIUM CHLORIDE 1000 ML: 9 INJECTION, SOLUTION INTRAVENOUS at 09:36:00

## 2020-01-01 RX ADMIN — NEOSTIGMINE METHYLSULFATE 5 MG: 1 INJECTION INTRAVENOUS at 08:51:00

## 2020-01-01 RX ADMIN — SODIUM CHLORIDE 0.9 % (FLUSH) 10 ML: 0.9 % SYRINGE (ML) INJECTION at 11:15:00

## 2020-01-01 RX ADMIN — SODIUM CHLORIDE 0.9 % (FLUSH) 10 ML: 0.9 % SYRINGE (ML) INJECTION at 16:00:00

## 2020-01-01 RX ADMIN — LORAZEPAM 1 MG: 2 INJECTION INTRAMUSCULAR at 14:45:00

## 2020-01-01 RX ADMIN — ONDANSETRON 4 MG: 2 INJECTION INTRAMUSCULAR; INTRAVENOUS at 08:51:00

## 2020-01-01 RX ADMIN — ONDANSETRON 8 MG: 2 INJECTION INTRAMUSCULAR; INTRAVENOUS at 12:48:00

## 2020-01-01 RX ADMIN — CEFAZOLIN SODIUM/WATER 2 G: 2 G/20 ML SYRINGE (ML) INTRAVENOUS at 07:43:00

## 2020-01-01 RX ADMIN — SODIUM CHLORIDE 0.9 % (FLUSH) 10 ML: 0.9 % SYRINGE (ML) INJECTION at 09:30:00

## 2020-01-01 RX ADMIN — SODIUM CHLORIDE 0.9 % (FLUSH) 10 ML: 0.9 % SYRINGE (ML) INJECTION at 12:27:00

## 2020-01-01 RX ADMIN — LORAZEPAM 1 MG: 2 INJECTION INTRAMUSCULAR at 11:57:00

## 2020-01-01 RX ADMIN — ROCURONIUM BROMIDE 40 MG: 10 INJECTION, SOLUTION INTRAVENOUS at 07:32:00

## 2020-01-01 RX ADMIN — SODIUM CHLORIDE 1000 ML: 9 INJECTION, SOLUTION INTRAVENOUS at 10:18:00

## 2020-01-01 RX ADMIN — DEXAMETHASONE SODIUM PHOSPHATE 8 MG: 4 MG/ML VIAL (ML) INJECTION at 07:40:00

## 2020-01-01 RX ADMIN — LORAZEPAM 1 MG: 2 INJECTION INTRAMUSCULAR at 11:43:00

## 2020-01-01 RX ADMIN — ROCURONIUM BROMIDE 20 MG: 10 INJECTION, SOLUTION INTRAVENOUS at 08:15:00

## 2020-01-01 RX ADMIN — LORAZEPAM 1 MG: 2 INJECTION INTRAMUSCULAR at 12:53:00

## 2020-01-01 RX ADMIN — SODIUM CHLORIDE 1000 ML: 9 INJECTION, SOLUTION INTRAVENOUS at 10:04:00

## 2020-01-01 RX ADMIN — KETAMINE HYDROCHLORIDE 30 MG: 50 INJECTION, SOLUTION, CONCENTRATE INTRAMUSCULAR; INTRAVENOUS at 07:40:00

## 2020-01-09 ENCOUNTER — OFFICE VISIT (OUTPATIENT)
Dept: HEMATOLOGY/ONCOLOGY | Age: 30
End: 2020-01-09
Attending: INTERNAL MEDICINE
Payer: COMMERCIAL

## 2020-01-09 VITALS
SYSTOLIC BLOOD PRESSURE: 137 MMHG | BODY MASS INDEX: 29.79 KG/M2 | OXYGEN SATURATION: 97 % | DIASTOLIC BLOOD PRESSURE: 87 MMHG | TEMPERATURE: 99 F | HEART RATE: 106 BPM | RESPIRATION RATE: 18 BRPM | WEIGHT: 257.5 LBS | HEIGHT: 77.99 IN

## 2020-01-09 DIAGNOSIS — C77.0 SECONDARY MALIGNANT NEOPLASM LYMPH NODES OF HEAD, FACE AND NECK (HCC): ICD-10-CM

## 2020-01-09 DIAGNOSIS — Z51.11 ENCOUNTER FOR CHEMOTHERAPY MANAGEMENT: ICD-10-CM

## 2020-01-09 DIAGNOSIS — F41.9 ANXIETY: ICD-10-CM

## 2020-01-09 DIAGNOSIS — J06.9 URI, ACUTE: ICD-10-CM

## 2020-01-09 DIAGNOSIS — C77.2 SECONDARY MALIGNANT NEOPLASM OF RETROPERITONEAL LYMPH NODES (HCC): Primary | ICD-10-CM

## 2020-01-09 DIAGNOSIS — D64.81 ANEMIA DUE TO CHEMOTHERAPY: ICD-10-CM

## 2020-01-09 DIAGNOSIS — C16.0 GE JUNCTION CARCINOMA (HCC): ICD-10-CM

## 2020-01-09 DIAGNOSIS — M79.601 BILATERAL ARM PAIN: ICD-10-CM

## 2020-01-09 DIAGNOSIS — D69.6 THROMBOCYTOPENIA (HCC): ICD-10-CM

## 2020-01-09 DIAGNOSIS — L73.9 FOLLICULITIS: ICD-10-CM

## 2020-01-09 DIAGNOSIS — R11.2 NAUSEA AND VOMITING: ICD-10-CM

## 2020-01-09 DIAGNOSIS — M79.602 BILATERAL ARM PAIN: ICD-10-CM

## 2020-01-09 DIAGNOSIS — T45.1X5A CHEMOTHERAPY-INDUCED NAUSEA AND VOMITING: ICD-10-CM

## 2020-01-09 DIAGNOSIS — R11.2 CHEMOTHERAPY-INDUCED NAUSEA AND VOMITING: ICD-10-CM

## 2020-01-09 DIAGNOSIS — K52.1 CHEMOTHERAPY INDUCED DIARRHEA: ICD-10-CM

## 2020-01-09 DIAGNOSIS — T45.1X5A CHEMOTHERAPY INDUCED DIARRHEA: ICD-10-CM

## 2020-01-09 DIAGNOSIS — C77.0 SECONDARY MALIGNANT NEOPLASM OF SUPRACLAVICULAR LYMPH NODE (HCC): ICD-10-CM

## 2020-01-09 DIAGNOSIS — T45.1X5A ANEMIA DUE TO CHEMOTHERAPY: ICD-10-CM

## 2020-01-09 LAB
ALBUMIN SERPL-MCNC: 3.3 G/DL (ref 3.4–5)
ALBUMIN/GLOB SERPL: 0.9 {RATIO} (ref 1–2)
ALP LIVER SERPL-CCNC: 112 U/L (ref 45–117)
ALT SERPL-CCNC: 31 U/L (ref 16–61)
ANION GAP SERPL CALC-SCNC: 8 MMOL/L (ref 0–18)
AST SERPL-CCNC: 30 U/L (ref 15–37)
BASOPHILS # BLD AUTO: 0.04 X10(3) UL (ref 0–0.2)
BASOPHILS NFR BLD AUTO: 0.6 %
BILIRUB SERPL-MCNC: 0.3 MG/DL (ref 0.1–2)
BUN BLD-MCNC: 6 MG/DL (ref 7–18)
BUN/CREAT SERPL: 7 (ref 10–20)
CALCIUM BLD-MCNC: 8.8 MG/DL (ref 8.5–10.1)
CANCER AG19-9 SERPL-ACNC: 37.3 U/ML (ref ?–37)
CEA SERPL-MCNC: 1.8 NG/ML (ref ?–5)
CHLORIDE SERPL-SCNC: 109 MMOL/L (ref 98–112)
CO2 SERPL-SCNC: 25 MMOL/L (ref 21–32)
CONTROL RUN WITHIN 24 HOURS?: YES
CREAT BLD-MCNC: 0.86 MG/DL (ref 0.7–1.3)
DEPRECATED RDW RBC AUTO: 54.1 FL (ref 35.1–46.3)
EOSINOPHIL # BLD AUTO: 0.09 X10(3) UL (ref 0–0.7)
EOSINOPHIL NFR BLD AUTO: 1.4 %
ERYTHROCYTE [DISTWIDTH] IN BLOOD BY AUTOMATED COUNT: 16.8 % (ref 11–15)
GLOBULIN PLAS-MCNC: 3.6 G/DL (ref 2.8–4.4)
GLUCOSE BLD-MCNC: 79 MG/DL (ref 70–99)
GLUCOSE URINE: NEGATIVE
HCT VFR BLD AUTO: 40 % (ref 39–53)
HGB BLD-MCNC: 12.8 G/DL (ref 13–17.5)
IMM GRANULOCYTES # BLD AUTO: 0.02 X10(3) UL (ref 0–1)
IMM GRANULOCYTES NFR BLD: 0.3 %
LEUKOCYTE ESTERASE URINE: NEGATIVE
LYMPHOCYTES # BLD AUTO: 0.71 X10(3) UL (ref 1–4)
LYMPHOCYTES NFR BLD AUTO: 11.2 %
M PROTEIN MFR SERPL ELPH: 6.9 G/DL (ref 6.4–8.2)
MCH RBC QN AUTO: 28.4 PG (ref 26–34)
MCHC RBC AUTO-ENTMCNC: 32 G/DL (ref 31–37)
MCV RBC AUTO: 88.7 FL (ref 80–100)
MONOCYTES # BLD AUTO: 0.84 X10(3) UL (ref 0.1–1)
MONOCYTES NFR BLD AUTO: 13.3 %
NEUTROPHILS # BLD AUTO: 4.63 X10 (3) UL (ref 1.5–7.7)
NEUTROPHILS # BLD AUTO: 4.63 X10(3) UL (ref 1.5–7.7)
NEUTROPHILS NFR BLD AUTO: 73.2 %
NITRITE URINE: NEGATIVE
OSMOLALITY SERPL CALC.SUM OF ELEC: 291 MOSM/KG (ref 275–295)
PATIENT FASTING Y/N/NP: NO
PLATELET # BLD AUTO: 81 10(3)UL (ref 150–450)
POTASSIUM SERPL-SCNC: 3.9 MMOL/L (ref 3.5–5.1)
RBC # BLD AUTO: 4.51 X10(6)UL (ref 4.3–5.7)
SODIUM SERPL-SCNC: 142 MMOL/L (ref 136–145)
WBC # BLD AUTO: 6.3 X10(3) UL (ref 4–11)

## 2020-01-09 PROCEDURE — 99215 OFFICE O/P EST HI 40 MIN: CPT | Performed by: CLINICAL NURSE SPECIALIST

## 2020-01-09 PROCEDURE — 96368 THER/DIAG CONCURRENT INF: CPT

## 2020-01-09 PROCEDURE — 85025 COMPLETE CBC W/AUTO DIFF WBC: CPT

## 2020-01-09 PROCEDURE — 96377 APPLICATON ON-BODY INJECTOR: CPT

## 2020-01-09 PROCEDURE — 96417 CHEMO IV INFUS EACH ADDL SEQ: CPT

## 2020-01-09 PROCEDURE — 86301 IMMUNOASSAY TUMOR CA 19-9: CPT

## 2020-01-09 PROCEDURE — 96375 TX/PRO/DX INJ NEW DRUG ADDON: CPT

## 2020-01-09 PROCEDURE — 80053 COMPREHEN METABOLIC PANEL: CPT

## 2020-01-09 PROCEDURE — 96413 CHEMO IV INFUSION 1 HR: CPT

## 2020-01-09 PROCEDURE — 96367 TX/PROPH/DG ADDL SEQ IV INF: CPT

## 2020-01-09 PROCEDURE — S0028 INJECTION, FAMOTIDINE, 20 MG: HCPCS | Performed by: CLINICAL NURSE SPECIALIST

## 2020-01-09 PROCEDURE — 96376 TX/PRO/DX INJ SAME DRUG ADON: CPT

## 2020-01-09 PROCEDURE — 82378 CARCINOEMBRYONIC ANTIGEN: CPT

## 2020-01-09 RX ORDER — ATROPINE SULFATE 0.4 MG/ML
0.2 AMPUL (ML) INJECTION ONCE
Status: COMPLETED | OUTPATIENT
Start: 2020-01-09 | End: 2020-01-09

## 2020-01-09 RX ORDER — LORAZEPAM 1 MG/1
TABLET ORAL
Qty: 60 TABLET | Refills: 5 | Status: SHIPPED | OUTPATIENT
Start: 2020-01-09 | End: 2020-01-01

## 2020-01-09 RX ORDER — FLUOROURACIL 50 MG/ML
2400 INJECTION, SOLUTION INTRAVENOUS CONTINUOUS
Status: CANCELLED | OUTPATIENT
Start: 2020-01-09

## 2020-01-09 RX ORDER — FAMOTIDINE 10 MG/ML
20 INJECTION, SOLUTION INTRAVENOUS ONCE
Status: CANCELLED
Start: 2020-01-09

## 2020-01-09 RX ORDER — AMOXICILLIN AND CLAVULANATE POTASSIUM 875; 125 MG/1; MG/1
1 TABLET, FILM COATED ORAL 2 TIMES DAILY
Qty: 20 TABLET | Refills: 0 | Status: SHIPPED
Start: 2020-01-09 | End: 2020-01-09

## 2020-01-09 RX ORDER — FAMOTIDINE 10 MG/ML
20 INJECTION, SOLUTION INTRAVENOUS ONCE
Status: COMPLETED | OUTPATIENT
Start: 2020-01-09 | End: 2020-01-09

## 2020-01-09 RX ORDER — GUAIFENESIN AND CODEINE PHOSPHATE 100; 10 MG/5ML; MG/5ML
10 SOLUTION ORAL NIGHTLY
Qty: 236 ML | Refills: 0 | Status: SHIPPED
Start: 2020-01-09 | End: 2020-01-09

## 2020-01-09 RX ORDER — LORAZEPAM 0.5 MG/1
TABLET ORAL EVERY 4 HOURS PRN
Status: CANCELLED | OUTPATIENT
Start: 2020-01-09

## 2020-01-09 RX ORDER — AMOXICILLIN AND CLAVULANATE POTASSIUM 875; 125 MG/1; MG/1
1 TABLET, FILM COATED ORAL 2 TIMES DAILY
Qty: 20 TABLET | Refills: 0 | Status: SHIPPED | OUTPATIENT
Start: 2020-01-09 | End: 2020-01-13

## 2020-01-09 RX ORDER — GUAIFENESIN AND CODEINE PHOSPHATE 100; 10 MG/5ML; MG/5ML
10 SOLUTION ORAL NIGHTLY
Qty: 236 ML | Refills: 0 | Status: SHIPPED
Start: 2020-01-09 | End: 2020-01-30 | Stop reason: ALTCHOICE

## 2020-01-09 RX ORDER — DIPHENHYDRAMINE HYDROCHLORIDE 50 MG/ML
50 INJECTION INTRAMUSCULAR; INTRAVENOUS ONCE
Status: COMPLETED | OUTPATIENT
Start: 2020-01-09 | End: 2020-01-09

## 2020-01-09 RX ORDER — PROCHLORPERAZINE MALEATE 10 MG
10 TABLET ORAL EVERY 6 HOURS PRN
Status: CANCELLED | OUTPATIENT
Start: 2020-01-09

## 2020-01-09 RX ORDER — ATROPINE SULFATE 0.4 MG/ML
0.2 AMPUL (ML) INJECTION ONCE
Status: CANCELLED
Start: 2020-01-09

## 2020-01-09 RX ORDER — ONDANSETRON 2 MG/ML
8 INJECTION INTRAMUSCULAR; INTRAVENOUS EVERY 6 HOURS PRN
Status: CANCELLED | OUTPATIENT
Start: 2020-01-09

## 2020-01-09 RX ORDER — ATROPINE SULFATE 0.4 MG/ML
0.2 AMPUL (ML) INJECTION ONCE
Status: CANCELLED | OUTPATIENT
Start: 2020-01-09

## 2020-01-09 RX ORDER — DIPHENHYDRAMINE HYDROCHLORIDE 50 MG/ML
50 INJECTION INTRAMUSCULAR; INTRAVENOUS ONCE
Status: CANCELLED
Start: 2020-01-09

## 2020-01-09 RX ORDER — FLUOROURACIL 50 MG/ML
2400 INJECTION, SOLUTION INTRAVENOUS CONTINUOUS
Status: DISCONTINUED | OUTPATIENT
Start: 2020-01-09 | End: 2020-01-09

## 2020-01-09 RX ORDER — METOCLOPRAMIDE HYDROCHLORIDE 5 MG/ML
10 INJECTION INTRAMUSCULAR; INTRAVENOUS EVERY 6 HOURS PRN
Status: CANCELLED | OUTPATIENT
Start: 2020-01-09

## 2020-01-09 RX ORDER — LORAZEPAM 2 MG/ML
INJECTION INTRAMUSCULAR EVERY 4 HOURS PRN
Status: CANCELLED | OUTPATIENT
Start: 2020-01-09

## 2020-01-09 RX ADMIN — FLUOROURACIL 6050 MG: 50 INJECTION, SOLUTION INTRAVENOUS at 14:58:00

## 2020-01-09 RX ADMIN — ATROPINE SULFATE 0.2 MG: 0.4 MG/ML AMPUL (ML) INJECTION at 13:10:00

## 2020-01-09 RX ADMIN — FAMOTIDINE 20 MG: 10 INJECTION, SOLUTION INTRAVENOUS at 10:33:00

## 2020-01-09 RX ADMIN — DIPHENHYDRAMINE HYDROCHLORIDE 50 MG: 50 INJECTION INTRAMUSCULAR; INTRAVENOUS at 10:30:00

## 2020-01-09 RX ADMIN — ATROPINE SULFATE 0.2 MG: 0.4 MG/ML AMPUL (ML) INJECTION at 14:08:00

## 2020-01-09 NOTE — PROGRESS NOTES
Pt here for C23D1.   Arrives Ambulating independently, accompanied by Self           Patient reports possible pregnancy since last therapy cycle: Not Applicable    Modifications in dose or schedule: No     Frequency of blood return and site check throughout

## 2020-01-09 NOTE — PATIENT INSTRUCTIONS
On-body Injector for Neulasta Patient Instructions       Your On-Body Injection device was applied on this day:  1/9 at this time 3 PM    Your dose of medication will start on this day: 1/10 at this time 7 PM

## 2020-01-09 NOTE — PROGRESS NOTES
Nutrition F/U Note (as per pt request):     Patient Kelly Peacock  YOB: 1990  Medical Record Number: [id number]      Account Number: [de-identified]  Dietitian: Leia Whitley RD     Date of review: 01/09/20     Diet Rx: high protein/ca

## 2020-01-09 NOTE — PROGRESS NOTES
ANP Visit Note    Patient Name: Aleksandr Jimenes   YOB: 1990   Medical Record Number: [id number]   CSN: [id number]   Date of visit: 1/9/2020   Arian Hu,    No primary care provider on file.      Chief Complaint/Reason for Visit:  Rachel Danielle Port   • Sleep disturbance    • Uncomfortable fullness after meals    • Vomiting    • Vomiting blood        Surgical History:  Past Surgical History:   Procedure Laterality Date   • INSERTION OF ACCESS PORT  04/10/2019   • LAPAROSCOPIC ESOPHAGOGASTRECTOMY Gets together: Not on file        Attends Rastafarian service: Not on file        Active member of club or organization: Not on file        Attends meetings of clubs or organizations: Not on file        Relationship status: Not on file      Intimate partner Hydrobromide 40 MG Oral Tab, Take 1 tablet (40 mg total) by mouth daily. , Disp: 30 tablet, Rfl: 11  •  Clindamycin Phosphate 1 % External Lotion, Apply 1 Application topically 2 (two) times daily. , Disp: 60 mL, Rfl: 2  •  Loperamide HCl 2 MG Oral Cap, Take Calculated Osmolality 291 275 - 295 mOsm/kg    GFR, Non- 116 >=60    GFR, -American 135 >=60    AST 30 15 - 37 U/L    ALT 31 16 - 61 U/L    Alkaline Phosphatase 112 45 - 117 U/L    Bilirubin, Total 0.3 0.1 - 2.0 mg/dL    Total Pro continue current regimen. 2. Acute URI with exposure to influenza: Augmentin and Guaifenesin for cough. Tamiflu  3. Nausea: CPM  4. Drug Rash: improved to stable, continue ointment  5. Bilateral Arm pain: controlled with Dex post chemo  6.  Anxiety: Geraldene Yeison

## 2020-01-13 ENCOUNTER — HOSPITAL ENCOUNTER (EMERGENCY)
Age: 30
Discharge: HOME | End: 2020-01-13
Attending: EMERGENCY MEDICINE
Payer: COMMERCIAL

## 2020-01-13 ENCOUNTER — OFFICE VISIT (OUTPATIENT)
Dept: HEMATOLOGY/ONCOLOGY | Age: 30
End: 2020-01-13
Attending: INTERNAL MEDICINE
Payer: COMMERCIAL

## 2020-01-13 ENCOUNTER — APPOINTMENT (OUTPATIENT)
Dept: CT IMAGING | Age: 30
End: 2020-01-13
Attending: EMERGENCY MEDICINE
Payer: COMMERCIAL

## 2020-01-13 VITALS
HEIGHT: 78 IN | BODY MASS INDEX: 29.73 KG/M2 | RESPIRATION RATE: 18 BRPM | WEIGHT: 257 LBS | OXYGEN SATURATION: 99 % | HEART RATE: 66 BPM | SYSTOLIC BLOOD PRESSURE: 127 MMHG | DIASTOLIC BLOOD PRESSURE: 70 MMHG | TEMPERATURE: 99 F

## 2020-01-13 VITALS
SYSTOLIC BLOOD PRESSURE: 127 MMHG | DIASTOLIC BLOOD PRESSURE: 89 MMHG | HEART RATE: 89 BPM | TEMPERATURE: 97 F | RESPIRATION RATE: 18 BRPM | OXYGEN SATURATION: 100 %

## 2020-01-13 DIAGNOSIS — T45.1X5A CHEMOTHERAPY-INDUCED NAUSEA AND VOMITING: ICD-10-CM

## 2020-01-13 DIAGNOSIS — R11.2 CHEMOTHERAPY-INDUCED NAUSEA AND VOMITING: ICD-10-CM

## 2020-01-13 DIAGNOSIS — R11.2 NAUSEA VOMITING AND DIARRHEA: Primary | ICD-10-CM

## 2020-01-13 DIAGNOSIS — R19.7 NAUSEA VOMITING AND DIARRHEA: Primary | ICD-10-CM

## 2020-01-13 DIAGNOSIS — M79.602 BILATERAL ARM PAIN: ICD-10-CM

## 2020-01-13 DIAGNOSIS — C16.0 GE JUNCTION CARCINOMA (HCC): Primary | ICD-10-CM

## 2020-01-13 DIAGNOSIS — C77.2 SECONDARY MALIGNANT NEOPLASM OF RETROPERITONEAL LYMPH NODES (HCC): ICD-10-CM

## 2020-01-13 DIAGNOSIS — M79.601 BILATERAL ARM PAIN: ICD-10-CM

## 2020-01-13 DIAGNOSIS — C77.0 SECONDARY MALIGNANT NEOPLASM LYMPH NODES OF HEAD, FACE AND NECK (HCC): ICD-10-CM

## 2020-01-13 DIAGNOSIS — C77.0 SECONDARY MALIGNANT NEOPLASM OF SUPRACLAVICULAR LYMPH NODE (HCC): ICD-10-CM

## 2020-01-13 DIAGNOSIS — R11.2 INTRACTABLE VOMITING WITH NAUSEA, UNSPECIFIED VOMITING TYPE: ICD-10-CM

## 2020-01-13 LAB
ALBUMIN SERPL-MCNC: 3.4 G/DL (ref 3.4–5)
ALBUMIN/GLOB SERPL: 0.9 {RATIO} (ref 1–2)
ALP LIVER SERPL-CCNC: 151 U/L (ref 45–117)
ALT SERPL-CCNC: 38 U/L (ref 16–61)
ANION GAP SERPL CALC-SCNC: 6 MMOL/L (ref 0–18)
AST SERPL-CCNC: 26 U/L (ref 15–37)
BASOPHILS # BLD: 0 X10(3) UL (ref 0–0.2)
BASOPHILS NFR BLD: 0 %
BILIRUB SERPL-MCNC: 0.4 MG/DL (ref 0.1–2)
BUN BLD-MCNC: 10 MG/DL (ref 7–18)
BUN/CREAT SERPL: 14.1 (ref 10–20)
CALCIUM BLD-MCNC: 8.8 MG/DL (ref 8.5–10.1)
CHLORIDE SERPL-SCNC: 107 MMOL/L (ref 98–112)
CO2 SERPL-SCNC: 25 MMOL/L (ref 21–32)
CREAT BLD-MCNC: 0.71 MG/DL (ref 0.7–1.3)
DEPRECATED RDW RBC AUTO: 53.2 FL (ref 35.1–46.3)
EOSINOPHIL # BLD: 0 X10(3) UL (ref 0–0.7)
EOSINOPHIL NFR BLD: 0 %
ERYTHROCYTE [DISTWIDTH] IN BLOOD BY AUTOMATED COUNT: 16.6 % (ref 11–15)
GLOBULIN PLAS-MCNC: 3.6 G/DL (ref 2.8–4.4)
GLUCOSE BLD-MCNC: 122 MG/DL (ref 70–99)
HCT VFR BLD AUTO: 41.9 % (ref 39–53)
HGB BLD-MCNC: 13.4 G/DL (ref 13–17.5)
LYMPHOCYTES NFR BLD: 0.48 X10(3) UL (ref 1–4)
LYMPHOCYTES NFR BLD: 1 %
M PROTEIN MFR SERPL ELPH: 7 G/DL (ref 6.4–8.2)
MCH RBC QN AUTO: 28.2 PG (ref 26–34)
MCHC RBC AUTO-ENTMCNC: 32 G/DL (ref 31–37)
MCV RBC AUTO: 88.2 FL (ref 80–100)
MONOCYTES # BLD: 0 X10(3) UL (ref 0.1–1)
MONOCYTES NFR BLD: 0 %
MORPHOLOGY: NORMAL
NEUTROPHILS # BLD AUTO: 37.69 X10 (3) UL (ref 1.5–7.7)
NEUTROPHILS NFR BLD: 89 %
NEUTS BAND NFR BLD: 10 %
NEUTS HYPERSEG # BLD: 47.22 X10(3) UL (ref 1.5–7.7)
NEUTS VAC BLD QL SMEAR: PRESENT
OSMOLALITY SERPL CALC.SUM OF ELEC: 286 MOSM/KG (ref 275–295)
PATIENT FASTING Y/N/NP: NO
PLATELET # BLD AUTO: 113 10(3)UL (ref 150–450)
PLATELET MORPHOLOGY: NORMAL
POTASSIUM SERPL-SCNC: 4.1 MMOL/L (ref 3.5–5.1)
RBC # BLD AUTO: 4.75 X10(6)UL (ref 4.3–5.7)
SODIUM SERPL-SCNC: 138 MMOL/L (ref 136–145)
TOTAL CELLS COUNTED: 100
TOXIC GRANULES BLD QL SMEAR: PRESENT
WBC # BLD AUTO: 47.7 X10(3) UL (ref 4–11)

## 2020-01-13 PROCEDURE — 74177 CT ABD & PELVIS W/CONTRAST: CPT | Performed by: EMERGENCY MEDICINE

## 2020-01-13 PROCEDURE — 85027 COMPLETE CBC AUTOMATED: CPT

## 2020-01-13 PROCEDURE — 85007 BL SMEAR W/DIFF WBC COUNT: CPT

## 2020-01-13 PROCEDURE — 80053 COMPREHEN METABOLIC PANEL: CPT

## 2020-01-13 PROCEDURE — 96374 THER/PROPH/DIAG INJ IV PUSH: CPT

## 2020-01-13 PROCEDURE — 96361 HYDRATE IV INFUSION ADD-ON: CPT

## 2020-01-13 PROCEDURE — 99215 OFFICE O/P EST HI 40 MIN: CPT | Performed by: CLINICAL NURSE SPECIALIST

## 2020-01-13 PROCEDURE — 99284 EMERGENCY DEPT VISIT MOD MDM: CPT

## 2020-01-13 PROCEDURE — 85025 COMPLETE CBC W/AUTO DIFF WBC: CPT

## 2020-01-13 RX ORDER — SODIUM CHLORIDE 0.9 % (FLUSH) 0.9 %
10 SYRINGE (ML) INJECTION ONCE
Status: DISCONTINUED | OUTPATIENT
Start: 2020-01-13 | End: 2020-01-13

## 2020-01-13 RX ORDER — SODIUM CHLORIDE 0.9 % (FLUSH) 0.9 %
10 SYRINGE (ML) INJECTION ONCE
Status: CANCELLED | OUTPATIENT
Start: 2020-01-13

## 2020-01-13 RX ORDER — METOCLOPRAMIDE HYDROCHLORIDE 5 MG/ML
5 INJECTION INTRAMUSCULAR; INTRAVENOUS ONCE
Status: COMPLETED | OUTPATIENT
Start: 2020-01-13 | End: 2020-01-13

## 2020-01-13 RX ORDER — SODIUM CHLORIDE 9 MG/ML
1000 INJECTION, SOLUTION INTRAVENOUS ONCE
Status: COMPLETED | OUTPATIENT
Start: 2020-01-13 | End: 2020-01-13

## 2020-01-13 RX ADMIN — SODIUM CHLORIDE 1000 ML: 9 INJECTION, SOLUTION INTRAVENOUS at 15:25:00

## 2020-01-13 NOTE — ED INITIAL ASSESSMENT (HPI)
PT STATES HE WAS BROUGHT HERE TO ED [facility] FOR VOMITING AND DIARRHEA WITH CHEMOTHERAPY AND STATES HE WAS ON AUGMENTIN FOR A \"COUGH\". PT STATES HE HAS HAD HIS COUGH \"MONTHS\".  PT STATES HE HAS NOT VOMITED TODAY, BUT LAST NIGHT WAS THE LAST T

## 2020-01-13 NOTE — ED PROVIDER NOTES
Patient Seen in: THE [facility] Emergency Department In [place]      History   Patient presents with:  Nausea/Vomiting/Diarrhea: STARTED AS SOON AS HE TOOK ANTIBIOTIC. Stated Complaint: CHEMO LAST THURSDAY. N/V/D ALL WEEKEND.  PATIENT WAS PUT ON AMOXICILLIN 4/27/2018    Performed by Sriram Griffith., Stacey Mansfield MD at [facility] MAIN OR   • OTHER SURGICAL HISTORY  04/27/2018    Laparoscopic-assisted Robert Duard Billet esophagogastrectomy, abdominal and mediastinal lymphadenectomy, feeding jejunostomy tube placement   • WISDOM TEETH THURSDAY. N/V/D ALL WEEKEND. PATIENT WAS PUT ON AMOXICILLIN-POT CLAVULANATE 76632 MG TABS 1 TABLET TWICE A DAY ON THURSDAY AS WELL.   TECHNIQUE:  CT scanning was performed from the dome of the diaphragm to the pubic symphysis with non-ionic intravenous cont stomach noted within the chest. OTHER:  Negative. CONCLUSION:  There is a small amount of free fluid noted within the pelvis. There is probable circumferential mural thickening of the colon extending from mid descending colon to the rectum.   This is

## 2020-01-13 NOTE — PROGRESS NOTES
Orders from [place] Rachel APN to send pt to ER. Report given to Juliet Tam. CT duran needle in place. Pt still c/o nausea and general not feeling good. Pt sent to ER via LINDA Siddiqi 23 with RN.

## 2020-01-13 NOTE — PROGRESS NOTES
Education Record    Learner:  Patient and Spouse    Disease / Diagnosis: IVF + Z/D for N/V    Barriers / Limitations:  None   Comments:    Method:  Brief focused   Comments:    General Topics:  Medication and Plan of care reviewed   Comments:    Outcome:

## 2020-01-13 NOTE — PROGRESS NOTES
ANP Visit Note    Patient Name: Isela Cobb   YOB: 1990   Medical Record Number: [id number]   CSN: [id number]   Date of visit: 1/13/2020   Benji Quinones DO   No primary care provider on file.      Chief Complaint/Reason for Visit:  Nico [place] • Vomiting    • Vomiting blood        Surgical History:  Past Surgical History:   Procedure Laterality Date   • INSERTION OF ACCESS PORT  04/10/2019   • LAPAROSCOPIC ESOPHAGOGASTRECTOMY N/A 4/27/2018    Performed by Elinor Jimenez MD at [facility] MAIN OR   • L on file        Active member of club or organization: Not on file        Attends meetings of clubs or organizations: Not on file        Relationship status: Not on file      Intimate partner violence:        Fear of current or ex partner: Not on file 40 MG Oral Tab, Take 1 tablet (40 mg total) by mouth daily. , Disp: 30 tablet, Rfl: 11  •  Clindamycin Phosphate 1 % External Lotion, Apply 1 Application topically 2 (two) times daily. , Disp: 60 mL, Rfl: 2  •  Loperamide HCl 2 MG Oral Cap, Take 2 mg by mout Protein 7.0 6.4 - 8.2 g/dL    Albumin 3.4 3.4 - 5.0 g/dL    Globulin  3.6 2.8 - 4.4 g/dL    A/G Ratio 0.9 (L) 1.0 - 2.0    FASTING No    CBC W/ DIFFERENTIAL    Collection Time: 01/13/20  3:28 PM   Result Value Ref Range    WBC 47.7 (H) 4.0 - 11.0 x10(3) uL Esophageal Cancer     Electronically Signed by:    Hannah Wiggins ANP-BC  Nurse Practitioner  THE [facility]    Addendum:  Patient still with complaints of intractable nausea after IV anti-emetics and IVF, will take to ER for further work

## 2020-01-30 ENCOUNTER — OFFICE VISIT (OUTPATIENT)
Dept: HEMATOLOGY/ONCOLOGY | Age: 30
End: 2020-01-30
Attending: INTERNAL MEDICINE
Payer: COMMERCIAL

## 2020-01-30 VITALS
WEIGHT: 258 LBS | RESPIRATION RATE: 16 BRPM | OXYGEN SATURATION: 97 % | SYSTOLIC BLOOD PRESSURE: 127 MMHG | BODY MASS INDEX: 30 KG/M2 | DIASTOLIC BLOOD PRESSURE: 87 MMHG | HEART RATE: 92 BPM | TEMPERATURE: 99 F

## 2020-01-30 DIAGNOSIS — C77.2 SECONDARY MALIGNANT NEOPLASM OF RETROPERITONEAL LYMPH NODES (HCC): Primary | ICD-10-CM

## 2020-01-30 DIAGNOSIS — C77.0 SECONDARY MALIGNANT NEOPLASM OF SUPRACLAVICULAR LYMPH NODE (HCC): ICD-10-CM

## 2020-01-30 DIAGNOSIS — C16.0 GE JUNCTION CARCINOMA (HCC): Primary | ICD-10-CM

## 2020-01-30 DIAGNOSIS — C16.0 GE JUNCTION CARCINOMA (HCC): ICD-10-CM

## 2020-01-30 DIAGNOSIS — D69.6 THROMBOCYTOPENIA (HCC): ICD-10-CM

## 2020-01-30 DIAGNOSIS — C77.2 SECONDARY MALIGNANT NEOPLASM OF RETROPERITONEAL LYMPH NODES (HCC): ICD-10-CM

## 2020-01-30 LAB
ALBUMIN SERPL-MCNC: 3.3 G/DL (ref 3.4–5)
ALBUMIN/GLOB SERPL: 0.9 {RATIO} (ref 1–2)
ALP LIVER SERPL-CCNC: 113 U/L (ref 45–117)
ALT SERPL-CCNC: 28 U/L (ref 16–61)
ANION GAP SERPL CALC-SCNC: 7 MMOL/L (ref 0–18)
AST SERPL-CCNC: 27 U/L (ref 15–37)
BASOPHILS # BLD AUTO: 0.05 X10(3) UL (ref 0–0.2)
BASOPHILS NFR BLD AUTO: 0.8 %
BILIRUB SERPL-MCNC: 0.3 MG/DL (ref 0.1–2)
BUN BLD-MCNC: 9 MG/DL (ref 7–18)
BUN/CREAT SERPL: 11.1 (ref 10–20)
CALCIUM BLD-MCNC: 8.7 MG/DL (ref 8.5–10.1)
CANCER AG19-9 SERPL-ACNC: 52.1 U/ML (ref ?–37)
CEA SERPL-MCNC: 1.7 NG/ML (ref ?–5)
CHLORIDE SERPL-SCNC: 110 MMOL/L (ref 98–112)
CO2 SERPL-SCNC: 26 MMOL/L (ref 21–32)
CONTROL RUN WITHIN 24 HOURS?: YES
CREAT BLD-MCNC: 0.81 MG/DL (ref 0.7–1.3)
DEPRECATED RDW RBC AUTO: 54 FL (ref 35.1–46.3)
EOSINOPHIL # BLD AUTO: 0.14 X10(3) UL (ref 0–0.7)
EOSINOPHIL NFR BLD AUTO: 2.3 %
ERYTHROCYTE [DISTWIDTH] IN BLOOD BY AUTOMATED COUNT: 17 % (ref 11–15)
GLOBULIN PLAS-MCNC: 3.8 G/DL (ref 2.8–4.4)
GLUCOSE BLD-MCNC: 91 MG/DL (ref 70–99)
GLUCOSE URINE: NEGATIVE
HCT VFR BLD AUTO: 42.5 % (ref 39–53)
HGB BLD-MCNC: 13.3 G/DL (ref 13–17.5)
IMM GRANULOCYTES # BLD AUTO: 0.02 X10(3) UL (ref 0–1)
IMM GRANULOCYTES NFR BLD: 0.3 %
LEUKOCYTE ESTERASE URINE: NEGATIVE
LYMPHOCYTES # BLD AUTO: 0.98 X10(3) UL (ref 1–4)
LYMPHOCYTES NFR BLD AUTO: 16.1 %
M PROTEIN MFR SERPL ELPH: 7.1 G/DL (ref 6.4–8.2)
MCH RBC QN AUTO: 27.7 PG (ref 26–34)
MCHC RBC AUTO-ENTMCNC: 31.3 G/DL (ref 31–37)
MCV RBC AUTO: 88.5 FL (ref 80–100)
MONOCYTES # BLD AUTO: 0.7 X10(3) UL (ref 0.1–1)
MONOCYTES NFR BLD AUTO: 11.5 %
NEUTROPHILS # BLD AUTO: 4.2 X10 (3) UL (ref 1.5–7.7)
NEUTROPHILS # BLD AUTO: 4.2 X10(3) UL (ref 1.5–7.7)
NEUTROPHILS NFR BLD AUTO: 69 %
NITRITE URINE: NEGATIVE
OSMOLALITY SERPL CALC.SUM OF ELEC: 294 MOSM/KG (ref 275–295)
PATIENT FASTING Y/N/NP: NO
PLATELET # BLD AUTO: 88 10(3)UL (ref 150–450)
POTASSIUM SERPL-SCNC: 4 MMOL/L (ref 3.5–5.1)
RBC # BLD AUTO: 4.8 X10(6)UL (ref 4.3–5.7)
SODIUM SERPL-SCNC: 143 MMOL/L (ref 136–145)
WBC # BLD AUTO: 6.1 X10(3) UL (ref 4–11)

## 2020-01-30 PROCEDURE — 96377 APPLICATON ON-BODY INJECTOR: CPT

## 2020-01-30 PROCEDURE — 99214 OFFICE O/P EST MOD 30 MIN: CPT | Performed by: INTERNAL MEDICINE

## 2020-01-30 PROCEDURE — 96417 CHEMO IV INFUS EACH ADDL SEQ: CPT

## 2020-01-30 PROCEDURE — 96376 TX/PRO/DX INJ SAME DRUG ADON: CPT

## 2020-01-30 PROCEDURE — 82378 CARCINOEMBRYONIC ANTIGEN: CPT

## 2020-01-30 PROCEDURE — S0028 INJECTION, FAMOTIDINE, 20 MG: HCPCS | Performed by: INTERNAL MEDICINE

## 2020-01-30 PROCEDURE — 80053 COMPREHEN METABOLIC PANEL: CPT

## 2020-01-30 PROCEDURE — 96368 THER/DIAG CONCURRENT INF: CPT

## 2020-01-30 PROCEDURE — 86301 IMMUNOASSAY TUMOR CA 19-9: CPT

## 2020-01-30 PROCEDURE — 96367 TX/PROPH/DG ADDL SEQ IV INF: CPT

## 2020-01-30 PROCEDURE — 85025 COMPLETE CBC W/AUTO DIFF WBC: CPT

## 2020-01-30 PROCEDURE — 96413 CHEMO IV INFUSION 1 HR: CPT

## 2020-01-30 PROCEDURE — 96375 TX/PRO/DX INJ NEW DRUG ADDON: CPT

## 2020-01-30 RX ORDER — LORAZEPAM 0.5 MG/1
TABLET ORAL EVERY 4 HOURS PRN
Status: CANCELLED | OUTPATIENT
Start: 2020-01-30

## 2020-01-30 RX ORDER — DIPHENHYDRAMINE HYDROCHLORIDE 50 MG/ML
50 INJECTION INTRAMUSCULAR; INTRAVENOUS ONCE
Status: CANCELLED
Start: 2020-01-30

## 2020-01-30 RX ORDER — FLUOROURACIL 50 MG/ML
2400 INJECTION, SOLUTION INTRAVENOUS CONTINUOUS
Status: CANCELLED | OUTPATIENT
Start: 2020-01-30

## 2020-01-30 RX ORDER — FLUOROURACIL 50 MG/ML
2400 INJECTION, SOLUTION INTRAVENOUS CONTINUOUS
Status: DISCONTINUED | OUTPATIENT
Start: 2020-01-30 | End: 2020-01-30

## 2020-01-30 RX ORDER — ATROPINE SULFATE 0.4 MG/ML
0.2 AMPUL (ML) INJECTION ONCE
Status: CANCELLED
Start: 2020-01-30

## 2020-01-30 RX ORDER — DIPHENHYDRAMINE HYDROCHLORIDE 50 MG/ML
50 INJECTION INTRAMUSCULAR; INTRAVENOUS ONCE
Status: COMPLETED | OUTPATIENT
Start: 2020-01-30 | End: 2020-01-30

## 2020-01-30 RX ORDER — ONDANSETRON 2 MG/ML
8 INJECTION INTRAMUSCULAR; INTRAVENOUS EVERY 6 HOURS PRN
Status: CANCELLED | OUTPATIENT
Start: 2020-01-30

## 2020-01-30 RX ORDER — FAMOTIDINE 10 MG/ML
20 INJECTION, SOLUTION INTRAVENOUS ONCE
Status: COMPLETED | OUTPATIENT
Start: 2020-01-30 | End: 2020-01-30

## 2020-01-30 RX ORDER — PROCHLORPERAZINE MALEATE 10 MG
10 TABLET ORAL EVERY 6 HOURS PRN
Status: CANCELLED | OUTPATIENT
Start: 2020-01-30

## 2020-01-30 RX ORDER — FLUOROURACIL 50 MG/ML
2000 INJECTION, SOLUTION INTRAVENOUS CONTINUOUS
Status: DISCONTINUED | OUTPATIENT
Start: 2020-01-30 | End: 2020-01-30

## 2020-01-30 RX ORDER — ATROPINE SULFATE 0.4 MG/ML
0.2 AMPUL (ML) INJECTION ONCE
Status: COMPLETED | OUTPATIENT
Start: 2020-01-30 | End: 2020-01-30

## 2020-01-30 RX ORDER — LORAZEPAM 2 MG/ML
INJECTION INTRAMUSCULAR EVERY 4 HOURS PRN
Status: CANCELLED | OUTPATIENT
Start: 2020-01-30

## 2020-01-30 RX ORDER — FAMOTIDINE 10 MG/ML
20 INJECTION, SOLUTION INTRAVENOUS ONCE
Status: CANCELLED
Start: 2020-01-30

## 2020-01-30 RX ORDER — ATROPINE SULFATE 0.4 MG/ML
0.2 AMPUL (ML) INJECTION ONCE
Status: CANCELLED | OUTPATIENT
Start: 2020-01-30

## 2020-01-30 RX ORDER — METOCLOPRAMIDE HYDROCHLORIDE 5 MG/ML
10 INJECTION INTRAMUSCULAR; INTRAVENOUS EVERY 6 HOURS PRN
Status: CANCELLED | OUTPATIENT
Start: 2020-01-30

## 2020-01-30 RX ADMIN — FLUOROURACIL 5050 MG: 50 INJECTION, SOLUTION INTRAVENOUS at 14:23:00

## 2020-01-30 RX ADMIN — FAMOTIDINE 20 MG: 10 INJECTION, SOLUTION INTRAVENOUS at 10:37:00

## 2020-01-30 RX ADMIN — ATROPINE SULFATE 0.2 MG: 0.4 MG/ML AMPUL (ML) INJECTION at 13:52:00

## 2020-01-30 RX ADMIN — ATROPINE SULFATE 0.2 MG: 0.4 MG/ML AMPUL (ML) INJECTION at 12:34:00

## 2020-01-30 RX ADMIN — DIPHENHYDRAMINE HYDROCHLORIDE 50 MG: 50 INJECTION INTRAMUSCULAR; INTRAVENOUS at 10:35:00

## 2020-01-30 NOTE — PROGRESS NOTES
Patient is here for MD f/u and cycle 24 of chemo. Patient was in the ER last cycle due to colitis. He is taking Lomotil twice daily. His appetite is ok. Ongoing fatigue. Rash is controlled.        Education Record    Learner:  Patient    Disease / Diagnosis

## 2020-01-30 NOTE — PROGRESS NOTES
Pt here for C24D1.   Arrives Ambulating independently, accompanied by Family member           Patient reports possible pregnancy since last therapy cycle: Not Applicable    Modifications in dose or schedule: Yes, 5FU pump dose decreased     Frequency of blo

## 2020-01-30 NOTE — PROGRESS NOTES
Pt c/o abd cramping after more than half of infusion of Irinotecan. Add'l dose of atropine administered as ordered.

## 2020-02-04 DIAGNOSIS — N45.1 EPIDIDYMITIS, LEFT: ICD-10-CM

## 2020-02-04 RX ORDER — DOXYCYCLINE 100 MG/1
100 CAPSULE ORAL 2 TIMES DAILY
Qty: 20 CAPSULE | Refills: 0 | Status: SHIPPED | OUTPATIENT
Start: 2020-02-04 | End: 2020-02-20

## 2020-02-04 NOTE — TELEPHONE ENCOUNTER
Treated 12/26/2019 for epididymitis with this antibiotic. Patient states same pain started acouple days ago; asking for refill. Routed to Dr Romelia Neff to address.

## 2020-02-04 NOTE — PROGRESS NOTES
[facility]    PATIENT'S NAME: Krunal Quinn NELDA   ATTENDING PHYSICIAN: Macy Jiménez M.D.    PATIENT ACCOUNT #: [de-identified] LOCATION: [address] [place] [address] RECORD #: [id number] YOB: 1990   DATE OF SERVICE: 01/30/2020       CANCER clindamycin topical lotion twice daily, dexamethasone 2 tablets twice daily for 5 days starting with the chemotherapy, diphenoxylate/atropine 1 to 2 tablets q.i.d. p.r.n., loperamide 2 to 4 mg q.i.d. p.r.n., lorazepam 1 mg q.4-6 h. p.r.n., mupirocin topica Jackson Graves in the future should he progress.     Dictated By Mitali Manrique M.D.  d: 02/03/2020 14:28:09  t: 02/04/2020 06:08:51  [facility] [id number]  /    cc: Dr. Heri Kraft D.O.

## 2020-02-04 NOTE — PROGRESS NOTES
Nutrition F/U Note:      Patient Name: Thierry Khanna Cha  YOB: 1990  Medical Record Number: [id number]      Account Number: [de-identified]  Dietitian: Alia Worrell RD     Date of review: 01/30/20     Diet Rx: high protein/calorie, post-esophag

## 2020-02-20 ENCOUNTER — OFFICE VISIT (OUTPATIENT)
Dept: HEMATOLOGY/ONCOLOGY | Age: 30
End: 2020-02-20
Attending: INTERNAL MEDICINE
Payer: COMMERCIAL

## 2020-02-20 VITALS
RESPIRATION RATE: 16 BRPM | BODY MASS INDEX: 30.31 KG/M2 | HEIGHT: 77.99 IN | OXYGEN SATURATION: 95 % | HEART RATE: 85 BPM | SYSTOLIC BLOOD PRESSURE: 120 MMHG | TEMPERATURE: 98 F | DIASTOLIC BLOOD PRESSURE: 85 MMHG | WEIGHT: 262 LBS

## 2020-02-20 DIAGNOSIS — C77.0 SECONDARY MALIGNANT NEOPLASM OF SUPRACLAVICULAR LYMPH NODE (HCC): ICD-10-CM

## 2020-02-20 DIAGNOSIS — C16.0 GE JUNCTION CARCINOMA (HCC): ICD-10-CM

## 2020-02-20 DIAGNOSIS — C77.2 SECONDARY MALIGNANT NEOPLASM OF RETROPERITONEAL LYMPH NODES (HCC): Primary | ICD-10-CM

## 2020-02-20 DIAGNOSIS — N45.1 EPIDIDYMITIS, LEFT: ICD-10-CM

## 2020-02-20 LAB
ALBUMIN SERPL-MCNC: 3.1 G/DL (ref 3.4–5)
ALBUMIN/GLOB SERPL: 0.9 {RATIO} (ref 1–2)
ALP LIVER SERPL-CCNC: 103 U/L (ref 45–117)
ALT SERPL-CCNC: 25 U/L (ref 16–61)
ANION GAP SERPL CALC-SCNC: 6 MMOL/L (ref 0–18)
AST SERPL-CCNC: 23 U/L (ref 15–37)
BASOPHILS # BLD AUTO: 0.06 X10(3) UL (ref 0–0.2)
BASOPHILS NFR BLD AUTO: 1 %
BILIRUB SERPL-MCNC: 0.3 MG/DL (ref 0.1–2)
BUN BLD-MCNC: 7 MG/DL (ref 7–18)
BUN/CREAT SERPL: 10.1 (ref 10–20)
CALCIUM BLD-MCNC: 8.8 MG/DL (ref 8.5–10.1)
CANCER AG19-9 SERPL-ACNC: 66 U/ML (ref ?–37)
CEA SERPL-MCNC: 1.5 NG/ML (ref ?–5)
CHLORIDE SERPL-SCNC: 111 MMOL/L (ref 98–112)
CO2 SERPL-SCNC: 26 MMOL/L (ref 21–32)
CONTROL RUN WITHIN 24 HOURS?: YES
CREAT BLD-MCNC: 0.69 MG/DL (ref 0.7–1.3)
DEPRECATED RDW RBC AUTO: 56.5 FL (ref 35.1–46.3)
EOSINOPHIL # BLD AUTO: 0.26 X10(3) UL (ref 0–0.7)
EOSINOPHIL NFR BLD AUTO: 4.5 %
ERYTHROCYTE [DISTWIDTH] IN BLOOD BY AUTOMATED COUNT: 17.7 % (ref 11–15)
GLOBULIN PLAS-MCNC: 3.4 G/DL (ref 2.8–4.4)
GLUCOSE BLD-MCNC: 86 MG/DL (ref 70–99)
GLUCOSE URINE: NEGATIVE
HCT VFR BLD AUTO: 39.1 % (ref 39–53)
HGB BLD-MCNC: 12.2 G/DL (ref 13–17.5)
IMM GRANULOCYTES # BLD AUTO: 0.02 X10(3) UL (ref 0–1)
IMM GRANULOCYTES NFR BLD: 0.3 %
LEUKOCYTE ESTERASE URINE: NEGATIVE
LYMPHOCYTES # BLD AUTO: 0.87 X10(3) UL (ref 1–4)
LYMPHOCYTES NFR BLD AUTO: 15.1 %
M PROTEIN MFR SERPL ELPH: 6.5 G/DL (ref 6.4–8.2)
MCH RBC QN AUTO: 27.6 PG (ref 26–34)
MCHC RBC AUTO-ENTMCNC: 31.2 G/DL (ref 31–37)
MCV RBC AUTO: 88.5 FL (ref 80–100)
MONOCYTES # BLD AUTO: 0.65 X10(3) UL (ref 0.1–1)
MONOCYTES NFR BLD AUTO: 11.2 %
NEUTROPHILS # BLD AUTO: 3.92 X10 (3) UL (ref 1.5–7.7)
NEUTROPHILS # BLD AUTO: 3.92 X10(3) UL (ref 1.5–7.7)
NEUTROPHILS NFR BLD AUTO: 67.9 %
NITRITE URINE: NEGATIVE
OSMOLALITY SERPL CALC.SUM OF ELEC: 293 MOSM/KG (ref 275–295)
PLATELET # BLD AUTO: 75 10(3)UL (ref 150–450)
POTASSIUM SERPL-SCNC: 4.1 MMOL/L (ref 3.5–5.1)
RBC # BLD AUTO: 4.42 X10(6)UL (ref 4.3–5.7)
SODIUM SERPL-SCNC: 143 MMOL/L (ref 136–145)
WBC # BLD AUTO: 5.8 X10(3) UL (ref 4–11)

## 2020-02-20 PROCEDURE — 96367 TX/PROPH/DG ADDL SEQ IV INF: CPT

## 2020-02-20 PROCEDURE — 82378 CARCINOEMBRYONIC ANTIGEN: CPT

## 2020-02-20 PROCEDURE — 85025 COMPLETE CBC W/AUTO DIFF WBC: CPT

## 2020-02-20 PROCEDURE — 96413 CHEMO IV INFUSION 1 HR: CPT

## 2020-02-20 PROCEDURE — 96417 CHEMO IV INFUS EACH ADDL SEQ: CPT

## 2020-02-20 PROCEDURE — 80053 COMPREHEN METABOLIC PANEL: CPT

## 2020-02-20 PROCEDURE — 96375 TX/PRO/DX INJ NEW DRUG ADDON: CPT

## 2020-02-20 PROCEDURE — S0028 INJECTION, FAMOTIDINE, 20 MG: HCPCS | Performed by: INTERNAL MEDICINE

## 2020-02-20 PROCEDURE — 96368 THER/DIAG CONCURRENT INF: CPT

## 2020-02-20 PROCEDURE — 99214 OFFICE O/P EST MOD 30 MIN: CPT | Performed by: INTERNAL MEDICINE

## 2020-02-20 PROCEDURE — 86301 IMMUNOASSAY TUMOR CA 19-9: CPT

## 2020-02-20 PROCEDURE — 96377 APPLICATON ON-BODY INJECTOR: CPT

## 2020-02-20 RX ORDER — PROCHLORPERAZINE MALEATE 10 MG
10 TABLET ORAL EVERY 6 HOURS PRN
Status: CANCELLED | OUTPATIENT
Start: 2020-02-20

## 2020-02-20 RX ORDER — ATROPINE SULFATE 0.4 MG/ML
0.2 AMPUL (ML) INJECTION ONCE
Status: COMPLETED | OUTPATIENT
Start: 2020-02-20 | End: 2020-02-20

## 2020-02-20 RX ORDER — ATROPINE SULFATE 0.4 MG/ML
0.2 AMPUL (ML) INJECTION ONCE
Status: CANCELLED | OUTPATIENT
Start: 2020-02-20

## 2020-02-20 RX ORDER — LORAZEPAM 2 MG/ML
1 INJECTION INTRAMUSCULAR ONCE
Status: COMPLETED | OUTPATIENT
Start: 2020-02-20 | End: 2020-02-20

## 2020-02-20 RX ORDER — ONDANSETRON 2 MG/ML
8 INJECTION INTRAMUSCULAR; INTRAVENOUS EVERY 6 HOURS PRN
Status: CANCELLED | OUTPATIENT
Start: 2020-02-20

## 2020-02-20 RX ORDER — DIPHENHYDRAMINE HYDROCHLORIDE 50 MG/ML
50 INJECTION INTRAMUSCULAR; INTRAVENOUS ONCE
Status: CANCELLED
Start: 2020-02-20

## 2020-02-20 RX ORDER — METOCLOPRAMIDE HYDROCHLORIDE 5 MG/ML
10 INJECTION INTRAMUSCULAR; INTRAVENOUS EVERY 6 HOURS PRN
Status: CANCELLED | OUTPATIENT
Start: 2020-02-20

## 2020-02-20 RX ORDER — DOXYCYCLINE 100 MG/1
100 CAPSULE ORAL 2 TIMES DAILY
Qty: 20 CAPSULE | Refills: 5 | Status: SHIPPED | OUTPATIENT
Start: 2020-02-20 | End: 2020-01-01

## 2020-02-20 RX ORDER — FAMOTIDINE 10 MG/ML
20 INJECTION, SOLUTION INTRAVENOUS ONCE
Status: CANCELLED
Start: 2020-02-20

## 2020-02-20 RX ORDER — FLUOROURACIL 50 MG/ML
2000 INJECTION, SOLUTION INTRAVENOUS CONTINUOUS
Status: DISCONTINUED | OUTPATIENT
Start: 2020-02-20 | End: 2020-02-20

## 2020-02-20 RX ORDER — LORAZEPAM 0.5 MG/1
TABLET ORAL EVERY 4 HOURS PRN
Status: CANCELLED | OUTPATIENT
Start: 2020-02-20

## 2020-02-20 RX ORDER — LORAZEPAM 2 MG/ML
INJECTION INTRAMUSCULAR EVERY 4 HOURS PRN
Status: CANCELLED | OUTPATIENT
Start: 2020-02-20

## 2020-02-20 RX ORDER — OXYCODONE AND ACETAMINOPHEN 10; 325 MG/1; MG/1
1 TABLET ORAL EVERY 4 HOURS PRN
Qty: 90 TABLET | Refills: 0 | Status: SHIPPED | OUTPATIENT
Start: 2020-02-20 | End: 2020-04-09 | Stop reason: DRUGHIGH

## 2020-02-20 RX ORDER — DIPHENHYDRAMINE HYDROCHLORIDE 50 MG/ML
50 INJECTION INTRAMUSCULAR; INTRAVENOUS ONCE
Status: COMPLETED | OUTPATIENT
Start: 2020-02-20 | End: 2020-02-20

## 2020-02-20 RX ORDER — FAMOTIDINE 10 MG/ML
20 INJECTION, SOLUTION INTRAVENOUS ONCE
Status: COMPLETED | OUTPATIENT
Start: 2020-02-20 | End: 2020-02-20

## 2020-02-20 RX ORDER — ATROPINE SULFATE 0.4 MG/ML
0.2 AMPUL (ML) INJECTION ONCE
Status: CANCELLED
Start: 2020-02-20

## 2020-02-20 RX ORDER — FLUOROURACIL 50 MG/ML
2000 INJECTION, SOLUTION INTRAVENOUS CONTINUOUS
Status: CANCELLED | OUTPATIENT
Start: 2020-02-20

## 2020-02-20 RX ADMIN — ATROPINE SULFATE 0.2 MG: 0.4 MG/ML AMPUL (ML) INJECTION at 14:01:00

## 2020-02-20 RX ADMIN — FLUOROURACIL 5050 MG: 50 INJECTION, SOLUTION INTRAVENOUS at 14:09:00

## 2020-02-20 RX ADMIN — FAMOTIDINE 20 MG: 10 INJECTION, SOLUTION INTRAVENOUS at 10:28:00

## 2020-02-20 RX ADMIN — ATROPINE SULFATE 0.2 MG: 0.4 MG/ML AMPUL (ML) INJECTION at 12:22:00

## 2020-02-20 RX ADMIN — LORAZEPAM 1 MG: 2 INJECTION INTRAMUSCULAR at 13:58:00

## 2020-02-20 RX ADMIN — DIPHENHYDRAMINE HYDROCHLORIDE 50 MG: 50 INJECTION INTRAMUSCULAR; INTRAVENOUS at 10:27:00

## 2020-02-20 NOTE — PROGRESS NOTES
Patient is here for MD f/u and cycle 25 of chemo. Patient c/o mild increase in lower back pain. Taking Oxycodone as needed for pain. BM are normal. No nausea or vomiting. Not much of an appetite. Ongoing fatigue.        Education Record    Learner:  Patient

## 2020-02-20 NOTE — PROGRESS NOTES
Pt here for C25D1.   Arrives Ambulating independently, accompanied by Self and Family member           Patient reports possible pregnancy since last therapy cycle: Not Applicable    Modifications in dose or schedule: No     Frequency of blood return and sit

## 2020-02-20 NOTE — PATIENT INSTRUCTIONS
On-body Injector for Neulasta Patient Instructions       Your On-Body Injection device was applied on this day:  Thursday, Feb 20th at this time 2:15pm    Your dose of medication will start on this day: Friday, F

## 2020-02-21 NOTE — PROGRESS NOTES
[facility]    PATIENT'S NAME: Prashanth LUTZ   ATTENDING PHYSICIAN: Oscar De Anda M.D.    PATIENT ACCOUNT #: [de-identified] LOCATION: [address] [place] [address] RECORD #: [id number] YOB: 1990   DATE OF SERVICE: 02/20/2020       CANCER of which has never been fully determined but thought to be either treatment related or somewhat immune mediated. It has never been enough to prevent issues, though at one point he did have bruising, but this is no longer the case.   He is here with his mot cancer:  He is proceeding with his 25th cycle of 5-FU, leucovorin, ramucirumab, and irinotecan. His tolerance is actually fairly good. The 3-week cycle has definitely improved his quality of life.   The toxicities he is experiencing are real but manageabl

## 2020-02-24 RX ORDER — DIPHENOXYLATE HYDROCHLORIDE AND ATROPINE SULFATE 2.5; .025 MG/1; MG/1
TABLET ORAL
Qty: 60 TABLET | Refills: 0 | Status: SHIPPED | OUTPATIENT
Start: 2020-02-24 | End: 2021-01-01

## 2020-02-28 ENCOUNTER — TELEPHONE (OUTPATIENT)
Dept: HEMATOLOGY/ONCOLOGY | Facility: HOSPITAL | Age: 30
End: 2020-02-28

## 2020-02-28 NOTE — TELEPHONE ENCOUNTER
Having esophageal symptoms, has Carafate pill but got stuck and threw it back up, instructed to dissolve into slurry in shot glass and drink, he is to call back if this does not work.

## 2020-03-03 NOTE — PROGRESS NOTES
met with patient due to questions on Disability and [employer]. Patient reports that his job has officially ended due to them closing, but he will be on their [employer] 1944 through February 6, 2020.  Patient reports that he ha Well-developed, well nourished

## 2020-03-11 RX ORDER — OLANZAPINE 5 MG/1
TABLET ORAL
Qty: 30 TABLET | Refills: 0 | Status: SHIPPED | OUTPATIENT
Start: 2020-03-11 | End: 2020-01-01

## 2020-03-12 ENCOUNTER — OFFICE VISIT (OUTPATIENT)
Dept: HEMATOLOGY/ONCOLOGY | Age: 30
End: 2020-03-12
Attending: INTERNAL MEDICINE
Payer: COMMERCIAL

## 2020-03-12 VITALS
SYSTOLIC BLOOD PRESSURE: 114 MMHG | OXYGEN SATURATION: 94 % | TEMPERATURE: 98 F | HEIGHT: 77.99 IN | WEIGHT: 255 LBS | DIASTOLIC BLOOD PRESSURE: 79 MMHG | HEART RATE: 96 BPM | BODY MASS INDEX: 29.5 KG/M2 | RESPIRATION RATE: 16 BRPM

## 2020-03-12 DIAGNOSIS — C77.0 SECONDARY MALIGNANT NEOPLASM OF SUPRACLAVICULAR LYMPH NODE (HCC): ICD-10-CM

## 2020-03-12 DIAGNOSIS — C15.5 MALIGNANT NEOPLASM OF LOWER THIRD OF ESOPHAGUS (HCC): Primary | ICD-10-CM

## 2020-03-12 DIAGNOSIS — C16.0 GE JUNCTION CARCINOMA (HCC): ICD-10-CM

## 2020-03-12 DIAGNOSIS — C77.2 SECONDARY MALIGNANT NEOPLASM OF RETROPERITONEAL LYMPH NODES (HCC): Primary | ICD-10-CM

## 2020-03-12 DIAGNOSIS — C77.2 SECONDARY MALIGNANT NEOPLASM OF RETROPERITONEAL LYMPH NODES (HCC): ICD-10-CM

## 2020-03-12 LAB
ALBUMIN SERPL-MCNC: 3.3 G/DL (ref 3.4–5)
ALBUMIN/GLOB SERPL: 0.9 {RATIO} (ref 1–2)
ALP LIVER SERPL-CCNC: 153 U/L (ref 45–117)
ALT SERPL-CCNC: 118 U/L (ref 16–61)
ANION GAP SERPL CALC-SCNC: 3 MMOL/L (ref 0–18)
AST SERPL-CCNC: 107 U/L (ref 15–37)
BASOPHILS # BLD AUTO: 0.05 X10(3) UL (ref 0–0.2)
BASOPHILS NFR BLD AUTO: 0.7 %
BILIRUB SERPL-MCNC: 0.4 MG/DL (ref 0.1–2)
BUN BLD-MCNC: 8 MG/DL (ref 7–18)
BUN/CREAT SERPL: 10.5 (ref 10–20)
CALCIUM BLD-MCNC: 9.2 MG/DL (ref 8.5–10.1)
CANCER AG19-9 SERPL-ACNC: 91.5 U/ML (ref ?–37)
CEA SERPL-MCNC: 1.9 NG/ML (ref ?–5)
CHLORIDE SERPL-SCNC: 109 MMOL/L (ref 98–112)
CO2 SERPL-SCNC: 28 MMOL/L (ref 21–32)
CREAT BLD-MCNC: 0.76 MG/DL (ref 0.7–1.3)
DEPRECATED RDW RBC AUTO: 58.5 FL (ref 35.1–46.3)
EOSINOPHIL # BLD AUTO: 0.25 X10(3) UL (ref 0–0.7)
EOSINOPHIL NFR BLD AUTO: 3.6 %
ERYTHROCYTE [DISTWIDTH] IN BLOOD BY AUTOMATED COUNT: 18.6 % (ref 11–15)
GLOBULIN PLAS-MCNC: 3.5 G/DL (ref 2.8–4.4)
GLUCOSE BLD-MCNC: 119 MG/DL (ref 70–99)
HCT VFR BLD AUTO: 39.2 % (ref 39–53)
HGB BLD-MCNC: 12.4 G/DL (ref 13–17.5)
IMM GRANULOCYTES # BLD AUTO: 0.01 X10(3) UL (ref 0–1)
IMM GRANULOCYTES NFR BLD: 0.1 %
LYMPHOCYTES # BLD AUTO: 0.92 X10(3) UL (ref 1–4)
LYMPHOCYTES NFR BLD AUTO: 13.4 %
M PROTEIN MFR SERPL ELPH: 6.8 G/DL (ref 6.4–8.2)
MCH RBC QN AUTO: 28.1 PG (ref 26–34)
MCHC RBC AUTO-ENTMCNC: 31.6 G/DL (ref 31–37)
MCV RBC AUTO: 88.9 FL (ref 80–100)
MONOCYTES # BLD AUTO: 0.72 X10(3) UL (ref 0.1–1)
MONOCYTES NFR BLD AUTO: 10.5 %
NEUTROPHILS # BLD AUTO: 4.91 X10 (3) UL (ref 1.5–7.7)
NEUTROPHILS # BLD AUTO: 4.91 X10(3) UL (ref 1.5–7.7)
NEUTROPHILS NFR BLD AUTO: 71.7 %
OSMOLALITY SERPL CALC.SUM OF ELEC: 289 MOSM/KG (ref 275–295)
PLATELET # BLD AUTO: 84 10(3)UL (ref 150–450)
PLATELET MORPHOLOGY: NORMAL
POTASSIUM SERPL-SCNC: 4 MMOL/L (ref 3.5–5.1)
RBC # BLD AUTO: 4.41 X10(6)UL (ref 4.3–5.7)
SODIUM SERPL-SCNC: 140 MMOL/L (ref 136–145)
WBC # BLD AUTO: 6.9 X10(3) UL (ref 4–11)

## 2020-03-12 PROCEDURE — 80053 COMPREHEN METABOLIC PANEL: CPT

## 2020-03-12 PROCEDURE — 85025 COMPLETE CBC W/AUTO DIFF WBC: CPT

## 2020-03-12 PROCEDURE — 82378 CARCINOEMBRYONIC ANTIGEN: CPT

## 2020-03-12 PROCEDURE — 99214 OFFICE O/P EST MOD 30 MIN: CPT | Performed by: INTERNAL MEDICINE

## 2020-03-12 PROCEDURE — 86301 IMMUNOASSAY TUMOR CA 19-9: CPT

## 2020-03-12 PROCEDURE — 36591 DRAW BLOOD OFF VENOUS DEVICE: CPT

## 2020-03-12 RX ORDER — OXYCODONE HYDROCHLORIDE AND ACETAMINOPHEN 5; 325 MG/1; MG/1
1-2 TABLET ORAL EVERY 4 HOURS PRN
Qty: 90 TABLET | Refills: 0 | Status: SHIPPED | OUTPATIENT
Start: 2020-03-12 | End: 2020-05-15

## 2020-03-12 NOTE — PROGRESS NOTES
Per Dr Marshall Dears, no chemo today. Patient to get a PET scan within one week. See [facility] next week to discuss results.

## 2020-03-12 NOTE — PROGRESS NOTES
Patient is here for MD f/u and cycle 26 of chemo. Patient c/o increase lower back pain. He is taking Oxycodone as needed. BM are normal. Urinating w/o difficultly. No fevers. Patient has also been more nauseous lately and compazine is not working.        Jessy Montez

## 2020-03-13 NOTE — PROGRESS NOTES
[facility]    PATIENT'S NAME: Angela LUTZ   ATTENDING PHYSICIAN: Lang Milligan M.D.    PATIENT ACCOUNT #: [de-identified] LOCATION: [address] [place] [address] RECORD #: [id number] YOB: 1990   DATE OF SERVICE: 03/12/2020       CANCER chemo cycle, Lomotil 1 to 2 tablets q.i.d. p.r.n., doxycycline 100 mg b.i.d., loperamide 2 to 4 mg q.i.d. p.r.n., lorazepam 1 mg that he is taking daily (he tends to not take more than this, but he has been using it), olanzapine 5 mg nightly for 5 days aft tests. We did see a mild rise again in his marker and we need to reassess him. He just had a CT scan done in mid January with no significant findings, and, therefore, we need a PET scan obtained. I placed the order.   We will try to get this authorized a

## 2020-03-17 ENCOUNTER — HOSPITAL ENCOUNTER (OUTPATIENT)
Dept: NUCLEAR MEDICINE | Facility: HOSPITAL | Age: 30
Discharge: HOME | End: 2020-03-17
Attending: INTERNAL MEDICINE
Payer: COMMERCIAL

## 2020-03-17 DIAGNOSIS — C77.2 SECONDARY MALIGNANT NEOPLASM OF RETROPERITONEAL LYMPH NODES (HCC): ICD-10-CM

## 2020-03-17 DIAGNOSIS — C15.5 MALIGNANT NEOPLASM OF LOWER THIRD OF ESOPHAGUS (HCC): ICD-10-CM

## 2020-03-17 LAB — GLUCOSE BLD-MCNC: 75 MG/DL (ref 70–99)

## 2020-03-17 PROCEDURE — 82962 GLUCOSE BLOOD TEST: CPT

## 2020-03-17 PROCEDURE — 78815 PET IMAGE W/CT SKULL-THIGH: CPT | Performed by: INTERNAL MEDICINE

## 2020-03-19 ENCOUNTER — OFFICE VISIT (OUTPATIENT)
Dept: HEMATOLOGY/ONCOLOGY | Age: 30
End: 2020-03-19
Attending: INTERNAL MEDICINE
Payer: COMMERCIAL

## 2020-03-19 VITALS
HEIGHT: 77.99 IN | HEART RATE: 97 BPM | DIASTOLIC BLOOD PRESSURE: 85 MMHG | TEMPERATURE: 98 F | RESPIRATION RATE: 18 BRPM | BODY MASS INDEX: 29.16 KG/M2 | OXYGEN SATURATION: 95 % | SYSTOLIC BLOOD PRESSURE: 119 MMHG | WEIGHT: 252 LBS

## 2020-03-19 DIAGNOSIS — C77.0 SECONDARY MALIGNANT NEOPLASM LYMPH NODES OF HEAD, FACE AND NECK (HCC): ICD-10-CM

## 2020-03-19 DIAGNOSIS — L27.0 DRUG RASH: ICD-10-CM

## 2020-03-19 DIAGNOSIS — C16.0 GE JUNCTION CARCINOMA (HCC): Primary | ICD-10-CM

## 2020-03-19 DIAGNOSIS — C77.2 SECONDARY MALIGNANT NEOPLASM OF RETROPERITONEAL LYMPH NODES (HCC): ICD-10-CM

## 2020-03-19 DIAGNOSIS — R11.2 CHEMOTHERAPY-INDUCED NAUSEA AND VOMITING: ICD-10-CM

## 2020-03-19 DIAGNOSIS — K52.1 CHEMOTHERAPY INDUCED DIARRHEA: ICD-10-CM

## 2020-03-19 DIAGNOSIS — D64.81 ANEMIA DUE TO CHEMOTHERAPY: ICD-10-CM

## 2020-03-19 DIAGNOSIS — D69.6 THROMBOCYTOPENIA (HCC): ICD-10-CM

## 2020-03-19 DIAGNOSIS — M54.50 CHRONIC BILATERAL LOW BACK PAIN WITHOUT SCIATICA: ICD-10-CM

## 2020-03-19 DIAGNOSIS — T45.1X5A ANEMIA DUE TO CHEMOTHERAPY: ICD-10-CM

## 2020-03-19 DIAGNOSIS — T45.1X5A CHEMOTHERAPY INDUCED DIARRHEA: ICD-10-CM

## 2020-03-19 DIAGNOSIS — C77.0 SECONDARY MALIGNANT NEOPLASM OF SUPRACLAVICULAR LYMPH NODE (HCC): ICD-10-CM

## 2020-03-19 DIAGNOSIS — T45.1X5A CHEMOTHERAPY-INDUCED NAUSEA AND VOMITING: ICD-10-CM

## 2020-03-19 DIAGNOSIS — G89.29 CHRONIC BILATERAL LOW BACK PAIN WITHOUT SCIATICA: ICD-10-CM

## 2020-03-19 DIAGNOSIS — F41.9 ANXIETY: ICD-10-CM

## 2020-03-19 DIAGNOSIS — C15.5 MALIGNANT NEOPLASM OF LOWER THIRD OF ESOPHAGUS (HCC): Primary | ICD-10-CM

## 2020-03-19 DIAGNOSIS — Z51.11 ENCOUNTER FOR CHEMOTHERAPY MANAGEMENT: ICD-10-CM

## 2020-03-19 LAB
ALBUMIN SERPL-MCNC: 3.5 G/DL (ref 3.4–5)
ALBUMIN/GLOB SERPL: 1.1 {RATIO} (ref 1–2)
ALP LIVER SERPL-CCNC: 118 U/L (ref 45–117)
ALT SERPL-CCNC: 40 U/L (ref 16–61)
ANION GAP SERPL CALC-SCNC: 5 MMOL/L (ref 0–18)
AST SERPL-CCNC: 27 U/L (ref 15–37)
BASOPHILS # BLD AUTO: 0.05 X10(3) UL (ref 0–0.2)
BASOPHILS NFR BLD AUTO: 0.8 %
BILIRUB SERPL-MCNC: 0.4 MG/DL (ref 0.1–2)
BILIRUB UR QL STRIP.AUTO: NEGATIVE
BUN BLD-MCNC: 9 MG/DL (ref 7–18)
BUN/CREAT SERPL: 13.4 (ref 10–20)
CALCIUM BLD-MCNC: 9 MG/DL (ref 8.5–10.1)
CHLORIDE SERPL-SCNC: 109 MMOL/L (ref 98–112)
CO2 SERPL-SCNC: 28 MMOL/L (ref 21–32)
COLOR UR AUTO: YELLOW
CREAT BLD-MCNC: 0.67 MG/DL (ref 0.7–1.3)
DEPRECATED RDW RBC AUTO: 59.6 FL (ref 35.1–46.3)
EOSINOPHIL # BLD AUTO: 0.44 X10(3) UL (ref 0–0.7)
EOSINOPHIL NFR BLD AUTO: 7 %
ERYTHROCYTE [DISTWIDTH] IN BLOOD BY AUTOMATED COUNT: 18.4 % (ref 11–15)
GLOBULIN PLAS-MCNC: 3.2 G/DL (ref 2.8–4.4)
GLUCOSE BLD-MCNC: 80 MG/DL (ref 70–99)
GLUCOSE UR STRIP.AUTO-MCNC: NEGATIVE MG/DL
HCT VFR BLD AUTO: 39.6 % (ref 39–53)
HGB BLD-MCNC: 12.4 G/DL (ref 13–17.5)
HYALINE CASTS #/AREA URNS AUTO: PRESENT /LPF
IMM GRANULOCYTES # BLD AUTO: 0.03 X10(3) UL (ref 0–1)
IMM GRANULOCYTES NFR BLD: 0.5 %
KETONES UR STRIP.AUTO-MCNC: NEGATIVE MG/DL
LEUKOCYTE ESTERASE UR QL STRIP.AUTO: NEGATIVE
LYMPHOCYTES # BLD AUTO: 0.85 X10(3) UL (ref 1–4)
LYMPHOCYTES NFR BLD AUTO: 13.4 %
M PROTEIN MFR SERPL ELPH: 6.7 G/DL (ref 6.4–8.2)
MCH RBC QN AUTO: 27.7 PG (ref 26–34)
MCHC RBC AUTO-ENTMCNC: 31.3 G/DL (ref 31–37)
MCV RBC AUTO: 88.4 FL (ref 80–100)
MONOCYTES # BLD AUTO: 0.79 X10(3) UL (ref 0.1–1)
MONOCYTES NFR BLD AUTO: 12.5 %
NEUTROPHILS # BLD AUTO: 4.17 X10 (3) UL (ref 1.5–7.7)
NEUTROPHILS # BLD AUTO: 4.17 X10(3) UL (ref 1.5–7.7)
NEUTROPHILS NFR BLD AUTO: 65.8 %
NITRITE UR QL STRIP.AUTO: NEGATIVE
OSMOLALITY SERPL CALC.SUM OF ELEC: 292 MOSM/KG (ref 275–295)
PATIENT FASTING Y/N/NP: NO
PH UR STRIP.AUTO: 5.5 [PH] (ref 4.5–8)
PLATELET # BLD AUTO: 113 10(3)UL (ref 150–450)
POTASSIUM SERPL-SCNC: 3.7 MMOL/L (ref 3.5–5.1)
RBC # BLD AUTO: 4.48 X10(6)UL (ref 4.3–5.7)
RBC UR QL AUTO: NEGATIVE
SODIUM SERPL-SCNC: 142 MMOL/L (ref 136–145)
SP GR UR STRIP.AUTO: >=1.03 (ref 1–1.03)
UROBILINOGEN UR STRIP.AUTO-MCNC: 1 MG/DL
WBC # BLD AUTO: 6.3 X10(3) UL (ref 4–11)

## 2020-03-19 PROCEDURE — S0028 INJECTION, FAMOTIDINE, 20 MG: HCPCS | Performed by: CLINICAL NURSE SPECIALIST

## 2020-03-19 PROCEDURE — 99215 OFFICE O/P EST HI 40 MIN: CPT | Performed by: CLINICAL NURSE SPECIALIST

## 2020-03-19 PROCEDURE — 81001 URINALYSIS AUTO W/SCOPE: CPT

## 2020-03-19 PROCEDURE — 96417 CHEMO IV INFUS EACH ADDL SEQ: CPT

## 2020-03-19 PROCEDURE — 96375 TX/PRO/DX INJ NEW DRUG ADDON: CPT

## 2020-03-19 PROCEDURE — 96413 CHEMO IV INFUSION 1 HR: CPT

## 2020-03-19 PROCEDURE — 85025 COMPLETE CBC W/AUTO DIFF WBC: CPT

## 2020-03-19 PROCEDURE — 96377 APPLICATON ON-BODY INJECTOR: CPT

## 2020-03-19 PROCEDURE — 96367 TX/PROPH/DG ADDL SEQ IV INF: CPT

## 2020-03-19 PROCEDURE — 96376 TX/PRO/DX INJ SAME DRUG ADON: CPT

## 2020-03-19 PROCEDURE — 96368 THER/DIAG CONCURRENT INF: CPT

## 2020-03-19 PROCEDURE — 80053 COMPREHEN METABOLIC PANEL: CPT

## 2020-03-19 RX ORDER — FAMOTIDINE 10 MG/ML
20 INJECTION, SOLUTION INTRAVENOUS ONCE
Status: CANCELLED
Start: 2020-03-19

## 2020-03-19 RX ORDER — METOCLOPRAMIDE HYDROCHLORIDE 5 MG/ML
10 INJECTION INTRAMUSCULAR; INTRAVENOUS EVERY 6 HOURS PRN
Status: CANCELLED | OUTPATIENT
Start: 2020-03-19

## 2020-03-19 RX ORDER — LORAZEPAM 0.5 MG/1
TABLET ORAL EVERY 4 HOURS PRN
Status: CANCELLED | OUTPATIENT
Start: 2020-03-19

## 2020-03-19 RX ORDER — DIPHENHYDRAMINE HYDROCHLORIDE 50 MG/ML
50 INJECTION INTRAMUSCULAR; INTRAVENOUS ONCE
Status: COMPLETED | OUTPATIENT
Start: 2020-03-19 | End: 2020-03-19

## 2020-03-19 RX ORDER — FLUOROURACIL 50 MG/ML
2000 INJECTION, SOLUTION INTRAVENOUS CONTINUOUS
Status: DISCONTINUED | OUTPATIENT
Start: 2020-03-19 | End: 2020-03-19

## 2020-03-19 RX ORDER — FAMOTIDINE 10 MG/ML
20 INJECTION, SOLUTION INTRAVENOUS ONCE
Status: COMPLETED | OUTPATIENT
Start: 2020-03-19 | End: 2020-03-19

## 2020-03-19 RX ORDER — DIPHENHYDRAMINE HYDROCHLORIDE 50 MG/ML
50 INJECTION INTRAMUSCULAR; INTRAVENOUS ONCE
Status: CANCELLED
Start: 2020-03-19

## 2020-03-19 RX ORDER — FLUOROURACIL 50 MG/ML
2000 INJECTION, SOLUTION INTRAVENOUS CONTINUOUS
Status: CANCELLED | OUTPATIENT
Start: 2020-03-19

## 2020-03-19 RX ORDER — ATROPINE SULFATE 0.4 MG/ML
0.2 AMPUL (ML) INJECTION ONCE
Status: COMPLETED | OUTPATIENT
Start: 2020-03-19 | End: 2020-03-19

## 2020-03-19 RX ORDER — PROCHLORPERAZINE MALEATE 10 MG
10 TABLET ORAL EVERY 6 HOURS PRN
Status: CANCELLED | OUTPATIENT
Start: 2020-03-19

## 2020-03-19 RX ORDER — ATROPINE SULFATE 0.4 MG/ML
0.2 AMPUL (ML) INJECTION ONCE
Status: CANCELLED | OUTPATIENT
Start: 2020-03-19

## 2020-03-19 RX ORDER — ATROPINE SULFATE 0.4 MG/ML
0.2 AMPUL (ML) INJECTION ONCE
Status: CANCELLED
Start: 2020-03-19

## 2020-03-19 RX ORDER — LORAZEPAM 2 MG/ML
INJECTION INTRAMUSCULAR EVERY 4 HOURS PRN
Status: CANCELLED | OUTPATIENT
Start: 2020-03-19

## 2020-03-19 RX ORDER — ONDANSETRON 2 MG/ML
8 INJECTION INTRAMUSCULAR; INTRAVENOUS EVERY 6 HOURS PRN
Status: CANCELLED | OUTPATIENT
Start: 2020-03-19

## 2020-03-19 RX ADMIN — FAMOTIDINE 20 MG: 10 INJECTION, SOLUTION INTRAVENOUS at 11:56:00

## 2020-03-19 RX ADMIN — ATROPINE SULFATE 0.2 MG: 0.4 MG/ML AMPUL (ML) INJECTION at 15:15:00

## 2020-03-19 RX ADMIN — ATROPINE SULFATE 0.2 MG: 0.4 MG/ML AMPUL (ML) INJECTION at 14:19:00

## 2020-03-19 RX ADMIN — FLUOROURACIL 5050 MG: 50 INJECTION, SOLUTION INTRAVENOUS at 15:56:00

## 2020-03-19 RX ADMIN — DIPHENHYDRAMINE HYDROCHLORIDE 50 MG: 50 INJECTION INTRAMUSCULAR; INTRAVENOUS at 12:13:00

## 2020-03-19 NOTE — PROGRESS NOTES
Pt here for C26D1.   Arrives Ambulating independently, accompanied by Self           Patient reports possible pregnancy since last therapy cycle: Not Applicable    Modifications in dose or schedule: No     Frequency of blood return and site check throughout

## 2020-03-19 NOTE — PROGRESS NOTES
On-body Injector for Neulasta Patient Instructions       Your On-Body Injection device was applied on this day:  3/19 at this time 4:00 PM    Your dose of medication will start on this day: 3/20 at this time 7:00

## 2020-03-19 NOTE — PROGRESS NOTES
ANP Visit Note    Patient Name: Fiona Gorman   YOB: 1990   Medical Record Number: [id number]   CSN: [id number]   Date of visit: 3/19/2020   Suzi Velez DO   No primary care provider on file.      Chief Complaint/Reason for Visit:  Claudio Lee blood        Surgical History:  Past Surgical History:   Procedure Laterality Date   • INSERTION OF ACCESS PORT  04/10/2019   • LAPAROSCOPIC ESOPHAGOGASTRECTOMY N/A 4/27/2018    Performed by Lauren Blevins MD at [facility] MAIN OR   • LAPAROSCOPIC ESOPHAGOGASTRECT of club or organization: Not on file        Attends meetings of clubs or organizations: Not on file        Relationship status: Not on file      Intimate partner violence:        Fear of current or ex partner: Not on file        Emotionally abused: Not on Citalopram Hydrobromide 40 MG Oral Tab, Take 1 tablet (40 mg total) by mouth daily. , Disp: 30 tablet, Rfl: 11  •  Clindamycin Phosphate 1 % External Lotion, Apply 1 Application topically 2 (two) times daily. , Disp: 60 mL, Rfl: 2  •  Loperamide HCl 2 MG Ora AST 27 15 - 37 U/L    ALT 40 16 - 61 U/L    Alkaline Phosphatase 118 (H) 45 - 117 U/L    Bilirubin, Total 0.4 0.1 - 2.0 mg/dL    Total Protein 6.7 6.4 - 8.2 g/dL    Albumin 3.5 3.4 - 5.0 g/dL    Globulin  3.2 2.8 - 4.4 g/dL    A/G Ratio 1.1 1.0 - 2.0    FA Epithelial Cells None Seen Small /LPF    Transitional Cells None Seen Small /LPF    Ca Oxalate Crystals Few (A) None Seen /LPF    Hyaline Casts Present (A) None Seen /LPF    Mucous Urine 4+ (A) None Seen    Yeast Urine None Seen None Seen     Radiology:  P the anterior inferior aspect of the patient's gastric pull-through, with a max SUV of 5.31 and 3.87 respectively.   Prior PET/CT from 11/19/2018 revealed a similar, singular   focus of increased radiotracer uptake along the lateral, inferior aspect of the g

## 2020-03-25 ENCOUNTER — DIETICIAN VISIT (OUTPATIENT)
Dept: HEMATOLOGY/ONCOLOGY | Facility: HOSPITAL | Age: 30
End: 2020-03-25

## 2020-04-03 ENCOUNTER — SOCIAL WORK SERVICES (OUTPATIENT)
Dept: HEMATOLOGY/ONCOLOGY | Facility: HOSPITAL | Age: 30
End: 2020-04-03

## 2020-04-06 ENCOUNTER — PATIENT MESSAGE (OUTPATIENT)
Dept: HEMATOLOGY/ONCOLOGY | Facility: HOSPITAL | Age: 30
End: 2020-04-06

## 2020-04-06 RX ORDER — CYCLOBENZAPRINE HCL 5 MG
5 TABLET ORAL 3 TIMES DAILY PRN
Qty: 30 TABLET | Refills: 0 | Status: SHIPPED | OUTPATIENT
Start: 2020-04-06

## 2020-04-06 NOTE — TELEPHONE ENCOUNTER
From: Anil Titus  To: Joi Rodriguez MD  Sent: 4/6/2020 1:51 PM CDT  Subject: Non-Urgent Marvine Mirza Dr. Lisa Aase,    I left a voicemail for [place] Rachel this morning but haven't heard back so I wasn't sure if she was working.  I've been having

## 2020-04-09 ENCOUNTER — APPOINTMENT (OUTPATIENT)
Dept: HEMATOLOGY/ONCOLOGY | Age: 30
End: 2020-04-09
Attending: INTERNAL MEDICINE
Payer: COMMERCIAL

## 2020-04-09 ENCOUNTER — OFFICE VISIT (OUTPATIENT)
Dept: HEMATOLOGY/ONCOLOGY | Facility: HOSPITAL | Age: 30
End: 2020-04-09
Attending: INTERNAL MEDICINE
Payer: COMMERCIAL

## 2020-04-09 ENCOUNTER — HOSPITAL ENCOUNTER (OUTPATIENT)
Dept: RADIATION ONCOLOGY | Facility: HOSPITAL | Age: 30
Discharge: HOME | End: 2020-04-09
Attending: INTERNAL MEDICINE
Payer: COMMERCIAL

## 2020-04-09 VITALS
BODY MASS INDEX: 29.16 KG/M2 | SYSTOLIC BLOOD PRESSURE: 131 MMHG | HEIGHT: 77.99 IN | WEIGHT: 252 LBS | TEMPERATURE: 98 F | OXYGEN SATURATION: 98 % | RESPIRATION RATE: 18 BRPM | HEART RATE: 88 BPM | DIASTOLIC BLOOD PRESSURE: 84 MMHG

## 2020-04-09 DIAGNOSIS — C77.0 SECONDARY MALIGNANT NEOPLASM OF SUPRACLAVICULAR LYMPH NODE (HCC): ICD-10-CM

## 2020-04-09 DIAGNOSIS — C77.2 SECONDARY MALIGNANT NEOPLASM OF RETROPERITONEAL LYMPH NODES (HCC): Primary | ICD-10-CM

## 2020-04-09 DIAGNOSIS — C77.2 SECONDARY MALIGNANT NEOPLASM OF RETROPERITONEAL LYMPH NODES (HCC): ICD-10-CM

## 2020-04-09 DIAGNOSIS — C16.0 GE JUNCTION CARCINOMA (HCC): Primary | ICD-10-CM

## 2020-04-09 DIAGNOSIS — C16.0 GE JUNCTION CARCINOMA (HCC): ICD-10-CM

## 2020-04-09 PROCEDURE — 96376 TX/PRO/DX INJ SAME DRUG ADON: CPT

## 2020-04-09 PROCEDURE — 80053 COMPREHEN METABOLIC PANEL: CPT

## 2020-04-09 PROCEDURE — 86301 IMMUNOASSAY TUMOR CA 19-9: CPT

## 2020-04-09 PROCEDURE — 96413 CHEMO IV INFUSION 1 HR: CPT

## 2020-04-09 PROCEDURE — 96368 THER/DIAG CONCURRENT INF: CPT

## 2020-04-09 PROCEDURE — 82378 CARCINOEMBRYONIC ANTIGEN: CPT

## 2020-04-09 PROCEDURE — 96375 TX/PRO/DX INJ NEW DRUG ADDON: CPT

## 2020-04-09 PROCEDURE — S0028 INJECTION, FAMOTIDINE, 20 MG: HCPCS | Performed by: INTERNAL MEDICINE

## 2020-04-09 PROCEDURE — 85025 COMPLETE CBC W/AUTO DIFF WBC: CPT

## 2020-04-09 PROCEDURE — 99214 OFFICE O/P EST MOD 30 MIN: CPT | Performed by: INTERNAL MEDICINE

## 2020-04-09 PROCEDURE — 96417 CHEMO IV INFUS EACH ADDL SEQ: CPT

## 2020-04-09 PROCEDURE — 96367 TX/PROPH/DG ADDL SEQ IV INF: CPT

## 2020-04-09 RX ORDER — ONDANSETRON 2 MG/ML
8 INJECTION INTRAMUSCULAR; INTRAVENOUS EVERY 6 HOURS PRN
Status: CANCELLED | OUTPATIENT
Start: 2020-04-09

## 2020-04-09 RX ORDER — ATROPINE SULFATE 0.4 MG/ML
0.2 AMPUL (ML) INJECTION ONCE
Status: COMPLETED | OUTPATIENT
Start: 2020-04-09 | End: 2020-04-09

## 2020-04-09 RX ORDER — PROCHLORPERAZINE MALEATE 10 MG
10 TABLET ORAL EVERY 6 HOURS PRN
Status: CANCELLED | OUTPATIENT
Start: 2020-04-09

## 2020-04-09 RX ORDER — DIPHENHYDRAMINE HYDROCHLORIDE 50 MG/ML
50 INJECTION INTRAMUSCULAR; INTRAVENOUS ONCE
Status: COMPLETED | OUTPATIENT
Start: 2020-04-09 | End: 2020-04-09

## 2020-04-09 RX ORDER — LORAZEPAM 0.5 MG/1
TABLET ORAL EVERY 4 HOURS PRN
Status: CANCELLED | OUTPATIENT
Start: 2020-04-09

## 2020-04-09 RX ORDER — FLUOROURACIL 50 MG/ML
2000 INJECTION, SOLUTION INTRAVENOUS CONTINUOUS
Status: CANCELLED | OUTPATIENT
Start: 2020-04-09

## 2020-04-09 RX ORDER — METOCLOPRAMIDE HYDROCHLORIDE 5 MG/ML
10 INJECTION INTRAMUSCULAR; INTRAVENOUS EVERY 6 HOURS PRN
Status: CANCELLED | OUTPATIENT
Start: 2020-04-09

## 2020-04-09 RX ORDER — ATROPINE SULFATE 0.4 MG/ML
0.2 AMPUL (ML) INJECTION ONCE
Status: CANCELLED | OUTPATIENT
Start: 2020-04-09

## 2020-04-09 RX ORDER — LORAZEPAM 2 MG/ML
INJECTION INTRAMUSCULAR EVERY 4 HOURS PRN
Status: CANCELLED | OUTPATIENT
Start: 2020-04-09

## 2020-04-09 RX ORDER — ATROPINE SULFATE 0.4 MG/ML
0.2 AMPUL (ML) INJECTION ONCE
Status: CANCELLED
Start: 2020-04-09

## 2020-04-09 RX ORDER — DIPHENHYDRAMINE HYDROCHLORIDE 50 MG/ML
50 INJECTION INTRAMUSCULAR; INTRAVENOUS ONCE
Status: CANCELLED
Start: 2020-04-09

## 2020-04-09 RX ORDER — FAMOTIDINE 10 MG/ML
20 INJECTION, SOLUTION INTRAVENOUS ONCE
Status: COMPLETED | OUTPATIENT
Start: 2020-04-09 | End: 2020-04-09

## 2020-04-09 RX ORDER — FAMOTIDINE 10 MG/ML
20 INJECTION, SOLUTION INTRAVENOUS ONCE
Status: CANCELLED
Start: 2020-04-09

## 2020-04-09 RX ORDER — FLUOROURACIL 50 MG/ML
2000 INJECTION, SOLUTION INTRAVENOUS CONTINUOUS
Status: DISCONTINUED | OUTPATIENT
Start: 2020-04-09 | End: 2020-04-09

## 2020-04-09 RX ADMIN — DIPHENHYDRAMINE HYDROCHLORIDE 50 MG: 50 INJECTION INTRAMUSCULAR; INTRAVENOUS at 11:44:00

## 2020-04-09 RX ADMIN — FLUOROURACIL 5050 MG: 50 INJECTION, SOLUTION INTRAVENOUS at 15:28:00

## 2020-04-09 RX ADMIN — FAMOTIDINE 20 MG: 10 INJECTION, SOLUTION INTRAVENOUS at 11:45:00

## 2020-04-09 RX ADMIN — ATROPINE SULFATE 0.2 MG: 0.4 MG/ML AMPUL (ML) INJECTION at 13:29:00

## 2020-04-09 RX ADMIN — ATROPINE SULFATE 0.2 MG: 0.4 MG/ML AMPUL (ML) INJECTION at 14:52:00

## 2020-04-09 NOTE — PROGRESS NOTES
Patient is here for MD f/u and cycle 27 of chemo. Patient c/o chronic lower back pain. Patient is taking Oxycodone around the clock with little relief. Patient will take Tylenol in addition to Oxycodone as well. Appetite is good.  Mild fatigue but overall f

## 2020-04-09 NOTE — PATIENT INSTRUCTIONS
On-body Injector for Neulasta Patient Instructions       Your On-Body Injection device was applied on this day:  4/9/20 at this time 1455    Your dose of medication will start on this day: 4/10/20 at this time 16

## 2020-04-09 NOTE — PROGRESS NOTES
Pt here for C 27 D 1 .   Arrives Ambulating independently, accompanied by Self           Patient reports possible pregnancy since last therapy cycle: Not Applicable    Modifications in dose or schedule: No     Frequency of blood return and site check throug

## 2020-04-09 NOTE — CONSULTS
[facility] Oncology New Consultation      Patient Name: Abner Rod   MRN: [id number]  PCP: Ainsley Chew DO  Referring Physician: Mitali Manrique MD    Diagnosis: metastatic esophageal cancer    Reason for Consultation: back tablets by mouth every 4 (four) hours as needed for Pain.  90 tablet 0   • OLANZAPINE 5 MG Oral Tab TAKE 1 TABLET(5 MG) BY MOUTH EVERY NIGHT 30 tablet 0   • DIPHENOXYLATE-ATROPINE 2.5-0.025 MG Oral Tab TAKE 1 TO 2 TABLETS BY MOUTH FOUR TIMES DAILY AS NEEDED paraspinal/flank area in low T spine   Ext: wwp, no cyanosis or edema  Neuro: CN II-XII grossly intact    Assessment: The patient is a 29yo M with a history of initially localized GEJ cancer treated with neoadjuvant CRT and surgery in early 2018.  Later nat

## 2020-04-13 PROBLEM — Z15.09 MONOALLELIC MUTATION OF ATM GENE: Status: ACTIVE | Noted: 2020-04-13

## 2020-04-13 PROBLEM — Z15.01 MONOALLELIC MUTATION OF ATM GENE: Status: ACTIVE | Noted: 2020-04-13

## 2020-04-13 PROBLEM — Z15.89 MONOALLELIC MUTATION OF ATM GENE: Status: ACTIVE | Noted: 2020-04-13

## 2020-04-14 ENCOUNTER — HOSPITAL ENCOUNTER (OUTPATIENT)
Dept: RADIATION ONCOLOGY | Facility: HOSPITAL | Age: 30
Discharge: HOME | End: 2020-04-14
Attending: INTERNAL MEDICINE
Payer: COMMERCIAL

## 2020-04-14 PROCEDURE — 77334 RADIATION TREATMENT AID(S): CPT | Performed by: INTERNAL MEDICINE

## 2020-04-14 PROCEDURE — 77470 SPECIAL RADIATION TREATMENT: CPT | Performed by: INTERNAL MEDICINE

## 2020-04-14 PROCEDURE — 77290 THER RAD SIMULAJ FIELD CPLX: CPT | Performed by: INTERNAL MEDICINE

## 2020-04-14 NOTE — PROGRESS NOTES
[place]    PATIENT'S NAME: Krunal Quinn NELDA   ATTENDING PHYSICIAN: Macy Jiménez M.D.    PATIENT ACCOUNT #: [de-identified] LOCATION: [address] [place] [address] RECORD #: [id number] YOB: 1990   DATE OF SERVICE: 04/09/2020       CANCER has no cervical, supraclavicular, or axillary adenopathy. LUNGS:  Resonant to percussion and clear to auscultation, with no wheezing, rales, or rhonchi. HEART:  Normal.   ABDOMEN:  No hepatosplenomegaly or tenderness.    EXTREMITIES:  He has no clubbing, variant detected, and, while it is not clear that he would necessarily respond to something like a PARP inhibitor, it may be possible that we could try off label drug or find a clinical trial.  I will discuss this with Dr. Jackson Graves as well who has seen hi

## 2020-04-15 PROCEDURE — 77295 3-D RADIOTHERAPY PLAN: CPT | Performed by: INTERNAL MEDICINE

## 2020-04-15 PROCEDURE — 77300 RADIATION THERAPY DOSE PLAN: CPT | Performed by: INTERNAL MEDICINE

## 2020-04-15 PROCEDURE — 77334 RADIATION TREATMENT AID(S): CPT | Performed by: INTERNAL MEDICINE

## 2020-04-16 ENCOUNTER — HOSPITAL ENCOUNTER (OUTPATIENT)
Dept: RADIATION ONCOLOGY | Facility: HOSPITAL | Age: 30
Discharge: HOME | End: 2020-04-16
Attending: INTERNAL MEDICINE
Payer: COMMERCIAL

## 2020-04-16 PROCEDURE — 77412 RADIATION TX DELIVERY LVL 3: CPT | Performed by: INTERNAL MEDICINE

## 2020-04-16 PROCEDURE — 77280 THER RAD SIMULAJ FIELD SMPL: CPT | Performed by: INTERNAL MEDICINE

## 2020-04-17 PROCEDURE — 77412 RADIATION TX DELIVERY LVL 3: CPT | Performed by: INTERNAL MEDICINE

## 2020-04-17 PROCEDURE — 77387 GUIDANCE FOR RADJ TX DLVR: CPT | Performed by: INTERNAL MEDICINE

## 2020-04-20 ENCOUNTER — TELEPHONE (OUTPATIENT)
Dept: HEMATOLOGY/ONCOLOGY | Facility: HOSPITAL | Age: 30
End: 2020-04-20

## 2020-04-20 ENCOUNTER — TELEPHONE (OUTPATIENT)
Dept: FAMILY MEDICINE CLINIC | Facility: CLINIC | Age: 30
End: 2020-04-20

## 2020-04-20 PROCEDURE — 77412 RADIATION TX DELIVERY LVL 3: CPT | Performed by: INTERNAL MEDICINE

## 2020-04-20 PROCEDURE — 77387 GUIDANCE FOR RADJ TX DLVR: CPT | Performed by: INTERNAL MEDICINE

## 2020-04-20 NOTE — TELEPHONE ENCOUNTER
Ulises Naqvi calling from [place] regarding patient Kristie. She would like to get a plan of care faxed over @[phone]. She also stated that if you have any questions or need any thing you can reach out to her@ [phone].

## 2020-04-20 NOTE — TELEPHONE ENCOUNTER
Spoke with Sharyn Tierney RN case manager at [facility]. She is trying to confirm his plan of care for his oncology treatment. Gave her Dr. Pawan Bush information. She also wanted to remind us he is due for his wellness exam and lipid screening.

## 2020-04-21 ENCOUNTER — HOSPITAL ENCOUNTER (OUTPATIENT)
Dept: RADIATION ONCOLOGY | Facility: HOSPITAL | Age: 30
Discharge: HOME | End: 2020-04-21
Attending: INTERNAL MEDICINE
Payer: COMMERCIAL

## 2020-04-21 VITALS
HEART RATE: 86 BPM | BODY MASS INDEX: 30 KG/M2 | SYSTOLIC BLOOD PRESSURE: 119 MMHG | DIASTOLIC BLOOD PRESSURE: 76 MMHG | OXYGEN SATURATION: 97 % | WEIGHT: 255.63 LBS | TEMPERATURE: 97 F | RESPIRATION RATE: 17 BRPM

## 2020-04-21 DIAGNOSIS — C16.0 GE JUNCTION CARCINOMA (HCC): Primary | ICD-10-CM

## 2020-04-21 PROCEDURE — 77412 RADIATION TX DELIVERY LVL 3: CPT | Performed by: INTERNAL MEDICINE

## 2020-04-21 PROCEDURE — 77387 GUIDANCE FOR RADJ TX DLVR: CPT | Performed by: INTERNAL MEDICINE

## 2020-04-21 NOTE — PATIENT INSTRUCTIONS
POST-RADIATION INSTRUCTIONS:   - CALL [phone] FOR A FOLLOW-UP WITH DR. ROQUE IN THREE MONTHS  - SIDE EFFECTS OF RADIATION WILL GRADUALLY SUBSIDE, IT MAY TAKE UP TO 2 WEEKS POST-RADIATION FOR YOU TO NOTICE CHANGES; SUCH AS A DECREASE IN YOUR FATIGUE

## 2020-04-21 NOTE — PROGRESS NOTES
[facility] Radiation Treatment Management Note 1-5    Patient:  Geofm Presume  Age:  26year old  Visit Diagnosis:  Metastatic esophageal CA with paraspinal mass  Primary Rad/Onc:  Dr. Lopes Human    Site Delivered Dose (Gy

## 2020-04-22 ENCOUNTER — DOCUMENTATION ONLY (OUTPATIENT)
Dept: RADIATION ONCOLOGY | Facility: HOSPITAL | Age: 30
End: 2020-04-22

## 2020-04-22 PROCEDURE — 77387 GUIDANCE FOR RADJ TX DLVR: CPT | Performed by: INTERNAL MEDICINE

## 2020-04-22 PROCEDURE — 77412 RADIATION TX DELIVERY LVL 3: CPT | Performed by: INTERNAL MEDICINE

## 2020-04-22 NOTE — PROGRESS NOTES
Radiation Oncology Treatment Summary    Diagnosis: metastatic esophageal cancer with paraspinal mass    Site: L1 paraspinal mass    Dose: 20 Gy in 5 fractions    Treatment Start Date: 4/16/20    Treatment End Date: 4/22/20    Elapsed Days: 6     Concurrent

## 2020-04-23 ENCOUNTER — TELEPHONE (OUTPATIENT)
Dept: HEMATOLOGY/ONCOLOGY | Facility: HOSPITAL | Age: 30
End: 2020-04-23

## 2020-04-23 RX ORDER — MORPHINE SULFATE 15 MG/1
15 TABLET, FILM COATED, EXTENDED RELEASE ORAL EVERY 12 HOURS SCHEDULED
Qty: 60 TABLET | Refills: 0 | Status: SHIPPED | OUTPATIENT
Start: 2020-04-23 | End: 2020-06-04 | Stop reason: WASHOUT

## 2020-04-23 NOTE — TELEPHONE ENCOUNTER
Kristie calling finished RT yesterday  Calling that back pain is bad and the Norco is not helping  Discussed with Dr Clari Peterson and will start MS Contin 15 mg BID and continue Norco for breakthrough pain.

## 2020-04-24 PROCEDURE — 77336 RADIATION PHYSICS CONSULT: CPT | Performed by: INTERNAL MEDICINE

## 2020-04-27 ENCOUNTER — OFFICE VISIT (OUTPATIENT)
Dept: HEMATOLOGY/ONCOLOGY | Facility: HOSPITAL | Age: 30
End: 2020-04-27
Attending: INTERNAL MEDICINE
Payer: COMMERCIAL

## 2020-04-27 ENCOUNTER — TELEPHONE (OUTPATIENT)
Dept: HEMATOLOGY/ONCOLOGY | Facility: HOSPITAL | Age: 30
End: 2020-04-27

## 2020-04-27 VITALS
WEIGHT: 254 LBS | SYSTOLIC BLOOD PRESSURE: 132 MMHG | TEMPERATURE: 98 F | RESPIRATION RATE: 18 BRPM | BODY MASS INDEX: 29.39 KG/M2 | HEART RATE: 76 BPM | DIASTOLIC BLOOD PRESSURE: 85 MMHG | HEIGHT: 77.99 IN

## 2020-04-27 DIAGNOSIS — M79.601 BILATERAL ARM PAIN: Primary | ICD-10-CM

## 2020-04-27 DIAGNOSIS — C15.5 MALIGNANT NEOPLASM OF LOWER THIRD OF ESOPHAGUS (HCC): ICD-10-CM

## 2020-04-27 DIAGNOSIS — C77.0 SECONDARY MALIGNANT NEOPLASM LYMPH NODES OF HEAD, FACE AND NECK (HCC): ICD-10-CM

## 2020-04-27 DIAGNOSIS — C16.0 GE JUNCTION CARCINOMA (HCC): Primary | ICD-10-CM

## 2020-04-27 DIAGNOSIS — C77.2 SECONDARY MALIGNANT NEOPLASM OF RETROPERITONEAL LYMPH NODES (HCC): ICD-10-CM

## 2020-04-27 DIAGNOSIS — M54.50 ACUTE LOW BACK PAIN WITHOUT SCIATICA, UNSPECIFIED BACK PAIN LATERALITY: ICD-10-CM

## 2020-04-27 DIAGNOSIS — M79.602 BILATERAL ARM PAIN: Primary | ICD-10-CM

## 2020-04-27 DIAGNOSIS — C77.0 SECONDARY MALIGNANT NEOPLASM OF SUPRACLAVICULAR LYMPH NODE (HCC): ICD-10-CM

## 2020-04-27 DIAGNOSIS — C16.0 GE JUNCTION CARCINOMA (HCC): ICD-10-CM

## 2020-04-27 PROCEDURE — 96374 THER/PROPH/DIAG INJ IV PUSH: CPT

## 2020-04-27 PROCEDURE — 99214 OFFICE O/P EST MOD 30 MIN: CPT | Performed by: CLINICAL NURSE SPECIALIST

## 2020-04-27 RX ORDER — MORPHINE SULFATE 30 MG/1
30 TABLET, FILM COATED, EXTENDED RELEASE ORAL EVERY 12 HOURS SCHEDULED
Qty: 60 TABLET | Refills: 0 | Status: SHIPPED | OUTPATIENT
Start: 2020-04-27 | End: 2020-06-01

## 2020-04-27 RX ORDER — SODIUM CHLORIDE 0.9 % (FLUSH) 0.9 %
10 SYRINGE (ML) INJECTION ONCE
Status: CANCELLED | OUTPATIENT
Start: 2020-04-27

## 2020-04-27 NOTE — TELEPHONE ENCOUNTER
Pain a bit better on the morphine but wears before the 12 hours   The pain is excrutiating and he is requesting to be seen  Will see him in office today at 1:30 pm

## 2020-04-27 NOTE — PROGRESS NOTES
ANP Visit Note    Patient Name: Sabrina Mom   YOB: 1990   Medical Record Number: [id number]   CSN: [id number]   Date of visit: 4/27/2020   Simon Palacios DO   No primary care provider on file.      Chief Complaint/Reason for Visit:  Eliot Boland History:  Past Surgical History:   Procedure Laterality Date   • INSERTION OF ACCESS PORT  04/10/2019   • LAPAROSCOPIC ESOPHAGOGASTRECTOMY N/A 4/27/2018    Performed by Michelle Ramey MD at [facility] MAIN OR   • LAPAROSCOPIC ESOPHAGOGASTRECTOMY Right 4/27/2018 organization: Not on file        Attends meetings of clubs or organizations: Not on file        Relationship status: Not on file      Intimate partner violence:        Fear of current or ex partner: Not on file        Emotionally abused: Not on file Rfl: 0  •  Prochlorperazine Maleate (COMPAZINE) 10 mg tablet, Take 1 tablet (10 mg total) by mouth every 6 (six) hours as needed for Nausea., Disp: 90 tablet, Rfl: 5  •  Citalopram Hydrobromide 40 MG Oral Tab, Take 1 tablet (40 mg total) by mouth daily. , D systems and currently there are no active problems. Planned Follow Up: Friday as scheduled with CT prior     Risk Level: High Metastatic Esophageal cancer with uncontrolled pain.      Electronically Signed by:    Terrance Moon ANP-BC  Nurse Practitioner

## 2020-04-27 NOTE — PATIENT INSTRUCTIONS
Dr. Syed Cerrato nurse line Monday through Friday 84:30:  [phone]  After hours or weekends for emergent needs:  [phone]    To schedule testing, Central [phone]  For Medical Records: [phone]

## 2020-04-30 ENCOUNTER — HOSPITAL ENCOUNTER (OUTPATIENT)
Dept: CT IMAGING | Age: 30
Discharge: HOME | End: 2020-04-30
Attending: CLINICAL NURSE SPECIALIST
Payer: COMMERCIAL

## 2020-04-30 DIAGNOSIS — C77.0 SECONDARY MALIGNANT NEOPLASM LYMPH NODES OF HEAD, FACE AND NECK (HCC): ICD-10-CM

## 2020-04-30 DIAGNOSIS — C16.0 GE JUNCTION CARCINOMA (HCC): Primary | ICD-10-CM

## 2020-04-30 DIAGNOSIS — C15.5 MALIGNANT NEOPLASM OF LOWER THIRD OF ESOPHAGUS (HCC): ICD-10-CM

## 2020-04-30 DIAGNOSIS — C77.2 SECONDARY MALIGNANT NEOPLASM OF RETROPERITONEAL LYMPH NODES (HCC): ICD-10-CM

## 2020-04-30 DIAGNOSIS — C77.0 SECONDARY MALIGNANT NEOPLASM OF SUPRACLAVICULAR LYMPH NODE (HCC): ICD-10-CM

## 2020-04-30 DIAGNOSIS — C16.0 GE JUNCTION CARCINOMA (HCC): ICD-10-CM

## 2020-04-30 DIAGNOSIS — M54.50 ACUTE LOW BACK PAIN WITHOUT SCIATICA, UNSPECIFIED BACK PAIN LATERALITY: ICD-10-CM

## 2020-04-30 PROCEDURE — 74177 CT ABD & PELVIS W/CONTRAST: CPT | Performed by: CLINICAL NURSE SPECIALIST

## 2020-04-30 PROCEDURE — 71260 CT THORAX DX C+: CPT | Performed by: CLINICAL NURSE SPECIALIST

## 2020-05-01 ENCOUNTER — OFFICE VISIT (OUTPATIENT)
Dept: HEMATOLOGY/ONCOLOGY | Facility: HOSPITAL | Age: 30
End: 2020-05-01
Attending: INTERNAL MEDICINE
Payer: COMMERCIAL

## 2020-05-01 ENCOUNTER — HOSPITAL ENCOUNTER (OUTPATIENT)
Dept: RADIATION ONCOLOGY | Facility: HOSPITAL | Age: 30
Discharge: HOME | End: 2020-05-01
Attending: INTERNAL MEDICINE
Payer: COMMERCIAL

## 2020-05-01 VITALS
WEIGHT: 258 LBS | RESPIRATION RATE: 18 BRPM | HEART RATE: 88 BPM | DIASTOLIC BLOOD PRESSURE: 95 MMHG | TEMPERATURE: 96 F | OXYGEN SATURATION: 98 % | HEIGHT: 77.99 IN | SYSTOLIC BLOOD PRESSURE: 128 MMHG | BODY MASS INDEX: 29.85 KG/M2

## 2020-05-01 DIAGNOSIS — C16.0 GE JUNCTION CARCINOMA (HCC): Primary | ICD-10-CM

## 2020-05-01 DIAGNOSIS — C77.0 SECONDARY MALIGNANT NEOPLASM OF SUPRACLAVICULAR LYMPH NODE (HCC): ICD-10-CM

## 2020-05-01 DIAGNOSIS — C77.0 SECONDARY MALIGNANT NEOPLASM LYMPH NODES OF HEAD, FACE AND NECK (HCC): ICD-10-CM

## 2020-05-01 DIAGNOSIS — C15.5 MALIGNANT NEOPLASM OF LOWER THIRD OF ESOPHAGUS (HCC): ICD-10-CM

## 2020-05-01 DIAGNOSIS — C16.0 GE JUNCTION CARCINOMA (HCC): ICD-10-CM

## 2020-05-01 DIAGNOSIS — C77.2 SECONDARY MALIGNANT NEOPLASM OF RETROPERITONEAL LYMPH NODES (HCC): Primary | ICD-10-CM

## 2020-05-01 DIAGNOSIS — C77.2 SECONDARY MALIGNANT NEOPLASM OF RETROPERITONEAL LYMPH NODES (HCC): ICD-10-CM

## 2020-05-01 PROBLEM — R18.0 MALIGNANT ASCITES: Status: ACTIVE | Noted: 2020-05-01

## 2020-05-01 PROCEDURE — 99214 OFFICE O/P EST MOD 30 MIN: CPT | Performed by: INTERNAL MEDICINE

## 2020-05-01 PROCEDURE — 80053 COMPREHEN METABOLIC PANEL: CPT

## 2020-05-01 PROCEDURE — 36591 DRAW BLOOD OFF VENOUS DEVICE: CPT

## 2020-05-01 PROCEDURE — 86301 IMMUNOASSAY TUMOR CA 19-9: CPT

## 2020-05-01 PROCEDURE — 85025 COMPLETE CBC W/AUTO DIFF WBC: CPT

## 2020-05-01 PROCEDURE — 82378 CARCINOEMBRYONIC ANTIGEN: CPT

## 2020-05-01 NOTE — PROGRESS NOTES
Patient is here for MD and cycle 28 of chemo. Patient completed radiation last Wednesday. He continues to have chronic pain in left lower back. Taking pain meds regularly. Appetite and energy level is good.        Education Record    Learner:  Patient    Mart Shaw

## 2020-05-04 ENCOUNTER — TELEPHONE (OUTPATIENT)
Dept: HEMATOLOGY/ONCOLOGY | Facility: HOSPITAL | Age: 30
End: 2020-05-04

## 2020-05-04 RX ORDER — MORPHINE SULFATE 30 MG/1
60 TABLET, FILM COATED, EXTENDED RELEASE ORAL EVERY 12 HOURS SCHEDULED
Qty: 120 TABLET | Refills: 0 | Status: SHIPPED | OUTPATIENT
Start: 2020-05-04 | End: 2020-05-26

## 2020-05-04 NOTE — PROGRESS NOTES
[facility]    PATIENT'S NAME: Krunal Quinn NELDA   ATTENDING PHYSICIAN: Macy Jiménez M.D.    PATIENT ACCOUNT #: [de-identified] LOCATION: [address] [place] [address] RECORD #: [id number] YOB: 1990   DATE OF SERVICE: 05/01/2020       CANCER mild increase in pelvic fluid. He had a very trace amount of fluid on his last PET scan, but there is definitely an increase at present. He is here with his wife to discuss further options.   He has been seen in the past by Dr. Tia Yousif at [place] of already received platinum agents in the past, both as part of carboplatin and Taxol as part of the CROSS regimen, and then as part of FLOT in the form of oxaliplatin. He has received the taxane as part of the FLOT regimen and as part of the Taxol.   He cou 85:60:07  [facility] [id number]  /    cc: Dr. Crys Gray, Gordon Suarez M.D.

## 2020-05-04 NOTE — TELEPHONE ENCOUNTER
Called insurance for [employer]. PA needs to be done via fax per pharm tech. They will send over form today for completion. Max tabs 3 per day per PA info being run. MSG to pt via [employer] that PA being done.

## 2020-05-04 NOTE — TELEPHONE ENCOUNTER
[address] LM that his morphine is no longer covering the pain. Requesting higher dose.    MSG to MD.

## 2020-05-04 NOTE — TELEPHONE ENCOUNTER
Spoke to patient about pain. Taking 45 mg BID - it is getting worse. Using peyton about 2 tabs of oxycodone or hydrocodone in a day. Told him he can easily take 6 - will also increase the morphine ER to 60 mg BID.

## 2020-05-12 NOTE — PROGRESS NOTES
Immunotherapy  Education     Learner:  Patient, Mother     Barriers / Limitations:  None     Immunotherapy  education goals:  · Learn the drug names  · Administration schedule  · Routes of administration  · Treatment setting     Drug names:  Gerianne Cunningham (pemb counseling patient regarding the above documented side effects and management, when to call provider and contact information.      [facility]

## 2020-05-13 ENCOUNTER — TELEPHONE (OUTPATIENT)
Dept: HEMATOLOGY/ONCOLOGY | Facility: HOSPITAL | Age: 30
End: 2020-05-13

## 2020-05-13 NOTE — TELEPHONE ENCOUNTER
Has a cough and had a fever 99.1 rechecked and now 98.2  Has appointment tomorrow, will call if has fever prior to coming, if no fever will come to the office as scheduled.

## 2020-05-14 ENCOUNTER — OFFICE VISIT (OUTPATIENT)
Dept: HEMATOLOGY/ONCOLOGY | Facility: HOSPITAL | Age: 30
End: 2020-05-14
Attending: INTERNAL MEDICINE
Payer: COMMERCIAL

## 2020-05-14 VITALS
WEIGHT: 256.19 LBS | HEART RATE: 95 BPM | RESPIRATION RATE: 16 BRPM | OXYGEN SATURATION: 99 % | BODY MASS INDEX: 29.64 KG/M2 | HEIGHT: 77.99 IN | TEMPERATURE: 97 F | DIASTOLIC BLOOD PRESSURE: 86 MMHG | SYSTOLIC BLOOD PRESSURE: 120 MMHG

## 2020-05-14 DIAGNOSIS — C16.0 GE JUNCTION CARCINOMA (HCC): ICD-10-CM

## 2020-05-14 DIAGNOSIS — Z71.89 OTHER SPECIFIED COUNSELING: ICD-10-CM

## 2020-05-14 DIAGNOSIS — C77.2 SECONDARY MALIGNANT NEOPLASM OF RETROPERITONEAL LYMPH NODES (HCC): Primary | ICD-10-CM

## 2020-05-14 DIAGNOSIS — C16.0 GE JUNCTION CARCINOMA (HCC): Primary | ICD-10-CM

## 2020-05-14 DIAGNOSIS — C77.0 SECONDARY MALIGNANT NEOPLASM OF SUPRACLAVICULAR LYMPH NODE (HCC): ICD-10-CM

## 2020-05-14 PROCEDURE — 99215 OFFICE O/P EST HI 40 MIN: CPT | Performed by: CLINICAL NURSE SPECIALIST

## 2020-05-14 PROCEDURE — 84439 ASSAY OF FREE THYROXINE: CPT

## 2020-05-14 PROCEDURE — 84443 ASSAY THYROID STIM HORMONE: CPT

## 2020-05-14 PROCEDURE — 96413 CHEMO IV INFUSION 1 HR: CPT

## 2020-05-14 PROCEDURE — 80053 COMPREHEN METABOLIC PANEL: CPT

## 2020-05-14 PROCEDURE — 85025 COMPLETE CBC W/AUTO DIFF WBC: CPT

## 2020-05-14 NOTE — PROGRESS NOTES
Pt here for C1D1.   Arrives Ambulating independently, accompanied by Self and Family member           Patient reports possible pregnancy since last therapy cycle: Not Applicable    Modifications in dose or schedule: No     Frequency of blood return and site

## 2020-05-14 NOTE — PROGRESS NOTES
Patient presents with:  Pt Ed: immunotherapy eduation Ayesha Riedel- Pt here for education and treatment.     Education Record    Learner:  Patient and Family Member    Disease / Diagnosis:    Barriers / Limitations:  None   Comments:    Method:  Brief f

## 2020-05-15 RX ORDER — OXYCODONE HYDROCHLORIDE AND ACETAMINOPHEN 5; 325 MG/1; MG/1
1-2 TABLET ORAL EVERY 4 HOURS PRN
Qty: 90 TABLET | Refills: 0 | Status: SHIPPED | OUTPATIENT
Start: 2020-05-15 | End: 2020-01-01

## 2020-05-18 ENCOUNTER — TELEPHONE (OUTPATIENT)
Dept: HEMATOLOGY/ONCOLOGY | Facility: HOSPITAL | Age: 30
End: 2020-05-18

## 2020-05-18 NOTE — TELEPHONE ENCOUNTER
Waking up every morning nauseous and ends up throwing up. Not taking the Olanzapine regularly, will take nightly also Lorazepam helps will take more consistently. Will call if not effective.

## 2020-05-19 ENCOUNTER — TELEPHONE (OUTPATIENT)
Dept: HEMATOLOGY/ONCOLOGY | Facility: HOSPITAL | Age: 30
End: 2020-05-19

## 2020-05-19 DIAGNOSIS — R11.2 NAUSEA AND VOMITING: ICD-10-CM

## 2020-05-19 DIAGNOSIS — C16.0 GE JUNCTION CARCINOMA (HCC): Primary | ICD-10-CM

## 2020-05-20 ENCOUNTER — TELEPHONE (OUTPATIENT)
Dept: HEMATOLOGY/ONCOLOGY | Facility: HOSPITAL | Age: 30
End: 2020-05-20

## 2020-05-20 ENCOUNTER — HOSPITAL ENCOUNTER (OUTPATIENT)
Dept: MRI IMAGING | Age: 30
Discharge: HOME | End: 2020-05-20
Attending: INTERNAL MEDICINE
Payer: COMMERCIAL

## 2020-05-20 DIAGNOSIS — R11.2 NAUSEA AND VOMITING: ICD-10-CM

## 2020-05-20 DIAGNOSIS — C16.0 GE JUNCTION CARCINOMA (HCC): ICD-10-CM

## 2020-05-20 DIAGNOSIS — C16.0 GE JUNCTION CARCINOMA (HCC): Primary | ICD-10-CM

## 2020-05-20 DIAGNOSIS — R11.2 INTRACTABLE VOMITING WITH NAUSEA, UNSPECIFIED VOMITING TYPE: ICD-10-CM

## 2020-05-20 PROCEDURE — A9575 INJ GADOTERATE MEGLUMI 0.1ML: HCPCS | Performed by: INTERNAL MEDICINE

## 2020-05-20 PROCEDURE — 70553 MRI BRAIN STEM W/O & W/DYE: CPT | Performed by: INTERNAL MEDICINE

## 2020-05-20 RX ORDER — SCOLOPAMINE TRANSDERMAL SYSTEM 1 MG/1
1 PATCH, EXTENDED RELEASE TRANSDERMAL
Qty: 10 PATCH | Refills: 0 | Status: SHIPPED | OUTPATIENT
Start: 2020-05-20 | End: 2020-01-01

## 2020-05-20 NOTE — TELEPHONE ENCOUNTER
Spoke with Kristie, MRI is fine, will try Scop patch and he will come tomorrow at 9 am for repeat labs.

## 2020-05-21 ENCOUNTER — TELEPHONE (OUTPATIENT)
Dept: HEMATOLOGY/ONCOLOGY | Facility: HOSPITAL | Age: 30
End: 2020-05-21

## 2020-05-21 ENCOUNTER — NURSE ONLY (OUTPATIENT)
Dept: HEMATOLOGY/ONCOLOGY | Facility: HOSPITAL | Age: 30
End: 2020-05-21
Attending: INTERNAL MEDICINE
Payer: COMMERCIAL

## 2020-05-21 DIAGNOSIS — C16.0 GE JUNCTION CARCINOMA (HCC): ICD-10-CM

## 2020-05-21 DIAGNOSIS — R11.2 INTRACTABLE VOMITING WITH NAUSEA, UNSPECIFIED VOMITING TYPE: ICD-10-CM

## 2020-05-21 PROCEDURE — 84443 ASSAY THYROID STIM HORMONE: CPT

## 2020-05-21 PROCEDURE — 36591 DRAW BLOOD OFF VENOUS DEVICE: CPT

## 2020-05-21 PROCEDURE — 80053 COMPREHEN METABOLIC PANEL: CPT

## 2020-05-21 PROCEDURE — 85025 COMPLETE CBC W/AUTO DIFF WBC: CPT

## 2020-05-21 NOTE — TELEPHONE ENCOUNTER
Feeling better today, did not throw up. Labs are all ok. Will continue with Olanzapine, Scop patch and Lorazepam as needed.

## 2020-05-26 ENCOUNTER — TELEPHONE (OUTPATIENT)
Dept: HEMATOLOGY/ONCOLOGY | Facility: HOSPITAL | Age: 30
End: 2020-05-26

## 2020-05-26 RX ORDER — MORPHINE SULFATE 30 MG/1
60 TABLET, FILM COATED, EXTENDED RELEASE ORAL EVERY 12 HOURS SCHEDULED
Qty: 120 TABLET | Refills: 0 | Status: SHIPPED | OUTPATIENT
Start: 2020-05-26 | End: 2020-01-01

## 2020-05-26 NOTE — TELEPHONE ENCOUNTER
Spoke to patient regarding increased pain. Increased the Long acting morphine to 75 mg BID from 60 - to call back in the next day or two if not better. Using PRN oxycodone.   OK to continue  A Hantel

## 2020-05-28 ENCOUNTER — TELEPHONE (OUTPATIENT)
Dept: HEMATOLOGY/ONCOLOGY | Facility: HOSPITAL | Age: 30
End: 2020-05-28

## 2020-05-28 NOTE — TELEPHONE ENCOUNTER
Toxicities: C1 D1 Pembrolizumab on 5/14/2020    Low Back Pain    Low Back Pain: (Pt is having a constant, dull aching pain in his lower back. It radiates to both hips and down left buttock stopping at the upper thigh.  Pt took Morphine Sulfate ER 30 mg (2 t

## 2020-05-28 NOTE — TELEPHONE ENCOUNTER
Kristie calling having severe back back pain scale at a 7. Having some pain in the hips and left leg. He would like to see Dr Selma Bolanos.  Thank you liat

## 2020-06-01 ENCOUNTER — OFFICE VISIT (OUTPATIENT)
Dept: HEMATOLOGY/ONCOLOGY | Facility: HOSPITAL | Age: 30
End: 2020-06-01
Attending: INTERNAL MEDICINE
Payer: COMMERCIAL

## 2020-06-01 ENCOUNTER — HOSPITAL ENCOUNTER (OUTPATIENT)
Dept: ULTRASOUND IMAGING | Facility: HOSPITAL | Age: 30
Discharge: HOME | End: 2020-06-01
Attending: CLINICAL NURSE SPECIALIST
Payer: COMMERCIAL

## 2020-06-01 VITALS
RESPIRATION RATE: 18 BRPM | SYSTOLIC BLOOD PRESSURE: 123 MMHG | DIASTOLIC BLOOD PRESSURE: 85 MMHG | WEIGHT: 268 LBS | HEART RATE: 99 BPM | TEMPERATURE: 99 F | OXYGEN SATURATION: 99 % | BODY MASS INDEX: 31 KG/M2

## 2020-06-01 DIAGNOSIS — C77.0 SECONDARY MALIGNANT NEOPLASM OF SUPRACLAVICULAR LYMPH NODE (HCC): ICD-10-CM

## 2020-06-01 DIAGNOSIS — C77.2 SECONDARY MALIGNANT NEOPLASM OF RETROPERITONEAL LYMPH NODES (HCC): ICD-10-CM

## 2020-06-01 DIAGNOSIS — C16.0 GE JUNCTION CARCINOMA (HCC): ICD-10-CM

## 2020-06-01 DIAGNOSIS — M79.89 LEFT LEG SWELLING: Primary | ICD-10-CM

## 2020-06-01 DIAGNOSIS — G89.3 CHRONIC PAIN DUE TO MALIGNANT NEOPLASTIC DISEASE: ICD-10-CM

## 2020-06-01 DIAGNOSIS — M79.89 LEFT LEG SWELLING: ICD-10-CM

## 2020-06-01 PROCEDURE — 99214 OFFICE O/P EST MOD 30 MIN: CPT | Performed by: CLINICAL NURSE SPECIALIST

## 2020-06-01 PROCEDURE — 93971 EXTREMITY STUDY: CPT | Performed by: CLINICAL NURSE SPECIALIST

## 2020-06-01 RX ORDER — MORPHINE SULFATE 100 MG/1
100 TABLET ORAL EVERY 12 HOURS SCHEDULED
Qty: 60 TABLET | Refills: 0 | Status: SHIPPED | OUTPATIENT
Start: 2020-06-01 | End: 2020-01-01

## 2020-06-01 RX ORDER — PREDNISONE 10 MG/1
10 TABLET ORAL DAILY
Qty: 30 TABLET | Refills: 0 | Status: SHIPPED | OUTPATIENT
Start: 2020-06-01 | End: 2020-01-01

## 2020-06-01 NOTE — PROGRESS NOTES
ANP Visit Note    Patient Name: Delio Sotelo   YOB: 1990   Medical Record Number: [id number]   CSN: [id number]   Date of visit: 6/1/2020   Som Dong DO   No primary care provider on file.      Chief Complaint/Reason for Visit:  Pepe Calero Indigestion    • Loss of appetite    • Personal history of antineoplastic chemotherapy     03/01/2018 - No Port   • Sleep disturbance    • Uncomfortable fullness after meals    • Vomiting    • Vomiting blood        Surgical History:  Past Surgical History: file      Stress: Not on file    Relationships      Social connections:        Talks on phone: Not on file        Gets together: Not on file        Attends Voodoo service: Not on file        Active member of club or organization: Not on file        Atte mg total) by mouth 2 (two) times daily. , Disp: 20 capsule, Rfl: 5  •  LORazepam 1 MG Oral Tab, TAKE 1 TABLET BY MOUTH EVERY 4 HOURS AS NEEDED FOR ANXIETY, Disp: 60 tablet, Rfl: 5  •  Omeprazole 40 MG Oral Capsule Delayed Release, Take 1 capsule (40 mg tota swelling in foot and calf, tenderness over bilateral hip bursae   Neurological: Grossly intact. Lymphatics: There is no palpable lymphadenopathy   Psych/Depression: mood and affect are appropriate.      Labs:   No results found for this or any previous vi emotional well-being and any concerns about anxiety or depression. We discussed issues of distress, coping difficulties and social support systems and currently there are no active problems.     Planned Follow Up: Thursday as scheduled     Risk Level: High

## 2020-06-04 ENCOUNTER — OFFICE VISIT (OUTPATIENT)
Dept: HEMATOLOGY/ONCOLOGY | Facility: HOSPITAL | Age: 30
End: 2020-06-04
Attending: INTERNAL MEDICINE
Payer: COMMERCIAL

## 2020-06-04 VITALS
HEIGHT: 77.99 IN | RESPIRATION RATE: 16 BRPM | OXYGEN SATURATION: 100 % | DIASTOLIC BLOOD PRESSURE: 86 MMHG | HEART RATE: 73 BPM | WEIGHT: 264.81 LBS | SYSTOLIC BLOOD PRESSURE: 128 MMHG | TEMPERATURE: 100 F | BODY MASS INDEX: 30.64 KG/M2

## 2020-06-04 DIAGNOSIS — C77.0 SECONDARY MALIGNANT NEOPLASM OF SUPRACLAVICULAR LYMPH NODE (HCC): ICD-10-CM

## 2020-06-04 DIAGNOSIS — C77.2 SECONDARY MALIGNANT NEOPLASM OF RETROPERITONEAL LYMPH NODES (HCC): ICD-10-CM

## 2020-06-04 DIAGNOSIS — C16.0 GE JUNCTION CARCINOMA (HCC): ICD-10-CM

## 2020-06-04 DIAGNOSIS — K21.9 GASTROESOPHAGEAL REFLUX DISEASE WITHOUT ESOPHAGITIS: Primary | ICD-10-CM

## 2020-06-04 DIAGNOSIS — G89.3 CHRONIC PAIN DUE TO MALIGNANT NEOPLASTIC DISEASE: ICD-10-CM

## 2020-06-04 DIAGNOSIS — C77.2 SECONDARY MALIGNANT NEOPLASM OF RETROPERITONEAL LYMPH NODES (HCC): Primary | ICD-10-CM

## 2020-06-04 PROCEDURE — 96413 CHEMO IV INFUSION 1 HR: CPT

## 2020-06-04 PROCEDURE — 80053 COMPREHEN METABOLIC PANEL: CPT

## 2020-06-04 PROCEDURE — 99214 OFFICE O/P EST MOD 30 MIN: CPT | Performed by: INTERNAL MEDICINE

## 2020-06-04 PROCEDURE — 84443 ASSAY THYROID STIM HORMONE: CPT

## 2020-06-04 PROCEDURE — 85025 COMPLETE CBC W/AUTO DIFF WBC: CPT

## 2020-06-04 NOTE — PROGRESS NOTES
Patient is here for MD f/vinod and cycle 2 of Keytruda. Patient continues to have ongoing pain in left lower back. Pain radiates down into the left leg. + edema in left lower extremity. He was started on Prednisone and current dose is 5 mg as of 2 days ago.  He

## 2020-06-04 NOTE — PROGRESS NOTES
Pt here for C 1 D 8 gemzar.   Arrives Ambulating independently, accompanied by Self           Patient reports possible pregnancy since last therapy cycle: Not Applicable    Modifications in dose or schedule: No     Frequency of blood return and site check t

## 2020-06-19 NOTE — TELEPHONE ENCOUNTER
Temperature was 102 after coming out of shower now its 98. No other symptoms. Wife concerned he had COVID. He will call if he gets another fever or any symptoms.

## 2020-06-25 NOTE — PROGRESS NOTES
Pt here for C3D1 Keytruda.   Arrives Ambulating independently, accompanied by Self           Patient reports possible pregnancy since last therapy cycle: Not Applicable    Modifications in dose or schedule: no     Frequency of blood return and site check th

## 2020-06-25 NOTE — PROGRESS NOTES
Patient is here for MD f/vinod and cycle 3 of Keytruda. He continues to have chronic ongoing pain in left flank. Patient is on Morphine 100 mg twice daily and Oxycodone 2-3 times daily for breakthrough pain. Appetite is good despite 10 lb weight loss.  Patient

## 2020-06-30 NOTE — PROGRESS NOTES
[facility]    PATIENT'S NAME: Yumiko King NELDA   ATTENDING PHYSICIAN: Bryant Reeves M.D.    PATIENT ACCOUNT #: [de-identified] LOCATION: [address] [place] [address] RECORD #: [id number] YOB: 1990   DATE OF SERVICE: 06/25/2020       CANCER visible acute distress. VITAL SIGNS:  His performance status is 1 to 2. His weight is 254 pounds. He is 5 feet 6 inches. Blood pressure is 134/87, pulse 96, respiratory rate is 20, temperature is 99 degrees. HEENT:  Unremarkable.     LYMPHATICS:  He h

## 2020-06-30 NOTE — PROGRESS NOTES
[facility]    PATIENT'S NAME: Angela LUTZ   ATTENDING PHYSICIAN: Lang Milligan M.D.    PATIENT ACCOUNT #: [de-identified] LOCATION: [address] [place] [address] RECORD #: [id number] YOB: 1990   DATE OF SERVICE: 06/04/2020       CANCER distress. VITAL SIGNS:  His performance status is 1. His weight is 264 pounds. Blood pressure is 128/86, pulse 73, respiratory rate is 20, his temperature is 99.6. HEENT:  Unremarkable. LYMPHATICS:  He has no adenopathy. LUNGS:  Clear.   HEART:  No

## 2020-07-16 NOTE — PROGRESS NOTES
Patient is here for MD f/u and cycle 4 of Keytruda. Patient started radiation to the left psoas muscle. He continues to have chronic pain in this area. Taking Morphine 100 mg twice daily and Percocet for breakthrough pain. Appetite is fair.  Chronic fatigue

## 2020-07-17 NOTE — PROGRESS NOTES
[facility]    PATIENT'S NAME: Con Nara LUTZ   ATTENDING PHYSICIAN: Car Paulino M.D.    PATIENT ACCOUNT #: [de-identified] LOCATION: [address] [place] [address] RECORD #: [id number] YOB: 1990   DATE OF SERVICE: 07/16/2020       CANCER prednisone 5 mg daily; prochlorperazine 10 mg q.6 h. p.r.n.; scopolamine 1.5 mg every 3 days; Senna S 1 to 2 tablets daily p.r.n. PHYSICAL EXAMINATION:    GENERAL:  He is a relatively well appearing male in no acute distress.   VITAL SIGNS:  Performanc

## 2020-07-17 NOTE — PROGRESS NOTES
[facility] PROGRESS NOTE    Spoke to Kristie this AM. Last night he started having nausea and vomiting. Only partially relieved by zofran/compazine, he is also on the scopolamine patch.  I'm worried that RT scatter dose to the intestines could be aggravating thing

## 2020-07-17 NOTE — TELEPHONE ENCOUNTER
Spoke with Dimitry Underwood at [facility] who needed information regarding patient's last wellness visit and last LIPID panel with Dr. Kg Zurita. Explained that patient saw Dr. Kg Zurita for acute visit on 12/26/19 and had Lipid panel in 2016.

## 2020-07-20 NOTE — PROGRESS NOTES
[facility] Radiation Treatment Management Note 1-5    Patient:  Nora Pérez  Age:  26year old  Visit Diagnosis:    1. GE junction carcinoma ([address]      Primary Rad/Onc:  Dr. Mindi Pelaez    Site Delivered Dose (Gy) Prescri

## 2020-07-28 NOTE — PROGRESS NOTES
[facility] Radiation Treatment Management Note 6-10    Patient:  Annie Lopes  Age:  26year old  Visit Diagnosis:  Metastatic esophageal CA  Primary Rad/Onc:  Dr. Terrazas Nichole    Site Delivered Dose (Gy) Prescribed Dose (G gabapentin for nerve component.  -Continue bowel regimen. We discussed expected future toxicity. All questions answered.   RTC or video follow-up ~2 weeks post-RT    Mindi ePlaez MD

## 2020-07-28 NOTE — PATIENT INSTRUCTIONS
POST-RADIATION INSTRUCTIONS:   - FOLLOW-UP WITH DR. ROQUE ON AUG 11 (Tuesday), PLEASE STOP BY THE  TO SET-UP AN APPOINTMENT.  - AN ORDER FOR STEROIDS HAVE BEEN PLACED TO YOUR PHARMACY.  DR. Dede Workman WILL CALL YOU TOWARDS THE END OF THE WEEK FOR AN

## 2020-07-29 NOTE — PROGRESS NOTES
spoke with Patient and completed Temporary Handicap Placard for Patient to  at [facility].

## 2020-07-31 NOTE — PROGRESS NOTES
[facility] PROGRESS NOTE    Patient called this AM, back pain still intense, getting less relief from his short-acting pain medications.  No new symptoms of leg weakness, numbness, etc.    Regimen includes   MS ER 100mg BID              Oxycodone 5-325, 2 tabs

## 2020-08-06 NOTE — PROGRESS NOTES
Patient is here for MD f/u and cycle 5 of Keytruda. Pt completed RT. Pt continues to have chronic pain in this area. Taking Morphine 100 mg twice daily and Percocet for breakthrough pain. Appetite is fair.  Chronic fatigue.      Education Record     Learner

## 2020-08-07 NOTE — PROGRESS NOTES
[facility]    PATIENT'S NAME: Tito LUTZ   ATTENDING PHYSICIAN: Sisi Johnston M.D.    PATIENT ACCOUNT #: [de-identified] LOCATION: [address] [place] [address] RECORD #: [id number] YOB: 1990   DATE OF SERVICE: 08/06/2020       CANCER has no oral mucositis. He has no diarrhea. He has increase in cough or shortness of breath, and he has no real untoward autoimmune toxicities from the immunotherapy.        MEDICATIONS:  Citalopram 40 mg daily; clindamycin topical lotion p.r.n.; cyclobenz clinical trial that he may be eligible for, and I am going to ask the patient to be contacted by them in order to be evaluated for the trial.  As a result, we are holding treatment today.   It is a combination trial of 2 different immunotherapy agents and c

## 2020-08-13 NOTE — PROGRESS NOTES
Radiation Oncology Treatment Summary    Diagnosis: metastatic esophageal cancer    Site: Left psoas mass    Dose: 3000 cGy in 10 fractions    Treatment Start Date: 7/15/2020    Treatment End Date: 7/29/2020    Elapsed Days: 14     Concurrent Treatments: No

## 2020-08-13 NOTE — TELEPHONE ENCOUNTER
Met with Dr Joesph Lawrence today for trial, he is asking us to increase pain meds for pain trial.   Will touch base and discuss with Dr Aracelis Mariee

## 2020-08-27 NOTE — TELEPHONE ENCOUNTER
Kristie requesting a call back from Dr Selma Bolanos nurse to discuss U/C and clinical trial and recommendations of plan.

## 2020-08-27 NOTE — TELEPHONE ENCOUNTER
Patient seen and not a candidate for clinical trial, will start process for [facility].  Rx sent to [facility] Patient aware

## 2020-08-31 NOTE — TELEPHONE ENCOUNTER
Pt requesting refill of MS  and 30 mg tabs. Pt is due for 100 mg tabs now, but not the 30 mg. Pt aware that he cannot refill the latter dose until 9/11.      Will send Rx request to MD.

## 2020-09-09 NOTE — TELEPHONE ENCOUNTER
Kristie saw MARILIN Umaña today. She gave him a Celexa script for 20mg daily. He wanted to let her know he is already taking 40mg daily for his depression. He is requesting a call back from MARILIN Umaña at [phone].

## 2020-09-09 NOTE — PROGRESS NOTES
ORAL CHEMOTHERAPY EDUCATION RECORD  Learner:  Faraz Argueta and his mother     Barriers / Limitations: Pain and depression      Diagnosis:   Metastatic Esophageal Cancer      Medication Name:   Lonsurf     Dose:     4 tabs Frequency: BID days 1-5 and 8-12 every 28 symptom management, fluid, diet, and activities. Chemotherapy Consent Form signed by the patient.   I spent a total of 55 minutes with the patient, 100 % of that time was spent counseling patient regarding the above documented side effects and managemen
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
## 2020-09-15 NOTE — PROGRESS NOTES
Education Record    Learner:  Patient    Disease / Diagnosis:pt here for nausea    Barriers / Limitations:  None    Method:  Brief focused, printed material and  reinforcement    General Topics:  Plan of care reviewed    Outcome:  Shows understanding.  Pt a

## 2020-09-16 PROBLEM — Z51.5 PALLIATIVE CARE BY SPECIALIST: Status: ACTIVE | Noted: 2020-01-01

## 2020-09-16 NOTE — PROGRESS NOTES
Palliative Care Consult Note     Patient Name: Ruchi Hung   YOB: 1990   Medical Record Number: [id number]   CSN: [id number]   Date of visit: 9/15/2020     Reason for Consult:  Referred by April Pacheco for Symptom management    Chief Comp appetite    • Personal history of antineoplastic chemotherapy     03/01/2018 - No Port   • Sleep disturbance    • Uncomfortable fullness after meals    • Vomiting    • Vomiting blood      Surgical History:  Past Surgical History:   Procedure Laterality Antoni tablet 0   • busPIRone HCl 5 MG Oral Tab Take 1 tablet (5 mg total) by mouth 3 (three) times daily. 60 tablet 0   • OLANZapine 10 MG Oral Tab Take 1 tablet (10 mg total) by mouth nightly.  30 tablet 1   • morphINE Sulfate ER 30 MG Oral Tab CR Take 1 tablet Capsule Delayed Release Take 1 capsule (40 mg total) by mouth 2 (two) times daily. 60 capsule 0   • Citalopram Hydrobromide 40 MG Oral Tab Take 1 tablet (40 mg total) by mouth daily.  30 tablet 11   • Clindamycin Phosphate 1 % External Lotion Apply 1 Applic Palliative Nurse Practitioner

## 2020-10-01 NOTE — PROGRESS NOTES
Patient is here for APN assessment. He is on Lonsurf 4 tabs bid. He will be starting cycle 2 on Monday. Patient c/o loose stools 3-4 times daily. Taking Imodium and Lomotil. He was in the office a few weeks ago for IVF.  He feels he is drinking adequate sangeeta

## 2020-10-01 NOTE — TELEPHONE ENCOUNTER
[facility] 580, hold treatment and return in 1 week for repeat labs.

## 2020-10-01 NOTE — PROGRESS NOTES
ANP Visit Note    Patient Name: Nora Pérez   YOB: 1990   Medical Record Number: [id number]   CSN: [id number]   Date of visit: 10/1/2020   Carol Barrientos DO   No primary care provider on file.      Chief Complaint/Reason for Visit:  Moiz Mcnulty urination    • GE junction carcinoma (HCC)    • Indigestion    • Loss of appetite    • Personal history of antineoplastic chemotherapy     03/01/2018 - No Port   • Sleep disturbance    • Uncomfortable fullness after meals    • Vomiting    • Vomiting blood file        Minutes per session: Not on file      Stress: Not on file    Relationships      Social connections        Talks on phone: Not on file        Gets together: Not on file        Attends Mandaeism service: Not on file        Active member of club o 1-2 tablets by mouth every 4 (four) hours as needed for Pain., Disp: 150 tablet, Rfl: 0  •  gabapentin 300 MG Oral Cap, Take 1 capsule (300 mg total) by mouth nightly., Disp: 90 capsule, Rfl: 0  •  HYDROmorphone HCl 4 MG Oral Tab, Take 1-2 tablets (4-8 mg Pain Score 0     HEENT: EOMs intact. PERRL. Oropharynx is clear. Neck: No JVD. No palpable lymphadenopathy. Neck is supple. Chest: Clear to auscultation. Diminished RLL   Heart: Regular rate and rhythm.    Abdomen: Soft, non tender with good bowel soun 0.10 - 1.00 x10(3) uL    Eosinophil Absolute 0.02 0.00 - 0.70 x10(3) uL    Basophil Absolute 0.01 0.00 - 0.20 x10(3) uL    Immature Granulocyte Absolute 0.00 0.00 - 1.00 x10(3) uL    Neutrophil % 51.8 %    Lymphocyte % 34.8 %    Monocyte % 10.7 %    Eosino

## 2020-10-07 NOTE — PROGRESS NOTES
Nutrition f/u screen complete as triggered by [employer] dx of metastatic esophageal cancer. Chart reviewed. Pt appears nutritionally stable at present. RD available on consult.

## 2020-10-07 NOTE — PROGRESS NOTES
Palliative Care Follow Up Note     Patient Name: Kaleb Mistry   YOB: 1990   Medical Record Number: [id number]   CSN: [id number]   Date of visit: 10/6/2020     Chief Complaint/Reason for Visit:  Follow up for anxiety and nausea     Histo history of antineoplastic chemotherapy     03/01/2018 - No Port   • Sleep disturbance    • Uncomfortable fullness after meals    • Vomiting    • Vomiting blood      Surgical History:  Past Surgical History:   Procedure Laterality Date   • INSERTION OF ACCE MG Oral Tab Take 1 tablet (5 mg total) by mouth 3 (three) times daily. 60 tablet 0   • OLANZapine 10 MG Oral Tab Take 1 tablet (10 mg total) by mouth nightly.  30 tablet 1   • morphINE Sulfate ER 30 MG Oral Tab CR Take 1 tablet (30 mg total) by mouth every daily as needed for Diarrhea. Review of Systems:  General:  Fatigue. Respiratory:  Denies SOB, denies cough  Cardiac:  Denies chest pain, heart palpitations  Abdomen:  Denies constipation, diarrhea. Denies pain. Psych:  No complaints.   [employer]

## 2020-10-08 NOTE — TELEPHONE ENCOUNTER
Neutropenic, continue to hold Lonsurf, recheck labs in 1 week, will dose reduce to 60 mg BID once able to restart.

## 2020-10-09 NOTE — PROGRESS NOTES
spoke with Wife Ilene Terry and completed her Int FMLA, sending back to her via secure email and also sending via Patient’s MyChart.

## 2020-10-14 NOTE — H&P
Name: Jani Jose   : 10/3/1990   DOS: 10/14/2020     Chief complaint: Gastric tumor    History of present illness:  Jani Jose is a 31year old male with a history of gastric tumor at the [address] [place] [address] referred by his oncology team for i 20-8.19 MG Oral tab Take 4 tablets (80 mg total) by mouth 2 (two) times daily. twice a day on days 1-5, and then days 8-12 in a 28-day cycle. 80 tablet 6   • Scopolamine 1.5mg TD patch 1mg/3days Place 1 patch onto the skin every third day.  10 patch 3   • o SURGICAL HISTORY  04/27/2018    Laparoscopic-assisted [place] T[facility] Davidson esophagogastrectomy, abdominal and mediastinal lymphadenectomy, feeding jejunostomy tube placement   • WISDOM TEETH REMOVED        Family History   Problem Relation Age of Onset   • Cancer Mo The patient is a pleasant 39-year-old gentleman with a history of gastric tumor currently on chemotherapy. He has pain which is controlled with high-dose morphine and referred by his oncology team to discuss intrathecal pump.   I discussed the pros and

## 2020-10-14 NOTE — PROGRESS NOTES
PHYSICAL EXAM:   Physical Exam   Constitutional:           Patient presents in office today with reported pain in left side lower back, lateral side left buttock, lateral side left thigh    Current pain level reported = 2/10    Last reported dose of HYDR

## 2020-10-15 NOTE — TELEPHONE ENCOUNTER
Patient called, will restart his Lonsurf on Sunday, 3 tabs BID days 1-5 and 8-12 he has appointment with Dr Cony Fiore on 29th.  Saw pain service yesterday and they are working on pump trial.

## 2020-10-21 PROBLEM — T45.1X5A CINV (CHEMOTHERAPY-INDUCED NAUSEA AND VOMITING): Status: ACTIVE | Noted: 2020-01-01

## 2020-10-21 PROBLEM — M54.16 RADICULOPATHY, LUMBAR REGION: Status: ACTIVE | Noted: 2020-01-01

## 2020-10-21 PROBLEM — D64.81 ANEMIA ASSOCIATED WITH CHEMOTHERAPY: Status: ACTIVE | Noted: 2018-06-18

## 2020-10-21 PROBLEM — R20.2 PARESTHESIA: Status: ACTIVE | Noted: 2020-01-01

## 2020-10-21 PROBLEM — T45.1X5A ANEMIA ASSOCIATED WITH CHEMOTHERAPY: Status: ACTIVE | Noted: 2018-06-18

## 2020-10-21 PROBLEM — G89.3 CANCER ASSOCIATED PAIN: Status: ACTIVE | Noted: 2020-01-01

## 2020-10-21 PROBLEM — R11.2 CINV (CHEMOTHERAPY-INDUCED NAUSEA AND VOMITING): Status: ACTIVE | Noted: 2020-01-01

## 2020-10-21 NOTE — TELEPHONE ENCOUNTER
Medical clearance needed- No    Implanted cardiac device/SCS/PNS: NA    Pt seen in OV 10/14 by Dr. Swapna Conrad and recommended for Intrathecal pump trial (X 1). Please begin PA process for procedure(s).      Laterality: NA  Level(s): NA    Pt informed callback wi

## 2020-10-21 NOTE — PROGRESS NOTES
Education Record    Learner:  Patient    Disease / Diagnosis: GE junction carcinoma/ nausea/vomiting    Barriers / Limitations:  None   Comments:    Method:  Brief focused   Comments:    General Topics:  Plan of care reviewed   Comments:    Outcome:  Shows

## 2020-10-21 NOTE — TELEPHONE ENCOUNTER
Pt calling that he has not been able to keep any food or water down since yesterday. Taking Lonsurf. Asking for IV meds and IVF. [facility] to review and orders labs. Pt to be seen at 10 am today. Pt aware of appointment.

## 2020-10-23 NOTE — PROGRESS NOTES
Pt here for IVF.   Arrives Ambulating independently, accompanied by Family member           Patient reports possible pregnancy since last therapy cycle: Not Applicable    Modifications in dose or schedule: No     Frequency of blood return and site check thr

## 2020-10-23 NOTE — PROGRESS NOTES
ANP Visit Note    Patient Name: Hansel Leventhal   YOB: 1990   Medical Record Number: [id number]   [facility]: [id number]   Date of visit: 10/23/2020   Mili Forbes DO   No primary care provider on file.      Chief Complaint/Reason for Visit:  [facility] (JES[facility]S CHENEY) tattoo    • Exposure to medical diagnostic radiation    • Flatulence/gas pain/belching    • Frequent urination    • GE junction carcinoma (HCC)    • Indigestion    • Loss of appetite    • Personal history of antineoplastic chemotherapy     03/01/2018 - No Drug use: No      Sexual activity: Not on file    Lifestyle      Physical activity        Days per week: Not on file        Minutes per session: Not on file      Stress: Not on file    Relationships      Social connections        Talks on phone: Not on nicanor twice a day on days 1-5, and then days 8-12 in a 28-day cycle., Disp: 80 tablet, Rfl: 6  •  Scopolamine 1.5mg TD patch 1mg/3days, Place 1 patch onto the skin every third day., Disp: 10 patch, Rfl: 3  •  oxyCODONE-acetaminophen 5-325 MG Oral Tab, Take 1-2 t 1    Physical Examination:  General: Patient is alert and oriented x 3, not in acute distress. Vital Signs:    Oncology Vitals 10/23/2020   Height    Height    Weight 239 lb 8 oz   Weight 108.636 kg   BSA (m2) 2.44 m2   /80   Pulse 109   Resp 18   Te 100.0 fL    MCH 29.7 26.0 - 34.0 pg    MCHC 33.3 31.0 - 37.0 g/dL    RDW 16.3 (H) 11.0 - 15.0 %    RDW-SD 52.4 (H) 35.1 - 46.3 fL    Neutrophil Absolute Prelim 4.00 1.50 - 7.70 x10 (3) uL    Neutrophil Absolute 4.00 1.50 - 7.70 x10(3) uL    Lymphocyte Abso have assessed the patient's emotional well-being and any concerns about anxiety or depression. We discussed issues of distress, coping difficulties and social support systems and currently there are no active problems.     Planned Follow Up: Monday for IVF

## 2020-10-26 NOTE — PROGRESS NOTES
Education Record    Learner:  Patient    Disease / Diagnosis: Dehydration / Nausea    Barriers / Limitations:  None   Comments:    Method:  Discussion   Comments:    General Topics:  Medication, Procedure and Plan of care reviewed   Comments:    Outcome:

## 2020-10-28 NOTE — PROGRESS NOTES
Palliative Care Follow Up Note     Patient Name: Isabella Villalobos   YOB: 1990   Medical Record Number: [id number]   [facility]: [id number]   Date of visit: 10/27/2020     Chief Complaint/Reason for Visit:  Follow up for anxiety and nausea     Hist pain    • Anxiety state    • Bloating    • Body piercing    • Chest pain    • Decorative tattoo    • Exposure to medical diagnostic radiation    • Flatulence/gas pain/belching    • Frequent urination    • GE junction carcinoma (HCC)    • Indigestion    • L support of his mother and sister also who help out when needed    Medications:  Current Outpatient Medications   Medication Sig Dispense Refill   • OLANZapine 10 MG Oral Tab Take 1 tablet (10 mg total) by mouth nightly.  30 tablet 2   • busPIRone HCl 10 MG Muscle spasms.  30 tablet 0   • DIPHENOXYLATE-ATROPINE 2.5-0.025 MG Oral Tab TAKE 1 TO 2 TABLETS BY MOUTH FOUR TIMES DAILY AS NEEDED FOR DIARRHEA 60 tablet 0   • Omeprazole 40 MG Oral Capsule Delayed Release Take 1 capsule (40 mg total) by mouth 2 (two) louisa minutes spent with patient >50% of visit was spent in counseling and coordination of care for symptom management.     Electronically Signed by:  MARILIN Alan   THE [facility] Outpatient Palliative Nurse Practitioner

## 2020-10-28 NOTE — PROGRESS NOTES
Education Record    Learner:  Patient    Disease / Diagnosis:pt here for fluids and antinausea med    Barriers / Limitations:  None    Method:  Brief focused, printed material and  reinforcement    General Topics:  Plan of care reviewed    Outcome:  Shows

## 2020-10-29 NOTE — PROGRESS NOTES
Patient is here for MD f/u for GE junction carcinoma. Patient is completing cycle 2 of Lonsurf today. Patient c/o chronic nausea, diarrhea and fatigue. Patient is taking antiemetics and Lomotil as needed. Patient c/o chronic back pain.  Patient is on [facility]

## 2020-10-30 NOTE — PROGRESS NOTES
[facility]    PATIENT'S NAME: Brittanychace LUTZ   ATTENDING PHYSICIAN: Teresa Blood M.D.    PATIENT ACCOUNT #: [de-identified] LOCATION: [address] [place] [address] RECORD #: [id number] YOB: 1990   DATE OF SERVICE: 10/29/2020       CANCER antiemetics. His recurrence of nausea does not seem to be completely related to the use of the medications. He also occasionally has diarrhea, typically toward the end of the 2 week treatment on the Lonsurf. He is taking Lomotil as needed for this.   His BUN, creatinine, and potassium are all normal.  His liver enzymes are normal.  His CBC is remarkable now only for a hemoglobin of 10.1 and a platelet count of 507,674. His markers are variable.   He has had CA 19-9 that has varied through the course of the Dominik Chapman M.D. Jazmin Mcgrath.  Zachary Nix MD

## 2020-11-06 NOTE — TELEPHONE ENCOUNTER
This member is not found in our system. Preauthorization or medical review may still be required by the member's healthplan.  Please call the number on the back of the member's identification card to confirm [employer]atch)     Prior authorization request complet

## 2020-11-08 PROBLEM — C15.9 ESOPHAGEAL CANCER, STAGE IV (HCC): Status: ACTIVE | Noted: 2020-01-01

## 2020-11-08 PROBLEM — E87.6 HYPOKALEMIA: Status: ACTIVE | Noted: 2020-01-01

## 2020-11-08 PROBLEM — R10.9 ABDOMINAL PAIN OF UNKNOWN ETIOLOGY: Status: ACTIVE | Noted: 2020-01-01

## 2020-11-08 PROBLEM — R53.1 GENERAL WEAKNESS: Status: ACTIVE | Noted: 2020-01-01

## 2020-11-08 PROBLEM — R53.1 WEAKNESS GENERALIZED: Status: ACTIVE | Noted: 2020-01-01

## 2020-11-08 PROBLEM — D64.9 ANEMIA: Status: ACTIVE | Noted: 2020-01-01

## 2020-11-08 PROBLEM — C79.9 METASTATIC MALIGNANT NEOPLASM, UNSPECIFIED SITE (HCC): Status: ACTIVE | Noted: 2020-01-01

## 2020-11-09 NOTE — PLAN OF CARE
NURSING ADMISSION NOTE      Patient admitted via Cart  Oriented to room. Safety precautions initiated. Bed in low position. Call light in reach. Assumed care of pt at 2300. Admission navigator completed. Pt is AOX4, VSS.  Pt can be anxious at louisa

## 2020-11-09 NOTE — TELEPHONE ENCOUNTER
Patient advised of insurance approval to proceed with procedure. offered to schedule, pt stated he is in the hospital right now and in unsure how long he will be there.   Patient was advised to call our office to schedule once he is discharged, pt verbalize

## 2020-11-09 NOTE — H&P
[facility]  History and Physical     Keri Winston Patient Status:  Emergency    10/3/1990 MRN [id number]   Location [address] [facility] Attending Kassandra Denis MD   Hosp Day # 0 PCP Laine Jones DO     Chief Complain that he does not use drugs.     Family History:   Family History   Problem Relation Age of Onset   • Cancer Mother 50        breast cancer   • Other (cancer- kidney) Maternal Grandfather    • Cancer Paternal Grandmother         cervicasl cancer        Aller Take 1-2 tablets by mouth every 4 (four) hours as needed for Pain.  (Patient not taking: Reported on 10/30/2020 ), Disp: 150 tablet, Rfl: 0    •  HYDROmorphone HCl 4 MG Oral Tab, Take 1-2 tablets (4-8 mg total) by mouth every 4 (four) hours as needed for Pa cyanosis. Integument: No rashes or lesions. Psychiatric: Appropriate mood and affect.       Diagnostic Data:      Labs:  Recent Labs   Lab 11/08/20  1800 11/09/20  0647   WBC 2.6* 2.2*   HGB 10.9* 9.7*   MCV 87.3 90.5   .0* 117.0*       Recent Lab

## 2020-11-09 NOTE — CONSULTS
[facility]  Report of Psychiatric Consultation    moniqueu Radha 11 Patient Status:  Observation    10/3/1990 MRN [id number]   Location [address] [place], [place] [address]E-B [facility]ending [facility] Day # 1 PCP Xavier Obrien,      Date of Admiss ideation. He watches TV most of the day. He is on disability because of the cancer. He got into an argument with his wife who is frustrated by his apathy and lack of motivation.  She tells him to drink more water and eat healthier and to take the dogs out f esophagogastrectomy, abdominal and mediastinal lymphadenectomy, feeding jejunostomy tube placement   • WISDOM TEETH REMOVED       Family History   Problem Relation Age of Onset   • Cancer Mother 50        breast cancer   • Other (cancer- kidney) Maternal G mg, Intravenous, Q8H PRN  •  ondansetron HCl (ZOFRAN) injection 4 mg, 4 mg, Intravenous, Q4H PRN    Review of Systems   Constitutional: Positive for malaise/fatigue. Psychiatric/Behavioral: Positive for depression.  Negative for hallucinations, substance

## 2020-11-09 NOTE — CONSULTS
[facility]  Report of Consultation    moniqueu Radha 11 Patient Status:  Observation    10/3/1990 MRN [id number]   [facility] 1SE-B Attending Juliana Alexandra [address] Day # 0 PCP Robert Larry,      Reason for Consultation: Uncomfortable fullness after meals    • Vomiting    • Vomiting blood      Past Surgical History:   Procedure Laterality Date   • INSERTION OF ACCESS PORT  04/10/2019   • LAPAROSCOPIC ESOPHAGOGASTRECTOMY N/A 4/27/2018    Performed by Rukhsana So MD at [facility] (TYLENOL) tab 650 mg, 650 mg, Oral, Q6H PRN  •  HYDROmorphone HCl (DILAUDID) 1 MG/ML injection 0.2 mg, 0.2 mg, Intravenous, Q2H PRN **OR** HYDROmorphone HCl (DILAUDID) 1 MG/ML injection 0.4 mg, 0.4 mg, Intravenous, Q2H PRN **OR** HYDROmorphone HCl (DILAUDI Calculation (Ruy) 453 ms    P Axis 50 degrees    R Axis 56 degrees    T Axis 58 degrees   RAINBOW DRAW BLUE    Collection Time: 11/08/20  6:00 PM   Result Value Ref Range    Hold Blue Auto Resulted    RAINBOW DRAW LAVENDER    Collection Time: 11/08/20  6 0. 31 0.10 - 1.00 x10(3) uL    Eosinophil Absolute 0.04 0.00 - 0.70 x10(3) uL    Basophil Absolute 0.00 0.00 - 0.20 x10(3) uL    Immature Granulocyte Absolute 0.02 0.00 - 1.00 x10(3) uL    Neutrophil % 66.0 %    Lymphocyte % 19.5 %    Monocyte % 12.1 %    E esophageal CA      CONTRAST USED:  100cc of Omnipaque 350     FINDINGS:    LIVER:  No enlargement, atrophy, abnormal density, or significant focal lesion. BILIARY:  No visible dilatation or calcification.     PANCREAS:  No lesion, fluid collection, ducta generalized weakness - stage IV esophageal ca     PATIENT STATED HISTORY: (As transcribed by Technologist)  Patient states he is feeling weak, has cough, and nausea.           FINDINGS:  Scattered linear scar-like densities are noted in lungs lungs which co of drop in markers with first course, but says: \"you're trying to kill me\". No major oral mucositis.   He has always had nausea out ot proportion to what is typically seen, consistent with an element of anxiety, or perhaps just much greater sensitivity t

## 2020-11-09 NOTE — PLAN OF CARE
Pt AOx4. VSS. Tolerating diet. Diarrhea improving. C Diff negative. IVF per [facility]. Hoping to D/C if feeling better. 1620--pt continuing to tolerate diet. Good appetite. Had HA this afternoon, prn tylenol and scheduled MS contin given. Resolved.  Ambulating

## 2020-11-09 NOTE — DIETARY NOTE
[facility]    NUTRITION INITIAL ASSESSMENT    Pt does not meet malnutrition criteria.       NUTRITION DIAGNOSIS/PROBLEM:    Predicted suboptimal energy intake related to weakness, poor appetite as evidenced by reduced po intake to less than 75% of cleo RELATED HISTORY:  Appetite: Poor  pta  Intake: 75%  Intake Meeting Needs: No, but supplements to maximize  Food Allergies: No  Cultural/Ethnic/Restoration Preferences Addresses: Yes    NUTRITION RELATED PHYSICAL FINDINGS:     1. Body Fat/Muscle Mass: well no

## 2020-11-09 NOTE — PROGRESS NOTES
[facility]  Progress Note     Keri Winston Patient Status:  Observation    10/3/1990 MRN [id number]   [facility] 1SE-B Attending Amber Jaeger, [address] Day # 0 PCP Laine Jones DO     Chief Complaint: weakness, naus last 168 hours. Imaging: Imaging data reviewed in Epic.     Medications:   • morphINE Sulfate ER  130 mg Oral Q12H [address]   • busPIRone HCl  10 mg Oral TID   • escitalopram  20 mg Oral Daily   • gabapentin  300 mg Oral Nightly   • OLANZapine  10 mg Ora

## 2020-11-09 NOTE — ED PROVIDER NOTES
Patient Seen in: [facility] Emergency Department      History   Patient presents with:  Fatigue    Stated Complaint: generalized weakness - stage IV esophageal ca    HPI    Generalized weakness that seemed to worse acutely at 3-4 pm today -he has be jejunostomy tube placement   • WISDOM TEETH REMOVED                      Social History    Tobacco Use      Smoking status: Never Smoker      Smokeless tobacco: Never Used    Alcohol use: Not Currently      Alcohol/week: 0.0 standard drinks      Comment: v sounds. No stridor. No wheezing. Chest:      Chest wall: No tenderness. Abdominal:      General: Bowel sounds are normal. There is no distension. Palpations: Abdomen is soft. Tenderness: There is no abdominal tenderness.  There is no guarding W/ DIFFERENTIAL[[id number]]          Abnormal            Final result                 Please view results for these tests on the individual orders.    URINALYSIS WITH CULTURE REFLEX   RAINBOW DRAW BLUE   RAINBOW DRAW LAVENDER   RAINBOW DRAW LIGHT GREEN   DELEON not     changed. Lesion left psoas muscle near the L3 vertebral body is less     conspicuous measuring 1.8 x 1.6 cm     (previously 2.5 x 2.4 cm. There is some increase in the amount of     sclerosis in the adjacent vertebral body.     BOWEL/MESENTERY:  T dunia are otherwise     unremarkable.                               =====    CONCLUSION:  Scattered linear scar-like densities in lungs are most likely     related to previous inflammation or atelectasis.   There is otherwise no     acute abnormality detecte D64.9 11/8/2020 Yes    Hypokalemia E87.6 11/8/2020 Yes    Weakness generalized R53.1 11/8/2020 Unknown

## 2020-11-10 PROBLEM — R19.7 DIARRHEA: Status: ACTIVE | Noted: 2020-01-01

## 2020-11-10 NOTE — PROGRESS NOTES
[facility]  Report of Psychiatric Progress Note    Maria Antonia Handler Patient Status:  Observation    10/3/1990 MRN [id number]   [facility] 1SE-B Attending Abi Mcelroy [facility] Day # 2 PCP Francia Olguin,      Date of Admis endorses low energy and mood and motivation and feelings of hopelessness, but NO suicidal ideation. He watches TV most of the day. He is on disability because of the cancer.  He got into an argument with his wife who is frustrated by his apathy and lack of • Vomiting    • Vomiting blood      Past Surgical History:   Procedure Laterality Date   • INSERTION OF ACCESS PORT  04/10/2019   • LAPAROSCOPIC ESOPHAGOGASTRECTOMY N/A 4/27/2018    Performed by Lauren Blevins MD at [facility] MAIN OR   • [facility] fair  Judgment: fair    Laboratory Data:  Lab Results   Component Value Date    CREATSERUM 0.57 11/10/2020    BUN 5 11/10/2020     11/10/2020    K 4.0 11/10/2020     11/10/2020    CO2 27.0 11/10/2020    GLU 76 11/10/2020    CA 8.5 11/10/2020

## 2020-11-10 NOTE — DISCHARGE SUMMARY
[facility]  DISCHARGE SUMMARY     Nakul Ocampo Patient Status:  Observation    10/3/1990 MRN [id number]   [facility] 1SE-B Attending Robbie Ralph [facility] Day # 0 PCP Mony Breen DO     Date of Admission:  these medications      Instructions Prescription details   mirtazapine 15 MG Tabs  Commonly known as: REMERON      Take 1 tablet (15 mg total) by mouth nightly.    Quantity: 30 tablet  Refills: 3        CHANGE how you take these medications      Instruction tablet  Refills: 2     Omeprazole 40 MG Cpdr      Take 1 capsule (40 mg total) by mouth 2 (two) times daily.    Quantity: 60 capsule  Refills: 0     ondansetron 8 MG Tbdp  Commonly known as: ZOFRAN-ODT      Take 1 tablet (8 mg total) by mouth every 8 (eight Location Instructions: Your appointment is scheduled at the [facility] in [place]. The address is [place] [address]. Please park at the [address] [place] [address] and enter through [address].  Once you arrive, please register at the Can visitor is allowed to accompany you to a physician visit. -No visitors are allowed in the infusion area.  -All visitors and patients will be screened for a temperatue greater than 100 degrees before entering our sites. -We encourage patients, who are receiv

## 2020-11-10 NOTE — PROGRESS NOTES
NURSING DISCHARGE NOTE    Discharged Home via Ambulatory. Accompanied by RN  Belongings Taken by patient/family. Pt discharged in stable condition. Port a cath heparinized and deaccessed. All discharge instructions given to pt.   All belonging mariia

## 2020-11-10 NOTE — PROGRESS NOTES
PSYCH CONSULT    Date of Admission: 11/8/20  Date of Consult: 11/9/20  Reason for Consultation: Depression    Impression:  Primary Psychiatric Diagnosis: Anxiety and depressive disorder unspecified.       Rule Out Diagnoses:  Anxiety and depressive disorde

## 2020-11-10 NOTE — PROGRESS NOTES
Heme/Onc Progress Note    Patient Name: Margarita Grover   YOB: 1990   Medical Record Number: [id number]   CSN: [id number]   Attending Physician: James Curtis M.D. Subjective:  Feels much better. No N or vomiting during the day. MG/ML injection 0.4 mg, 0.4 mg, Intravenous, Q2H PRN **OR** HYDROmorphone HCl (DILAUDID) 1 MG/ML injection 0.8 mg, 0.8 mg, Intravenous, Q2H PRN  •  ondansetron HCl (ZOFRAN) injection 4 mg, 4 mg, Intravenous, Q6H PRN  •  Prochlorperazine Edisylate (Sherine Achilles of disease progression on current CT. Check CA 19-9. He is on 2 week break - if retreated with Lonsurf, suspect we will need substantial dose reduction. We already dose reduced once.    3)  Leukopenia - mild and drug related.   Follow     4)  Diarrhea -

## 2020-11-10 NOTE — PLAN OF CARE
Patient alert and oriented x4. Calm and cooperative with care. Vital signs stable. No fever. No complaints of pain at this time. No nausea and vomiting. Appetite better. Diarrhea improving. C-diff negative.  Up to the bathroom with assist. Fall precautions

## 2020-11-11 NOTE — TELEPHONE ENCOUNTER
Spoke with patient. Per MD patient should be holding medication for two weeks. He verbalized understanding.  Appointment rescheduled per patient request.

## 2020-11-11 NOTE — TELEPHONE ENCOUNTER
Patient advised of insurance approval to proceed with injections and is agreeable to scheduling. Patient scheduled for procedure, pre-procedure instructions reviewed. Patient prefers conscious sedation.  Reviewed sedation instructions including need to be f Day of Procedure:   Do not eat or drink anything (including water) 8 hours prior to your procedure. ? If you take morning blood pressure medication or oral diabetic medication, please take with a small sip of water. ?  You are required to have a responsib ? Excedrin (with aspirin) 7 days  ?  Plavix (Clopidogrel)                             7 days      NSAIDs: 24 hours   Meloxicam -- Cervical procedures require 3 days    Ibuprofen (Motrin, Advil, Vicoprofen), Naproxen (Naprosyn, Aleve), Piroxcam (Feldene), Me ** TO AVOID CANCELLATION AND/OR RESCHEDULING: PLEASE CALL [facility] PRE-PROCEDURE LINE [phone] FOR DETAILED INSTRUCTIONS FIVE TO SEVEN DAYS PRIOR TO PROCEDURE**

## 2020-11-11 NOTE — TELEPHONE ENCOUNTER
Received call from patient. He wants to know when he should restart his Lonsurf and at what dosage he should restart. MD notified.

## 2020-11-17 NOTE — PATIENT INSTRUCTIONS
Follow-up with oncology as directed. Pain doctor as directed. Discussed activations and massage of temporalis and masseter muscle. Discussed taking that further if patient would like. Call with questions or problems.   Continue current care

## 2020-11-17 NOTE — PROGRESS NOTES
Patient is here for MD f/u for GE junction. Jerad Rachel is currently on hold. Patient is scheduled to have an intrathecal pain pump inserted on Thursday. Patient c/o chronic lower back pain. Now having pain on his scalp and left shoulder.  Patient noticed edema

## 2020-11-17 NOTE — PROGRESS NOTES
HPI:    Patient ID: Sabrina Mom is a 31year old male. C/o L shoulder pain x 3 wks / headache  W/o trauma  Better w/ pain med  W/o aggravating factors  Constant / toothache pain. Left clavicle. Scalp on left.     HPI    Review of Systems   Muscul DIPHENOXYLATE-ATROPINE 2.5-0.025 MG Oral Tab TAKE 1 TO 2 TABLETS BY MOUTH FOUR TIMES DAILY AS NEEDED FOR DIARRHEA 60 tablet 0   • Omeprazole 40 MG Oral Capsule Delayed Release Take 1 capsule (40 mg total) by mouth 2 (two) times daily.  60 capsule 0   • Lydia Prescriptions      No prescriptions requested or ordered in this encounter       Imaging & Referrals:  None       PATO#[id number]

## 2020-11-19 NOTE — OPERATIVE REPORT
[facility]  Operative Report  2020     Torou Radha 11 Patient Status:  Hospital Outpatient Surgery    10/3/1990 MRN [id number]   [facility] ENDOSCOPY Attending Penelope Nathan MD   Hosp Day # 0 PCP DO Inocente Faye with the stylet intact in situ. The needle's tip was intact. The patient tolerated the procedure very well without significant immediate complication. The patient's back was cleaned and sterile dressing was applied.   The patient was discharged with an i

## 2020-11-19 NOTE — OR NURSING
PATIENT HAD NO PAIN OR NAUSEA DURING POST-OP RECOVERY. RESTING COMFORTABLY.  HAS EATEN SNACKS AND JUICE X 2.  NO BLEEDING NOTED AT SURGICAL SITE

## 2020-11-19 NOTE — PROGRESS NOTES
Patient seen in PACU. Patient states pain control with intrathecal trial has been effective. He believes this is superior to oral pain medication. Denies any headaches. Denies any itching. Denies any difficulty breathing.

## 2020-11-19 NOTE — H&P
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 (Patient taking differently: Take 8 mg by mouth every 4 (four) hours as needed for Pain.  ), Disp: 120 tablet, Rfl: 0    •  Senna-Docusate Sodium 8.6-50 MG Oral Tab, Take 1-2 tablets daily as needed for constipation. , Disp: 60 tablet, Rfl: 4/27/2018    Performed by Mckenna Camargo., Michael Rubi MD at [facility] MAIN OR   • OTHER SURGICAL HISTORY  04/27/2018    Laparoscopic-assisted Robert Albjony Marley esophagogastrectomy, abdominal and mediastinal lymphadenectomy, feeding jejunostomy tube placement   • WISDOM TEETH

## 2020-11-20 NOTE — PROGRESS NOTES
Patient admitted from same day surgery. Oriented to room. Safety precautions initiated. Call light in reach. Denies pain, numbness, tingling. Bandaid CDI to lower back. Reports slight headache.     Paged Dr. Conrad Blinks at [phone] after receiving RN report on this

## 2020-11-20 NOTE — PROGRESS NOTES
Name: Becca Aj   : 10/3/1990   DOS: 2020     Pain Clinic Follow Up Visit:   GE junction adenocarcinoma    Thierry Zimmerman is a 31year old male with a history of GE junction adenocarcinoma, admitted overnight for observation after i a follow appointment after Thanksgiving to discuss. Additionally, will refer to Dr. Yahaira Xavier for permanent pump placement. Can resume home oral pain medications. Can be discharged from a pain service perspective.     Medications filled today:  Requested P

## 2020-11-20 NOTE — PLAN OF CARE
Bandage to low back clean, dry, intact. States pain well controlled on pain medication. Has ambulated in hallway with asst.  Gait steady. States ready to go home. Awaiting discharge orders.   Instructed on plan of care, call don't fall protocol, call fo

## 2020-11-20 NOTE — PROGRESS NOTES
[facility]  Progress Note     Nora Pérez Patient Status:  Observation    10/3/1990 MRN [id number]   [facility] 3SW-A Attending Sim Alexandra MD   Hosp Day # 0 PCP Carol Barrientos DO     Chief Complaint: Pain from malignancy mirtazapine  15 mg Oral Nightly   • OLANZapine  10 mg Oral Nightly   • Pantoprazole Sodium  20 mg Oral QAM AC   • scopolamine  1 patch Transdermal J02P   • folic acid  1 mg Oral Daily       ASSESSMENT / PLAN:     1.  Pod#1 s/p intrathecal pump trial s/p inj

## 2020-11-20 NOTE — CONSULTS
[facility]  [address] [facility] Patient Status:  Observation    10/3/1990 MRN [id number]   [facility] 3SW-A Attending Timmy Lizarraga MD   Hosp Day # 0 PCP Broderick Altamirano DO     Reason for consult: med mgt    Requested by: FLUSHING, SHORTNESS OF                            BREATH    Medications:  No current facility-administered medications on file prior to encounter.      •  mirtazapine 15 MG Oral Tab, Take 1 tablet (15 mg total) by mouth nightly., Disp: 30 tablet, Rfl: 3 Senna-Docusate Sodium 8.6-50 MG Oral Tab, Take 1-2 tablets daily as needed for constipation. , Disp: 60 tablet, Rfl: 2    •  cyclobenzaprine 5 MG Oral Tab, Take 1 tablet (5 mg total) by mouth 3 (three) times daily as needed for Muscle spasms. , Disp: 30 tabl the last 168 hours. No results for input(s): TROP, CK in the last 168 hours. Imaging: Imaging data reviewed in Epic. ASSESSMENT / PLAN:     1. Pod#0 s/p intrathecal pump trial s/p injection  2.  Pancytopenia-monitor; check vit I64; empiric folic

## 2020-11-21 NOTE — PLAN OF CARE
Family member at bedside. Verbal and written discharge orders given. Verbalized understanding. Ambulated to [facility], accompanied by nursing staff.

## 2020-11-25 NOTE — PROGRESS NOTES
Last procedure: INTRATHECAL PUMP TRIAL WITH SEDATION  Date: 11/19/2020      Patient presents in office today with reported pain in left lower back, radiating to the left leg    Current pain level reported = 2/10    Last reported dose of morphINE Sulfate ER

## 2020-11-25 NOTE — PROGRESS NOTES
Name: Danita Leiva   : 10/3/1990   DOS: 2020     Pain Clinic Follow Up Visit:   Patient presents with:   Follow - Up      Danita Leiva is a 31year old male with a history of GE junction carcinoma here following up after intrathecal p tablets (80 mg total) by mouth 2 (two) times daily. twice a day on days 1-5, and then days 8-12 in a 28-day cycle. (Patient taking differently: Take 30 mg by mouth 2 (two) times daily.  Take three pills in am and 2 pills in PM ) 80 tablet 6   • Scopolamine normal.  Neck: Full range of motion  Back: Nontender      IMAGES:   No new imaging      ASSESSMENT AND PLAN:   Carcinoma of gastroesophageal junction (HCC)  (primary encounter diagnosis)  GE junction carcinoma ([facility] [address]    The patient is a pleasant 21-year-old

## 2020-11-29 NOTE — ED PROVIDER NOTES
Patient Seen in: [facility] Emergency Department      History   Patient presents with:  Weakness  Fatigue    Stated Complaint: Cancer pt, feeling weak    HPI    18-year-old male, history of esophageal cancer on oral chemotherapy, here for nausea and Smoking status: Never Smoker      Smokeless tobacco: Never Used    Alcohol use: Not Currently      Alcohol/week: 0.0 standard drinks      Comment: very occ    Drug use:  No             Review of Systems    Positive for stated complaint: Cancer pt, feeling w .0 (*)     RDW 15.5 (*)     RDW-SD 50.3 (*)     Neutrophil Absolute Prelim 1.45 (*)     Neutrophil Absolute 1.45 (*)     Lymphocyte Absolute 0.35 (*)     All other components within normal limits   CBC WITH DIFFERENTIAL WITH PLATELET    Narrative:

## 2020-12-02 NOTE — TELEPHONE ENCOUNTER
PA message of insurance authorization noted for surgery date 12/8/20. Future follow up will be with the Pain Service. ATTN Nursing: Please notify pain service of surgery date so RX for pain pump can be placed and sent to compounding pharmacy.      [place]

## 2020-12-02 NOTE — H&P
Neurosurgery Clinic Visit  2020    Geofm Presume PCP:  DO JERRY Busby 10/3/1990 MRN [id number]       CC:  Intrathecal pain pump consultation    HPI:    Kristie is a very pleasant 31year old male with PMH of GE junction adenocarcinoma who pr Physical Exam:   12/02/20  0915   BP: 108/80   Pulse: 64     General: No Apparent Distress, Well Nourished, Well Developed, Normal Voice, Mood Appropriate, Appears Stated Age  HEENT: No Scleral Icterus, Good Dentition, No Facial Asymmetry  Integumentary:

## 2020-12-02 NOTE — PROGRESS NOTES
Patient here for presurgical consult, referred by Dr. Shorty Chambers for carcinoma of gastroesophageal junction. Pain is located in left lower back with occasional numbness radiating down left leg. No tingling reported.  No recent imaging done, however had EMG with D

## 2020-12-02 NOTE — TELEPHONE ENCOUNTER
Telephone conversation held: The CT shows some growth in the lymph nodes in the left neck and in the middle of the chest.  This is resulting in some mild congestion in the left lower lung.   Most organs are fine (liver, kidney, etc) but in view of this g

## 2020-12-02 NOTE — TELEPHONE ENCOUNTER
This member is not found in our system. Preauthorization or medical review may still be required by the member's healthplan.  Please call the number on the back of the member's identification card to confirm Vear Thaddeus)    Prior authorization request complete

## 2020-12-02 NOTE — TELEPHONE ENCOUNTER
Surgery order received from provider. Order completed and sent to OR via Epic. Covid test ordered, PTT, PT/INR ordered (CBC, CMP completed on 11/29/20).

## 2020-12-02 NOTE — TELEPHONE ENCOUNTER
You are scheduled for a Intrathecal pump implant on 12/8/20 with Dr. Justin Sood,    · Contact the pre-admissions department at [phone] to speak with a nurse  · You will need to have some blood work done for surgery  · Nothing to eat or drink 11:00pm the to be tested for Covid 19 surgery will be cancelled. · -If covid test is positive, surgery will be cancelled and re-evaluated after 14 days. Dr. Kofi Resterpo clearance.   [place]-BCD[place]

## 2020-12-02 NOTE — PATIENT INSTRUCTIONS
You are scheduled for a Intrathecal pump implant on 12/8/20 with Dr. Alec Timmons,    · Contact the pre-admissions department at [phone] to speak with a nurse  · You will need to have some blood work done for surgery  · Nothing to eat or drink 11:00pm the to be tested for Covid 19 surgery will be cancelled. · -If covid test is positive, surgery will be cancelled and re-evaluated after 14 days. Refill policies:    • Allow 2-3 business days for refills; controlled substances may take longer.   • Contact radiology tests such as MRI or CT scans, do not schedule the test until this office has notified you that the test has been approved by your insurer. Depending on your insurance carrier, approval may take 3-10 days.  It is highly recommended patients conta result in the delay of form completion.

## 2020-12-03 NOTE — TELEPHONE ENCOUNTER
Per Janelle Dighton, RN from Pain Services:  Medication from compounding pharmacy may not arrive on time for 12/8/20 surgery. Called patient and LMTCB to discuss possible change in surgery date regarding medication receipt to OR.

## 2020-12-03 NOTE — PROGRESS NOTES
Pt resting comfortably. Pt here for IVF.   Arrives Via wheelchair, accompanied by Family member           Patient reports possible pregnancy since last therapy cycle: Not Applicable    Modifications in dose or schedule: No     Frequency of blood return and

## 2020-12-03 NOTE — PROGRESS NOTES
ANP Visit Note    Patient Name: Danita Leiva   YOB: 1990   Medical Record Number: [id number]   CSN: [id number]   Date of visit: 12/3/2020   Nichole Murphy DO   No primary care provider on file.      Chief Complaint/Reason for Visit:  No ch History:  Past Surgical History:   Procedure Laterality Date   • INSERTION OF ACCESS PORT  04/10/2019   • INTRATHECAL PUMP TRIAL N/A 11/19/2020    Performed by Florence Henry MD at [facility] ENDOSCOPY   • LAPAROSCOPIC ESOPHAGOGASTRECTOMY N/A 4/27/2018    Performed Gets together: Not on file        Attends Orthodox service: Not on file        Active member of club or organization: Not on file        Attends meetings of clubs or organizations: Not on file        Relationship status: Not on file      Intimate part mouth nightly., Disp: 30 tablet, Rfl: 2  •  busPIRone HCl 10 MG Oral Tab, Take 1 tablet (10 mg total) by mouth 3 (three) times daily. , Disp: 90 tablet, Rfl: 2  •  Prochlorperazine Maleate (COMPAZINE) 10 mg tablet, Take 1 tablet (10 mg total) by mouth every needed for Diarrhea., Disp: , Rfl:     Narc[place] reviewed in [place]     Review of Systems:  A comprehensive 14 point review of systems was completed. Pertinent positives and negatives noted in the the HPI.     Performance Status: ECOG     Physic x10(6)uL    HGB 10.6 (L) 13.0 - 17.5 g/dL    HCT 32.1 (L) 39.0 - 53.0 %    .0 (L) 150.0 - 450.0 10(3)uL    MCV 88.9 80.0 - 100.0 fL    MCH 29.4 26.0 - 34.0 pg    MCHC 33.0 31.0 - 37.0 g/dL    RDW 15.6 (H) 11.0 - 15.0 %    RDW-SD 50.7 (H) 35.1 - 46. 3

## 2020-12-03 NOTE — PROGRESS NOTES
Pt completed IVF- minimal relief of nausea. MUM Farrell aware. Order for IV dex and Ativan if needed.

## 2020-12-03 NOTE — TELEPHONE ENCOUNTER
Communicated with The  the need for medication to be delivered by 12/7. Emailed copy of script and hard copy to be delivered to pharmacy today by [place]. Medication to be mixed today and shipped for arrival tomorrow, 12/4/2020.

## 2020-12-03 NOTE — PROGRESS NOTES
Pt here for assessmet. Ongoing nausea. Took Zofran and compazine this am. Denies dizziness upon standing. Labs drawn as ordered.

## 2020-12-07 NOTE — PROGRESS NOTES
Faxed recent notes since June 2020 to Dr. Juan Knott at fax [phone], phone [phone] per Dr. Danay Martinez request.

## 2020-12-07 NOTE — TELEPHONE ENCOUNTER
Patient called stating his COVID test came back POSITIVE. He states he is asymptomatic. He has sx scheduled for 12-8. Will cancel sx. Routed to Dr. Lashell Kim to advise when we can reschedule him.

## 2020-12-08 PROBLEM — I26.99 BILATERAL PULMONARY EMBOLISM (HCC): Status: ACTIVE | Noted: 2020-01-01

## 2020-12-08 PROBLEM — C15.9 MALIGNANT NEOPLASM OF ESOPHAGUS (HCC): Status: ACTIVE | Noted: 2020-01-01

## 2020-12-08 PROBLEM — U07.1 PNEUMONIA DUE TO COVID-19 VIRUS: Status: ACTIVE | Noted: 2020-01-01

## 2020-12-08 PROBLEM — J12.82 PNEUMONIA DUE TO COVID-19 VIRUS: Status: ACTIVE | Noted: 2020-01-01

## 2020-12-08 NOTE — PROGRESS NOTES
[facility]    PATIENT'S NAME: Simone Leonard NELDA   ATTENDING PHYSICIAN: Nguyen Alejandro M.D.    PATIENT ACCOUNT #: [de-identified] LOCATION: [address] [place] [address] RECORD #: [id number] YOB: 1990   DATE OF SERVICE: 11/17/2020       CANCER He has noted some pain in his left neck which appears to be new. He has also had chronic left lower back pain. He is having a little bit of pain extending from the neck up to his temporal area on the left side.   He has noted some fullness on the skin of today that was 3106. This compares to the most recent  at 3074 on November 8. His CEA is not elevated. His other chemistries are actually fairly normal with no major changes in his liver function tests or renal function. His potassium is 3.9.

## 2020-12-08 NOTE — ED PROVIDER NOTES
Patient Seen in: [facility] Emergency Department      History   Patient presents with:  Covid  Fatigue  Difficulty Breathing    Stated Complaint: covid, fatigue, sob, sat 90%,  upon arrival    HPI    Patient is a 34-year-old male with a histor Use      Smoking status: Never Smoker      Smokeless tobacco: Never Used    Alcohol use: Not Currently      Alcohol/week: 0.0 standard drinks      Comment: very occ    Drug use: Yes      Types: Cannabis             Review of Systems    Positive for stated appreciated. Patient answering all questions appropriately.            ED Course     Labs Reviewed   COMP METABOLIC PANEL (14) - Abnormal; Notable for the following components:       Result Value    Creatinine 0.62 (*)     ALT 15 (*)     Albumin 3.2 (*) RAINBOW DRAW GOLD   BLOOD CULTURE   BLOOD CULTURE     EKG    Rate, intervals and axes as noted on EKG Report.   Rate: 90  Rhythm: Sinus Rhythm  Reading: No acute ischemic change noted              Ct Angiography, Chest (cpt=71275)    Result Date: 12/8/202 patient underwent CT angiogram of the chest which showed evidence of bilateral pulmonary emboli. The patient will be admitted to the floor remained stable here in the emergency room. Patient was started on heparin here in the ER as well as IV fluids.   Pa

## 2020-12-08 NOTE — H&P
[facility]  History and Physical     Hansel Leventhal Patient Status:  Emergency    10/3/1990 MRN [id number]   Location [address] [facility] Attending Cristi Ag MD   Hosp Day # 0 PCP Mili Forbes DO     Chief Complain MD at [facility] MAIN OR   • OTHER SURGICAL HISTORY  04/27/2018    Laparoscopic-assisted Robertlizzie Ellison esophagogastrectomy, abdominal and mediastinal lymphadenectomy, feeding jejunostomy tube placement   • WISDOM TEETH REMOVED       Social History:  reports that he h (10 mg total) by mouth 3 (three) times daily. , Disp: 90 tablet, Rfl: 2    •  Prochlorperazine Maleate (COMPAZINE) 10 mg tablet, Take 1 tablet (10 mg total) by mouth every 6 (six) hours as needed for Nausea., Disp: 90 tablet, Rfl: 5    •  LORazepam 2 MG Ora (!) 128/92   Pulse 92   Temp 98.2 °F (36.8 °C) (Temporal)   Resp 19   Ht 198.1 cm (6' 6\")   Wt 223 lb (101.2 kg)   SpO2 94%   BMI 25.77 kg/m²   General: No acute distress. Alert and oriented x 3. Ill-appearing  HEENT: Normocephalic, atraumatic. EOM-I.  An medications  3. Pancytopenia-monitor  4. Elevated A-zzdkr-llvqlkkekqrkptk with Covid and follow-up CTA of the chest  5.  Depression-SSRI    Addendum:  B/L PEs  Hold Eliquis  Started heparin gtt and Hem/Onc consulted     Quality:  · DVT Prophylaxis: Eliquis

## 2020-12-08 NOTE — TELEPHONE ENCOUNTER
morphINE Sulfate (PF) 400 mg in Sodium Chloride (PF) 40 mL IT infusion was received on 12/4/20 and unopened box handed to [facility].

## 2020-12-09 PROBLEM — I26.99 PULMONARY EMBOLUS (HCC): Status: ACTIVE | Noted: 2020-01-01

## 2020-12-09 NOTE — COVID NURSING ASSESSMENT
COVID-19 Daily Discharge Readiness-Nursing    O2 Sat at Rest:    88 % -100% on RA.  Placed on 2L for desat HS  O2 Sat with Exertion:    % on    liters   Temperature max from last 24 hrs: Temp (24hrs), Av.5 °F (36.9 °C), Min:98.2 °F (36.8 °C), Max:98.9 °

## 2020-12-09 NOTE — PROGRESS NOTES
Pain Service  Patient rates pain 2/10 in his back. He is hoarse and states this is unchanged since last admission. He did not yet get intrathecal pump implanted - he was scheduled for pump implant and COVID screening test was positive.    MSContin 130mg BI

## 2020-12-09 NOTE — ED NOTES
Attempt to call report since 1845, no answer on charge phone.  Charge RN, Alejandro Travis and Lissette kimbrough

## 2020-12-09 NOTE — CONSULTS
INFECTIOUS DISEASE CONSULT NOTE    Fiona Gorman Patient Status:  Inpatient    10/3/1990 MRN [id number]   [facility] 5NW-A Attending Jethro Chaves MD   Hosp Day # 1 PCP Suzi Velez DO       Reason for Consultation:  Covid 19 infect WISDOM TEETH REMOVED       Family History   Problem Relation Age of Onset   • Cancer Mother 50        breast cancer   • Other (cancer- kidney) Maternal Grandfather    • Cancer Paternal Grandmother         cervicasl cancer      reports that he has never smo Q12H [address]    Facility-Administered Medications Ordered in Other Encounters:   •  Normal Saline Flush 0.9 % injection 10 mL, 10 mL, Intravenous, Once  •  Heparin Lock Flush 100 UNIT/ML lock flush 500 Units, 5 mL, Intracatheter, Once    Review of Systems: [facility] results found [facility]: [facility]  Recent Labs   Lab 12/08/20  2224   COLORUR Yellow   CLARITY Clear   SPECGRAVITY 1.041*   GLUUR Negative   BILUR Negative   KETUR Negative   BLOODURINE Negative   PHURINE 6.0   PROUR Negative   UROBILINOGEN 4.0*   NITRITE Nega embolism extends from the distal main pulmonary artery into the main upper, lower and middle lobe branches. On the left side, pulmonary emboli involve several segmental upper and lower lobe branches. Overall clot burden is considered moderate.  THORACIC A TECHNIQUE:  The usual exam was modified secondary to Covid 19 considerations. Real time grey scale imaging and compression sonography was utilized to evaluate both lower extremity venous systems.   B-mode two-dimensional images of the vascular structures ar are symmetric. ORAL CAVITY:  No visible mass. OROPHARYNX:  Faucial and lingual tonsils are symmetric. HYPOPHARYNX:  Epiglottis and aryepiglottic folds are unremarkable. LARYNX:  The vocal cords are symmetric and without mass.   SINUSES:  Limited views sh extent in the lingula is noted. Small left pleural effusion is noted.     Dictated by (CST): Emerita Allen MD on 11/30/2020 at 9:06 AM     Finalized by (CST): Emerita Allen MD on 11/30/2020 at 9:18 AM       Xr Chest Ap Portable  (cpt=71045)    Re infection with pna  - doubt superimposed bacterial pna as wbc is nl and PCT neg  - may benefit from CCP- d/w with pt that this is an experimental treatment currently given under EUA- possible side effects d/w pt, pt agreed with use.  D/w Dr Elisabet Oleary as well

## 2020-12-09 NOTE — CONSULTS
[facility]  Report of Consultation    Seferino Began Patient Status:  Inpatient    10/3/1990 MRN [id number]   [facility] 5NW-A Attending Andrew Cowan MD   Hosp Day # 1 PCP Gilmer Ortega DO     Reason for Consultation:    Mikael Hdez embolism ([facility] [address]    • Sleep disturbance    • Uncomfortable fullness after meals    • Vomiting    • Vomiting blood      Past Surgical History:   Procedure Laterality Date   • INSERTION OF ACCESS PORT  04/10/2019   • INTRATHECAL PUMP TRIAL N/A 11/19/2020    Pe acetaminophen (TYLENOL) tab 650 mg, 650 mg, Oral, Q6H PRN  •  PEG 3350 (MIRALAX) powder packet 17 g, 17 g, Oral, Daily PRN  •  magnesium hydroxide (MILK OF MAGNESIA) 400 MG/5ML suspension 30 mL, 30 mL, Oral, Daily PRN  •  bisacodyl (DULCOLAX) rectal suppos Results (from the past 24 hour(s))   RAINBOW DRAW GOLD    Collection Time: 12/08/20  2:24 PM   Result Value Ref Range    Hold Gold Auto Resulted    PRO BETA NATRIURETIC PEPTIDE    Collection Time: 12/08/20  2:43 PM   Result Value Ref Range    Pro-Beta Natr Range    Hold Blue Auto Resulted    RAINBOW DRAW LAVENDER    Collection Time: 12/08/20  2:43 PM   Result Value Ref Range    Hold Lavender Auto Resulted    RAINBOW DRAW LIGHT GREEN    Collection Time: 12/08/20  2:43 PM   Result Value Ref Range    Hold Lt Gr Negative mg/dl    Bilirubin Urine Negative Negative    Ketones Urine Negative Negative mg/dL    Blood Urine Negative Negative    pH Urine 6.0 4.5 - 8.0    Protein Urine Negative Negative mg/dl    Urobilinogen Urine 4.0 (A) 0.2 - 2.0 mg/dL    Nitrite Urine several segmental upper   and lower lobe branches. Overall clot burden is considered moderate. THORACIC AORTA:  No aneurysm or visible dissection.     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

## 2020-12-09 NOTE — PAYOR COMM NOTE
--------------  ADMISSION REVIEW     Payor: GABBIE RODAS  Subscriber #:  [id number]  Authorization Number: [id number]    Admit date: 12/8/20  Admit time: 2030       Patient Seen in: [facility] Emergency Department    History   Stated Complaint: covid, f placement   • WISDOM TEETH REMOVED       Social History    Tobacco Use      Smoking status: Never Smoker      Smokeless tobacco: Never Used    Alcohol use: Not Currently      Alcohol/week: 0.0 standard drinks      Comment: very occ    Drug use: Yes      Ty (*)     Albumin 3.2 (*)     A/G Ratio 0.8 (*)     All other components within normal limits   D-DIMER - Abnormal; Notable for the following components:    D-Dimer 9.26 (*)     All other components within normal limits   LDH - Abnormal; Notable for the foll stable here in the emergency room. Patient was started on heparin here in the ER as well as IV fluids. Patient had no further new complaints throughout the rest emergency room stay. The patient was admitted for further care at this time.   Disposition an Nausea., Disp: 30 tablet, Rfl: 0    •  morphINE Sulfate ER 30 MG Oral Tab CR, Take 1 tablet (30 mg total) by mouth every 12 (twelve) hours.  Total 130 mg bid Neoplastic pain, Disp: 60 tablet, Rfl: 0    •  mirtazapine 15 MG Oral Tab, Take 1 tablet (15 mg tot TIMES DAILY AS NEEDED FOR DIARRHEA, Disp: 60 tablet, Rfl: 0    •  Omeprazole 40 MG Oral Capsule Delayed Release, Take 1 capsule (40 mg total) by mouth 2 (two) times daily. , Disp: 60 capsule, Rfl: 0    •  Citalopram Hydrobromide 40 MG Oral Tab, Take 1 table 91%-95%  1. Inflammatory markers  2. Infectious disease consult for remdesivir consideration  3. Isolation  4. Covid protocol  5. Hold Decadron as saturating in the 90s on room air at this time  2. Stage IV esophageal cancer on oral chemotherapy  1.  Hold m in ER and CTA done - + for PE. Oxygenating well. No loss of taste or smell. No calf pain or swelling. History of prior UE DVT related to a PICC line. Had not been on oral DOAC when admitted. Overnight using 0-2 L by NC and oxygenating well.  No dex gi of COVID and active malignancy. On UFH. Will be reasonable to switch to a DOAC in the next 24 hours and continue on DC. Lovenox is fallback option. US of legs pending.    3)  Met adenocarcinoma of the esophagus.  He has nearly exhausted all standard op Mushtaq Rodríguez RN      morphINE Sulfate ER (MS CONTIN) CR tab 100 mg     Date Action Dose Route User    12/9/2020 0903 Given 100 mg Oral Tom Schmitz RN    12/8/2020 2310 Given 100 mg Oral Christa Duron RN      morphINE Sulfate ER (MS CONTIN) CR tab 30 mg

## 2020-12-09 NOTE — COVID NURSING ASSESSMENT
COVID-19 Daily Discharge Readiness-Nursing    O2 Sat at Rest:   95  % on 3L O2  O2 Sat with Exertion:    % on    liters   Temperature max from last 24 hrs: Temp (24hrs), Av.4 °F (36.9 °C), Min:97.9 °F (36.6 °C), Max:98.9 °F (37.2 °C)    Inflammatory Ma

## 2020-12-09 NOTE — ED NOTES
Pt resting in bed, states pain, \"2 to the lower back, breathing fair. \" Pt given apple juice per request. He denies further need at this time

## 2020-12-10 NOTE — PROGRESS NOTES
[facility]  Progress Note     Jess Hdez Patient Status:  Inpatient    10/3/1990 MRN [id number]   [facility] 5NW-A Attending Guerrero Handy MD   Hosp Day # 2 PCP Fam Jonesers,      Chief Complaint: Dyspnea    S: Patient  O TROP <0.045 <0.045 <0.045   CK 23*  --   --             Imaging: Imaging data reviewed in Epic.     Medications:   • apixaban  10 mg Oral BID   • HYDROmorphone HCl  8 mg Oral Q4H While awake   • remdesivir IVPB  100 mg Intravenous Q24H   • oxyCODONE-aceta

## 2020-12-10 NOTE — COVID NURSING ASSESSMENT
COVID-19 Daily Discharge Readiness-Nursing    O2 Sat at Rest:    100% on RA  O2 Sat with Exertion:   93 % on    RA  Temperature max from last 24 hrs: Temp (24hrs), Av.7 °F (37.1 °C), Min:98.3 °F (36.8 °C), Max:98.9 °F (37.2 °C)    Inflammatory Markers:

## 2020-12-10 NOTE — PROGRESS NOTES
Heme/Onc Progress Note    Patient Name: Fiona Gorman   YOB: 1990   Medical Record Number: [id number]   CSN: [id number]   Attending Physician: Gerald Velarde M.D. Subjective:  Feels ok. Not particularly SOB and using O2 at 2 L. 133 mL, 1 enema, Rectal, Once PRN  •  ondansetron HCl (ZOFRAN) injection 4 mg, 4 mg, Intravenous, Q6H PRN  •  ondansetron HCl (ZOFRAN) injection 4 mg, 4 mg, Intravenous, Q4H PRN  •  morphINE Sulfate ER (MS CONTIN) CR tab 30 mg, 30 mg, Oral, Q12H [address]    Fac 150.0 - 450.0 10(3)uL    MCV 89.9 80.0 - 100.0 fL    MCH 28.9 26.0 - 34.0 pg    MCHC 32.2 31.0 - 37.0 g/dL    RDW 15.7 (H) 11.0 - 15.0 %    RDW-SD 51.4 (H) 35.1 - 46.3 fL    Neutrophil Absolute Prelim 1.55 1.50 - 7.70 x10 (3) uL    Neutrophil Absolute 1.55 Ratio 15.6 10.0 - 20.0    Calcium, Total 8.6 8.5 - 10.1 mg/dL    Calculated Osmolality 283 275 - 295 mOsm/kg    GFR, Non- 151 >=60    GFR, -American 174 >=60    AST 15 15 - 37 U/L    ALT 12 (L) 16 - 61 U/L    Alkaline Phosphatase 100 grey scale imaging and compression sonography was utilized to evaluate both lower extremity venous systems. B-mode two-dimensional images of the vascular structures   are submitted.   The following veins were imaged: Common femoral, profunda femoral, super lines of chemo and radiation. Follow.     Julianne Morgan MD  THE [facility] OF [facility]  [facility] [address] [facility]

## 2020-12-10 NOTE — PROGRESS NOTES
[facility]  Progress Note     Shaan Parekh Patient Status:  Inpatient    10/3/1990 MRN [id number]   [facility] 5NW-A Attending Dary Lynch MD   Hosp Day # 1 PCP Winter Vyas DO     Chief Complaint: Dyspnea    S: Patient fe 12/08/20  1443 12/08/20  2224 12/09/20  0028   TROP <0.045 <0.045 <0.045   CK 23*  --   --             Imaging: Imaging data reviewed in Epic.     Medications:   • [START ON 12/10/2020] remdesivir IVPB  100 mg Intravenous Q24H   • oxyCODONE-acetaminophen  2

## 2020-12-10 NOTE — COVID NURSING ASSESSMENT
COVID-19 Daily Discharge Readiness-Nursing    O2 Sat at Rest:     95%   O2 Sat with Exertion:   93 % on   2 liters   Temperature max from last 24 hrs: Temp (24hrs), Av.6 °F (37 °C), Min:97.9 °F (36.6 °C), Max:98.9 °F (37.2 °C)    Inflammatory Markers:

## 2020-12-10 NOTE — CM/SW NOTE
eliquis 30 day free and $10 copay cards given to RN to dispense to patient.     Kenneth Chambers RN,   Phone [phone]

## 2020-12-10 NOTE — PROGRESS NOTES
INFECTIOUS DISEASE PROGRESS NOTE    Sabrina Joyce Patient Status:  Inpatient    10/3/1990 MRN [id number]   [facility] 5NW-A Attending Tata Rojas MD   Hosp Day # 2 PCP DO Iraida Clemonsdesivir d#2  CCP given  dexa no    Subj Ordered in Other Encounters:   •  Normal Saline Flush 0.9 % injection 10 mL, 10 mL, Intravenous, Once  •  Heparin Lock Flush 100 UNIT/ML lock flush 500 Units, 5 mL, Intracatheter, Once      Physical Exam:    Vital signs: Blood pressure 127/78, pulse 85, te Lab 12/08/20  1443   CK 23*       Inflammatory Markers  Recent Labs   Lab 12/08/20  1443 12/09/20  0751 12/10/20  0455   CRP 7.56* 7.19* 8.75*   BERENICE 89.7 103.5 97.0   * 204 203   DDIMER 9.26* 2.64* 2.26*       Microbiology    Reviewed in EMR,

## 2020-12-10 NOTE — PROGRESS NOTES
Pain Service  Patient reports back pain persists. He did not yet get intrathecal pump implanted - he was scheduled for pump implant and COVID screening test was positive.      Current analgesics:  MSContin 130mg BID  Percocet 5/325 2 tabs every 4 hrs whe

## 2020-12-11 NOTE — PLAN OF CARE
Assumed care at 7:30 am. Alert/oriented to baseline. Cardiac monitoring. RA. PIV/Port SL. Incentive spirometer. Isolation precautions. Patient to discharge today. Will con't to monitor patient.     Problem: RESPIRATORY - ADULT  Goal: Achieves optimal v

## 2020-12-11 NOTE — PROGRESS NOTES
INFECTIOUS DISEASE PROGRESS NOTE    Delio Ashleigh Patient Status:  Inpatient    10/3/1990 MRN [id number]   [facility] 5NW-A Attending Aric Milton MD   Hosp Day # 3 PCP Som Dong DO     Remdesivir d#3  CCP given  dexa no    Subj CONTIN) CR tab 30 mg, 30 mg, Oral, Q12H [address]    Facility-Administered Medications Ordered in Other Encounters:   •  Normal Saline Flush 0.9 % injection 10 mL, 10 mL, Intravenous, Once  •  Heparin Lock Flush 100 UNIT/ML lock flush 500 Units, 5 mL, Intracathe Cardiac  Recent Labs   Lab 12/08/20  1443 12/08/20  2224 12/09/20  0028   TROP <0.045 <0.045 <0.045   PBNP 44  --   --        Creatinine Kinase  Recent Labs   Lab 12/08/20  1443   CK 23*       Inflammatory Markers  Recent Labs   Lab 12/09/20  0751 12

## 2020-12-11 NOTE — PROGRESS NOTES
Hem/Onc Inpatient Note    Patient Name: Cayetano Mackenzie   YOB: 1990   Medical Record Number: [id number]   CSN: [id number]   Attending Hematology/Oncology Physician: Dr. Jerad Ellis    This patient is COVID-19 positive.  For purposes of responsib are appropriate     Labs:  Recent Results (from the past 24 hour(s))   CBC W/ DIFFERENTIAL    Collection Time: 12/11/20  4:21 AM   Result Value Ref Range    WBC 2.7 (L) 4.0 - 11.0 x10(3) uL    RBC 3.39 (L) 4.30 - 5.70 x10(6)uL    HGB 9.8 (L) 13.0 - 17.5 g/ Non- 160 >=60    GFR, -American 185 >=60    AST 12 (L) 15 - 37 U/L    ALT 13 (L) 16 - 61 U/L    Alkaline Phosphatase 98 45 - 117 U/L    Bilirubin, Total 0.6 0.1 - 2.0 mg/dL    Total Protein 6.5 6.4 - 8.2 g/dL    Albumin 2.8 (L) 3.4 - by:    MARILIN Mota, NP-C  Nurse Practitioner  [facility]

## 2020-12-11 NOTE — TELEPHONE ENCOUNTER
MD Evelia Rocha RN; Madeline Gross MD; Abdulkadir Barrientos RN; Martha Braswell MD             Hi,     I will be happy to manage his pump post implant. Will defer to Laquita Matos and Tank regarding timing of implant as Kristie will need to be op

## 2020-12-11 NOTE — DISCHARGE PLANNING
NURSING DISCHARGE NOTE    Discharged Home via Ambulatory. Accompanied by Support staff  Belongings Taken by patient/family. PIV removed. Port de-accessed per hospital protocol. Discharge navigator complete.    Discharge instructions reviewed with augustine

## 2020-12-11 NOTE — PLAN OF CARE
Assumed patient care at [address] [place] [address]. Vital signs stable. Patient alert and oriented x 4. Patient states pain is well controlled with pain medications. CCP delivered and is now thawing.   Problem: RESPIRATORY - ADULT  Goal: Achieves optimal ventilation and oxyg Patient/Family Goals  Goal: Patient/Family Long Term Goal  Description: Patient's Long Term Goal: Recover from covid and PE.      Interventions:  - Hem consult  -Heparin drip  -IS  -Monitor VS/labs  - See additional Care Plan goals for specific intervention

## 2020-12-11 NOTE — COVID NURSING ASSESSMENT
COVID-19 Daily Discharge Readiness-Nursing    O2 Sat at Rest:     97%   O2 Sat with Exertion:    92% on    liters   Temperature max from last 24 hrs: Temp (24hrs), Av.8 °F (37.1 °C), Min:97.9 °F (36.6 °C), Max:99.3 °F (37.4 °C)    Inflammatory Markers:

## 2020-12-11 NOTE — DISCHARGE SUMMARY
[facility] [place] [facility]  DISCHARGE SUMMARY     Fiona Gorman Patient Status:  Inpatient    10/3/1990 MRN [id number]   [facility] 5NW-A Attending Jethro Chaves MD   [facility] Day # 3 PCP Suzi Velez DO     Date of Admission: 2020  Date of changed: how much to take      Take 1-2 tablets (4-8 mg total) by mouth every 4 (four) hours as needed for Pain.    Quantity: 120 tablet  Refills: 0        CONTINUE taking these medications      Instructions Prescription details   busPIRone HCl 10 MG Tabs 1 tablet (100 mg total) by mouth every 12 (twelve) hours. 130 mg BID total dose   Quantity: 60 tablet  Refills: 0     OLANZapine 10 MG Tabs  Commonly known as: ZYPREXA      Take 1 tablet (10 mg total) by mouth nightly.    Quantity: 30 tablet  Refills: 2 FOLLOW UP VISIT [2828] 30 min Meri Saavedra MD    Patient Instructions:                         Vital signs:  Temp:  [97.9 °F (36.6 °C)-99.3 °F (37.4 °C)] 98.8 °F (37.1 °C)  Pulse:  [66-90] 90  Resp:  [17-1

## 2020-12-14 NOTE — TELEPHONE ENCOUNTER
Triage RN called Christie Ventura, RN back- requested information provided. Nurse at [facility] called. His Name is Christie Ventura.  Dr. Isis Friend emailed [facility] regarding a trial for his patient Ton, however, he needs mor

## 2020-12-14 NOTE — TELEPHONE ENCOUNTER
Called patient and rescheduled him to 12-31 with Dr Frances Anderson. New order needed- placed and signed  Changed in outlook and on sx sheet. Pt will not need labs or covid as he will test positive for 90 days and has up to date labwork on file.      Notified

## 2020-12-15 NOTE — TELEPHONE ENCOUNTER
Spoke with Bertina Bumpers , pharmacist regarding medication for ITP implantation 12/31/20. Bertina Bumpers confirmed they have the medication and it is to be used by 1/2/2021.    No further needs

## 2020-12-17 NOTE — PROGRESS NOTES
ANP Visit Note    Patient Name: Karlee Vann   YOB: 1990   Medical Record Number: [id number]   CSN: [id number]   Date of visit: 12/17/2020   Jill Stevenson DO   No primary care provider on file.      Chief Complaint/Reason for Visit:  [facility] Radiculopathy, lumbar region     Cancer associated pain     Hypokalemia     Anemia     Weakness generalized     Malignant neoplasm of esophagus (HCC)     Abdominal pain of unknown etiology     Metastatic malignant neoplasm, unspecified site ([facility] [address]     Gener kidney) Maternal Grandfather    • Cancer Paternal Grandmother         cervicasl cancer       Social History:  Social History    Socioeconomic History      Marital status:       Spouse name: Not on file      Number of children: 0      Years of educat Hazards: Not Asked        Sleep Concern: Not Asked        Back Care: Not Asked        Bike Helmet: Not Asked        Self-Exams: Not Asked    Social History Narrative      Not on file      Medications:    Current Outpatient Medications:   •  apixaban 5 MG O total) by mouth nightly., Disp: 90 capsule, Rfl: 0  •  HYDROmorphone HCl 4 MG Oral Tab, Take 1-2 tablets (4-8 mg total) by mouth every 4 (four) hours as needed for Pain.  (Patient taking differently: Take 8 mg by mouth every 4 (four) hours as needed for [employer] imaging from hospitalization personally reviewed by me. Impression/Plan:  1. Metastatic Esophageal Carcinoma: awaiting trial eligibility   2. Bilateral PE: on Apixaban, will need to discuss anti-coagulation with Dr Rosalie Montano and Dr Manda Dodge   3.  COVID: sym

## 2020-12-17 NOTE — PROGRESS NOTES
Pt here for APN visit post hospital f/u for PE. + covid for pre-surgical work up. Pt ambulated well, no SOB. States he is \"feeling better everyday\".

## 2020-12-17 NOTE — TELEPHONE ENCOUNTER
Spoke to Dr Gino Briseno and to patient. Needs IVC filter placed in order to safely stop DOAC for pump placement. Plan to place IVC filter on the the 22nd. Don't take Eliquis that day or the night before. Resume the evening of the filter placement.   Then

## 2020-12-21 NOTE — TELEPHONE ENCOUNTER
Patient called asking what time is his surgery tomorrow? It looks like Dr. Rafi Murillo has him scheduled for 12/31, but the note from Gabriela Lopez says MD spoke to Dr. Rafi Murillo about having it done on 12/22.

## 2020-12-30 NOTE — PROCEDURES
[facility]  Procedure Note    [address] Patient Status:  Outpatient    10/3/1990 MRN [id number]   Location [address] Attending No att. providers found   Hosp Day # 0 PCP Fam Estrella, DO     Procedure: IVC Blossom Lee

## 2020-12-30 NOTE — H&P
[address] [facility] Patient Status:  Outpatient    10/3/1990 MRN [id number]   Location [facility] 14 Attending No att. providers found   Hosp Day # 0 PCP DO LINDA Rosario 39 lymphadenectomy, feeding jejunostomy tube placement   • WISDOM TEETH REMOVED         Social History:  Social History    Tobacco Use      Smoking status: Never Smoker      Smokeless tobacco: Never Used    Alcohol use: Not Currently      Alcohol/week: 0.0 st Intrathecal route continuous. , Disp: 1 Syringe, Rfl: 0  •  ondansetron 8 MG Oral Tablet Dispersible, Take 1 tablet (8 mg total) by mouth every 8 (eight) hours as needed for Nausea., Disp: 30 tablet, Rfl: 0  •  Prochlorperazine Maleate (COMPAZINE) 10 mg tab hours.    Assessment/Plan:   Impression: 30 yo M w/ PE    I have discussed with the patient and/or legal representative the potential benefits, risks, and side effects of this procedure, the likelihood of the patient achieving goals; and the potential prob

## 2020-12-30 NOTE — TELEPHONE ENCOUNTER
----- Message from Kg Webber MD sent at 12/30/2020  3:22 PM CST -----  Regarding: RE: Kristie admitted with + COVID screen prior to ITP implant and bilateral PEs  Hi    He did well with 500 mcg of Duramorph for the trial that provided 12 hours of pain contr

## 2020-12-30 NOTE — PROGRESS NOTES
S/p IVC filter placement, right IJ, site soft , dressing c/d/i. Patient denies pain 0/10 on pain scale. Tolerating PO intake. VSS. Bedrest completed, ambulating without difficulty. Right port-a-cath flushed and decannulated per protocol.   Patient has int

## 2020-12-31 NOTE — CONSULTS
[facility]  [address] [facility] [place] [facility]"" Patient Status:  Outpatient in a Bed    10/3/1990 MRN [id number]   [facility] 3SW-A Attending Wilbert Davis MD   Hosp Day # 0 PCP Juan Beltran DO     Reason for consult: med mgmt    Req History:   Past Surgical History:   Procedure Laterality Date   • INSERTION OF ACCESS PORT  04/10/2019   • INTRATHECAL PUMP TRIAL N/A 11/19/2020    Performed by Yael Hernandes MD at [facility] ENDOSCOPY   • LAPAROSCOPIC ESOPHAGOGASTRECTOMY N/A 4/27/2018    Performed Take 1 tablet (100 mg total) by mouth every 12 (twelve) hours.  130 mg BID total dose, Disp: 60 tablet, Rfl: 0    •  mirtazapine 15 MG Oral Tab, Take 1 tablet (15 mg total) by mouth nightly., Disp: 30 tablet, Rfl: 3    •  OLANZapine 10 MG Oral Tab, Take 1 t mg total) by mouth every 6 (six) hours as needed for Nausea., Disp: 90 tablet, Rfl: 5    •  cyclobenzaprine 5 MG Oral Tab, Take 1 tablet (5 mg total) by mouth 3 (three) times daily as needed for Muscle spasms. , Disp: 30 tablet, Rfl: 0        Review of Syst mgmt  2. Nausea  1. Compazine  3. GE jxn adenocarcinoma  4. Hx DVT/PE s/p IVC filter  5. Recent COVID  1.  No need for isolation    Quality:  · DVT Prophylaxis: scd  · CODE status: full  · Howard: no    Plan of care discussed with pt, ed physician     Seth Tyson

## 2020-12-31 NOTE — ANESTHESIA PROCEDURE NOTES
Airway  Urgency: elective      General Information and Staff    Patient location during procedure: OR  Anesthesiologist: Tang Lawrence MD  Resident/CRNA: Errol Hobson CRNA  Performed: CRNA     Indications and Patient Condition  Indications for air

## 2020-12-31 NOTE — PLAN OF CARE
Pt a/ox4. Pain tolerable, MSO4 and norco for breakthrough. incision site to back and abdomen CDI. Intermittent nausea, on several anti-emetics at home, resolved best with compazine. Scope patch behind left ear. Tolerating diet.    Up and ambulating in [place] w

## 2020-12-31 NOTE — PROGRESS NOTES
Patient arrived on unit from PACU. Reports pain 2-3/10 on arrival. Incision sites CDI, coverlet dressing to site. Pt a/ox4. Due to void. Hx chronic pain. Intrathecal catheter placed, per PACU, running at 0.5 mg daily. [employer] rep at bedside in P{ACU.  Maxime Moreno

## 2020-12-31 NOTE — OPERATIVE REPORT
[facility]    OPERATIVE REPORT    Patient:  Isela Cobb; YOB: 1990     CSN:  [id number]; Medical Record Number:  [id number]    Admission Date:  12/31/2020 Operation Date:  12/31/2020    . ..........................     Operating P scapel. Sharp and blunt dissection were then used to open the incision down to fascia. A 14 gauge touhy needle was then introduced, identifying bony landmarks by palpation, before entering the intrathecal space. Brisk return of clear CSF was noted.  The int

## 2020-12-31 NOTE — H&P
Neurosurgery Pre-OP H&P  2020    Shaan Parekh PCP:  Winter Vyas DO    10/3/1990 MRN [id number]       CC:  Intrathecal pain pump     HPI:     Kristie is a very pleasant 31year old male with PMH of GE junction adenocarcinoma who presents for ev negatives are noted in HPI.    Physical Exam:   12/31/20  0631   BP: (!) 144/99   Pulse: 103   Resp: 18   Temp: 96.8 °F (36 °C)   General: No Apparent Distress, Well Nourished, Well Developed, Normal Voice, Mood Appropriate, Appears Stated Age  HEENT: No Sc

## 2020-12-31 NOTE — ANESTHESIA POSTPROCEDURE EVALUATION
[facility] Patient Status:  Outpatient in a Bed   Age/Gender 31year old male MRN [id number]   [facility] SURGERY Attending Terri Ventura MD   [facility] Day # 0 PCP Robert Pod, DO       Anesthesia Post-op Note    Proce

## 2020-12-31 NOTE — ANESTHESIA PREPROCEDURE EVALUATION
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     Anesthetic Complications  (-) history of anesthetic complications         GI/Hepatic/Renal      (+) GERD                           Cardiovascular 12/30/2020         Airway      Mallampati: II  Mouth opening: >3 FB  TM distance: > 6 cm  Neck ROM: full Cardiovascular    Cardiovascular exam normal.  Rhythm: regular  Rate: normal     Dental    No notable dental history.          Pulmonary    Pulmonary ex

## 2020-12-31 NOTE — BRIEF OP NOTE
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

## 2021-01-01 ENCOUNTER — HOSPITAL ENCOUNTER (OUTPATIENT)
Dept: RADIATION ONCOLOGY | Facility: HOSPITAL | Age: 31
Discharge: HOME | End: 2021-01-01
Attending: INTERNAL MEDICINE
Payer: COMMERCIAL

## 2021-01-01 ENCOUNTER — TELEPHONE (OUTPATIENT)
Dept: HEMATOLOGY/ONCOLOGY | Facility: HOSPITAL | Age: 31
End: 2021-01-01

## 2021-01-01 ENCOUNTER — APPOINTMENT (OUTPATIENT)
Dept: HEMATOLOGY/ONCOLOGY | Facility: HOSPITAL | Age: 31
End: 2021-01-01
Attending: INTERNAL MEDICINE
Payer: COMMERCIAL

## 2021-01-01 ENCOUNTER — ORDER TRANSCRIPTION (OUTPATIENT)
Dept: ADMINISTRATIVE | Facility: HOSPITAL | Age: 31
End: 2021-01-01

## 2021-01-01 ENCOUNTER — TELEPHONE (OUTPATIENT)
Dept: FAMILY MEDICINE CLINIC | Facility: CLINIC | Age: 31
End: 2021-01-01

## 2021-01-01 ENCOUNTER — TELEPHONE (OUTPATIENT)
Dept: RADIATION ONCOLOGY | Facility: HOSPITAL | Age: 31
End: 2021-01-01

## 2021-01-01 ENCOUNTER — HOSPITAL ENCOUNTER (OUTPATIENT)
Dept: RADIATION ONCOLOGY | Facility: HOSPITAL | Age: 31
End: 2021-01-01
Attending: INTERNAL MEDICINE
Payer: COMMERCIAL

## 2021-01-01 ENCOUNTER — APPOINTMENT (OUTPATIENT)
Dept: ULTRASOUND IMAGING | Facility: HOSPITAL | Age: 31
DRG: 181 | End: 2021-01-01
Attending: INTERNAL MEDICINE
Payer: COMMERCIAL

## 2021-01-01 ENCOUNTER — NURSE ONLY (OUTPATIENT)
Dept: HEMATOLOGY/ONCOLOGY | Facility: HOSPITAL | Age: 31
End: 2021-01-01

## 2021-01-01 ENCOUNTER — DOCUMENTATION ONLY (OUTPATIENT)
Dept: HEMATOLOGY/ONCOLOGY | Facility: HOSPITAL | Age: 31
End: 2021-01-01

## 2021-01-01 ENCOUNTER — OFFICE VISIT (OUTPATIENT)
Dept: SURGERY | Facility: CLINIC | Age: 31
End: 2021-01-01
Payer: COMMERCIAL

## 2021-01-01 ENCOUNTER — PATIENT MESSAGE (OUTPATIENT)
Dept: HEMATOLOGY/ONCOLOGY | Age: 31
End: 2021-01-01

## 2021-01-01 ENCOUNTER — OFFICE VISIT (OUTPATIENT)
Dept: HEMATOLOGY/ONCOLOGY | Age: 31
End: 2021-01-01
Attending: INTERNAL MEDICINE
Payer: COMMERCIAL

## 2021-01-01 ENCOUNTER — APPOINTMENT (OUTPATIENT)
Dept: CT IMAGING | Facility: HOSPITAL | Age: 31
DRG: 180 | End: 2021-01-01
Attending: EMERGENCY MEDICINE
Payer: COMMERCIAL

## 2021-01-01 ENCOUNTER — APPOINTMENT (OUTPATIENT)
Dept: ULTRASOUND IMAGING | Facility: HOSPITAL | Age: 31
DRG: 176 | End: 2021-01-01
Attending: STUDENT IN AN ORGANIZED HEALTH CARE EDUCATION/TRAINING PROGRAM
Payer: COMMERCIAL

## 2021-01-01 ENCOUNTER — APPOINTMENT (OUTPATIENT)
Dept: ULTRASOUND IMAGING | Facility: HOSPITAL | Age: 31
DRG: 180 | End: 2021-01-01
Attending: INTERNAL MEDICINE
Payer: COMMERCIAL

## 2021-01-01 ENCOUNTER — OFFICE VISIT (OUTPATIENT)
Dept: PAIN CLINIC | Facility: CLINIC | Age: 31
End: 2021-01-01
Payer: COMMERCIAL

## 2021-01-01 ENCOUNTER — APPOINTMENT (OUTPATIENT)
Dept: GENERAL RADIOLOGY | Facility: HOSPITAL | Age: 31
DRG: 181 | End: 2021-01-01
Attending: INTERNAL MEDICINE
Payer: COMMERCIAL

## 2021-01-01 ENCOUNTER — APPOINTMENT (OUTPATIENT)
Dept: CV DIAGNOSTICS | Facility: HOSPITAL | Age: 31
DRG: 181 | End: 2021-01-01
Attending: INTERNAL MEDICINE
Payer: COMMERCIAL

## 2021-01-01 ENCOUNTER — HOSPITAL ENCOUNTER (INPATIENT)
Facility: HOSPITAL | Age: 31
LOS: 5 days | Discharge: HOME HEALTH | DRG: 181 | End: 2021-01-01
Attending: HOSPITALIST | Admitting: HOSPITALIST
Payer: COMMERCIAL

## 2021-01-01 ENCOUNTER — NURSE ONLY (OUTPATIENT)
Dept: HEMATOLOGY/ONCOLOGY | Age: 31
End: 2021-01-01
Attending: INTERNAL MEDICINE
Payer: COMMERCIAL

## 2021-01-01 ENCOUNTER — LAB ENCOUNTER (OUTPATIENT)
Dept: LAB | Age: 31
End: 2021-01-01
Attending: OTOLARYNGOLOGY
Payer: COMMERCIAL

## 2021-01-01 ENCOUNTER — APPOINTMENT (OUTPATIENT)
Dept: GENERAL RADIOLOGY | Facility: HOSPITAL | Age: 31
DRG: 180 | End: 2021-01-01
Attending: INTERNAL MEDICINE
Payer: COMMERCIAL

## 2021-01-01 ENCOUNTER — MED REC SCAN ONLY (OUTPATIENT)
Dept: FAMILY MEDICINE CLINIC | Facility: CLINIC | Age: 31
End: 2021-01-01

## 2021-01-01 ENCOUNTER — HOSPITAL ENCOUNTER (INPATIENT)
Facility: HOSPITAL | Age: 31
LOS: 3 days | Discharge: HOSPICE | DRG: 180 | End: 2021-01-01
Attending: EMERGENCY MEDICINE | Admitting: HOSPITALIST
Payer: COMMERCIAL

## 2021-01-01 ENCOUNTER — APPOINTMENT (OUTPATIENT)
Dept: HEMATOLOGY/ONCOLOGY | Age: 31
End: 2021-01-01
Attending: INTERNAL MEDICINE

## 2021-01-01 ENCOUNTER — TELEPHONE (OUTPATIENT)
Dept: SURGERY | Facility: CLINIC | Age: 31
End: 2021-01-01

## 2021-01-01 ENCOUNTER — APPOINTMENT (OUTPATIENT)
Dept: GENERAL RADIOLOGY | Facility: HOSPITAL | Age: 31
DRG: 181 | End: 2021-01-01
Attending: HOSPITALIST
Payer: COMMERCIAL

## 2021-01-01 ENCOUNTER — APPOINTMENT (OUTPATIENT)
Dept: CT IMAGING | Facility: HOSPITAL | Age: 31
DRG: 181 | End: 2021-01-01
Attending: INTERNAL MEDICINE
Payer: COMMERCIAL

## 2021-01-01 ENCOUNTER — HOSPITAL ENCOUNTER (OUTPATIENT)
Dept: GENERAL RADIOLOGY | Age: 31
Discharge: HOME | End: 2021-01-01
Attending: CLINICAL NURSE SPECIALIST
Payer: COMMERCIAL

## 2021-01-01 ENCOUNTER — HOSPITAL ENCOUNTER (INPATIENT)
Facility: HOSPITAL | Age: 31
LOS: 1 days | Discharge: HOME | DRG: 176 | End: 2021-01-01
Attending: EMERGENCY MEDICINE | Admitting: HOSPITALIST
Payer: COMMERCIAL

## 2021-01-01 ENCOUNTER — HOSPITAL ENCOUNTER (OUTPATIENT)
Dept: ULTRASOUND IMAGING | Age: 31
Discharge: HOME | End: 2021-01-01
Attending: RADIOLOGY
Payer: COMMERCIAL

## 2021-01-01 ENCOUNTER — APPOINTMENT (OUTPATIENT)
Dept: CT IMAGING | Facility: HOSPITAL | Age: 31
DRG: 176 | End: 2021-01-01
Attending: EMERGENCY MEDICINE
Payer: COMMERCIAL

## 2021-01-01 ENCOUNTER — HOSPITAL ENCOUNTER (OUTPATIENT)
Dept: ULTRASOUND IMAGING | Facility: HOSPITAL | Age: 31
Discharge: HOME | End: 2021-01-01
Attending: INTERNAL MEDICINE
Payer: COMMERCIAL

## 2021-01-01 ENCOUNTER — SOCIAL WORK SERVICES (OUTPATIENT)
Dept: HEMATOLOGY/ONCOLOGY | Facility: HOSPITAL | Age: 31
End: 2021-01-01

## 2021-01-01 ENCOUNTER — ANESTHESIA EVENT (OUTPATIENT)
Dept: SURGERY | Facility: HOSPITAL | Age: 31
DRG: 181 | End: 2021-01-01
Payer: COMMERCIAL

## 2021-01-01 ENCOUNTER — APPOINTMENT (OUTPATIENT)
Dept: GENERAL RADIOLOGY | Facility: HOSPITAL | Age: 31
DRG: 176 | End: 2021-01-01
Attending: EMERGENCY MEDICINE
Payer: COMMERCIAL

## 2021-01-01 ENCOUNTER — ANESTHESIA (OUTPATIENT)
Dept: SURGERY | Facility: HOSPITAL | Age: 31
DRG: 181 | End: 2021-01-01
Payer: COMMERCIAL

## 2021-01-01 ENCOUNTER — APPOINTMENT (OUTPATIENT)
Dept: ULTRASOUND IMAGING | Facility: HOSPITAL | Age: 31
DRG: 176 | End: 2021-01-01
Attending: EMERGENCY MEDICINE
Payer: COMMERCIAL

## 2021-01-01 ENCOUNTER — DOCUMENTATION ONLY (OUTPATIENT)
Dept: RADIATION ONCOLOGY | Facility: HOSPITAL | Age: 31
End: 2021-01-01

## 2021-01-01 ENCOUNTER — APPOINTMENT (OUTPATIENT)
Dept: GENERAL RADIOLOGY | Facility: HOSPITAL | Age: 31
DRG: 180 | End: 2021-01-01
Attending: EMERGENCY MEDICINE
Payer: COMMERCIAL

## 2021-01-01 VITALS
HEIGHT: 78 IN | OXYGEN SATURATION: 96 % | DIASTOLIC BLOOD PRESSURE: 84 MMHG | TEMPERATURE: 98 F | SYSTOLIC BLOOD PRESSURE: 132 MMHG | WEIGHT: 221.31 LBS | BODY MASS INDEX: 25.6 KG/M2 | HEART RATE: 87 BPM | RESPIRATION RATE: 18 BRPM

## 2021-01-01 VITALS
TEMPERATURE: 98 F | BODY MASS INDEX: 31.41 KG/M2 | OXYGEN SATURATION: 98 % | DIASTOLIC BLOOD PRESSURE: 54 MMHG | SYSTOLIC BLOOD PRESSURE: 99 MMHG | WEIGHT: 271.5 LBS | HEIGHT: 78 IN | RESPIRATION RATE: 18 BRPM | HEART RATE: 108 BPM

## 2021-01-01 VITALS
OXYGEN SATURATION: 97 % | BODY MASS INDEX: 25.45 KG/M2 | HEART RATE: 122 BPM | DIASTOLIC BLOOD PRESSURE: 80 MMHG | RESPIRATION RATE: 16 BRPM | SYSTOLIC BLOOD PRESSURE: 122 MMHG | WEIGHT: 220 LBS | HEIGHT: 78 IN

## 2021-01-01 VITALS
OXYGEN SATURATION: 97 % | WEIGHT: 234 LBS | DIASTOLIC BLOOD PRESSURE: 76 MMHG | RESPIRATION RATE: 16 BRPM | HEART RATE: 90 BPM | HEIGHT: 78 IN | BODY MASS INDEX: 27.07 KG/M2 | SYSTOLIC BLOOD PRESSURE: 134 MMHG

## 2021-01-01 VITALS
DIASTOLIC BLOOD PRESSURE: 98 MMHG | WEIGHT: 256.38 LBS | BODY MASS INDEX: 30 KG/M2 | TEMPERATURE: 98 F | OXYGEN SATURATION: 95 % | SYSTOLIC BLOOD PRESSURE: 148 MMHG | RESPIRATION RATE: 18 BRPM | HEART RATE: 124 BPM

## 2021-01-01 VITALS
OXYGEN SATURATION: 99 % | RESPIRATION RATE: 16 BRPM | BODY MASS INDEX: 28 KG/M2 | HEART RATE: 102 BPM | DIASTOLIC BLOOD PRESSURE: 90 MMHG | WEIGHT: 239 LBS | TEMPERATURE: 97 F | SYSTOLIC BLOOD PRESSURE: 129 MMHG

## 2021-01-01 VITALS
WEIGHT: 223 LBS | HEIGHT: 78 IN | BODY MASS INDEX: 25.8 KG/M2 | DIASTOLIC BLOOD PRESSURE: 74 MMHG | HEART RATE: 70 BPM | SYSTOLIC BLOOD PRESSURE: 120 MMHG

## 2021-01-01 VITALS
RESPIRATION RATE: 20 BRPM | HEART RATE: 60 BPM | SYSTOLIC BLOOD PRESSURE: 114 MMHG | DIASTOLIC BLOOD PRESSURE: 75 MMHG | OXYGEN SATURATION: 100 % | TEMPERATURE: 99 F

## 2021-01-01 VITALS
RESPIRATION RATE: 20 BRPM | HEART RATE: 110 BPM | SYSTOLIC BLOOD PRESSURE: 132 MMHG | TEMPERATURE: 98 F | DIASTOLIC BLOOD PRESSURE: 95 MMHG | BODY MASS INDEX: 28 KG/M2 | OXYGEN SATURATION: 98 % | WEIGHT: 240 LBS

## 2021-01-01 VITALS
SYSTOLIC BLOOD PRESSURE: 117 MMHG | DIASTOLIC BLOOD PRESSURE: 81 MMHG | OXYGEN SATURATION: 98 % | RESPIRATION RATE: 20 BRPM | HEART RATE: 119 BPM | TEMPERATURE: 99 F

## 2021-01-01 VITALS
HEART RATE: 103 BPM | RESPIRATION RATE: 18 BRPM | DIASTOLIC BLOOD PRESSURE: 100 MMHG | TEMPERATURE: 99 F | OXYGEN SATURATION: 94 % | BODY MASS INDEX: 28.72 KG/M2 | WEIGHT: 248.19 LBS | HEIGHT: 77.99 IN | SYSTOLIC BLOOD PRESSURE: 152 MMHG

## 2021-01-01 VITALS
WEIGHT: 233 LBS | RESPIRATION RATE: 16 BRPM | DIASTOLIC BLOOD PRESSURE: 80 MMHG | HEART RATE: 100 BPM | SYSTOLIC BLOOD PRESSURE: 114 MMHG | BODY MASS INDEX: 27 KG/M2 | OXYGEN SATURATION: 99 %

## 2021-01-01 VITALS
RESPIRATION RATE: 18 BRPM | OXYGEN SATURATION: 98 % | HEIGHT: 78 IN | HEART RATE: 91 BPM | TEMPERATURE: 99 F | BODY MASS INDEX: 27.77 KG/M2 | DIASTOLIC BLOOD PRESSURE: 96 MMHG | WEIGHT: 240 LBS | SYSTOLIC BLOOD PRESSURE: 141 MMHG

## 2021-01-01 VITALS
OXYGEN SATURATION: 95 % | RESPIRATION RATE: 20 BRPM | TEMPERATURE: 98 F | DIASTOLIC BLOOD PRESSURE: 89 MMHG | SYSTOLIC BLOOD PRESSURE: 120 MMHG | HEART RATE: 102 BPM

## 2021-01-01 VITALS
WEIGHT: 238.19 LBS | TEMPERATURE: 98 F | RESPIRATION RATE: 20 BRPM | OXYGEN SATURATION: 96 % | SYSTOLIC BLOOD PRESSURE: 133 MMHG | DIASTOLIC BLOOD PRESSURE: 88 MMHG | HEART RATE: 108 BPM | BODY MASS INDEX: 28 KG/M2

## 2021-01-01 VITALS
WEIGHT: 226.81 LBS | OXYGEN SATURATION: 96 % | HEIGHT: 77.99 IN | TEMPERATURE: 97 F | HEART RATE: 116 BPM | BODY MASS INDEX: 26.24 KG/M2 | RESPIRATION RATE: 16 BRPM | DIASTOLIC BLOOD PRESSURE: 89 MMHG | SYSTOLIC BLOOD PRESSURE: 128 MMHG

## 2021-01-01 VITALS
BODY MASS INDEX: 27 KG/M2 | WEIGHT: 233 LBS | HEART RATE: 70 BPM | DIASTOLIC BLOOD PRESSURE: 66 MMHG | SYSTOLIC BLOOD PRESSURE: 108 MMHG

## 2021-01-01 VITALS
HEIGHT: 77.99 IN | TEMPERATURE: 99 F | SYSTOLIC BLOOD PRESSURE: 130 MMHG | BODY MASS INDEX: 26.98 KG/M2 | HEART RATE: 116 BPM | OXYGEN SATURATION: 96 % | RESPIRATION RATE: 16 BRPM | WEIGHT: 233.19 LBS | DIASTOLIC BLOOD PRESSURE: 67 MMHG

## 2021-01-01 VITALS
DIASTOLIC BLOOD PRESSURE: 82 MMHG | RESPIRATION RATE: 16 BRPM | HEART RATE: 84 BPM | BODY MASS INDEX: 26.96 KG/M2 | HEIGHT: 78 IN | WEIGHT: 233 LBS | SYSTOLIC BLOOD PRESSURE: 128 MMHG

## 2021-01-01 VITALS
OXYGEN SATURATION: 100 % | BODY MASS INDEX: 29 KG/M2 | SYSTOLIC BLOOD PRESSURE: 126 MMHG | RESPIRATION RATE: 20 BRPM | HEART RATE: 66 BPM | TEMPERATURE: 98 F | DIASTOLIC BLOOD PRESSURE: 90 MMHG | WEIGHT: 254 LBS

## 2021-01-01 DIAGNOSIS — J90 PLEURAL EFFUSION: Primary | ICD-10-CM

## 2021-01-01 DIAGNOSIS — K21.9 GASTROESOPHAGEAL REFLUX DISEASE WITHOUT ESOPHAGITIS: Primary | ICD-10-CM

## 2021-01-01 DIAGNOSIS — C77.2 SECONDARY MALIGNANT NEOPLASM OF RETROPERITONEAL LYMPH NODES (HCC): ICD-10-CM

## 2021-01-01 DIAGNOSIS — C16.0 GE JUNCTION CARCINOMA (HCC): Primary | ICD-10-CM

## 2021-01-01 DIAGNOSIS — T17.908D ASPIRATION INTO AIRWAY, SUBSEQUENT ENCOUNTER: ICD-10-CM

## 2021-01-01 DIAGNOSIS — C77.0 SECONDARY MALIGNANT NEOPLASM OF SUPRACLAVICULAR LYMPH NODE (HCC): ICD-10-CM

## 2021-01-01 DIAGNOSIS — D64.9 ANEMIA, UNSPECIFIED TYPE: ICD-10-CM

## 2021-01-01 DIAGNOSIS — M79.601 BILATERAL ARM PAIN: ICD-10-CM

## 2021-01-01 DIAGNOSIS — M79.602 BILATERAL ARM PAIN: ICD-10-CM

## 2021-01-01 DIAGNOSIS — C16.0 GE JUNCTION CARCINOMA (HCC): ICD-10-CM

## 2021-01-01 DIAGNOSIS — T45.1X5A CINV (CHEMOTHERAPY-INDUCED NAUSEA AND VOMITING): Primary | ICD-10-CM

## 2021-01-01 DIAGNOSIS — R11.2 CINV (CHEMOTHERAPY-INDUCED NAUSEA AND VOMITING): Primary | ICD-10-CM

## 2021-01-01 DIAGNOSIS — J38.00 PARALYSIS OF LARYNX: ICD-10-CM

## 2021-01-01 DIAGNOSIS — C79.9 METASTATIC MALIGNANT NEOPLASM, UNSPECIFIED SITE (HCC): ICD-10-CM

## 2021-01-01 DIAGNOSIS — C15.5 MALIGNANT NEOPLASM OF LOWER THIRD OF ESOPHAGUS (HCC): ICD-10-CM

## 2021-01-01 DIAGNOSIS — R11.0 NAUSEA: ICD-10-CM

## 2021-01-01 DIAGNOSIS — J38.00 VOCAL CORD PARALYSIS: ICD-10-CM

## 2021-01-01 DIAGNOSIS — J90 PLEURAL EFFUSION, LEFT: ICD-10-CM

## 2021-01-01 DIAGNOSIS — G89.3 NEOPLASM RELATED PAIN: ICD-10-CM

## 2021-01-01 DIAGNOSIS — R06.02 SHORTNESS OF BREATH: Primary | ICD-10-CM

## 2021-01-01 DIAGNOSIS — Z98.890 S/P INSERTION OF INTRATHECAL PUMP: ICD-10-CM

## 2021-01-01 DIAGNOSIS — T45.1X5A CINV (CHEMOTHERAPY-INDUCED NAUSEA AND VOMITING): ICD-10-CM

## 2021-01-01 DIAGNOSIS — R49.0 HOARSENESS: ICD-10-CM

## 2021-01-01 DIAGNOSIS — Z11.59 ENCOUNTER FOR SCREENING FOR OTHER VIRAL DISEASES: ICD-10-CM

## 2021-01-01 DIAGNOSIS — Z15.01 MONOALLELIC MUTATION OF ATM GENE: ICD-10-CM

## 2021-01-01 DIAGNOSIS — R53.1 GENERAL WEAKNESS: ICD-10-CM

## 2021-01-01 DIAGNOSIS — Z15.09 MONOALLELIC MUTATION OF ATM GENE: ICD-10-CM

## 2021-01-01 DIAGNOSIS — R11.2 CINV (CHEMOTHERAPY-INDUCED NAUSEA AND VOMITING): ICD-10-CM

## 2021-01-01 DIAGNOSIS — J90 PLEURAL EFFUSION: ICD-10-CM

## 2021-01-01 DIAGNOSIS — G89.3 CANCER ASSOCIATED PAIN: Primary | ICD-10-CM

## 2021-01-01 DIAGNOSIS — Z15.89 MONOALLELIC MUTATION OF ATM GENE: ICD-10-CM

## 2021-01-01 DIAGNOSIS — Z15.01 MONOALLELIC MUTATION OF ATM GENE: Primary | ICD-10-CM

## 2021-01-01 DIAGNOSIS — R11.2 CHEMOTHERAPY-INDUCED NAUSEA AND VOMITING: ICD-10-CM

## 2021-01-01 DIAGNOSIS — R06.00 DYSPNEA ON EXERTION: ICD-10-CM

## 2021-01-01 DIAGNOSIS — R17 JAUNDICE: ICD-10-CM

## 2021-01-01 DIAGNOSIS — Z15.89 MONOALLELIC MUTATION OF ATM GENE: Primary | ICD-10-CM

## 2021-01-01 DIAGNOSIS — N17.9 AKI (ACUTE KIDNEY INJURY) (HCC): ICD-10-CM

## 2021-01-01 DIAGNOSIS — M54.9 BACK PAIN: ICD-10-CM

## 2021-01-01 DIAGNOSIS — I26.99 OTHER ACUTE PULMONARY EMBOLISM WITHOUT ACUTE COR PULMONALE (HCC): Primary | ICD-10-CM

## 2021-01-01 DIAGNOSIS — G89.3 CHRONIC PAIN DUE TO MALIGNANT NEOPLASTIC DISEASE: ICD-10-CM
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
## 2021-01-01 DIAGNOSIS — Z51.5 PALLIATIVE CARE ENCOUNTER: ICD-10-CM

## 2021-01-01 DIAGNOSIS — Z01.818 PREOP EXAMINATION: Primary | ICD-10-CM

## 2021-01-01 DIAGNOSIS — K21.9 GASTROESOPHAGEAL REFLUX DISEASE WITHOUT ESOPHAGITIS: ICD-10-CM

## 2021-01-01 DIAGNOSIS — I26.99 BILATERAL PULMONARY EMBOLISM (HCC): ICD-10-CM

## 2021-01-01 DIAGNOSIS — Z51.11 ENCOUNTER FOR CHEMOTHERAPY MANAGEMENT: Primary | ICD-10-CM

## 2021-01-01 DIAGNOSIS — N45.1 EPIDIDYMITIS, LEFT: ICD-10-CM

## 2021-01-01 DIAGNOSIS — J90 LARGE PLEURAL EFFUSION: ICD-10-CM

## 2021-01-01 DIAGNOSIS — D69.6 THROMBOCYTOPENIA (HCC): ICD-10-CM

## 2021-01-01 DIAGNOSIS — R60.9 EDEMA, UNSPECIFIED TYPE: ICD-10-CM

## 2021-01-01 DIAGNOSIS — Z71.9 ENCOUNTER FOR HEALTH EDUCATION: ICD-10-CM

## 2021-01-01 DIAGNOSIS — C15.9 ESOPHAGEAL CARCINOMA (HCC): ICD-10-CM

## 2021-01-01 DIAGNOSIS — T45.1X5A CHEMOTHERAPY-INDUCED NAUSEA AND VOMITING: ICD-10-CM

## 2021-01-01 DIAGNOSIS — Z98.890 S/P INSERTION OF INTRATHECAL PUMP: Primary | ICD-10-CM

## 2021-01-01 DIAGNOSIS — Z71.89 GOALS OF CARE, COUNSELING/DISCUSSION: ICD-10-CM

## 2021-01-01 DIAGNOSIS — I82.4Y3 ACUTE DEEP VEIN THROMBOSIS (DVT) OF PROXIMAL VEIN OF BOTH LOWER EXTREMITIES (HCC): ICD-10-CM

## 2021-01-01 DIAGNOSIS — Z15.09 MONOALLELIC MUTATION OF ATM GENE: Primary | ICD-10-CM

## 2021-01-01 LAB
ALBUMIN SERPL-MCNC: 2.4 G/DL (ref 3.4–5)
ALBUMIN SERPL-MCNC: 2.7 G/DL (ref 3.4–5)
ALBUMIN SERPL-MCNC: 2.7 G/DL (ref 3.4–5)
ALBUMIN SERPL-MCNC: 2.9 G/DL (ref 3.4–5)
ALBUMIN SERPL-MCNC: 3 G/DL (ref 3.4–5)
ALBUMIN/GLOB SERPL: 0.5 {RATIO} (ref 1–2)
ALBUMIN/GLOB SERPL: 0.6 {RATIO} (ref 1–2)
ALBUMIN/GLOB SERPL: 0.7 {RATIO} (ref 1–2)
ALP LIVER SERPL-CCNC: 124 U/L
ALP LIVER SERPL-CCNC: 127 U/L
ALP LIVER SERPL-CCNC: 132 U/L
ALP LIVER SERPL-CCNC: 134 U/L
ALP LIVER SERPL-CCNC: 143 U/L
ALT SERPL-CCNC: 12 U/L
ALT SERPL-CCNC: 13 U/L
ALT SERPL-CCNC: 14 U/L
ALT SERPL-CCNC: 14 U/L
ALT SERPL-CCNC: 16 U/L
ANION GAP SERPL CALC-SCNC: 4 MMOL/L (ref 0–18)
ANION GAP SERPL CALC-SCNC: 6 MMOL/L (ref 0–18)
ANION GAP SERPL CALC-SCNC: 6 MMOL/L (ref 0–18)
ANION GAP SERPL CALC-SCNC: 7 MMOL/L (ref 0–18)
APTT PPP: 108.9 SECONDS (ref 25.4–36.1)
APTT PPP: 74.7 SECONDS (ref 25.4–36.1)
APTT PPP: >300 SECONDS (ref 25.4–36.1)
AST SERPL-CCNC: 15 U/L (ref 15–37)
AST SERPL-CCNC: 16 U/L (ref 15–37)
AST SERPL-CCNC: 19 U/L (ref 15–37)
AST SERPL-CCNC: 22 U/L (ref 15–37)
AST SERPL-CCNC: 23 U/L (ref 15–37)
BASOPHILS # BLD AUTO: 0.02 X10(3) UL (ref 0–0.2)
BASOPHILS # BLD AUTO: 0.03 X10(3) UL (ref 0–0.2)
BASOPHILS # BLD AUTO: 0.04 X10(3) UL (ref 0–0.2)
BASOPHILS # BLD AUTO: 0.04 X10(3) UL (ref 0–0.2)
BASOPHILS # BLD AUTO: 0.05 X10(3) UL (ref 0–0.2)
BASOPHILS NFR BLD AUTO: 0.3 %
BASOPHILS NFR BLD AUTO: 0.6 %
BASOPHILS NFR BLD AUTO: 0.7 %
BILIRUB SERPL-MCNC: 0.3 MG/DL (ref 0.1–2)
BILIRUB SERPL-MCNC: 0.4 MG/DL (ref 0.1–2)
BILIRUB SERPL-MCNC: 0.5 MG/DL (ref 0.1–2)
BUN BLD-MCNC: 10 MG/DL (ref 7–18)
BUN BLD-MCNC: 14 MG/DL (ref 7–18)
BUN BLD-MCNC: 17 MG/DL (ref 7–18)
BUN BLD-MCNC: 17 MG/DL (ref 7–18)
BUN/CREAT SERPL: 10.7 (ref 10–20)
BUN/CREAT SERPL: 13.4 (ref 10–20)
BUN/CREAT SERPL: 13.6 (ref 10–20)
BUN/CREAT SERPL: 13.9 (ref 10–20)
BUN/CREAT SERPL: 16.3 (ref 10–20)
BUN/CREAT SERPL: 9.5 (ref 10–20)
CALCIUM BLD-MCNC: 8.2 MG/DL (ref 8.5–10.1)
CALCIUM BLD-MCNC: 8.4 MG/DL (ref 8.5–10.1)
CALCIUM BLD-MCNC: 8.5 MG/DL (ref 8.5–10.1)
CALCIUM BLD-MCNC: 9.1 MG/DL (ref 8.5–10.1)
CALCIUM BLD-MCNC: 9.2 MG/DL (ref 8.5–10.1)
CALCIUM BLD-MCNC: 9.2 MG/DL (ref 8.5–10.1)
CANCER AG19-9 SERPL-ACNC: ABNORMAL U/ML (ref ?–37)
CEA SERPL-MCNC: 3.7 NG/ML (ref ?–5)
CEA SERPL-MCNC: 4.4 NG/ML (ref ?–5)
CEA SERPL-MCNC: 6.9 NG/ML (ref ?–5)
CEA SERPL-MCNC: 7.8 NG/ML (ref ?–5)
CEA SERPL-MCNC: 9.3 NG/ML (ref ?–5)
CHLORIDE SERPL-SCNC: 101 MMOL/L (ref 98–112)
CHLORIDE SERPL-SCNC: 102 MMOL/L (ref 98–112)
CHLORIDE SERPL-SCNC: 103 MMOL/L (ref 98–112)
CHLORIDE SERPL-SCNC: 104 MMOL/L (ref 98–112)
CO2 SERPL-SCNC: 27 MMOL/L (ref 21–32)
CO2 SERPL-SCNC: 27 MMOL/L (ref 21–32)
CO2 SERPL-SCNC: 29 MMOL/L (ref 21–32)
CO2 SERPL-SCNC: 30 MMOL/L (ref 21–32)
CREAT BLD-MCNC: 0.72 MG/DL
CREAT BLD-MCNC: 0.86 MG/DL
CREAT BLD-MCNC: 1.03 MG/DL
CREAT BLD-MCNC: 1.27 MG/DL
CREAT BLD-MCNC: 1.47 MG/DL
CREAT BLD-MCNC: 1.59 MG/DL
CREAT BLD-MCNC: 1.6 MG/DL
D-DIMER: 1.66 UG/ML FEU (ref ?–0.5)
DEPRECATED RDW RBC AUTO: 43.2 FL (ref 35.1–46.3)
DEPRECATED RDW RBC AUTO: 44.9 FL (ref 35.1–46.3)
DEPRECATED RDW RBC AUTO: 45.9 FL (ref 35.1–46.3)
DEPRECATED RDW RBC AUTO: 52.4 FL (ref 35.1–46.3)
DEPRECATED RDW RBC AUTO: 64.8 FL (ref 35.1–46.3)
DEPRECATED RDW RBC AUTO: 65.1 FL (ref 35.1–46.3)
EOSINOPHIL # BLD AUTO: 0 X10(3) UL (ref 0–0.7)
EOSINOPHIL # BLD AUTO: 0.15 X10(3) UL (ref 0–0.7)
EOSINOPHIL # BLD AUTO: 0.16 X10(3) UL (ref 0–0.7)
EOSINOPHIL # BLD AUTO: 0.17 X10(3) UL (ref 0–0.7)
EOSINOPHIL # BLD AUTO: 0.21 X10(3) UL (ref 0–0.7)
EOSINOPHIL NFR BLD AUTO: 0 %
EOSINOPHIL NFR BLD AUTO: 2.3 %
EOSINOPHIL NFR BLD AUTO: 2.4 %
EOSINOPHIL NFR BLD AUTO: 2.9 %
EOSINOPHIL NFR BLD AUTO: 3 %
ERYTHROCYTE [DISTWIDTH] IN BLOOD BY AUTOMATED COUNT: 13.7 % (ref 11–15)
ERYTHROCYTE [DISTWIDTH] IN BLOOD BY AUTOMATED COUNT: 13.8 % (ref 11–15)
ERYTHROCYTE [DISTWIDTH] IN BLOOD BY AUTOMATED COUNT: 14 % (ref 11–15)
ERYTHROCYTE [DISTWIDTH] IN BLOOD BY AUTOMATED COUNT: 17.2 % (ref 11–15)
ERYTHROCYTE [DISTWIDTH] IN BLOOD BY AUTOMATED COUNT: 20.5 % (ref 11–15)
ERYTHROCYTE [DISTWIDTH] IN BLOOD BY AUTOMATED COUNT: 20.7 % (ref 11–15)
GLOBULIN PLAS-MCNC: 4.3 G/DL (ref 2.8–4.4)
GLOBULIN PLAS-MCNC: 4.4 G/DL (ref 2.8–4.4)
GLOBULIN PLAS-MCNC: 4.5 G/DL (ref 2.8–4.4)
GLOBULIN PLAS-MCNC: 4.8 G/DL (ref 2.8–4.4)
GLOBULIN PLAS-MCNC: 4.9 G/DL (ref 2.8–4.4)
GLUCOSE BLD-MCNC: 80 MG/DL (ref 70–99)
GLUCOSE BLD-MCNC: 85 MG/DL (ref 70–99)
GLUCOSE BLD-MCNC: 86 MG/DL (ref 70–99)
GLUCOSE BLD-MCNC: 86 MG/DL (ref 70–99)
GLUCOSE BLD-MCNC: 91 MG/DL (ref 70–99)
GLUCOSE BLD-MCNC: 95 MG/DL (ref 70–99)
GLUCOSE BLD-MCNC: 98 MG/DL (ref 70–99)
HAV IGM SER QL: 2 MG/DL (ref 1.6–2.6)
HCT VFR BLD AUTO: 28.7 %
HCT VFR BLD AUTO: 29.7 %
HCT VFR BLD AUTO: 30.6 %
HCT VFR BLD AUTO: 31.2 %
HCT VFR BLD AUTO: 33.6 %
HCT VFR BLD AUTO: 35.1 %
HGB BLD-MCNC: 10 G/DL
HGB BLD-MCNC: 10.6 G/DL
HGB BLD-MCNC: 11.2 G/DL
HGB BLD-MCNC: 9 G/DL
HGB BLD-MCNC: 9.2 G/DL
HGB BLD-MCNC: 9.7 G/DL
IMM GRANULOCYTES # BLD AUTO: 0.01 X10(3) UL (ref 0–1)
IMM GRANULOCYTES # BLD AUTO: 0.02 X10(3) UL (ref 0–1)
IMM GRANULOCYTES # BLD AUTO: 0.03 X10(3) UL (ref 0–1)
IMM GRANULOCYTES NFR BLD: 0.1 %
IMM GRANULOCYTES NFR BLD: 0.3 %
IMM GRANULOCYTES NFR BLD: 0.3 %
IMM GRANULOCYTES NFR BLD: 0.4 %
IMM GRANULOCYTES NFR BLD: 0.4 %
INR BLD: 1.34 (ref 0.89–1.11)
ISTAT BUN: 17 MG/DL (ref 7–18)
ISTAT CHLORIDE: 97 MMOL/L (ref 98–112)
ISTAT HEMATOCRIT: 30 %
ISTAT IONIZED CALCIUM FOR CHEM 8: 1.17 MMOL/L (ref 1.12–1.32)
ISTAT POTASSIUM: 3.6 MMOL/L (ref 3.6–5.1)
ISTAT SODIUM: 135 MMOL/L (ref 136–145)
ISTAT TCO2: 27 MMOL/L (ref 21–32)
LYMPHOCYTES # BLD AUTO: 0.38 X10(3) UL (ref 1–4)
LYMPHOCYTES # BLD AUTO: 0.45 X10(3) UL (ref 1–4)
LYMPHOCYTES # BLD AUTO: 0.73 X10(3) UL (ref 1–4)
LYMPHOCYTES # BLD AUTO: 0.79 X10(3) UL (ref 1–4)
LYMPHOCYTES # BLD AUTO: 0.81 X10(3) UL (ref 1–4)
LYMPHOCYTES NFR BLD AUTO: 10.1 %
LYMPHOCYTES NFR BLD AUTO: 11.3 %
LYMPHOCYTES NFR BLD AUTO: 11.6 %
LYMPHOCYTES NFR BLD AUTO: 5.9 %
LYMPHOCYTES NFR BLD AUTO: 8.9 %
M PROTEIN MFR SERPL ELPH: 6.9 G/DL (ref 6.4–8.2)
M PROTEIN MFR SERPL ELPH: 7 G/DL (ref 6.4–8.2)
M PROTEIN MFR SERPL ELPH: 7.3 G/DL (ref 6.4–8.2)
M PROTEIN MFR SERPL ELPH: 7.6 G/DL (ref 6.4–8.2)
M PROTEIN MFR SERPL ELPH: 7.8 G/DL (ref 6.4–8.2)
MCH RBC QN AUTO: 27.6 PG (ref 26–34)
MCH RBC QN AUTO: 28 PG (ref 26–34)
MCH RBC QN AUTO: 28 PG (ref 26–34)
MCH RBC QN AUTO: 28.3 PG (ref 26–34)
MCH RBC QN AUTO: 28.5 PG (ref 26–34)
MCH RBC QN AUTO: 28.9 PG (ref 26–34)
MCHC RBC AUTO-ENTMCNC: 31 G/DL (ref 31–37)
MCHC RBC AUTO-ENTMCNC: 31.4 G/DL (ref 31–37)
MCHC RBC AUTO-ENTMCNC: 31.5 G/DL (ref 31–37)
MCHC RBC AUTO-ENTMCNC: 31.7 G/DL (ref 31–37)
MCHC RBC AUTO-ENTMCNC: 31.9 G/DL (ref 31–37)
MCHC RBC AUTO-ENTMCNC: 32.1 G/DL (ref 31–37)
MCV RBC AUTO: 86.2 FL
MCV RBC AUTO: 88.7 FL
MCV RBC AUTO: 90 FL
MCV RBC AUTO: 90.3 FL
MCV RBC AUTO: 90.5 FL
MCV RBC AUTO: 90.5 FL
MONOCYTES # BLD AUTO: 0.44 X10(3) UL (ref 0.1–1)
MONOCYTES # BLD AUTO: 0.59 X10(3) UL (ref 0.1–1)
MONOCYTES # BLD AUTO: 0.6 X10(3) UL (ref 0.1–1)
MONOCYTES # BLD AUTO: 0.68 X10(3) UL (ref 0.1–1)
MONOCYTES # BLD AUTO: 0.7 X10(3) UL (ref 0.1–1)
MONOCYTES NFR BLD AUTO: 10 %
MONOCYTES NFR BLD AUTO: 11.8 %
MONOCYTES NFR BLD AUTO: 6.1 %
MONOCYTES NFR BLD AUTO: 9.2 %
MONOCYTES NFR BLD AUTO: 9.8 %
NEUTROPHILS # BLD AUTO: 3.82 X10 (3) UL (ref 1.5–7.7)
NEUTROPHILS # BLD AUTO: 3.82 X10(3) UL (ref 1.5–7.7)
NEUTROPHILS # BLD AUTO: 5.26 X10 (3) UL (ref 1.5–7.7)
NEUTROPHILS # BLD AUTO: 5.26 X10(3) UL (ref 1.5–7.7)
NEUTROPHILS # BLD AUTO: 5.3 X10 (3) UL (ref 1.5–7.7)
NEUTROPHILS # BLD AUTO: 5.3 X10(3) UL (ref 1.5–7.7)
NEUTROPHILS # BLD AUTO: 5.4 X10 (3) UL (ref 1.5–7.7)
NEUTROPHILS # BLD AUTO: 5.4 X10(3) UL (ref 1.5–7.7)
NEUTROPHILS # BLD AUTO: 5.76 X10 (3) UL (ref 1.5–7.7)
NEUTROPHILS # BLD AUTO: 5.76 X10(3) UL (ref 1.5–7.7)
NEUTROPHILS NFR BLD AUTO: 75.3 %
NEUTROPHILS NFR BLD AUTO: 75.5 %
NEUTROPHILS NFR BLD AUTO: 75.5 %
NEUTROPHILS NFR BLD AUTO: 79.8 %
NEUTROPHILS NFR BLD AUTO: 84.3 %
NT-PROBNP SERPL-MCNC: 389 PG/ML (ref ?–125)
OSMOLALITY SERPL CALC.SUM OF ELEC: 281 MOSM/KG (ref 275–295)
OSMOLALITY SERPL CALC.SUM OF ELEC: 282 MOSM/KG (ref 275–295)
OSMOLALITY SERPL CALC.SUM OF ELEC: 282 MOSM/KG (ref 275–295)
OSMOLALITY SERPL CALC.SUM OF ELEC: 283 MOSM/KG (ref 275–295)
OSMOLALITY SERPL CALC.SUM OF ELEC: 285 MOSM/KG (ref 275–295)
OSMOLALITY SERPL CALC.SUM OF ELEC: 288 MOSM/KG (ref 275–295)
PATIENT FASTING Y/N/NP: NO
PLATELET # BLD AUTO: 212 10(3)UL (ref 150–450)
PLATELET # BLD AUTO: 238 10(3)UL (ref 150–450)
PLATELET # BLD AUTO: 244 10(3)UL (ref 150–450)
PLATELET # BLD AUTO: 247 10(3)UL (ref 150–450)
PLATELET # BLD AUTO: 259 10(3)UL (ref 150–450)
PLATELET # BLD AUTO: 284 10(3)UL (ref 150–450)
POTASSIUM SERPL-SCNC: 3.7 MMOL/L (ref 3.5–5.1)
POTASSIUM SERPL-SCNC: 3.7 MMOL/L (ref 3.5–5.1)
POTASSIUM SERPL-SCNC: 3.8 MMOL/L (ref 3.5–5.1)
POTASSIUM SERPL-SCNC: 4 MMOL/L (ref 3.5–5.1)
POTASSIUM SERPL-SCNC: 4 MMOL/L (ref 3.5–5.1)
POTASSIUM SERPL-SCNC: 4.2 MMOL/L (ref 3.5–5.1)
PSA SERPL DL<=0.01 NG/ML-MCNC: 17 SECONDS (ref 12.4–14.6)
RBC # BLD AUTO: 3.18 X10(6)UL
RBC # BLD AUTO: 3.28 X10(6)UL
RBC # BLD AUTO: 3.4 X10(6)UL
RBC # BLD AUTO: 3.62 X10(6)UL
RBC # BLD AUTO: 3.79 X10(6)UL
RBC # BLD AUTO: 3.88 X10(6)UL
SARS-COV-2 RNA RESP QL NAA+PROBE: NOT DETECTED
SODIUM SERPL-SCNC: 135 MMOL/L (ref 136–145)
SODIUM SERPL-SCNC: 136 MMOL/L (ref 136–145)
SODIUM SERPL-SCNC: 136 MMOL/L (ref 136–145)
SODIUM SERPL-SCNC: 137 MMOL/L (ref 136–145)
SODIUM SERPL-SCNC: 137 MMOL/L (ref 136–145)
SODIUM SERPL-SCNC: 139 MMOL/L (ref 136–145)
TSI SER-ACNC: 0.63 MIU/ML (ref 0.36–3.74)
WBC # BLD AUTO: 5.1 X10(3) UL (ref 4–11)
WBC # BLD AUTO: 5.1 X10(3) UL (ref 4–11)
WBC # BLD AUTO: 6.4 X10(3) UL (ref 4–11)
WBC # BLD AUTO: 7 X10(3) UL (ref 4–11)
WBC # BLD AUTO: 7 X10(3) UL (ref 4–11)
WBC # BLD AUTO: 7.2 X10(3) UL (ref 4–11)

## 2021-01-01 PROCEDURE — 71045 X-RAY EXAM CHEST 1 VIEW: CPT | Performed by: EMERGENCY MEDICINE

## 2021-01-01 PROCEDURE — 77387 GUIDANCE FOR RADJ TX DLVR: CPT | Performed by: INTERNAL MEDICINE

## 2021-01-01 PROCEDURE — 96374 THER/PROPH/DIAG INJ IV PUSH: CPT

## 2021-01-01 PROCEDURE — 77412 RADIATION TX DELIVERY LVL 3: CPT | Performed by: INTERNAL MEDICINE

## 2021-01-01 PROCEDURE — 99223 1ST HOSP IP/OBS HIGH 75: CPT | Performed by: STUDENT IN AN ORGANIZED HEALTH CARE EDUCATION/TRAINING PROGRAM

## 2021-01-01 PROCEDURE — 80053 COMPREHEN METABOLIC PANEL: CPT

## 2021-01-01 PROCEDURE — 0W9B30Z DRAINAGE OF LEFT PLEURAL CAVITY WITH DRAINAGE DEVICE, PERCUTANEOUS APPROACH: ICD-10-PCS | Performed by: INTERNAL MEDICINE

## 2021-01-01 PROCEDURE — 83615 LACTATE (LD) (LDH) ENZYME: CPT | Performed by: INTERNAL MEDICINE

## 2021-01-01 PROCEDURE — 99239 HOSP IP/OBS DSCHRG MGMT >30: CPT | Performed by: HOSPITALIST

## 2021-01-01 PROCEDURE — 99252 IP/OBS CONSLTJ NEW/EST SF 35: CPT | Performed by: NURSE PRACTITIONER

## 2021-01-01 PROCEDURE — 82042 OTHER SOURCE ALBUMIN QUAN EA: CPT | Performed by: INTERNAL MEDICINE

## 2021-01-01 PROCEDURE — 71045 X-RAY EXAM CHEST 1 VIEW: CPT | Performed by: INTERNAL MEDICINE

## 2021-01-01 PROCEDURE — 77334 RADIATION TREATMENT AID(S): CPT | Performed by: INTERNAL MEDICINE

## 2021-01-01 PROCEDURE — 85014 HEMATOCRIT: CPT | Performed by: INTERNAL MEDICINE

## 2021-01-01 PROCEDURE — 88341 IMHCHEM/IMCYTCHM EA ADD ANTB: CPT | Performed by: INTERNAL MEDICINE

## 2021-01-01 PROCEDURE — 88108 CYTOPATH CONCENTRATE TECH: CPT | Performed by: INTERNAL MEDICINE

## 2021-01-01 PROCEDURE — 99231 SBSQ HOSP IP/OBS SF/LOW 25: CPT | Performed by: HOSPITALIST

## 2021-01-01 PROCEDURE — 99232 SBSQ HOSP IP/OBS MODERATE 35: CPT | Performed by: INTERNAL MEDICINE

## 2021-01-01 PROCEDURE — 93971 EXTREMITY STUDY: CPT | Performed by: RADIOLOGY

## 2021-01-01 PROCEDURE — 36591 DRAW BLOOD OFF VENOUS DEVICE: CPT

## 2021-01-01 PROCEDURE — 3074F SYST BP LT 130 MM HG: CPT | Performed by: ANESTHESIOLOGY

## 2021-01-01 PROCEDURE — 74230 X-RAY XM SWLNG FUNCJ C+: CPT | Performed by: HOSPITALIST

## 2021-01-01 PROCEDURE — 30233N1 TRANSFUSION OF NONAUTOLOGOUS RED BLOOD CELLS INTO PERIPHERAL VEIN, PERCUTANEOUS APPROACH: ICD-10-PCS | Performed by: HOSPITALIST

## 2021-01-01 PROCEDURE — 3008F BODY MASS INDEX DOCD: CPT | Performed by: PHYSICIAN ASSISTANT

## 2021-01-01 PROCEDURE — 77336 RADIATION PHYSICS CONSULT: CPT | Performed by: INTERNAL MEDICINE

## 2021-01-01 PROCEDURE — 3008F BODY MASS INDEX DOCD: CPT | Performed by: ANESTHESIOLOGY

## 2021-01-01 PROCEDURE — 3079F DIAST BP 80-89 MM HG: CPT | Performed by: ANESTHESIOLOGY

## 2021-01-01 PROCEDURE — 99255 IP/OBS CONSLTJ NEW/EST HI 80: CPT | Performed by: INTERNAL MEDICINE

## 2021-01-01 PROCEDURE — 89050 BODY FLUID CELL COUNT: CPT | Performed by: INTERNAL MEDICINE

## 2021-01-01 PROCEDURE — 93306 TTE W/DOPPLER COMPLETE: CPT | Performed by: INTERNAL MEDICINE

## 2021-01-01 PROCEDURE — 82378 CARCINOEMBRYONIC ANTIGEN: CPT

## 2021-01-01 PROCEDURE — 32555 ASPIRATE PLEURA W/ IMAGING: CPT | Performed by: INTERNAL MEDICINE

## 2021-01-01 PROCEDURE — 99024 POSTOP FOLLOW-UP VISIT: CPT | Performed by: PHYSICIAN ASSISTANT

## 2021-01-01 PROCEDURE — 99024 POSTOP FOLLOW-UP VISIT: CPT | Performed by: NEUROLOGICAL SURGERY

## 2021-01-01 PROCEDURE — 36415 COLL VENOUS BLD VENIPUNCTURE: CPT

## 2021-01-01 PROCEDURE — 85025 COMPLETE CBC W/AUTO DIFF WBC: CPT

## 2021-01-01 PROCEDURE — 99215 OFFICE O/P EST HI 40 MIN: CPT | Performed by: CLINICAL NURSE SPECIALIST

## 2021-01-01 PROCEDURE — 88342 IMHCHEM/IMCYTCHM 1ST ANTB: CPT | Performed by: INTERNAL MEDICINE

## 2021-01-01 PROCEDURE — 99233 SBSQ HOSP IP/OBS HIGH 50: CPT | Performed by: HOSPITALIST

## 2021-01-01 PROCEDURE — 3078F DIAST BP <80 MM HG: CPT | Performed by: ANESTHESIOLOGY

## 2021-01-01 PROCEDURE — 87205 SMEAR GRAM STAIN: CPT | Performed by: INTERNAL MEDICINE

## 2021-01-01 PROCEDURE — 99233 SBSQ HOSP IP/OBS HIGH 50: CPT | Performed by: ANESTHESIOLOGY

## 2021-01-01 PROCEDURE — 99213 OFFICE O/P EST LOW 20 MIN: CPT | Performed by: ANESTHESIOLOGY

## 2021-01-01 PROCEDURE — 3075F SYST BP GE 130 - 139MM HG: CPT | Performed by: ANESTHESIOLOGY

## 2021-01-01 PROCEDURE — 99215 OFFICE O/P EST HI 40 MIN: CPT | Performed by: INTERNAL MEDICINE

## 2021-01-01 PROCEDURE — 77300 RADIATION THERAPY DOSE PLAN: CPT | Performed by: INTERNAL MEDICINE

## 2021-01-01 PROCEDURE — 86301 IMMUNOASSAY TUMOR CA 19-9: CPT

## 2021-01-01 PROCEDURE — 3074F SYST BP LT 130 MM HG: CPT | Performed by: NEUROLOGICAL SURGERY

## 2021-01-01 PROCEDURE — 93970 EXTREMITY STUDY: CPT | Performed by: INTERNAL MEDICINE

## 2021-01-01 PROCEDURE — 99214 OFFICE O/P EST MOD 30 MIN: CPT | Performed by: ANESTHESIOLOGY

## 2021-01-01 PROCEDURE — 93971 EXTREMITY STUDY: CPT | Performed by: STUDENT IN AN ORGANIZED HEALTH CARE EDUCATION/TRAINING PROGRAM

## 2021-01-01 PROCEDURE — 71047 X-RAY EXAM CHEST 3 VIEWS: CPT | Performed by: CLINICAL NURSE SPECIALIST

## 2021-01-01 PROCEDURE — 80047 BASIC METABLC PNL IONIZED CA: CPT

## 2021-01-01 PROCEDURE — 77470 SPECIAL RADIATION TREATMENT: CPT | Performed by: INTERNAL MEDICINE

## 2021-01-01 PROCEDURE — 3078F DIAST BP <80 MM HG: CPT | Performed by: NEUROLOGICAL SURGERY

## 2021-01-01 PROCEDURE — 74177 CT ABD & PELVIS W/CONTRAST: CPT | Performed by: EMERGENCY MEDICINE

## 2021-01-01 PROCEDURE — 99231 SBSQ HOSP IP/OBS SF/LOW 25: CPT | Performed by: NURSE PRACTITIONER

## 2021-01-01 PROCEDURE — 96376 TX/PRO/DX INJ SAME DRUG ADON: CPT

## 2021-01-01 PROCEDURE — 89051 BODY FLUID CELL COUNT: CPT | Performed by: INTERNAL MEDICINE

## 2021-01-01 PROCEDURE — 99213 OFFICE O/P EST LOW 20 MIN: CPT | Performed by: INTERNAL MEDICINE

## 2021-01-01 PROCEDURE — 99223 1ST HOSP IP/OBS HIGH 75: CPT | Performed by: HOSPITALIST

## 2021-01-01 PROCEDURE — 99214 OFFICE O/P EST MOD 30 MIN: CPT | Performed by: INTERNAL MEDICINE

## 2021-01-01 PROCEDURE — 93971 EXTREMITY STUDY: CPT | Performed by: EMERGENCY MEDICINE

## 2021-01-01 PROCEDURE — 77331 SPECIAL RADIATION DOSIMETRY: CPT | Performed by: INTERNAL MEDICINE

## 2021-01-01 PROCEDURE — 77280 THER RAD SIMULAJ FIELD SMPL: CPT | Performed by: INTERNAL MEDICINE

## 2021-01-01 PROCEDURE — 99223 1ST HOSP IP/OBS HIGH 75: CPT | Performed by: ANESTHESIOLOGY

## 2021-01-01 PROCEDURE — 84157 ASSAY OF PROTEIN OTHER: CPT | Performed by: INTERNAL MEDICINE

## 2021-01-01 PROCEDURE — 96375 TX/PRO/DX INJ NEW DRUG ADDON: CPT

## 2021-01-01 PROCEDURE — 36593 DECLOT VASCULAR DEVICE: CPT

## 2021-01-01 PROCEDURE — 88305 TISSUE EXAM BY PATHOLOGIST: CPT | Performed by: INTERNAL MEDICINE

## 2021-01-01 PROCEDURE — 74177 CT ABD & PELVIS W/CONTRAST: CPT | Performed by: INTERNAL MEDICINE

## 2021-01-01 PROCEDURE — 62368 ANALYZE SP INF PUMP W/REPROG: CPT | Performed by: ANESTHESIOLOGY

## 2021-01-01 PROCEDURE — 84478 ASSAY OF TRIGLYCERIDES: CPT | Performed by: INTERNAL MEDICINE

## 2021-01-01 PROCEDURE — 99211 OFF/OP EST MAY X REQ PHY/QHP: CPT

## 2021-01-01 PROCEDURE — 99223 1ST HOSP IP/OBS HIGH 75: CPT | Performed by: INTERNAL MEDICINE

## 2021-01-01 PROCEDURE — 3074F SYST BP LT 130 MM HG: CPT | Performed by: PHYSICIAN ASSISTANT

## 2021-01-01 PROCEDURE — 82465 ASSAY BLD/SERUM CHOLESTEROL: CPT | Performed by: INTERNAL MEDICINE

## 2021-01-01 PROCEDURE — 82945 GLUCOSE OTHER FLUID: CPT | Performed by: INTERNAL MEDICINE

## 2021-01-01 PROCEDURE — 83735 ASSAY OF MAGNESIUM: CPT

## 2021-01-01 PROCEDURE — 71275 CT ANGIOGRAPHY CHEST: CPT | Performed by: EMERGENCY MEDICINE

## 2021-01-01 PROCEDURE — 77295 3-D RADIOTHERAPY PLAN: CPT | Performed by: INTERNAL MEDICINE

## 2021-01-01 PROCEDURE — 71260 CT THORAX DX C+: CPT | Performed by: INTERNAL MEDICINE

## 2021-01-01 PROCEDURE — 99239 HOSP IP/OBS DSCHRG MGMT >30: CPT | Performed by: INTERNAL MEDICINE

## 2021-01-01 PROCEDURE — 3078F DIAST BP <80 MM HG: CPT | Performed by: PHYSICIAN ASSISTANT

## 2021-01-01 PROCEDURE — 82800 BLOOD PH: CPT | Performed by: INTERNAL MEDICINE

## 2021-01-01 PROCEDURE — 0CJS8ZZ INSPECTION OF LARYNX, VIA NATURAL OR ARTIFICIAL OPENING ENDOSCOPIC: ICD-10-PCS | Performed by: OTOLARYNGOLOGY

## 2021-01-01 PROCEDURE — 99233 SBSQ HOSP IP/OBS HIGH 50: CPT | Performed by: INTERNAL MEDICINE

## 2021-01-01 PROCEDURE — 87070 CULTURE OTHR SPECIMN AEROBIC: CPT | Performed by: INTERNAL MEDICINE

## 2021-01-01 PROCEDURE — 99253 IP/OBS CNSLTJ NEW/EST LOW 45: CPT | Performed by: CLINICAL NURSE SPECIALIST

## 2021-01-01 DEVICE — PROLARYN PLUS IS A WATER BASED INJECTABLE GEL IMPLANT USED TO TREAT VOCAL FOLD INSUFFICIENCY. THE PRINCIPLE COMPONENT OF PROLARYN PLUS IS SYNTHETIC CALCIUM HYDROXYAPATITE, A BIOMATERIAL FOUND IN BONE AND TEETH. INJECTION WITH PROLARYN PLUS AUGMENTS OR BULKS UPS THE DISPLACED OR DAMAGED VOCAL FOLD SO THAT IT CAN IMPROVE SPEAKING. THE RESULT IS LONG TERM RESTORATION AND AUGMENTATION. PROLARYN PLUS CAN BE INJECTED WITH A 26 GAUGE OR LARGER DIAMETER THIN-WALL-NEEDLE.
Type: IMPLANTABLE DEVICE | Site: THROAT | Status: FUNCTIONAL
Brand: PROLARYN PLUS INJECTABLE IMPLANT

## 2021-01-01 RX ORDER — ONDANSETRON 2 MG/ML
8 INJECTION INTRAMUSCULAR; INTRAVENOUS ONCE
Status: CANCELLED
Start: 2021-01-01 | End: 2021-01-01

## 2021-01-01 RX ORDER — LORAZEPAM 2 MG/ML
1 INJECTION INTRAMUSCULAR ONCE
Status: CANCELLED
Start: 2021-01-01 | End: 2021-01-01

## 2021-01-01 RX ORDER — ALBUTEROL SULFATE 90 UG/1
1 AEROSOL, METERED RESPIRATORY (INHALATION) EVERY 4 HOURS PRN
Status: DISCONTINUED | OUTPATIENT
Start: 2021-01-01 | End: 2021-01-01

## 2021-01-01 RX ORDER — HYDROMORPHONE HYDROCHLORIDE 1 MG/ML
1 INJECTION, SOLUTION INTRAMUSCULAR; INTRAVENOUS; SUBCUTANEOUS ONCE
Status: COMPLETED | OUTPATIENT
Start: 2021-01-01 | End: 2021-01-01

## 2021-01-01 RX ORDER — HYDROMORPHONE HYDROCHLORIDE 1 MG/ML
2 INJECTION, SOLUTION INTRAMUSCULAR; INTRAVENOUS; SUBCUTANEOUS
Status: COMPLETED | OUTPATIENT
Start: 2021-01-01 | End: 2021-01-01

## 2021-01-01 RX ORDER — SODIUM CHLORIDE 30 MG/ML INHALATION SOLUTION 30 MG/ML
3 SOLUTION INHALANT
Status: DISCONTINUED | OUTPATIENT
Start: 2021-01-01 | End: 2021-01-01

## 2021-01-01 RX ORDER — HYDROMORPHONE HYDROCHLORIDE 1 MG/ML
1 INJECTION, SOLUTION INTRAMUSCULAR; INTRAVENOUS; SUBCUTANEOUS AS NEEDED
Status: DISCONTINUED | OUTPATIENT
Start: 2021-01-01 | End: 2021-01-01

## 2021-01-01 RX ORDER — PREDNISONE 1 MG/1
5 TABLET ORAL NIGHTLY
Status: DISCONTINUED | OUTPATIENT
Start: 2021-01-01 | End: 2021-01-01

## 2021-01-01 RX ORDER — CYCLOBENZAPRINE HCL 5 MG
5 TABLET ORAL 3 TIMES DAILY PRN
Status: DISCONTINUED | OUTPATIENT
Start: 2021-01-01 | End: 2021-01-01

## 2021-01-01 RX ORDER — LORAZEPAM 1 MG/1
2 TABLET ORAL EVERY 6 HOURS PRN
Status: DISCONTINUED | OUTPATIENT
Start: 2021-01-01 | End: 2021-01-01

## 2021-01-01 RX ORDER — GABAPENTIN 300 MG/1
300 CAPSULE ORAL NIGHTLY
Status: DISCONTINUED | OUTPATIENT
Start: 2021-01-01 | End: 2021-01-01

## 2021-01-01 RX ORDER — HYDROMORPHONE HYDROCHLORIDE 2 MG/1
TABLET ORAL EVERY 4 HOURS PRN
Status: DISCONTINUED | OUTPATIENT
Start: 2021-01-01 | End: 2021-01-01

## 2021-01-01 RX ORDER — SODIUM CHLORIDE 0.9 % (FLUSH) 0.9 %
10 SYRINGE (ML) INJECTION ONCE
Status: COMPLETED | OUTPATIENT
Start: 2021-01-01 | End: 2021-01-01

## 2021-01-01 RX ORDER — ESCITALOPRAM OXALATE 20 MG/1
20 TABLET ORAL DAILY
Refills: 11 | Status: DISCONTINUED | OUTPATIENT
Start: 2021-01-01 | End: 2021-01-01

## 2021-01-01 RX ORDER — OXYCODONE HYDROCHLORIDE AND ACETAMINOPHEN 5; 325 MG/1; MG/1
2 TABLET ORAL EVERY 4 HOURS PRN
Status: DISCONTINUED | OUTPATIENT
Start: 2021-01-01 | End: 2021-01-01

## 2021-01-01 RX ORDER — HYDROMORPHONE HYDROCHLORIDE 1 MG/ML
1 INJECTION, SOLUTION INTRAMUSCULAR; INTRAVENOUS; SUBCUTANEOUS AS NEEDED
Status: CANCELLED
Start: 2021-01-01

## 2021-01-01 RX ORDER — ALBUTEROL SULFATE 90 UG/1
1 AEROSOL, METERED RESPIRATORY (INHALATION) EVERY 4 HOURS PRN
Qty: 3 INHALER | Refills: 1 | Status: SHIPPED | OUTPATIENT
Start: 2021-01-01

## 2021-01-01 RX ORDER — LIDOCAINE HYDROCHLORIDE 10 MG/ML
INJECTION, SOLUTION EPIDURAL; INFILTRATION; INTRACAUDAL; PERINEURAL AS NEEDED
Status: DISCONTINUED | OUTPATIENT
Start: 2021-01-01 | End: 2021-01-01 | Stop reason: SURG

## 2021-01-01 RX ORDER — SODIUM CHLORIDE 0.9 % (FLUSH) 0.9 %
10 SYRINGE (ML) INJECTION ONCE
Status: CANCELLED | OUTPATIENT
Start: 2021-01-01

## 2021-01-01 RX ORDER — ONDANSETRON 2 MG/ML
4 INJECTION INTRAMUSCULAR; INTRAVENOUS EVERY 6 HOURS PRN
Status: DISCONTINUED | OUTPATIENT
Start: 2021-01-01 | End: 2021-01-01

## 2021-01-01 RX ORDER — PROCHLORPERAZINE EDISYLATE 5 MG/ML
5 INJECTION INTRAMUSCULAR; INTRAVENOUS EVERY 8 HOURS PRN
Status: DISCONTINUED | OUTPATIENT
Start: 2021-01-01 | End: 2021-01-01

## 2021-01-01 RX ORDER — ACETAMINOPHEN 500 MG
1000 TABLET ORAL ONCE AS NEEDED
Status: DISCONTINUED | OUTPATIENT
Start: 2021-01-01 | End: 2021-01-01 | Stop reason: HOSPADM

## 2021-01-01 RX ORDER — CITALOPRAM 40 MG/1
40 TABLET ORAL DAILY
Qty: 30 TABLET | Refills: 11 | Status: SHIPPED | OUTPATIENT
Start: 2021-01-01

## 2021-01-01 RX ORDER — HEPARIN SODIUM AND DEXTROSE 10000; 5 [USP'U]/100ML; G/100ML
INJECTION INTRAVENOUS CONTINUOUS
Status: DISCONTINUED | OUTPATIENT
Start: 2021-01-01 | End: 2021-01-01

## 2021-01-01 RX ORDER — TORSEMIDE 20 MG/1
20 TABLET ORAL DAILY
Qty: 30 TABLET | Refills: 3 | Status: SHIPPED | OUTPATIENT
Start: 2021-01-01

## 2021-01-01 RX ORDER — DIPHENOXYLATE HYDROCHLORIDE AND ATROPINE SULFATE 2.5; .025 MG/1; MG/1
44198 TABLET ORAL 4 TIMES DAILY PRN
Status: DISCONTINUED | OUTPATIENT
Start: 2021-01-01 | End: 2021-01-01 | Stop reason: SDUPTHER

## 2021-01-01 RX ORDER — HYDROMORPHONE HYDROCHLORIDE 1 MG/ML
2 INJECTION, SOLUTION INTRAMUSCULAR; INTRAVENOUS; SUBCUTANEOUS EVERY 30 MIN PRN
Status: DISCONTINUED | OUTPATIENT
Start: 2021-01-01 | End: 2021-01-01

## 2021-01-01 RX ORDER — ALBUTEROL SULFATE 2.5 MG/3ML
2.5 SOLUTION RESPIRATORY (INHALATION)
Status: DISCONTINUED | OUTPATIENT
Start: 2021-01-01 | End: 2021-01-01

## 2021-01-01 RX ORDER — POTASSIUM CHLORIDE 1.5 G/1.77G
20 POWDER, FOR SOLUTION ORAL DAILY
Qty: 30 PACKET | Refills: 0 | Status: SHIPPED | OUTPATIENT
Start: 2021-01-01

## 2021-01-01 RX ORDER — OXYCODONE HYDROCHLORIDE AND ACETAMINOPHEN 5; 325 MG/1; MG/1
1-2 TABLET ORAL EVERY 4 HOURS PRN
Status: DISCONTINUED | OUTPATIENT
Start: 2021-01-01 | End: 2021-01-01

## 2021-01-01 RX ORDER — DEXAMETHASONE SODIUM PHOSPHATE 4 MG/ML
VIAL (ML) INJECTION AS NEEDED
Status: DISCONTINUED | OUTPATIENT
Start: 2021-01-01 | End: 2021-01-01 | Stop reason: SURG

## 2021-01-01 RX ORDER — ALBUTEROL SULFATE 90 UG/1
1 AEROSOL, METERED RESPIRATORY (INHALATION) EVERY 4 HOURS PRN
Qty: 1 INHALER | Refills: 1 | Status: SHIPPED | OUTPATIENT
Start: 2021-01-01 | End: 2021-01-01

## 2021-01-01 RX ORDER — OXYCODONE HYDROCHLORIDE AND ACETAMINOPHEN 5; 325 MG/1; MG/1
1-2 TABLET ORAL EVERY 4 HOURS PRN
Qty: 150 TABLET | Refills: 0 | Status: SHIPPED | OUTPATIENT
Start: 2021-01-01

## 2021-01-01 RX ORDER — SCOLOPAMINE TRANSDERMAL SYSTEM 1 MG/1
1 PATCH, EXTENDED RELEASE TRANSDERMAL
Status: DISCONTINUED | OUTPATIENT
Start: 2021-01-01 | End: 2021-01-01

## 2021-01-01 RX ORDER — PANTOPRAZOLE SODIUM 40 MG/1
40 TABLET, DELAYED RELEASE ORAL
Refills: 0 | Status: DISCONTINUED | OUTPATIENT
Start: 2021-01-01 | End: 2021-01-01

## 2021-01-01 RX ORDER — BUSPIRONE HYDROCHLORIDE 5 MG/1
10 TABLET ORAL 3 TIMES DAILY
Status: DISCONTINUED | OUTPATIENT
Start: 2021-01-01 | End: 2021-01-01

## 2021-01-01 RX ORDER — HEPARIN SODIUM AND DEXTROSE 10000; 5 [USP'U]/100ML; G/100ML
18 INJECTION INTRAVENOUS ONCE
Status: COMPLETED | OUTPATIENT
Start: 2021-01-01 | End: 2021-01-01

## 2021-01-01 RX ORDER — HYDROMORPHONE HYDROCHLORIDE 2 MG/1
4 TABLET ORAL EVERY 4 HOURS PRN
Status: DISCONTINUED | OUTPATIENT
Start: 2021-01-01 | End: 2021-01-01

## 2021-01-01 RX ORDER — METOCLOPRAMIDE HYDROCHLORIDE 5 MG/ML
10 INJECTION INTRAMUSCULAR; INTRAVENOUS ONCE
Status: COMPLETED | OUTPATIENT
Start: 2021-01-01 | End: 2021-01-01

## 2021-01-01 RX ORDER — SODIUM CHLORIDE 9 MG/ML
INJECTION, SOLUTION INTRAVENOUS CONTINUOUS PRN
Status: DISCONTINUED | OUTPATIENT
Start: 2021-01-01 | End: 2021-01-01 | Stop reason: SURG

## 2021-01-01 RX ORDER — LOPERAMIDE HYDROCHLORIDE 2 MG/1
2 CAPSULE ORAL 4 TIMES DAILY PRN
Status: DISCONTINUED | OUTPATIENT
Start: 2021-01-01 | End: 2021-01-01

## 2021-01-01 RX ORDER — PROCHLORPERAZINE MALEATE 10 MG
10 TABLET ORAL EVERY 6 HOURS PRN
Status: DISCONTINUED | OUTPATIENT
Start: 2021-01-01 | End: 2021-01-01

## 2021-01-01 RX ORDER — LORAZEPAM 2 MG/ML
1 INJECTION INTRAMUSCULAR ONCE
Status: COMPLETED | OUTPATIENT
Start: 2021-01-01 | End: 2021-01-01

## 2021-01-01 RX ORDER — DIPHENOXYLATE HYDROCHLORIDE AND ATROPINE SULFATE 2.5; .025 MG/1; MG/1
1 TABLET ORAL 4 TIMES DAILY PRN
Status: DISCONTINUED | OUTPATIENT
Start: 2021-01-01 | End: 2021-01-01

## 2021-01-01 RX ORDER — MIDAZOLAM HYDROCHLORIDE 1 MG/ML
INJECTION INTRAMUSCULAR; INTRAVENOUS AS NEEDED
Status: DISCONTINUED | OUTPATIENT
Start: 2021-01-01 | End: 2021-01-01 | Stop reason: SURG

## 2021-01-01 RX ORDER — OLANZAPINE 2.5 MG/1
10 TABLET ORAL NIGHTLY
Status: DISCONTINUED | OUTPATIENT
Start: 2021-01-01 | End: 2021-01-01

## 2021-01-01 RX ORDER — APIXABAN 5 MG/1
TABLET, FILM COATED ORAL
Qty: 74 TABLET | Refills: 0 | OUTPATIENT
Start: 2021-01-01

## 2021-01-01 RX ORDER — PANTOPRAZOLE SODIUM 40 MG/1
40 TABLET, DELAYED RELEASE ORAL
Status: DISCONTINUED | OUTPATIENT
Start: 2021-01-01 | End: 2021-01-01

## 2021-01-01 RX ORDER — HYDROCODONE BITARTRATE AND ACETAMINOPHEN 5; 325 MG/1; MG/1
2 TABLET ORAL AS NEEDED
Status: DISCONTINUED | OUTPATIENT
Start: 2021-01-01 | End: 2021-01-01 | Stop reason: HOSPADM

## 2021-01-01 RX ORDER — OXYCODONE HYDROCHLORIDE AND ACETAMINOPHEN 5; 325 MG/1; MG/1
1 TABLET ORAL EVERY 4 HOURS PRN
Status: DISCONTINUED | OUTPATIENT
Start: 2021-01-01 | End: 2021-01-01

## 2021-01-01 RX ORDER — FUROSEMIDE 10 MG/ML
20 INJECTION INTRAMUSCULAR; INTRAVENOUS
Status: COMPLETED | OUTPATIENT
Start: 2021-01-01 | End: 2021-01-01

## 2021-01-01 RX ORDER — LORAZEPAM 2 MG/1
TABLET ORAL
Qty: 90 TABLET | Refills: 0 | OUTPATIENT
Start: 2021-01-01

## 2021-01-01 RX ORDER — POTASSIUM CHLORIDE 20 MEQ/1
40 TABLET, EXTENDED RELEASE ORAL ONCE
Status: COMPLETED | OUTPATIENT
Start: 2021-01-01 | End: 2021-01-01

## 2021-01-01 RX ORDER — HYDROMORPHONE HYDROCHLORIDE 2 MG/1
8 TABLET ORAL EVERY 4 HOURS PRN
Status: DISCONTINUED | OUTPATIENT
Start: 2021-01-01 | End: 2021-01-01

## 2021-01-01 RX ORDER — LORAZEPAM 2 MG/1
TABLET ORAL
Qty: 90 TABLET | Refills: 1 | Status: SHIPPED | OUTPATIENT
Start: 2021-01-01 | End: 2021-01-01

## 2021-01-01 RX ORDER — LORAZEPAM 0.5 MG/1
2 TABLET ORAL EVERY 6 HOURS PRN
Status: DISCONTINUED | OUTPATIENT
Start: 2021-01-01 | End: 2021-01-01

## 2021-01-01 RX ORDER — OXYCODONE HYDROCHLORIDE AND ACETAMINOPHEN 5; 325 MG/1; MG/1
1-2 TABLET ORAL EVERY 4 HOURS PRN
Status: DISCONTINUED | OUTPATIENT
Start: 2021-01-01 | End: 2021-01-01 | Stop reason: SDUPTHER

## 2021-01-01 RX ORDER — ALBUTEROL SULFATE 2.5 MG/3ML
1.25 SOLUTION RESPIRATORY (INHALATION)
Status: DISCONTINUED | OUTPATIENT
Start: 2021-01-01 | End: 2021-01-01

## 2021-01-01 RX ORDER — FUROSEMIDE 10 MG/ML
20 INJECTION INTRAMUSCULAR; INTRAVENOUS ONCE
Status: COMPLETED | OUTPATIENT
Start: 2021-01-01 | End: 2021-01-01

## 2021-01-01 RX ORDER — METOCLOPRAMIDE HYDROCHLORIDE 5 MG/ML
5 INJECTION INTRAMUSCULAR; INTRAVENOUS EVERY 6 HOURS PRN
Status: DISCONTINUED | OUTPATIENT
Start: 2021-01-01 | End: 2021-01-01

## 2021-01-01 RX ORDER — HYDROMORPHONE HYDROCHLORIDE 4 MG/1
TABLET ORAL EVERY 4 HOURS PRN
Qty: 120 TABLET | Refills: 0 | Status: ON HOLD | OUTPATIENT
Start: 2021-01-01 | End: 2021-01-01

## 2021-01-01 RX ORDER — MAGNESIUM HYDROXIDE 1200 MG/15ML
30 LIQUID ORAL DAILY
Qty: 900 ML | Refills: 0 | Status: SHIPPED | OUTPATIENT
Start: 2021-01-01 | End: 2021-06-12

## 2021-01-01 RX ORDER — LORAZEPAM 2 MG/ML
1 INJECTION INTRAMUSCULAR EVERY 4 HOURS PRN
Status: DISCONTINUED | OUTPATIENT
Start: 2021-01-01 | End: 2021-01-01

## 2021-01-01 RX ORDER — ONDANSETRON 2 MG/ML
INJECTION INTRAMUSCULAR; INTRAVENOUS
Status: COMPLETED
Start: 2021-01-01 | End: 2021-01-01

## 2021-01-01 RX ORDER — HYDROMORPHONE HYDROCHLORIDE 1 MG/ML
0.4 INJECTION, SOLUTION INTRAMUSCULAR; INTRAVENOUS; SUBCUTANEOUS EVERY 2 HOUR PRN
Status: DISCONTINUED | OUTPATIENT
Start: 2021-01-01 | End: 2021-01-01

## 2021-01-01 RX ORDER — HYDROMORPHONE HYDROCHLORIDE 4 MG/1
TABLET ORAL EVERY 4 HOURS PRN
Status: DISCONTINUED | OUTPATIENT
Start: 2021-01-01 | End: 2021-01-01 | Stop reason: SDUPTHER

## 2021-01-01 RX ORDER — ONDANSETRON 8 MG/1
TABLET, ORALLY DISINTEGRATING ORAL
Qty: 30 TABLET | Refills: 3 | Status: SHIPPED | OUTPATIENT
Start: 2021-01-01

## 2021-01-01 RX ORDER — DICYCLOMINE HCL 20 MG
20 TABLET ORAL 4 TIMES DAILY PRN
Status: DISCONTINUED | OUTPATIENT
Start: 2021-01-01 | End: 2021-01-01

## 2021-01-01 RX ORDER — PROCHLORPERAZINE EDISYLATE 5 MG/ML
10 INJECTION INTRAMUSCULAR; INTRAVENOUS EVERY 6 HOURS PRN
Status: DISCONTINUED | OUTPATIENT
Start: 2021-01-01 | End: 2021-01-01

## 2021-01-01 RX ORDER — ACETAMINOPHEN 325 MG/1
650 TABLET ORAL EVERY 6 HOURS PRN
Status: DISCONTINUED | OUTPATIENT
Start: 2021-01-01 | End: 2021-01-01

## 2021-01-01 RX ORDER — TORSEMIDE 20 MG/1
20 TABLET ORAL DAILY
Status: DISCONTINUED | OUTPATIENT
Start: 2021-01-01 | End: 2021-01-01

## 2021-01-01 RX ORDER — MAGNESIUM HYDROXIDE 1200 MG/15ML
30 LIQUID ORAL EVERY 8 HOURS
Status: DISCONTINUED | OUTPATIENT
Start: 2021-01-01 | End: 2021-01-01

## 2021-01-01 RX ORDER — HYDROMORPHONE HYDROCHLORIDE 4 MG/1
4 TABLET ORAL EVERY 4 HOURS PRN
Status: DISCONTINUED | OUTPATIENT
Start: 2021-01-01 | End: 2021-01-01

## 2021-01-01 RX ORDER — DIPHENOXYLATE HYDROCHLORIDE AND ATROPINE SULFATE 2.5; .025 MG/1; MG/1
TABLET ORAL
Qty: 60 TABLET | Refills: 0 | Status: SHIPPED | OUTPATIENT
Start: 2021-01-01

## 2021-01-01 RX ORDER — APIXABAN 5 MG/1
TABLET, FILM COATED ORAL
Qty: 60 TABLET | Refills: 0 | Status: ON HOLD | OUTPATIENT
Start: 2021-01-01 | End: 2021-01-01

## 2021-01-01 RX ORDER — ONDANSETRON 2 MG/ML
8 INJECTION INTRAMUSCULAR; INTRAVENOUS ONCE
Status: COMPLETED | OUTPATIENT
Start: 2021-01-01 | End: 2021-01-01

## 2021-01-01 RX ORDER — LORAZEPAM 2 MG/1
2 TABLET ORAL EVERY 6 HOURS PRN
Qty: 90 TABLET | Refills: 5 | OUTPATIENT
Start: 2021-01-01

## 2021-01-01 RX ORDER — DIPHENOXYLATE HYDROCHLORIDE AND ATROPINE SULFATE 2.5; .025 MG/1; MG/1
2 TABLET ORAL 4 TIMES DAILY PRN
Status: DISCONTINUED | OUTPATIENT
Start: 2021-01-01 | End: 2021-01-01

## 2021-01-01 RX ORDER — MORPHINE SULFATE 2 MG/ML
1 INJECTION, SOLUTION INTRAMUSCULAR; INTRAVENOUS EVERY 2 HOUR PRN
Status: DISCONTINUED | OUTPATIENT
Start: 2021-01-01 | End: 2021-01-01

## 2021-01-01 RX ORDER — ONDANSETRON 2 MG/ML
INJECTION INTRAMUSCULAR; INTRAVENOUS AS NEEDED
Status: DISCONTINUED | OUTPATIENT
Start: 2021-01-01 | End: 2021-01-01 | Stop reason: SURG

## 2021-01-01 RX ORDER — NALOXONE HYDROCHLORIDE 0.4 MG/ML
80 INJECTION, SOLUTION INTRAMUSCULAR; INTRAVENOUS; SUBCUTANEOUS AS NEEDED
Status: DISCONTINUED | OUTPATIENT
Start: 2021-01-01 | End: 2021-01-01 | Stop reason: HOSPADM

## 2021-01-01 RX ORDER — FUROSEMIDE 10 MG/ML
20 INJECTION INTRAMUSCULAR; INTRAVENOUS
Status: DISCONTINUED | OUTPATIENT
Start: 2021-01-01 | End: 2021-01-01

## 2021-01-01 RX ORDER — HEPARIN SODIUM 5000 [USP'U]/ML
80 INJECTION INTRAVENOUS; SUBCUTANEOUS ONCE
Status: COMPLETED | OUTPATIENT
Start: 2021-01-01 | End: 2021-01-01

## 2021-01-01 RX ORDER — HYDROMORPHONE HYDROCHLORIDE 1 MG/ML
0.8 INJECTION, SOLUTION INTRAMUSCULAR; INTRAVENOUS; SUBCUTANEOUS EVERY 2 HOUR PRN
Status: DISCONTINUED | OUTPATIENT
Start: 2021-01-01 | End: 2021-01-01

## 2021-01-01 RX ORDER — MORPHINE SULFATE 4 MG/ML
4 INJECTION, SOLUTION INTRAMUSCULAR; INTRAVENOUS EVERY 2 HOUR PRN
Status: DISCONTINUED | OUTPATIENT
Start: 2021-01-01 | End: 2021-01-01

## 2021-01-01 RX ORDER — SODIUM CHLORIDE 9 MG/ML
INJECTION, SOLUTION INTRAVENOUS ONCE
Status: COMPLETED | OUTPATIENT
Start: 2021-01-01 | End: 2021-01-01

## 2021-01-01 RX ORDER — LORAZEPAM 2 MG/1
TABLET ORAL
Qty: 90 TABLET | Refills: 0 | Status: SHIPPED | OUTPATIENT
Start: 2021-01-01 | End: 2021-01-01

## 2021-01-01 RX ORDER — ONDANSETRON 2 MG/ML
4 INJECTION INTRAMUSCULAR; INTRAVENOUS AS NEEDED
Status: DISCONTINUED | OUTPATIENT
Start: 2021-01-01 | End: 2021-01-01 | Stop reason: HOSPADM

## 2021-01-01 RX ORDER — MORPHINE SULFATE 2 MG/ML
2 INJECTION, SOLUTION INTRAMUSCULAR; INTRAVENOUS ONCE
Status: DISCONTINUED | OUTPATIENT
Start: 2021-01-01 | End: 2021-01-01

## 2021-01-01 RX ORDER — ONDANSETRON 2 MG/ML
4 INJECTION INTRAMUSCULAR; INTRAVENOUS EVERY 4 HOURS PRN
Status: DISCONTINUED | OUTPATIENT
Start: 2021-01-01 | End: 2021-01-01

## 2021-01-01 RX ORDER — HYDROMORPHONE HYDROCHLORIDE 1 MG/ML
0.2 INJECTION, SOLUTION INTRAMUSCULAR; INTRAVENOUS; SUBCUTANEOUS EVERY 2 HOUR PRN
Status: DISCONTINUED | OUTPATIENT
Start: 2021-01-01 | End: 2021-01-01

## 2021-01-01 RX ORDER — MIRTAZAPINE 15 MG/1
15 TABLET, FILM COATED ORAL NIGHTLY
Status: DISCONTINUED | OUTPATIENT
Start: 2021-01-01 | End: 2021-01-01

## 2021-01-01 RX ORDER — SCOLOPAMINE TRANSDERMAL SYSTEM 1 MG/1
1 PATCH, EXTENDED RELEASE TRANSDERMAL
Qty: 10 PATCH | Refills: 3 | Status: SHIPPED | OUTPATIENT
Start: 2021-01-01

## 2021-01-01 RX ORDER — DICYCLOMINE HCL 20 MG
20 TABLET ORAL 4 TIMES DAILY PRN
Qty: 30 TABLET | Refills: 3 | Status: SHIPPED | OUTPATIENT
Start: 2021-01-01

## 2021-01-01 RX ORDER — SODIUM CHLORIDE, SODIUM LACTATE, POTASSIUM CHLORIDE, CALCIUM CHLORIDE 600; 310; 30; 20 MG/100ML; MG/100ML; MG/100ML; MG/100ML
INJECTION, SOLUTION INTRAVENOUS CONTINUOUS
Status: DISCONTINUED | OUTPATIENT
Start: 2021-01-01 | End: 2021-01-01 | Stop reason: HOSPADM

## 2021-01-01 RX ORDER — DIPHENOXYLATE HYDROCHLORIDE AND ATROPINE SULFATE 2.5; .025 MG/1; MG/1
1-2 TABLET ORAL 4 TIMES DAILY PRN
Status: DISCONTINUED | OUTPATIENT
Start: 2021-01-01 | End: 2021-01-01

## 2021-01-01 RX ORDER — DEXTROSE AND SODIUM CHLORIDE 5; .45 G/100ML; G/100ML
INJECTION, SOLUTION INTRAVENOUS CONTINUOUS
Status: DISCONTINUED | OUTPATIENT
Start: 2021-01-01 | End: 2021-01-01

## 2021-01-01 RX ORDER — HYDROMORPHONE HYDROCHLORIDE 2 MG/1
TABLET ORAL EVERY 4 HOURS PRN
Status: DISCONTINUED | OUTPATIENT
Start: 2021-01-01 | End: 2021-01-01 | Stop reason: SDUPTHER

## 2021-01-01 RX ORDER — ENOXAPARIN SODIUM 100 MG/ML
100 INJECTION SUBCUTANEOUS 2 TIMES DAILY
Status: DISCONTINUED | OUTPATIENT
Start: 2021-01-01 | End: 2021-01-01

## 2021-01-01 RX ORDER — HYDROMORPHONE HYDROCHLORIDE 4 MG/1
TABLET ORAL EVERY 4 HOURS PRN
Qty: 120 TABLET | Refills: 0 | Status: SHIPPED | OUTPATIENT
Start: 2021-01-01 | End: 2021-01-01

## 2021-01-01 RX ORDER — PREDNISONE 10 MG/1
10 TABLET ORAL DAILY
Status: DISCONTINUED | OUTPATIENT
Start: 2021-01-01 | End: 2021-01-01

## 2021-01-01 RX ORDER — ENOXAPARIN SODIUM 100 MG/ML
100 INJECTION SUBCUTANEOUS 2 TIMES DAILY
Qty: 60 SYRINGE | Refills: 0 | Status: SHIPPED | OUTPATIENT
Start: 2021-01-01

## 2021-01-01 RX ORDER — ALBUTEROL SULFATE 2.5 MG/3ML
2.5 SOLUTION RESPIRATORY (INHALATION) EVERY 4 HOURS PRN
Status: DISCONTINUED | OUTPATIENT
Start: 2021-01-01 | End: 2021-01-01

## 2021-01-01 RX ORDER — OXYCODONE HYDROCHLORIDE AND ACETAMINOPHEN 5; 325 MG/1; MG/1
1-2 TABLET ORAL EVERY 4 HOURS PRN
Qty: 150 TABLET | Refills: 0 | Status: SHIPPED | OUTPATIENT
Start: 2021-01-01 | End: 2021-01-01

## 2021-01-01 RX ORDER — HYDROMORPHONE HYDROCHLORIDE 8 MG/1
8 TABLET ORAL EVERY 4 HOURS PRN
Status: DISCONTINUED | OUTPATIENT
Start: 2021-01-01 | End: 2021-01-01

## 2021-01-01 RX ORDER — HYDROMORPHONE HYDROCHLORIDE 4 MG/1
TABLET ORAL EVERY 4 HOURS PRN
Qty: 120 TABLET | Refills: 0 | Status: SHIPPED | OUTPATIENT
Start: 2021-01-01

## 2021-01-01 RX ORDER — PREDNISONE 10 MG/1
10 TABLET ORAL
Status: DISCONTINUED | OUTPATIENT
Start: 2021-01-01 | End: 2021-01-01

## 2021-01-01 RX ORDER — MIRTAZAPINE 15 MG/1
15 TABLET, FILM COATED ORAL NIGHTLY PRN
Status: DISCONTINUED | OUTPATIENT
Start: 2021-01-01 | End: 2021-01-01

## 2021-01-01 RX ORDER — DIPHENHYDRAMINE HYDROCHLORIDE 50 MG/ML
50 INJECTION INTRAMUSCULAR; INTRAVENOUS ONCE
Status: COMPLETED | OUTPATIENT
Start: 2021-01-01 | End: 2021-01-01

## 2021-01-01 RX ORDER — MORPHINE SULFATE 2 MG/ML
2 INJECTION, SOLUTION INTRAMUSCULAR; INTRAVENOUS EVERY 2 HOUR PRN
Status: DISCONTINUED | OUTPATIENT
Start: 2021-01-01 | End: 2021-01-01

## 2021-01-01 RX ORDER — PANTOPRAZOLE SODIUM 20 MG/1
20 TABLET, DELAYED RELEASE ORAL
Refills: 0 | Status: DISCONTINUED | OUTPATIENT
Start: 2021-01-01 | End: 2021-01-01

## 2021-01-01 RX ORDER — ALBUTEROL SULFATE 2.5 MG/3ML
1.25 SOLUTION RESPIRATORY (INHALATION) EVERY 6 HOURS PRN
Qty: 90 VIAL | Refills: 3 | Status: SHIPPED | OUTPATIENT
Start: 2021-01-01

## 2021-01-01 RX ORDER — HYDROCODONE BITARTRATE AND ACETAMINOPHEN 5; 325 MG/1; MG/1
1 TABLET ORAL AS NEEDED
Status: DISCONTINUED | OUTPATIENT
Start: 2021-01-01 | End: 2021-01-01 | Stop reason: HOSPADM

## 2021-01-01 RX ORDER — HYDROMORPHONE HYDROCHLORIDE 1 MG/ML
0.4 INJECTION, SOLUTION INTRAMUSCULAR; INTRAVENOUS; SUBCUTANEOUS EVERY 5 MIN PRN
Status: DISCONTINUED | OUTPATIENT
Start: 2021-01-01 | End: 2021-01-01 | Stop reason: HOSPADM

## 2021-01-01 RX ORDER — HYDROMORPHONE HYDROCHLORIDE 1 MG/ML
1 INJECTION, SOLUTION INTRAMUSCULAR; INTRAVENOUS; SUBCUTANEOUS
Status: COMPLETED | OUTPATIENT
Start: 2021-01-01 | End: 2021-01-01

## 2021-01-01 RX ORDER — PREDNISONE 1 MG/1
TABLET ORAL
Qty: 90 TABLET | Refills: 5 | Status: SHIPPED | OUTPATIENT
Start: 2021-01-01

## 2021-01-01 RX ORDER — ONDANSETRON 2 MG/ML
4 INJECTION INTRAMUSCULAR; INTRAVENOUS ONCE
Status: COMPLETED | OUTPATIENT
Start: 2021-01-01 | End: 2021-01-01

## 2021-01-01 RX ADMIN — SODIUM CHLORIDE 0.9 % (FLUSH) 10 ML: 0.9 % SYRINGE (ML) INJECTION at 10:09:00

## 2021-01-01 RX ADMIN — LIDOCAINE HYDROCHLORIDE 50 MG: 10 INJECTION, SOLUTION EPIDURAL; INFILTRATION; INTRACAUDAL; PERINEURAL at 11:24:00

## 2021-01-01 RX ADMIN — LORAZEPAM 1 MG: 2 INJECTION INTRAMUSCULAR at 12:41:00

## 2021-01-01 RX ADMIN — HYDROMORPHONE HYDROCHLORIDE 1 MG: 1 INJECTION, SOLUTION INTRAMUSCULAR; INTRAVENOUS; SUBCUTANEOUS at 09:47:00

## 2021-01-01 RX ADMIN — MIDAZOLAM HYDROCHLORIDE 2 MG: 1 INJECTION INTRAMUSCULAR; INTRAVENOUS at 11:27:00

## 2021-01-01 RX ADMIN — ONDANSETRON 8 MG: 2 INJECTION INTRAMUSCULAR; INTRAVENOUS at 12:43:00

## 2021-01-01 RX ADMIN — HYDROMORPHONE HYDROCHLORIDE 1 MG: 1 INJECTION, SOLUTION INTRAMUSCULAR; INTRAVENOUS; SUBCUTANEOUS at 10:15:00

## 2021-01-01 RX ADMIN — HYDROMORPHONE HYDROCHLORIDE 1 MG: 1 INJECTION, SOLUTION INTRAMUSCULAR; INTRAVENOUS; SUBCUTANEOUS at 09:55:00

## 2021-01-01 RX ADMIN — LIDOCAINE HYDROCHLORIDE 50 MG: 10 INJECTION, SOLUTION EPIDURAL; INFILTRATION; INTRACAUDAL; PERINEURAL at 11:30:00

## 2021-01-01 RX ADMIN — HYDROMORPHONE HYDROCHLORIDE 1 MG: 1 INJECTION, SOLUTION INTRAMUSCULAR; INTRAVENOUS; SUBCUTANEOUS at 09:58:00

## 2021-01-01 RX ADMIN — SODIUM CHLORIDE: 9 INJECTION, SOLUTION INTRAVENOUS at 11:24:00

## 2021-01-01 RX ADMIN — HYDROMORPHONE HYDROCHLORIDE 1 MG: 1 INJECTION, SOLUTION INTRAMUSCULAR; INTRAVENOUS; SUBCUTANEOUS at 10:13:00

## 2021-01-01 RX ADMIN — HYDROMORPHONE HYDROCHLORIDE 1 MG: 1 INJECTION, SOLUTION INTRAMUSCULAR; INTRAVENOUS; SUBCUTANEOUS at 09:51:00

## 2021-01-01 RX ADMIN — HYDROMORPHONE HYDROCHLORIDE 1 MG: 1 INJECTION, SOLUTION INTRAMUSCULAR; INTRAVENOUS; SUBCUTANEOUS at 10:14:00

## 2021-01-01 RX ADMIN — ONDANSETRON 8 MG: 2 INJECTION INTRAMUSCULAR; INTRAVENOUS at 10:04:00

## 2021-01-01 RX ADMIN — HYDROMORPHONE HYDROCHLORIDE 1 MG: 1 INJECTION, SOLUTION INTRAMUSCULAR; INTRAVENOUS; SUBCUTANEOUS at 09:39:00

## 2021-01-01 RX ADMIN — HYDROMORPHONE HYDROCHLORIDE 1 MG: 1 INJECTION, SOLUTION INTRAMUSCULAR; INTRAVENOUS; SUBCUTANEOUS at 10:01:00

## 2021-01-01 RX ADMIN — HYDROMORPHONE HYDROCHLORIDE 1 MG: 1 INJECTION, SOLUTION INTRAMUSCULAR; INTRAVENOUS; SUBCUTANEOUS at 10:00:00

## 2021-01-01 RX ADMIN — SODIUM CHLORIDE 0.9 % (FLUSH) 10 ML: 0.9 % SYRINGE (ML) INJECTION at 10:07:00

## 2021-01-01 RX ADMIN — ONDANSETRON 8 MG: 2 INJECTION INTRAMUSCULAR; INTRAVENOUS at 10:56:00

## 2021-01-01 RX ADMIN — SODIUM CHLORIDE 0.9 % (FLUSH) 10 ML: 0.9 % SYRINGE (ML) INJECTION at 09:45:00

## 2021-01-01 RX ADMIN — SODIUM CHLORIDE 0.9 % (FLUSH) 10 ML: 0.9 % SYRINGE (ML) INJECTION at 09:52:00

## 2021-01-01 RX ADMIN — HYDROMORPHONE HYDROCHLORIDE 1 MG: 1 INJECTION, SOLUTION INTRAMUSCULAR; INTRAVENOUS; SUBCUTANEOUS at 10:10:00

## 2021-01-01 RX ADMIN — HYDROMORPHONE HYDROCHLORIDE 1 MG: 1 INJECTION, SOLUTION INTRAMUSCULAR; INTRAVENOUS; SUBCUTANEOUS at 10:16:00

## 2021-01-01 RX ADMIN — HYDROMORPHONE HYDROCHLORIDE 1 MG: 1 INJECTION, SOLUTION INTRAMUSCULAR; INTRAVENOUS; SUBCUTANEOUS at 09:43:00

## 2021-01-01 RX ADMIN — DEXAMETHASONE SODIUM PHOSPHATE 4 MG: 4 MG/ML VIAL (ML) INJECTION at 11:32:00

## 2021-01-01 RX ADMIN — HYDROMORPHONE HYDROCHLORIDE 1 MG: 1 INJECTION, SOLUTION INTRAMUSCULAR; INTRAVENOUS; SUBCUTANEOUS at 10:11:00

## 2021-01-01 RX ADMIN — SODIUM CHLORIDE 0.9 % (FLUSH) 10 ML: 0.9 % SYRINGE (ML) INJECTION at 10:00:00

## 2021-01-01 RX ADMIN — SODIUM CHLORIDE 0.9 % (FLUSH) 10 ML: 0.9 % SYRINGE (ML) INJECTION at 10:15:00

## 2021-01-01 RX ADMIN — SODIUM CHLORIDE 0.9 % (FLUSH) 10 ML: 0.9 % SYRINGE (ML) INJECTION at 10:25:00

## 2021-01-01 RX ADMIN — ONDANSETRON 4 MG: 2 INJECTION INTRAMUSCULAR; INTRAVENOUS at 11:32:00

## 2021-01-01 RX ADMIN — SODIUM CHLORIDE 0.9 % (FLUSH) 10 ML: 0.9 % SYRINGE (ML) INJECTION at 12:50:00

## 2021-01-01 RX ADMIN — HYDROMORPHONE HYDROCHLORIDE 1 MG: 1 INJECTION, SOLUTION INTRAMUSCULAR; INTRAVENOUS; SUBCUTANEOUS at 09:45:00

## 2021-01-01 NOTE — PLAN OF CARE
Patient AOx4. On bedrest during the night for headache post-op. Patient states headache better. Compazine given for nausea which patient states is chronic. Patient c/o epigastric pain which was resolved with morphine and ativan.  Dressing to left abdomen an

## 2021-01-01 NOTE — OCCUPATIONAL THERAPY NOTE
OCCUPATIONAL THERAPY QUICK EVALUATION - INPATIENT    Room Number: 378/378-A  Evaluation Date: 1/1/2021     Type of Evaluation: Initial & Quick Eval  Presenting Problem: pain pump placement 12/31    Physician Order: IP Consult to Occupational Therapy  Jennifer Laparoscopic-assisted Robert Bruno esophagogastrectomy, abdominal and mediastinal lymphadenectomy, feeding jejunostomy tube placement   • WISDOM TEETH REMOVED         OCCUPATIONAL PROFILE    HOME SITUATION  Type of Home: House  Home Layout: One level  Lives male admitted on 12/31/2020 for pain pump placement. Complete medical history and occupational profile noted above. Functional outcome measures completed include AMPA.  The AM-EYAL ' '6-Clicks' Inpatient Daily Activity Short Form was completed and  this pat

## 2021-01-01 NOTE — PROGRESS NOTES
[facility]  Neurosurgery Progress Note  2021    [address] Patient Status:  Outpatient in a Bed    10/3/1990 MRN [id number]   [facility] 3SW-A Attending Dre Ocampo MD   Hosp Day # 0 PCP Simon Palacios DO     SUBJECTIVE:

## 2021-01-01 NOTE — PROGRESS NOTES
[facility]  Progress Note     [address] Patient Status:  Outpatient in a Bed    10/3/1990 MRN [id number]   [facility] 3SW-A Attending Dre Ocampo MD   Hosp Day # 0 PCP Simon Palacios DO     Chief Complaint: med management mg Oral QAM AC   • scopolamine  1 patch Transdermal Q72H   • Senna  17.2 mg Oral Nightly   • docusate sodium  100 mg Oral BID   • busPIRone HCl  10 mg Oral TID PC       ASSESSMENT / PLAN:     1. Chronic pain s/p IT pain pump  1. NS primary  2.  Rx given for

## 2021-01-01 NOTE — PROGRESS NOTES
Acute Pain Service    Pt resting in bed and states his back pain is a 0-1/10. He has been on bedrest overnight due to a headache. Pt has been taking the Norco about every 6 hours. He has also had a few doses of IVP morphine.     Pt is likely going home t

## 2021-01-01 NOTE — PROGRESS NOTES
Pt c/o HA this afternoon postop. Reports worse when up and better when lying down. Paged and spoke with Dr Laurence Ernst. Orders to keep patient on bedrest overnight with HOB no greater than 30 degrees. Patient updated to POC.

## 2021-01-01 NOTE — PHYSICAL THERAPY NOTE
PHYSICAL THERAPY QUICK EVALUATION - INPATIENT    Room Number: 378/378-A  Evaluation Date: 1/1/2021  Presenting Problem: cancer associated pain s/p L intrathecal catheter and pump implantation 12/31/20  Physician Order: PT Eval and Treat    Problem List feeding jejunostomy tube placement   • WISDOM TEETH REMOVED         HOME SITUATION  Type of Home: House   Home Layout: One level  Stairs to Enter : 3  Railing: Yes  Stairs to Bedroom: 0       Lives With: Spouse  Drives: Yes  Patient Owned Equipment: None [facility]      FUNCTIONAL ABILITY STATUS  Gait Assessment  Gait Assistance: Modified independent  Distance (ft): 150  Assistive Device: None  Pattern: Comment(decreased arm sway)  Stoop/Curb Assistance: Modified independent  Comment : pt went up/down 4 stairs wit independently   PPE donned by therapist and worn throughout tx: mask, gloves, and goggles.   PPE donned by pt and worn throughout tx: mask

## 2021-01-02 NOTE — PROGRESS NOTES
NURSING DISCHARGE NOTE    Discharged Home via Wheelchair. Accompanied by Support staff  Belongings Taken by patient/family   VSS afebrile. IV removed with catheter tip noted intact and without complication.   Discharge instructions and prescription gi

## 2021-01-02 NOTE — PLAN OF CARE
Problem: PAIN - ADULT  Goal: Verbalizes/displays adequate comfort level or patient's stated pain goal  Description: INTERVENTIONS:  - Encourage pt to monitor pain and request assistance  - Assess pain using appropriate pain scale  - Administer analgesics and treat  - encourage early ambulation and mobility  - provide adequate pain control  - encourage patient participation in treatment plan. - See additional Care Plan goals for specific interventions  Outcome: Progressing   VSS afebrile.   Denies nausea

## 2021-01-04 PROBLEM — Z98.890 S/P INSERTION OF INTRATHECAL PUMP: Status: ACTIVE | Noted: 2021-01-01

## 2021-01-04 NOTE — PROGRESS NOTES
Name: Jess Hdez   : 10/3/1990   DOS: 2021     Pain Clinic Follow Up Visit:   Patient presents with:  Pump Refill      Jess Hdez is a 31year old male with a history of GE junction tumor status post intrathecal pump implant on Dec nightly. 30 tablet 3   • OLANZapine 10 MG Oral Tab Take 1 tablet (10 mg total) by mouth nightly. 30 tablet 2   • busPIRone HCl 10 MG Oral Tab Take 1 tablet (10 mg total) by mouth 3 (three) times daily.  90 tablet 2   • Prochlorperazine Maleate (COMPAZINE) 1 normal.  Neck: Full range of motion noted  Back: Incision sites clear      IMAGES:   No new imaging      ASSESSMENT AND PLAN:   Cancer associated pain  (primary encounter diagnosis)  S/P insertion of intrathecal pump    The patient is a 31year-old gentlem

## 2021-01-04 NOTE — PROGRESS NOTES
Patient presents in office today with reported pain in head    Current pain level reported = 8/10    Last reported dose of HYDROcodone-acetaminophen  MG Oral Tab, this morning prior to OV

## 2021-01-08 NOTE — TELEPHONE ENCOUNTER
Severe pain in left shoulder, DW Dr Selma Bolanos, restart  Gabapentin 300 mg at night, may supplement with Hydrocodone or Oxycodone

## 2021-01-11 NOTE — PROGRESS NOTES
Patient presents in office today with reported no pain    Current pain level reported = 0/10    Last reported dose of HYDROcodone-acetaminophen  MG Oral Tab, this morning prior to OV,  oxyCODONE-acetaminophen 5-325 MG Oral Tab, this morning prior to

## 2021-01-11 NOTE — PROGRESS NOTES
Name: Cayetano Mackenzie   : 10/3/1990   DOS: 2021     Pain Clinic Follow Up Visit:   Patient presents with:   Follow - Up      Cayetano Mackenzie is a 31year old male with a history of GE junction adenocarcinoma status post intrathecal pump impl tablet 0   • ondansetron 8 MG Oral Tablet Dispersible Take 1 tablet (8 mg total) by mouth every 8 (eight) hours as needed for Nausea. 30 tablet 0   • mirtazapine 15 MG Oral Tab Take 1 tablet (15 mg total) by mouth nightly.  30 tablet 3   • OLANZapine 10 MG attention span intact. Inspection:  Ambulates with well-coordinated, fluid, non-antalgic gait. Gait is normal.  Neck: Full range of motion  Back: Midline incision that is healing well. Also has an abdominal pocket which is also healing well.     IMAGES:

## 2021-01-14 NOTE — TELEPHONE ENCOUNTER
Discussed with provider,  Pt should not take a bath for minimum of 6 weeks.    mychart sent to patient with information

## 2021-01-14 NOTE — TELEPHONE ENCOUNTER
I left 2 detailed msges for pt regarding r/s today's apt with Adore Wetzel. Pt had sx with  on 12/31/20, pt needs a 2wk post op w/ Lethaniel Pack the week of 1/18/21. pt needs to r/s 's post op to week of 2/25/21.  Pls relay above info once pt

## 2021-01-20 NOTE — TELEPHONE ENCOUNTER
Received call from patient. He wanted to speak with Dr. Deirdre Carranza regarding his plan of care and what to do moving forward. Dr. Deirdre Carranza notified.

## 2021-01-22 NOTE — PROGRESS NOTES
Patient is here for MD f/u for GE junction. Patient had pain pump inserted in left lower abdomen one month ago. Patient states pain has improved all over his body except the left shoulder.  Still having to take Gabapentin, Dilaudid and Oxycodone as needed f

## 2021-01-22 NOTE — PATIENT INSTRUCTIONS
- WE WILL CALL AND SCHEDULE YOUR CT SIMULATION/MAPPING SESSION FOR NEXT WEEK      - IF YOU HAVE ANY QUESTIONS OR CONCERNS REGARDING RADIATION THERAPY, PLEASE CALL US AT [phone]

## 2021-01-22 NOTE — PROGRESS NOTES
Nursing Re- Consult Note    Patient: Karlee Vann  YOB: 1990  Age: 31year old  Ching Uribe MD  Referring Physician: Dr. Cony Fiore  Diagnosis: Esophageal with mets  Consult Date: 1/22/2021    History

## 2021-01-22 NOTE — PROGRESS NOTES
Neurosurgery Clinic Visit  2021    Rafatgisel Cici PCP:  Jennifer Avila DO    10/3/1990 MRN [id number]     CC:  S/P IT Pain pump 20 Dr. Марина Dick    HPI:    rKistie is here for 2 week post op. He is doing well with his pump.   He has excellent c use: No    Other Topics      Concerns:        Caffeine Concern: No          2 / day - pop        Exercise: No          walk        Weight Concern:  Yes           Family History   Problem Relation Age of Onset   • Cancer Mother 50         breast cancer   • O

## 2021-01-25 NOTE — PROGRESS NOTES
[facility]  Radiation Oncology Follow-up    Patient Name: Ana Paula Bello   MRN: [id number]  Referring Physician: Yokasta Vargas MD    Diagnosis: metastatic esophageal cancer    Prior Radiation Therapy  1. March 2018: neoadjuvant CRT to for Nausea. (Patient not taking: Reported on 1/22/2021 ) 30 tablet 0   • mirtazapine 15 MG Oral Tab Take 1 tablet (15 mg total) by mouth nightly.  (Patient not taking: Reported on 1/22/2021 ) 30 tablet 3   • OLANZapine 10 MG Oral Tab Take 1 tablet (10 mg to radiotherapy. Plan: I discussed the above clinical situation with the patient and his wife at the time of re-consultation.  I offered a course of palliative external beam radiotherapy to the L SCV mass for the purpose of improved LC of disease and delonte

## 2021-01-25 NOTE — PROGRESS NOTES
[facility]    PATIENT'S NAME: Con Nara LUTZ   ATTENDING PHYSICIAN: Car Paulino M.D.    PATIENT ACCOUNT #: [de-identified] LOCATION: [address] [place] [address] RECORD #: [id number] YOB: 1990   DATE OF SERVICE: 01/22/2021       CANCER well-controlled, except for the pain in the left neck. He does not choke when he eats or drinks. He does have a difficult time with coughing. He has mild fatigue. His appetite is actually fairly good. He is here to discuss further options.     Sowmya Harrison or not he has an actionable mutation that has developed since our last NGS, which was done on the resection specimen nearly 3 years ago. I will be in touch with Dr. Kaylyn Mcgowan as this comes in.   He has exhausted all standard options, and clinical trial option

## 2021-02-01 NOTE — TELEPHONE ENCOUNTER
Calling asking for an inhaler as he is getting more short of breath with any exertion   He is waiting on insurance approval for RT    Will get PA/Lat and decub CXR, h/o small left effusion on previous CT scan. Inhaler ordered.

## 2021-02-02 NOTE — PROGRESS NOTES
called and left Patient a message to discuss his ending Insurance and his options moving forward. Awaiting call back.

## 2021-02-02 NOTE — TELEPHONE ENCOUNTER
Xaviertcmartha to schedule pt for f/u appt with Dr. EDMOND [facility] tomorrow (2/3).

## 2021-02-02 NOTE — TELEPHONE ENCOUNTER
pt stating his back pain is getting worse, asking if he should come in for an appointment, or if medication should be increased.

## 2021-02-03 NOTE — TELEPHONE ENCOUNTER
Insurance mandates patient fill his Albuterol inhaler at a minimum of 3 months supply. Refilled Rx for #3 inhalers with one refill.

## 2021-02-03 NOTE — TELEPHONE ENCOUNTER
Left message regarding Guardant results. Has an NATIVIDAD mutation with high allelic frequency that wasn't on his Foundation one testing from three weeks ago. We have samples of olaparib that we could try.   I will discuss with Dr Kaylyn Mcgowan and see if they have a

## 2021-02-04 PROBLEM — Z15.01 MONOALLELIC MUTATION OF ATM GENE: Status: ACTIVE | Noted: 2021-01-01

## 2021-02-04 PROBLEM — Z15.89 MONOALLELIC MUTATION OF ATM GENE: Status: ACTIVE | Noted: 2021-01-01

## 2021-02-04 PROBLEM — Z15.09 MONOALLELIC MUTATION OF ATM GENE: Status: ACTIVE | Noted: 2021-01-01

## 2021-02-05 NOTE — PROGRESS NOTES
met with Patient who stated that his Insurance/COBRA through work is ending in Aug 2021. Patient has Social Security, but only for about 1year and needs to wait 2 years in total for Medicare to apply.  Patient’s Wife works, but adding him to h

## 2021-02-05 NOTE — PROGRESS NOTES
ANP Visit Note    Patient Name: Sabrina Mom   YOB: 1990   Medical Record Number: [id number]   CSN: [id number]   Date of visit: 2/5/2021   Simon Palacios DO   No primary care provider on file.      Chief Complaint/Reason for Visit:  Juan Alberto Saunders nausea and vomiting)     Paresthesia     Radiculopathy, lumbar region     Cancer associated pain     Hypokalemia     Anemia     Weakness generalized     Malignant neoplasm of esophagus (HCC)     Abdominal pain of unknown etiology     Metastatic malignant n LAPAROSCOPIC ESOPHAGOGASTRECTOMY Right 4/27/2018    Performed by Zuly Medel., Carolann Powers MD at [address] [place], [place] [address]nanci Chavez [facility] Left 12/31/2020    Performed by Ramya Tam MD at [facility] MAIN OR   • OTHER SURGICAL HISTORY  04/27/2018    Laparoscop Attends meetings of clubs or organizations: Not on file        Relationship status: Not on file      Intimate partner violence        Fear of current or ex partner: Not on file        Emotionally abused: Not on file        Physically abused: Not on fi Oral Tab, Take 1 tablet (10 mg total) by mouth nightly. (Patient not taking: Reported on 1/22/2021 ), Disp: 30 tablet, Rfl: 2  •  busPIRone HCl 10 MG Oral Tab, Take 1 tablet (10 mg total) by mouth 3 (three) times daily. , Disp: 90 tablet, Rfl: 2  •  Emmanuelleo clear.   Neck: No JVD. No palpable lymphadenopathy. Neck is supple. Chest: Clear to auscultation. Heart: Regular rate and rhythm. Abdomen: Soft, non tender with good bowel sounds. Extremities: No edema. Neurological: Grossly intact. Lymphatics:  The 0.00 - 1.00 x10(3) uL    Neutrophil % 75.5 %    Lymphocyte % 11.3 %    Monocyte % 10.0 %    Eosinophil % 2.3 %    Basophil % 0.6 %    Immature Granulocyte % 0.3 %       Guardant: copy given to patient - reviewed in detail with patient and mom   TEX Reeves DETECTED  Alterations or biomarkers that were \"NOT DETECTED\" have been excluded from the summary table above.   Niraparib, Olaparib, Rucapa      ORAL CHEMOTHERAPY EDUCATION RECORD  Learner:  Patient and mom     Barriers / Limitations: Pain      Diagnosis: Notes:   minutes    Counseling/Education: 30 minutes      Referring/Communicating:   minutes    Ind Interpretation: 3 minutes      Care Coordination:   minutes       My total time spent caring for the patient on the day of the encounter: 50  minutes.

## 2021-02-08 NOTE — PROGRESS NOTES
Patient presents in office today with reported pain in lower back  Current pain level reported  3/10    Last reported dose of oxyCODONE-acetaminophen 5-325 MG Oral Tab, last night prior to OV,

## 2021-02-08 NOTE — PROGRESS NOTES
Name: Beth Snachez   : 10/3/1990   DOS: 2021     Pain Clinic Follow Up Visit:   Patient presents with:   Follow - Up      Beth Sanchez is a 31year old male with a history of metastatic gastroesophageal junction tumor here for [facility] tablet (10 mg total) by mouth 3 (three) times daily. 90 tablet 2   • Prochlorperazine Maleate (COMPAZINE) 10 mg tablet Take 1 tablet (10 mg total) by mouth every 6 (six) hours as needed for Nausea.  90 tablet 5   • Scopolamine 1.5mg TD patch 1mg/3days Place pump.  He is willing to do so. He will make an office visit to see me after he finishes his hydromorphone and oxycodone. He is on a decent dosage of both medications. He may need a 5 to 10% dose adjustment in intrathecal pump.   I did adjust his IT pump

## 2021-02-08 NOTE — TELEPHONE ENCOUNTER
Requesting refill on Scop patch,  Reviewed CXR and will review in [address] on Wednesday ? Paralyzed diaphragm.

## 2021-02-12 NOTE — TELEPHONE ENCOUNTER
Patient called, having a bit more trouble breathing, spoke with billing and Christina called today and was able to get the RT approved, spoke with Dr Yue Poon, they will call him to start on Monday.

## 2021-02-18 NOTE — PROGRESS NOTES
[facility] Radiation Treatment Management Note 1-5    Patient:  Suman Broderick  Age:  31year old  Visit Diagnosis:  L SCF neck mass  Primary Rad/Onc:  Dr. Ya Kessler    Site Delivered Dose (cGy) Prescribed Dose (cGy) Fraction

## 2021-02-18 NOTE — PROGRESS NOTES
Patient is here for MD f/u for GE junction. Patient started radiation on Monday to the left shoulder. Still c/o voice hoarseness. Patient is on Olaparib. Occasional diarrhea but manageable. Appetite varies.  Patient c/o ongoing SOB with exertion and fatigue

## 2021-02-19 NOTE — PROGRESS NOTES
Orders received per Dr. Jerad Ellis to give pt dilaudid 1 mg (may repeat x1 dose) for back pain; to be given just prior to RT procedure. Pt states that first dose of dilaudid lowered pain level to 1/10, but effect wore off before RT was ready to take him.  Thuy Michael

## 2021-02-22 NOTE — PROGRESS NOTES
Pt arrived to RT with pain level 7/10. Dilaudid 1 mg given just prior to RT and pt was able to tolerate successfully.      Education Record    Learner:  Patient    Disease / Diagnosis: GE junction carcinoma     Barriers / Limitations:  None   Comments:    CARLY

## 2021-02-22 NOTE — TELEPHONE ENCOUNTER
Pt is having abdominal cramping. No diarrhea. He would like a refill on dicyclomine. Pt had a doppler US today. Negative for DVT. Pt wanted to know if he can take prednisone for the pain.   Instructed him not to take prednisone for the pain but to ta

## 2021-02-24 NOTE — PROGRESS NOTES
Name: Fiona Gorman   : 10/3/1990   DOS: 2021     Pain Clinic Follow Up Visit:   Patient presents with:   Follow - Up: Discuss pain pump      Kristie Varma is a 31year old male with a history of GE junction tumor who presents today for (four) hours as needed for Pain. 120 tablet 0   • oxyCODONE-acetaminophen 5-325 MG Oral Tab Take 1-2 tablets by mouth every 4 (four) hours as needed for Pain. 150 tablet 0   • apixaban 5 MG Oral Tab Take 1 tablet (5 mg total) by mouth 2 (two) times daily. imaging    ASSESSMENT AND PLAN:   Gastroesophageal reflux disease without esophagitis  (primary encounter diagnosis)  GE junction carcinoma (HCC)  S/P insertion of intrathecal pump    The patient is a 58-year-old gentleman with a history of gastroesophagea

## 2021-02-24 NOTE — PROGRESS NOTES
[facility]  Neurosurgery Clinic Visit      HISTORY OF PRESENT ILLNESS:  Jess Hdez is a(n) 31year old male s/p 12/31/2020 morphine pump implant. He is doing well, incisions have healed, pain is improved.  Is seeing Dr. Ashish Marin fo Laparoscopic-assisted Robert Bruno esophagogastrectomy, abdominal and mediastinal lymphadenectomy, feeding jejunostomy tube placement   • WISDOM TEETH REMOVED         FAMILY HISTORY:  family history includes Cancer in his paternal grandmother; Cancer (age of

## 2021-02-24 NOTE — PROGRESS NOTES
Patient presents in office today with reported pain in low back thinks his dose needs to be increased     Current pain level reported = 3/10

## 2021-02-24 NOTE — PROGRESS NOTES
Pt port accessed and 1mg Dilaudid given just prior RT. Tolerated well by pt. Will continue to monitor.

## 2021-02-25 NOTE — TELEPHONE ENCOUNTER
Pt called, states will not be coming in for RT today. States having nausea, no vomiting due to his chemo. Has increased hoarseness to voice, no c/o increased pain. Dr. Becky Lucas and therapists made aware.

## 2021-02-26 NOTE — PROGRESS NOTES
Patient her for frieda cath access and iv pain medication. For future appointments  staff will notify treating RN. RN to release medication and gather access supplies. Rad onc RN/tech to notify when patient is on treatment table and ready.  Patient

## 2021-02-26 NOTE — PROGRESS NOTES
[facility] Radiation Treatment Management Note 6-10    Patient:  Casey Medina  Age:  31year old  Visit Diagnosis:  L SCF neck mass  Primary Rad/Onc:  Dr. Gertrude Harris    Site Delivered Dose (cGy) Prescribed Dose (cGy) Fraction liquids  Will see if we can expedite swallow eval    2) L foot edema  -negative for DVT, unclear etiology, not painful, improved with leg elevation    We discussed expected future toxicity. All questions answered.   Next OTV 1 week    Darlyn Stinson MD

## 2021-02-27 NOTE — PROGRESS NOTES
[facility]    PATIENT'S NAME: Abhilash LUTZ   ATTENDING PHYSICIAN: Joi Rodriguez M.D.    PATIENT ACCOUNT #: [de-identified] LOCATION: [address] [place] [address] RECORD #: [id number] YOB: 1990   DATE OF SERVICE: 02/18/2021       CANCER medications include albuterol 1 puff q.4 h. p.r.n.;  apixaban 5 mg b.i.d.; buspirone 10 mg t.i.d.; citalopram 40 mg daily; cyclobenzaprine 5 mg t.i.d. p.r.n.; dicyclomine 20 mg q.i.d. p.r.n.; Lomotil 1 to 2 tablets q.i.d. p.r.n.; gabapentin 300 mg nightly; His blood counts are tolerating the PARP inhibitor well. We are going to continue to complete the course of radiotherapy.   All our efforts at this point appear to be palliative, and he may or may not be strong enough to consider future clinical trials if

## 2021-03-01 NOTE — TELEPHONE ENCOUNTER
Pt called, states will not be coming in for RT today. Having nausea and increased L shoulder pain. Taking his anti-emetics and pain meds as directed. Therapists made aware.

## 2021-03-02 NOTE — PROGRESS NOTES
Pt tolerated dilaudid injection well and was able to successfully complete RT.      Education Record    Learner:  Patient    Disease / Diagnosis: GE junction ca    Barriers / Limitations:  None   Comments:    Method:  Discussion   Comments:    General Topic

## 2021-03-03 NOTE — PROGRESS NOTES
Pt reporting moderate pain today. Dilaudid 1 mg given just prior to RT; tolerated well. Pt was able to successfully complete RT treatment today.      Education Record    Learner:  Patient    Disease / Diagnosis: GE junction ca    Barriers / Limitations:  No

## 2021-03-05 NOTE — PROGRESS NOTES
[facility] Radiation Treatment Management Note 11-15    Patient:  Anil Titus  Age:  31year old  Visit Diagnosis:  L SCF neck mass  Primary Rad/Onc:  Dr. Ignacio Side    Site Delivered Dose (cGy) Prescribed Dose (cGy) Bobbi Esquivel

## 2021-03-05 NOTE — PROGRESS NOTES
Kristie here for RT. Dilaudid given prior for pain 2/10. Called by radiation RN about 12 min after first dose given and pt.  Was having increased pain on table, 5/10.  1mg dilaudid given and patient was more comfortable and able to tolerate radiation without

## 2021-03-08 NOTE — PROGRESS NOTES
Pt requested 2nd dose of dilaudid because he was unable to lay down on RT table without significant pain after first dose was given. Pt was able to tolerate RT after 2nd dose.      Education Record    Learner:  Patient    Disease / Diagnosis: GE junction ca

## 2021-03-09 NOTE — PROGRESS NOTES
Education Record    Learner:  Patient    Disease / Diagnosis:Given pain medication before getting radiation    Barriers / Limitations:  None   Comments:    Method:  Brief focused   Comments:    General Topics:  Infection, Medication, Pain, Precautions, Pro

## 2021-03-09 NOTE — PROGRESS NOTES
[facility]    PATIENT'S NAME: Ying LUTZ   ATTENDING PHYSICIAN: Dorys Wolff M.D.    PATIENT ACCOUNT #: [de-identified] LOCATION: [address] [place] [address] RECORD #: [id number] YOB: 1990   DATE OF SERVICE: 03/05/2021       CANCER 5 mg b.i.d., buspirone 10 mg t.i.d., citalopram 40 mg daily, cyclobenzaprine 5 mg t.i.d. p.r.n. muscle spasms, dicyclomine 20 mg q.i.d. p.r.n. abdominal cramps, Lomotil which he is not taking, gabapentin 300 mg nightly, hydromorphone 4 to 8 mg q.4 h. p.r.n Systemically he is being treated with olaparib. His labs are actually quite good. These were reviewed.   He has moderate anemia but no leukopenia, no thrombocytopenia, and his CA 19-9 has in fact dropped in the last 2-1/2 weeks, going from 18,460 to 15,67

## 2021-03-11 NOTE — PROGRESS NOTES
Education Record    Learner:  Patient    Disease / Diagnosis: GE junction carcinoma    Barriers / Limitations:  None   Comments:    Method:  Brief focused   Comments:    General Topics:  Medication and Side effects and symptom management   Comments:     Out

## 2021-03-11 NOTE — PROGRESS NOTES
Patient is here for MD f/u for GE junction carcinoma. Finished radiation today. Pain in left shoulder is intermittent. Taking Oxycodone and Tylenol for pain. Patient has a pain pump and settings have been adjusted during radiation. Decrease appetite.  Chron

## 2021-03-15 NOTE — PROGRESS NOTES
Radiation Oncology Treatment Summary    Diagnosis: metastatic esophageal cancer    Site: left supraclavicular mass    Dose: 3750 cGy in 15 fractions    Treatment Start Date: 2/15/21    Treatment End Date: 3/11/21    Elapsed Days: 24     Concurrent Treatmen

## 2021-03-16 NOTE — PROGRESS NOTES
[facility]    PATIENT'S NAME: Carlos LUTZ   ATTENDING PHYSICIAN: Amy Cagle M.D.    PATIENT ACCOUNT #: [de-identified] LOCATION: [address] [place] [address] RECORD #: [id number] YOB: 1990   DATE OF SERVICE: 03/11/2021       CANCER q.i.d. p.r.n. cramps, Lomotil 1 to 2 tablets q.i.d. p.r.n. diarrhea, gabapentin 300 mg nightly, hydromorphone 48 mg q.4 h. p.r.n., loperamide 2 to 4 mg q.i.d. p.r.n. diarrhea, lorazepam 2 mg q.6 h. p.r.n., mirtazapine 15 mg nightly.   He has a pain pump wit respond to the current regimen, we will have to consider just palliative care. His questions were answered, and we will see him back next week.     Dictated By Bin Peters M.D.  d: 03/15/2021 18:01:27  t: 03/16/2021 02:57:04  Soha Cano [id number]  CHACORTA

## 2021-03-19 NOTE — TELEPHONE ENCOUNTER
Patient called back again and states he is now having chest pain. Advised patient to go to the ER now and not to wait. Patient verbalized understanding.

## 2021-03-19 NOTE — TELEPHONE ENCOUNTER
Patient called to report pain and edema in both arms. Left arm is a little worse. No numbness or tingling. No redness. Patient has been on Prednisone 10 mg in the morning and 5 mg in the evening. Discussed with [facility].  Patient to not take todays' evening

## 2021-03-19 NOTE — ED INITIAL ASSESSMENT (HPI)
PT with hx of esophageal cancer and on chemo, last treatment today. PT reports swelling in L leg starting a month ago and swelling in L arm a few days ago. Denies pain or fevers.

## 2021-03-20 PROBLEM — I26.99 OTHER ACUTE PULMONARY EMBOLISM WITHOUT ACUTE COR PULMONALE (HCC): Status: ACTIVE | Noted: 2021-01-01

## 2021-03-20 PROBLEM — D64.9 ANEMIA, UNSPECIFIED TYPE: Status: ACTIVE | Noted: 2021-01-01

## 2021-03-20 PROBLEM — N17.9 AKI (ACUTE KIDNEY INJURY) (HCC): Status: ACTIVE | Noted: 2021-01-01

## 2021-03-20 PROBLEM — C15.5 MALIGNANT NEOPLASM OF LOWER THIRD OF ESOPHAGUS (HCC): Status: ACTIVE | Noted: 2021-01-01

## 2021-03-20 NOTE — CONSULTS
[facility] Note  [facility]  Report of Consultation    Codi Parkinson Patient Status:  Inpatient    10/3/1990 MRN [id number]   [facility] 7NE-A Attending Jermaine Young MD   [address] [facility] thrombosis ([facility]    • Depression    • Exposure to medical diagnostic radiation     12/2019   • Flatulence/gas pain/belching    • Frequent urination    • GE junction carcinoma ([facility]    • History of 2019 novel coronavirus disease (COVID-19) 12/05/2020   • In BREATH    Medications:  • busPIRone HCl  10 mg Oral TID   • escitalopram  20 mg Oral Daily   • gabapentin  300 mg Oral Nightly   • mirtazapine  15 mg Oral Nightly   • OLANZapine  10 mg Oral Nightly   • Olaparib  300 mg Oral BID   • Pantoprazole Sodium  20 20 94 % — —   03/20/21 0227 — — — 116 — 94 % — —   03/20/21 0045 — — — 119 21 98 % — —   03/19/21 2345 (!) 146/91 — — 116 15 96 % — —   03/19/21 2030 — — — 102 16 96 % — —   03/19/21 1930 (!) 145/106 — — 108 20 93 % — —   03/19/21 1804 (!) 148/107 97.7 °F UOIMMP3N, BHBZT0O, ABGHCO3, ABGBE, TEMP, FEDERICO, SITE, DEV, THGB in the last 168 hours. Invalid input(s): UNC16VHE, CHOB    Cultures:     No results found for this visit on 03/19/21.   No results for input(s): Destin Blanchard, [address] [facility], [address], [address] Maira Campbell, (CST): Yony Vuong MD on 3/19/2021 at 11:39 PM     Finalized by (CST): Yony Vuong MD on 3/19/2021 at 11:44 PM       [address] [facility] (ZFG=[id number])    Result Date: 3/19/2021  CONCLUSION:  No DVT visualized in the left leg.     Eugenie Elizabeth

## 2021-03-20 NOTE — PROGRESS NOTES
NURSING ADMISSION NOTE      Patient admitted via cart. Oriented to room. Safety precautions initiated. Bed in low position. Call light in reach. Pt admitted from ED to room 1150. Admission navigator completed.   Dilaudid given for pleuritic chest

## 2021-03-20 NOTE — ED PROVIDER NOTES
Patient Seen in: [facility] Emergency Department      History   Patient presents with:  Swelling Edema    Stated Complaint: swelling to left arm and left leg, hx of esophagael cancer and is on oral chemo*    HPI/Subjective:   HPI    26-year-old male by Penelope Nathan MD at [facility] ENDOSCOPY   • LAPAROSCOPIC ESOPHAGOGASTRECTOMY N/A 4/27/2018    Performed by Elinor Jimenez MD at [facility] MAIN OR   • LAPAROSCOPIC ESOPHAGOGASTRECTOMY Right 4/27/2018    Performed by Mckenna Camargo., Michael Rubi MD at [facility] MAIN OR   • [address] Extremities: Mild edema of the left arm primarily in the region of the humerus without cords. Some discomfort with movement of the shoulder but no deformity. The remainder the extremity is atraumatic.   Left lower extremity there is 2+ edema without warmt esophagael cancer and is on oral chemo. TECHNIQUE:  IV contrast-enhanced multislice CT angiography is performed through the pulmonary arterial anatomy. 3D volume renderings are generated. Dose reduction techniques were used.  Dose information is transmitt tissue/adenopathy measuring approximately 4.8 x 4.6 cm arising anterior to the left subclavian artery . ABDOMEN:  There is soft tissue stranding and nodularity surrounding the adrenal glands and visualized mesentery. Jacob Angles   BONES:  No obvious lytic or destructi pleural effusion. Postsurgical changes of the esophagectomy with gastric pull-through. Slight atelectasis/scarring in the right lung base. Port-A-Cath tip in the SVC. No pneumothorax.     Dictated by (CST): Richard Mackey MD on 3/19/2021 at 8:18 PM

## 2021-03-20 NOTE — PROGRESS NOTES
[facility]  Progress Note     Aleksandr Jimenes Patient Status:  Inpatient    10/3/1990 MRN [id number]   [facility] 7NE-A Attending Maryjo Powell MD   Hosp Day # 0 PCP Arian Hu DO     Chief Complaint: chest pain    S: [place] [facility].    Medications:   • busPIRone HCl  10 mg Oral TID   • escitalopram  20 mg Oral Daily   • gabapentin  300 mg Oral Nightly   • mirtazapine  15 mg Oral Nightly   • OLANZapine  10 mg Oral Nightly   • Olaparib  300 mg Oral BID   • Pantoprazole Sodium  20

## 2021-03-20 NOTE — CONSULTS
[facility] Consult Note    Reason for Consult: Metastatic GEJ Cancer  Medical Record Number: [id number]   CSN: [id number]   Referring Physician: No ref.  provider found  PCP: Broderick Altamirano DO    History of Present Illness: 30M with metastat • Anxiety state    • Back problem     Lower back disc    • Bloating    • Blood disorder     thrombocytopenia   • Body piercing    • Chest pain    • Decorative tattoo    • Deep vein thrombosis (HCC)    • Depression    • Exposure to medical diagnostic radi Never Smoker      Smokeless tobacco: Never Used    Vaping Use      Vaping Use: Some days    Substance and Sexual Activity      Alcohol use: Not Currently        Alcohol/week: 0.0 standard drinks        Comment: very occ      Drug use: Not Currently anteriorly, 2.0 x 1.2 cm right middle lobe, 2.3 x 1.0 cm and 1.9 x 1.4 cm right lower lobe.  New subpleural nodule in the    right upper lobe medially measuring 1.4 x 1.3 cm.  There is partial collapse of the left lower lobe and lingula.  Scattered subpleu mutation  - He did not bring in his oral chemo  - Imaging concerning for progression however tumor markers relatively stale. Unfortunately, there does not appear to be any remaining chemotherapy options.    - repeat  16,424 from 15.675 an CEA 6.9 from

## 2021-03-20 NOTE — H&P
[facility]  History and Physical     Isela Cobb Patient Status:  Emergency    10/3/1990 MRN [id number]   Location [address] [facility] Attending Jerome Harris MD   Hosp Day # 0 PCP Benji Quinones DO     Chief Compl Procedure Laterality Date   • INSERTION OF ACCESS PORT  04/10/2019   • INTRATHECAL PUMP TRIAL N/A 11/19/2020    Performed by Sailaja Adan MD at [facility] ENDOSCOPY   • LAPAROSCOPIC ESOPHAGOGASTRECTOMY N/A 4/27/2018    Performed by Howard Reyes MD at [place] [address]. cramps). , Disp: 30 tablet, Rfl: 3  morphINE Sulfate (PF), HYDROmorphone HCl PF, fentaNYL citrate, cloNIDine HCl, baclofen () in sodium chloride 0.9% infusion, by Intrathecal route continuous.  Gets morphine through pain pump, managed by pain manag Release, Take 1 capsule (40 mg total) by mouth 2 (two) times daily. , Disp: 60 capsule, Rfl: 0  Loperamide HCl 2 MG Oral Cap, Take 2 mg by mouth 4 (four) times daily as needed for Diarrhea., Disp: , Rfl:         Review of Systems:   A comprehensive 14 point hilar mass encasing left mainstem bronchus and PE  2. Pulm on Cs  3. Doppler of LLE Neg , will doppler RLE. 4. GGO opacities noted on CTA  2. Thrombus involving R IJ  3. Esophageal Ca with mets on palliative thx   1. Onc on Cs   4. Cancer related pain  1.

## 2021-03-21 NOTE — DISCHARGE SUMMARY
[facility]PITAL[facility]  DISCHARGE SUMMARY     Shaan Parekh Patient Status:  Inpatient    10/3/1990 MRN [id number]   [facility] 7NE-A Attending No att. providers found   Hosp Day # 1 PCP Winter Vyas DO     Date of Admission: 3/19/2021  D pending at Discharge:   · none    Consultants:  • Oncology, Pulmonary    Discharge Medication List:     Discharge Medications      CONTINUE taking these medications      Instructions Prescription details   Albuterol Sulfate  (90 Base) MCG/ACT Aers NAUSEA   Quantity: 90 tablet  Refills: 1     mirtazapine 15 MG Tabs  Commonly known as: REMERON      Take 1 tablet (15 mg total) by mouth nightly.    Quantity: 30 tablet  Refills: 3     OLANZapine 10 MG Tabs  Commonly known as: ZYPREXA      Take 1 tablet ([id number]    In 1 week  appointment with Dr. Janusz John or Dr Jazmine Sagastume to evaluate for left lung tumor infiltratiuon airway    Appointments for Next 30 Days 3/21/2021 - 4/20/2021 Comment      Date Arrival Time Visit Type Length Department Provider area, to be dropped off at the door. Staff will be available to escort patients inside as needed. Family/friends will be notified when your loved one is ready for pick-up. Thank you for your understanding.                     Vital signs:  Temp:  [98.8 °F

## 2021-03-21 NOTE — PLAN OF CARE
Assumed care @3703. Heparin gtt per order. All MD signed off. Discharge instructions reviewed with pt. All questions answered. Eliquis dose given. Stable at time of discharge. NURSING DISCHARGE NOTE    Discharged Home via Wheelchair.   Accom

## 2021-03-22 NOTE — PAYOR COMM NOTE
--------------  DISCHARGE REVIEW    Payor: GABBIE RODAS  Subscriber #:  [id number]  Authorization Number: [id number]    Admit date: 3/20/21  Admit time:   1:14 AM  Discharge Date: 3/20/2021  8:51 PM     Admitting Physician:   Attending Physician:  Nerissa mejia which showed a left hilar mass encasing the left mainstem bronchus as well as pulmonary embolism. Pulmonary and oncology were both consulted. He was placed on IV heparin. He remained stable, his pain improved.   No intervention was scheduled and he was t AS NEEDED FOR DIARRHEA   Quantity: 60 tablet  Refills: 0     gabapentin 300 MG Caps  Commonly known as: NEURONTIN      Take 1 capsule (300 mg total) by mouth nightly.    Quantity: 90 capsule  Refills: 0     HYDROmorphone HCl 4 MG Tabs  Commonly known as: DI Quantity: 10 patch  Refills: 3        STOP taking these medications    morphINE Sulfate (PF), HYDROmorphone HCl PF, fentaNYL citrate, cloNIDine HCl, baclofen () in sodium chloride 0.9% infusion               [place] reviewed: n[place]    Follow-up appoi for [id number] [place] [address]** Because the safety and protection of patients, staff, physicians and community is an absolute priority for [facility], we have put the following visitor restrictions in place during the Coronavirus outbreak.   -Visit

## 2021-03-22 NOTE — PAYOR COMM NOTE
--------------  ADMISSION REVIEW     Payor: GABBIE RODAS  Subscriber #:  [id number]  Authorization Number: [id number]    Admit date: 3/20/21  Admit time:  1:14 AM      Patient Seen in: [facility] Emergency Department    History   Stated Complaint: swell INSERTION OF ACCESS PORT  04/10/2019   • INTRATHECAL PUMP TRIAL N/A 11/19/2020    Performed by Jose Gomez MD at [facility] ENDOSCOPY   • LAPAROSCOPIC ESOPHAGOGASTRECTOMY N/A 4/27/2018    Performed by Sariah Jiang MD at [facility] MAIN OR   • [address] [place] [address] extremity there is 2+ edema without warmth or erythema. No open wounds or skin changes. Right upper and lower extremities are atraumatic. Skin: No other rashes or lesions. Neuro exam: Awake, conversive and moving all 4 extremities well.     Labs Reviewe mainstem bronchus measuring approximately 6.7 x 2.9 cm with marked narrowing of the left mainstem bronchus distal branches with occlusion in the region of the lingula. CARDIAC:  Normal heart size. No significant pericardial effusion.   PLEURA:  Bilateral p pneumothorax. Venous Dopplers of both left arm and left leg are pending at the time of dictation. Heparin protocol as well as coagulation studies will be initiated once the patient returns from ultrasound.   Case was discussed with the Meme Gurrola day., Disp: , Rfl:   predniSONE 5 MG Oral Tab, 2 tabs in am and 1 tab in pm, Disp: 90 tablet, Rfl: 5  LORAZEPAM 2 MG Oral Tab, TAKE 1 TABLET(2 MG) BY MOUTH EVERY 6 HOURS AS NEEDED FOR NAUSEA, Disp: 90 tablet, Rfl: 1  ONDANSETRON 8 MG Oral Tablet Dispersibl Nausea., Disp: 90 tablet, Rfl: 5  gabapentin 300 MG Oral Cap, Take 1 capsule (300 mg total) by mouth nightly., Disp: 90 capsule, Rfl: 0  cyclobenzaprine 5 MG Oral Tab, Take 1 tablet (5 mg total) by mouth 3 (three) times daily as needed for Muscle spasms. , hilar mass encasing left mainstem bronchus and PE  2. Pulm on Cs  3. Doppler of LLE Neg , will doppler RLE. 4. GGO opacities noted on CTA  2. Thrombus involving R IJ  3. Esophageal Ca with mets on palliative thx   1. Onc on Cs   4. Cancer related pain  1. Plan:  Cancer Associated VTE  - 06/2018: RUE DVT  - 12/8/20: Bilateral PE  - 03/19/21: Left Internal Jugular DVT  - 3/19/21: Possible tumor thrombus in lingula.  Difficult to assess for thrombus given worsening GGOs and encasement of left distal PA  - Repea RUL. Of note, CT Chest from 12/2020 with COVID 19 bilateral PNA changes and bilateral PE. LUE US c/w possible LIJ thrombus. Pt has chronic back pain and reports \"my pain pump\" is kicking in.   He reports the dyspnea he had a few days ago has resolved an 9. 7* 9.0*   HCT 30.6* 28.7*   MCV 90.0 90.3   MCH 28.5 28.3   MCHC 31.7 31.4   RDW 20.7* 20.5*   NEPRELIM 5.40  --    WBC 6.4 5.1   .0 244.0      No results for input(s): BNP in the last 168 hours.   No results for input(s): TROP, CK in the last 168 Nausea,vomiting  Start: 03/20/21 0134 End: 03/21/21 0017    Admin Instructions:    0159-Given   0922-Given            DC HOME 3/20

## 2021-03-25 NOTE — PROGRESS NOTES
Patient is here for MD f/u for GE junction. Patient c/o swelling in left arm and left leg. Left arm is red and warm to the touch. He continues on Eliquis daily. Pain is stable. Patient has a pain pump and taking oral pain meds. Appetite is fair.  Patient sa

## 2021-03-30 NOTE — PROGRESS NOTES
[facility]    PATIENT'S NAME: Con Nara LUTZ   ATTENDING PHYSICIAN: Car Paulino M.D.    PATIENT ACCOUNT #: [de-identified] LOCATION: [address] [place] [address] RECORD #: [id number] YOB: 1990   DATE OF SERVICE: 03/25/2021       CANCER loss of consciousness. He does not have a headache now. He has chronic fatigue.     MEDICATIONS:  His current medications include albuterol HFA inhaler 2 puffs q.4 h. p.r.n., apixaban 5 mg b.i.d., buspirone 10 mg t.i.d., citalopram 40 mg daily, cyclobenza Dr. Yee Thibodeaux regarding his left bronchial tree, which is certainly at risk for obstruction. He has a modest pleural effusion. It does not necessarily need to be tapped at present.   As to his cancer, he continues on an off-label attempt at treatment with a

## 2021-04-02 NOTE — TELEPHONE ENCOUNTER
Patient's mother is calling to discuss some test he is having next and plus he's having Surgery on Friday. Please call Tommie Crouch.

## 2021-04-02 NOTE — TELEPHONE ENCOUNTER
Patient's mom wanted to make sure it was ok for patient to have thoracentesis on Thursday and surgery on vocal cords on Friday. Informed her ok to do this. Proceed as planned.

## 2021-04-02 NOTE — TELEPHONE ENCOUNTER
Returned patient's mom's call. No answer. Left voicemail asked her to call back to answer her questions.

## 2021-04-05 NOTE — TELEPHONE ENCOUNTER
Received call from patient. He fell out of his chair today and has scraped his face. Denies any bleeding or signs of additional injury. Acute care visit scheduled with APN on 4/6/21.

## 2021-04-06 NOTE — PROGRESS NOTES
Pt here for APN visit and IV meds. Pt feeling very nauseated. Given IV ativan and Zofran as ordered. Pt feeling slightly drowsy but some relief from nausea already. OK to d.c per APN. Pt here with wife and not driving self today.   Pt departed stable with w

## 2021-04-06 NOTE — PROGRESS NOTES
[facility]    PATIENT'S NAME: Deepti LUTZ   ATTENDING PHYSICIAN: Audra Richard M.D.    PATIENT ACCOUNT #: [de-identified] LOCATION: [address] [place] [address] RECORD #: [id number] YOB: 1990   DATE OF SERVICE: 04/01/2021       CANCER q.4 h. p.r.n.; apixaban 5 mg b.i.d.; buspirone 10 mg 3 times a day; citalopram 40 mg daily; cyclobenzaprine 5 mg t.i.d. p.r.n.; dicyclomine 20 mg q.i.d. p.r.n. cramps;  Lomotil 1 to 2 tablets q.i.d. p.r.n. diarrhea; gabapentin 300 mg nightly; hydromorphone benefit with regard to the pressure on his left main bronchial tubes. 2.   Pleural effusion and left hilar obstruction. His thoracentesis is delayed. It will be rescheduled for later, and he will have the Eliquis held for 48 hours prior to this. 3.    V

## 2021-04-06 NOTE — PROGRESS NOTES
ANP Visit Note    Patient Name: Annie Lopes   YOB: 1990   Medical Record Number: [id number]   CSN: [id number]   Date of visit: 4/6/2021   Xavier Obrien DO   No primary care provider on file.      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)     Secondary malignant neoplasm of supraclavicular lymph node ([address]     Bilateral arm pain     Allergic rhinitis     Monoallelic mutation of NATIVIDAD gene     Malignant ascites     Palliative care by spec will be placed- on 12/22/20   • Shortness of breath     a little bit   • Sleep disturbance    • Uncomfortable fullness after meals    • Vomiting    • Vomiting blood        Surgical History:  Past Surgical History:   Procedure Laterality Date   • INSERTION marijuana      Sexual activity: Not on file    Other Topics      Concerns:        Caffeine Concern: Yes          2 / day - pop        Exercise: No          walk        Seat Belt: Not Asked        Special Diet: Not Asked        Stress Concern: Not Asked day., Disp: , Rfl:   •  predniSONE 5 MG Oral Tab, 2 tabs in am and 1 tab in pm, Disp: 90 tablet, Rfl: 5  •  LORAZEPAM 2 MG Oral Tab, TAKE 1 TABLET(2 MG) BY MOUTH EVERY 6 HOURS AS NEEDED FOR NAUSEA, Disp: 90 tablet, Rfl: 1  •  ONDANSETRON 8 MG Oral Tablet D 1 TO 2 TABLETS BY MOUTH FOUR TIMES DAILY AS NEEDED FOR DIARRHEA, Disp: 60 tablet, Rfl: 0  •  Omeprazole 40 MG Oral Capsule Delayed Release, Take 1 capsule (40 mg total) by mouth 2 (two) times daily. , Disp: 60 capsule, Rfl: 0  •  Loperamide HCl 2 MG Oral Ca active problems.     Planned Follow Up: 1-2 weeks with Dr Magda De Oliveira in office     Risk Level: High: Metastatic Esophageal Cancer on chemo with side effects and s/p fall     Electronically Signed by:    Zeke Valencia ANP-BC  Nurse Practitioner  Jose Forward

## 2021-04-06 NOTE — PROGRESS NOTES
Outpatient Oncology Care Plan  Problem list:  fatigue  nausea and vomiting    Problems related to:    disease/disease progression    Interventions:  provided general teaching    Expected outcomes:  understands plan of care    Progress towards outcome:  patrick

## 2021-04-08 NOTE — OPERATIVE REPORT
[facility] Patient Status:  Outpatient    10/3/1990 MRN [id number]   Location [address] [place] [address] Attending Bruce Deshpande MD   Hosp Day # 0 PCP Sven Suero DO       Date of Procedure: 21      Pre-operative Teresa complications.       Condition:  Stable     Kapil Benavides MD  [facility] [place] [facility]

## 2021-04-09 NOTE — TELEPHONE ENCOUNTER
Pt called stating that he was unable to make it to procedure with Dr. Abisai Lugo today. He is currently at home with SOB and swelling to BLE. Pt states he has been able to swallow all of his medications.  Dr. Saad Law is aware and has instructed pt to proceed to

## 2021-04-09 NOTE — TELEPHONE ENCOUNTER
Pt. Called to inform Dr. Darlene Randolph that he was unable to go to procedure today that was supposed to \"help with his vocal cords\". Reason stated is that he is too SOB and has too much lower body swelling.   Pt. States MD is aware of both the SOB and swelling

## 2021-04-12 PROBLEM — R60.0 BILATERAL LEG EDEMA: Status: ACTIVE | Noted: 2021-01-01

## 2021-04-12 PROBLEM — R60.9 EDEMA: Status: ACTIVE | Noted: 2021-01-01

## 2021-04-12 NOTE — H&P
[facility]  History and Physical     Sofia Bal Patient Status:  Inpatient    10/3/1990 MRN [id number]   [facility] 7NE-A Attending Kaycee Lewis DO   Hosp Day # 0 PCP Robert Larry DO     Chief Complaint: swelling, Elier Snipe placed- on 12/22/20   • Shortness of breath     a little bit   • Sleep disturbance    • Uncomfortable fullness after meals    • Vomiting    • Vomiting blood         Past Surgical History:   Past Surgical History:   Procedure Laterality Date   • INSERTION O 20 mEq by mouth daily. , Disp: 30 packet, Rfl: 0  oxyCODONE-acetaminophen 5-325 MG Oral Tab, Take 1-2 tablets by mouth every 4 (four) hours as needed for Pain., Disp: 150 tablet, Rfl: 0  Olaparib 150 MG Oral Tab, Take 300 mg by mouth 2 (two) times a day. , D for Muscle spasms. , Disp: 30 tablet, Rfl: 0  DIPHENOXYLATE-ATROPINE 2.5-0.025 MG Oral Tab, TAKE 1 TO 2 TABLETS BY MOUTH FOUR TIMES DAILY AS NEEDED FOR DIARRHEA, Disp: 60 tablet, Rfl: 0  Omeprazole 40 MG Oral Capsule Delayed Release, Take 1 capsule (40 mg t COVID19 Not Detected 03/19/2021    COVID19 Detected (A) 12/05/2020       Pro-Calcitonin  No results for input(s): PCT in the last 168 hours. Cardiac  No results for input(s): TROP, PBNP in the last 168 hours.     Creatinine Kinase  No results for input(s

## 2021-04-12 NOTE — TELEPHONE ENCOUNTER
Bilateral LE edema with scrotal swelling making voiding difficult. Discussed with Dr Wanda Freitas and will try to direct admit.

## 2021-04-12 NOTE — TELEPHONE ENCOUNTER
Received call from patient. Noticeable swelling to bilateral lower extremities. Per APN, patient increased to 20 mg BID of diuretic. Will call Wednesday with update. Patient verbalized understanding on plan.

## 2021-04-12 NOTE — SLP NOTE
ADULT SWALLOWING EVALUATION    ASSESSMENT    ASSESSMENT/OVERALL IMPRESSION:  Patient is a 32 y/o male admitted with worsening leg/scrotal edema and SOB. PMHx significant for PE/DVT, metastatic esophageal cancer, and anemia.  SLP orders received to evaluate Videofluoroscopic swallow study;Aspiration precautions       HISTORY   MEDICAL HISTORY  Reason for Referral: R/O aspiration    Problem List  Active Problems:    Edema    Bilateral leg edema      Past Medical History  Past Medical History:   Diagnosis Date Limits                      Pharyngeal Phase of Swallow: Impaired  Laryngeal Elevation Timing: Appears impaired  Laryngeal Elevation Strength: Appears intact     (Please note: Silent aspiration cannot be evaluated clinically.  Videofluoroscopic Swallow Stud

## 2021-04-12 NOTE — CONSULTS
Werner Lowe [address] [facility] [address] Note [facility]  Report of Consultation    Delio Sotelo Patient Status:  Inpatient    10/3/1990 MRN [id number]   [facility] 7NE-A Attending Henna Shannon, detection of COVID-19 virus     12/5/2020   • Loss of appetite    • Personal history of antineoplastic chemotherapy     12/3/2020 last tx- port a cath right side   • Pulmonary embolism (HCC)    • S/P insertion of IVC (inferior vena caval) filter     Per MD Nightly   • Olaparib  300 mg Oral BID   • [START ON 4/13/2021] Pantoprazole Sodium  40 mg Oral QAM AC   • predniSONE  10 mg Oral Daily   • predniSONE  5 mg Oral Nightly   • scopolamine  1 patch Transdermal Q72H   • torsemide  20 mg Oral Daily     Albuterol distress  PSYCH: Normal mood and affect, AAOX3  NEURO: CN 2-12 grossly intact, no focal deficits  HEENT: Atraumatic, normocephalic, EOMI, no icterus/hemorrhage, no conjunctival injection/discharge, nares normal  MOUTH: MMM, good dentition  NECK: Trachea mi which may be related to total body fluid overload or even the collapsed lung  · Suspected malignant airway obstruction on chest imaging with 3/19/21 CTA with LMS narrowing concern for MAO with partial collapse of left lung  · possible invasion of tumor int

## 2021-04-13 PROBLEM — R06.02 SHORTNESS OF BREATH: Status: ACTIVE | Noted: 2021-01-01

## 2021-04-13 NOTE — VIDEO SWALLOW STUDY NOTE
ADULT VIDEOFLUOROSCOPIC SWALLOWING STUDY    Admission Date: 4/12/2021  Evaluation Date: 04/13/21  Radiologist: Aster Still    RECOMMENDATIONS   Diet Recommendations - Solids: Regular  Diet Recommendations - Liquids: Honey thick liquids/ Moderately thick    Della Erasmo appetite    • Personal history of antineoplastic chemotherapy     12/3/2020 last tx- port a cath right side   • Pulmonary embolism (HCC)    • S/P insertion of IVC (inferior vena caval) filter     Per MD Notes- and confirmed by patient, IVC Filter will be p Honey Thick Liquids):  Within Functional Limits  Triggered at: Base of tongue  Residue Severity, Location: Mild;Valleculae  Cleared/Reduced with: Multiple swallows  Laryngeal Penetration: None  Tracheal Aspiration: None  PUREE  Oral Phase of Swallow (VFSS - tolerance and adjust as appropriate. GOALS  Goal #1 The patient will tolerate regular consistency and honey-thick/moderately-thick liquids without overt signs or symptoms of aspiration with 90 % accuracy over 2-3 session(s).   In [address] [facility]

## 2021-04-13 NOTE — PROGRESS NOTES
[facility]  Report of Consultation    Codi Parkinson Patient Status:  Inpatient    10/3/1990 MRN [id number]   [facility] 7NE-A Attending Neeta Negrete MD   [facility] Day # 1 PCP Peggy Malone DO     Date of Admission:  2021  Date of • Loss of appetite    • Personal history of antineoplastic chemotherapy     12/3/2020 last tx- port a cath right side   • Pulmonary embolism (HCC)    • S/P insertion of IVC (inferior vena caval) filter     Per MD Notes- and confirmed by patient, IVC Filt Inhalation, Q4H PRN  •  busPIRone HCl (BUSPAR) tab 10 mg, 10 mg, Oral, TID  •  escitalopram (LEXAPRO) tab 20 mg, 20 mg, Oral, Daily  •  cyclobenzaprine (FLEXERIL) tab 5 mg, 5 mg, Oral, TID PRN  •  Dicyclomine HCl (BENTYL) tab 20 mg, 20 mg, Oral, QID PRN  • resp. rate 16, weight 115.4 kg (254 lb 6.4 oz), SpO2 98 %.     Laboratory Data:  Lab Results   Component Value Date    WBC 4.5 04/13/2021    HGB 7.9 04/13/2021    HCT 25.1 04/13/2021    .0 04/13/2021    CREATSERUM 0.69 04/13/2021    CREATSERUM 0.69 0 of intrathecal pump     Monoallelic mutation of NATIVIDAD gene     Other acute pulmonary embolism without acute cor pulmonale (HCC)     Malignant neoplasm of lower third of esophagus (HCC)     Anemia, unspecified type     DARÍO (acute kidney injury) ([facility]     Khoa

## 2021-04-13 NOTE — CONSULTS
[address] Singh  [id number]  Hospital Day #1  Date of Consult:   4/13/2021        Reason for Consultation:      Consult requested by Dr. 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 His appetite has been poor. He reports at times he eats and will vomit other times he tolerates small amounts of food.       Substance History     Smoking status: no  History of Substance abuse: cannabis use      Allergies:   Paclitaxel oxyCODONE-acetaminophen (PERCOCET) 5-325 MG per tab 1 tablet, 1 tablet, Oral, Q4H PRN **OR** oxyCODONE-acetaminophen (PERCOCET) 5-325 MG per tab 2 tablet, 2 tablet, Oral, Q4H PRN  •  HYDROmorphone HCl (DILAUDID) tab 4 mg, 4 mg, Oral, Q4H PRN **OR** HYDROmo VENOUS DOPPLER LEG LEFT - DIAG IMG (CPT=93971), 3/19/2021, 9:53 PM.     INDICATIONS:  swelling, hx of DVT     TECHNIQUE:  Real time, grey scale, and duplex ultrasound was used to evaluate the lower extremity venous system.  B-mode two-dimensional images of [phone]    ----------------------------------------------------------------------------  Transthoracic Echocardiogram    Name:Callie Reeves    Date: 2021 :  10/03/1990 Ht:  (78in)  BP: 124 / 83  MRN:  [id number]    Age: normal.  Right atrium:  The atrium was normal in size. Mitral valve:   Structurally normal valve. Leaflet separation was normal.  Doppler:  Transvalvular velocity was within the normal range. There was no  evidence for stenosis.  There was no significant Mood- no facial expressions/ flat affect, cooperative  Skin: Warm and dry.     Palliative Performance Scale: 40%    Palliative Performance Scale   % Ambulation Activity Level Self-Care Intake Consciousness   100 Full Normal Full   Normal Full   90 Full No d Palliative Care criteria:  1) Is not effected by prognostication (can receive palliative care services if prognosis is in hours, days, months, years, decades)  2) May continue life-prolonging measures and treatments  3) Includes treatment of symptom

## 2021-04-13 NOTE — PLAN OF CARE
Assumed care at [address]. Alert and oriented x4. Telemetry monitor reading St. Rt chest port accessed assessed and maintained. Pain controlled with Dilaudid PO. Fall precautions maintained per protocol. Plan for venous doppler, CRX, PT/OT evaluation.  Call [facility]

## 2021-04-13 NOTE — PROGRESS NOTES
Clarified with Heme/Onc that patient should still be taking Olaparib Tabs.  Will ask patient to bring in for our pharmacy to verify

## 2021-04-13 NOTE — CONSULTS
Name: Anil Titus   : 10/3/1990   DOS: 2021     Chief complaint: Presence of intrathecal pump    History of present illness:  Anil Titus is a 31year old male with a diagnosis of esophageal adenocarcinoma 3 years ago status post ch Flatulence/gas pain/belching    • Frequent urination    • GE junction carcinoma ([address]    • History of 2019 novel coronavirus disease (COVID-19) 12/05/2020   • Indigestion    • Lab test positive for detection of COVID-19 virus     12/5/2020   • Loss of appe Currently      Frequency: 5.0 times per week      Types: Cannabis      Comment: medical marijuana      Review of  other systems:  10 point ROS otherwise negative    Physical examination: Kristie is a 31year old male not in acute distress  /71 (BP Locat discussed with him and his wife his pain management options. He has the option of further increasing his pump dosage. We will follow him tomorrow to see if this is needed.   If his pain remains a 3 out of 10, I do not believe increasing his pump dosage wo

## 2021-04-13 NOTE — CONSULTS
[facility]                       Gastroenterology [address] [facility] Gastroenterology    [facility] Patient Status:  Inpatient    10/3/1990 MRN [id number]   [facility] 7NE-A Attending Mode Marlow MD   [facility] Day # 1 PCP Lorri Cr history of antineoplastic chemotherapy     12/3/2020 last tx- port a cath right side   • Pulmonary embolism (HCC)    • S/P insertion of IVC (inferior vena caval) filter     Per MD Notes- and confirmed by patient, IVC Filter will be placed- on 12/22/20   • QAM AC  predniSONE (DELTASONE) tab 10 mg, 10 mg, Oral, Daily  scopolamine (TRANSDERM-SCOP) patch, 1 patch, Transdermal, Q72H  torsemide (DEMADEX) tab 20 mg, 20 mg, Oral, Daily  acetaminophen (TYLENOL) tab 650 mg, 650 mg, Oral, Q6H PRN  HYDROmorphone HCl (D The patient reports no recent rashes or chronic skin disorders            Rheumatologic: The patient reports no history of chronic arthritis, myalgias, arthralgias            Genitourinary:  The patient reports no history of recurrent urinary tract infecti 0.69*   GFRAA 136 146  146   GFRNAA 118 126  126   CA 8.4* 8.6  8.6    136  136   K 4.0 3.8  3.8    100  100   CO2 32.0 30.0  30.0     Recent Labs   Lab 04/13/21  0730   RBC 2.70*   HGB 7.9*   HCT 25.1*   MCV 93.0   MCH 29.3   MCHC 31.5   RDW pleural effusion, partial collapse of LLL. He states his his main complaint with swallowing is immediate, and he starts choking. He is able to take liquids but also will choke. This is more consistent with oropharyngeal dysphagia.  This is in light of e

## 2021-04-13 NOTE — PLAN OF CARE
NURSING ADMISSION NOTE      Patient admitted via Wheelchair  Oriented to room. Safety precautions initiated. Bed in low position. Call light in reach. Admission navigator completed with patient and wife. Oriented to room and unit.      Pt AxOx4,

## 2021-04-13 NOTE — CM/SW NOTE
Pt seen for PHQ4. Pt has not be diagnosed with depression/anxiety but appreciative for the [facility] resource flyer given. Pt should not have any other dc needs at this time. PC meeting with pt for Kristin 63 discussion.

## 2021-04-13 NOTE — OCCUPATIONAL THERAPY NOTE
OCCUPATIONAL THERAPY EVALUATION - INPATIENT     Room Number: 7995/1809-F  Evaluation Date: 4/13/2021  Type of Evaluation: Initial  Presenting Problem: dyspnea, L pleural effusion    Physician Order: IP Consult to Occupational Therapy  Reason for Therapy: A History:   Procedure Laterality Date   • INSERTION OF ACCESS PORT  04/10/2019   • INTRATHECAL PUMP TRIAL N/A 11/19/2020    Performed by Penelope Nathan MD at [address] [place] [address] ENDOSCOPY   • LAPAROSCOPIC ESOPHAGOGASTRECTOMY N/A 4/27/2018    Performed by Elinor Jimenez MD at extremity strength is within functional limits grossly 4+/5    COORDINATION  Gross Motor    [facility]    Fine Motor    [facility]      ADDITIONAL TESTS                                    NEUROLOGICAL FINDINGS                   ACTIVITY TOLERANCE addressed; Alarm set; Other (Comment) (LEs elevated)    ASSESSMENT     Patient is a 31year old male admitted on 4/12/2021 for dyspnea. Complete medical history and occupational profile noted above. Functional outcome measures completed include AMPAC.  In thi strengthening/ROM; Endurance training;Patient/Family education;Patient/Family training;Equipment eval/education; Compensatory technique education;Continued evaluation  Rehab Potential : Fair  Frequency (Obs): 3-5x/week  Number of Visits to Meet Established G

## 2021-04-13 NOTE — PROGRESS NOTES
[facility]  Progress Note    Isabella Villalobos Patient Status:  Inpatient    10/3/1990 MRN [id number]   [facility] 7NE-A Attending Ana Paula Dumas MD   [facility] Day # 1 PCP Simin Hoyos DO     Subjective:  Isabella Villalobos is a(n) 31 yea Component Value Date    COVID19 Not Detected 04/06/2021    COVID19 Not Detected 03/19/2021    COVID19 Detected (A) 12/05/2020       Pro-Calcitonin  No results for input(s): PCT in the last 168 hours.     Cardiac  No results for input(s): TROP, PBNP in the HCl (ZOFRAN) injection 4 mg, 4 mg, Intravenous, Q6H PRN  LORazepam (ATIVAN) tab 2 mg, 2 mg, Oral, Q6H PRN   Or  LORazepam (ATIVAN) injection 1 mg, 1 mg, Intravenous, Q4H PRN  oxyCODONE-acetaminophen (PERCOCET) 5-325 MG per tab 1 tablet, 1 tablet, Oral, [facility]

## 2021-04-13 NOTE — DIETARY NOTE
[facility]    NUTRITION ASSESSMENT    Pt does not meet malnutrition criteria.     NUTRITION INTERVENTION:    1. RD nutrition Care Plan- Encouraged small frequent meals with emphasis on high calorie/high protein and Initiated ONS (oral nutritional suppl days)     Date/Time Percent Meals Eaten (%)    04/13/21 0900  0 %    Percent Meals Eaten (%): NPO at 04/13/21 0900    04/13/21 1300  0 %    Percent Meals Eaten (%): NPO at 04/13/21 1300            FOOD/NUTRITION RELATED HISTORY:   Appetite: Fair  Intake: -

## 2021-04-13 NOTE — CONSULTS
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[facility] [address]    • History of 2019 novel coronavirus disease (COVID-19) 12/05/2020   • Indigestion    • Lab test positive for detection of COVID-19 virus     12/5/2020   • Loss of appetite    • [facility] Facility-Administered Medications:   •  furosemide (LASIX) injection 20 mg, 20 mg, Intravenous, Once  •  Albuterol Sulfate  (90 Base) MCG/ACT inhaler 1 puff, 1 puff, Inhalation, Q4H PRN  •  busPIRone HCl (BUSPAR) tab 10 mg, 10 mg, Oral, TID  •  esci Once    Review of Systems:  A 12-point review of systems was done with pertinent positives and negatives per the HPI.     Physical Exam:        Vital Signs: /81 (BP Location: Left arm)   Pulse 104   Temp 98.2 °F (36.8 °C) (Oral)   Resp 16   Wt 115.4 k Globulin  4.5 (H) 2.8 - 4.4 g/dL    A/G Ratio 0.4 (L) 1.0 - 2.0   CBC W/ DIFFERENTIAL    Collection Time: 04/12/21  4:23 PM   Result Value Ref Range    WBC 6.6 4.0 - 11.0 x10(3) uL    RBC 2.76 (L) 4.30 - 5.70 x10(6)uL    HGB 8.2 (L) 13.0 - 17.5 g/dL disease without esophagitis     GE junction carcinoma (HCC)     Iron deficiency anemia due to chronic blood loss     Infusion reaction     Thrombocytopenia (HCC)     Anxiety     Anemia associated with chemotherapy     Folliculitis     Chemotherapy-induced multifactorial - Seen by Dr Vu Lopez - has effusion and partial obstruction of left lung due to hilar mets. ?bronch and stenting. 4)  Increased LE edema. On eliquis though will temporarily hold this in case of need for procedures.   Check venous doppler

## 2021-04-13 NOTE — PROGRESS NOTES
[facility]  Progress Note     Dagmar Courtney Patient Status:  Inpatient    10/3/1990 MRN [id number]   [facility] 7NE-A Attending Eliana Zaidi MD   Hosp Day # 1 PCP Leobardo Montgomery DO     Chief Complaint: sob and les edema    S: Pa COVID19 Not Detected 04/06/2021    COVID19 Not Detected 03/19/2021    COVID19 Detected (A) 12/05/2020       Pro-Calcitonin  No results for input(s): PCT in the last 168 hours. Cardiac  No results for input(s): TROP, PBNP in the last 168 hours.     July Orantes

## 2021-04-13 NOTE — PAYOR COMM NOTE
--------------  ADMISSION REVIEW     Payor: GABBIE RODAS  Subscriber #:  [id number]  Authorization Number: [id number]    Admit date: 4/12/21  Admit time:  2:43 PM         [facility]  History and Physical     Chief Complaint: swelling, urinary retenti 12/22/20   • Shortness of breath     a little bit   • Sleep disturbance    • Uncomfortable fullness after meals    • Vomiting    • Vomiting blood       Past Surgical History:   Past Surgical History:   Procedure Laterality Date   • INSERTION OF ACCESS PORT NAUSEA, Disp: 90 tablet, Rfl: 1  ONDANSETRON 8 MG Oral Tablet Dispersible, DISSOLVE 1 TABLET(8 MG) ON THE TONGUE EVERY 8 HOURS AS NEEDED FOR NAUSEA, Disp: 30 tablet, Rfl: 3  Dicyclomine HCl 20 MG Oral Tab, Take 1 tablet (20 mg total) by mouth 4 (four) time Physical Exam:    /80 (BP Location: Left arm)   Pulse (!) 126   Temp 98.3 °F (36.8 °C) (Oral)   Resp 19   SpO2 97%   General: No acute distress. Alert and oriented x 3. HEENT: Normocephalic atraumatic. Moist mucous membranes. EOM-I. PERRLA.  Anic obstruction. He underwent left thoracentesis on 4/8 with 900ml removed but denies any change in symptoms. He thinks his dyspnea has actually progressively worsened over the past few weeks. Also has noted more edema of BLE and abdominal bloating.  Poor appet pain.     History of Present Illness:     Thierry Villegas is a 31year old male. Diagnosed with esophageal adenocarcinoma 3 years ago - SP chemo/RT and surgery with eventual recurrence in 2019. Has been on a variety of treatments since then.  Has an un Regular rate and rhythm. Abdomen: Soft, non tender with good bowel sounds. No   Hepatosplenomegaly. Mild diffuse firmness. Extremities: Pedal pulses are present. 2-3+ edema           L>R and no tenderness.                 Josette     Vital signs:  Temp:  [97.8 °F (36.6 °C)-98.5 °F (36.9 °C)] 98.2 °F (36.8 °C)  Pulse:  [104-135] 110  Resp:  [16-19] 16  BP: (111-138)/(71-95) 115/71    ASSESSMENT / PLAN:      1. Lower Extr Edema  1. Resume home diuretics  2.  Check albumin, may be nu mirtazapine (REMERON) tab 15 mg     Date Action Dose Route User    4/12/2021 2137 Given 15 mg Oral Harrison Sheldon RN      Pantoprazole Sodium (PROTONIX) EC tab 40 mg     Date Action Dose Route User    4/13/2021 0663 Given 40 mg Oral Harrison Sheldon RN      pred

## 2021-04-13 NOTE — PHYSICAL THERAPY NOTE
PHYSICAL THERAPY EVALUATION - INPATIENT     Room Number: 5082/4988-L  Evaluation Date: 4/13/2021  Type of Evaluation: Initial  Physician Order: PT Eval and Treat    Presenting Problem: weakness, edema, shortness of breath  Reason for Therapy: Jhony Sutton Indigestion    • Lab test positive for detection of COVID-19 virus     12/5/2020   • Loss of appetite    • Personal history of antineoplastic chemotherapy     12/3/2020 last tx- port a cath right side   • Pulmonary embolism (HCC)    • S/P insertion of IVC upper thigh, scrotal region  Management Techniques:  Activity promotion;Relaxation;Repositioning    COGNITION  · Overall Cognitive Status:  WFL - within functional limits    RANGE OF MOTION AND STRENGTH ASSESSMENT  Upper extremity ROM and strength are withi Supervision  Distance (ft): 25  Assistive Device: Rolling walker  Pattern:  (decreased B hip, knee, and ankle ROM due to edema, wide LUCERO)  Stoop/Curb Assistance: Not tested       Skilled Therapy Provided: Per RN aaliyah to work with pt.  Pt received in supine complexity is considered moderate. These impairments and comorbidities manifest themselves as functional limitations in independent bed mobility, transfers, and gait.   The patient is below baseline and would benefit from skilled inpatient PT to address th

## 2021-04-14 PROBLEM — I82.4Y3 ACUTE DEEP VEIN THROMBOSIS (DVT) OF PROXIMAL VEIN OF BOTH LOWER EXTREMITIES (HCC): Status: ACTIVE | Noted: 2021-01-01

## 2021-04-14 NOTE — PLAN OF CARE
Assumed care at 1930  A&Ox4, flat affect  On RA, SOB with activity  BLE, scrotal edema noted - lasix given  Heparin gtt infusing  States pain is controlled with intrathecal pump, PRN meds  Plan is for diuresis, pain control, anticoagulation  Discussed with Therapy support as indicated  - Manage/alleviate anxiety  - Monitor for signs/symptoms of CO2 retention  Outcome: Progressing     Problem: HEMATOLOGIC - ADULT  Goal: Maintains hematologic stability  Description: INTERVENTIONS  - Assess for signs and sympto

## 2021-04-14 NOTE — PLAN OF CARE
Assumed pt care at 0730  VSS, ST on tele, A&Ox4, drowsy, hoarse voice  Pain controlled with PRN meds and intrathecal pump  Heparin gtt infusing per protocol, next pTT at 2030  Complaint of nausea, x1 Zofran  Chemo med sent and verified by pharmacy   [facility]  respiratory difficulty  - Respiratory Therapy support as indicated  - Manage/alleviate anxiety  - Monitor for signs/symptoms of CO2 retention  Outcome: Progressing     Problem: HEMATOLOGIC - ADULT  Goal: Maintains hematologic stability  Description: INTERV

## 2021-04-14 NOTE — PROGRESS NOTES
Pain Service    Patient reports pain is managed at rest - but severe with activity in low back and BLE with weight-bearing. He states pain is across right low back and burning pain in BLE. Jesusita Band    Doppler ultrasound shows extensive BLE DVTs - patient on Hepari

## 2021-04-14 NOTE — PAYOR COMM NOTE
--------------  CONTINUED STAY REVIEW    Payor: [employer] PPO  Subscriber #:  [id number]  Authorization Number: [id number]    Admit date: 4/12/21  Admit time:  2:43 PM    Admitting Physician: Rahul Fitch DO  Attending Physician:  Rosendo Jimenez MD  Primary C 4/13/2021 0830 Rate/Dose Change (none) Intrathecal Mary Carmen Corado, RN      mirtazapine (REMERON) tab 15 mg     Date Action Dose Route User    4/13/2021 2011 Given 15 mg Oral Serenity Null RN      oxyCODONE-acetaminophen (PERCOCET) 5-325 MG per tab 2 tablet 20 mg, Intravenous, BID (Diuretic)  •  [START ON 4/15/2021] torsemide (DEMADEX) tab 20 mg, 20 mg, Oral, Daily  •  Albuterol Sulfate  (90 Base) MCG/ACT inhaler 1 puff, 1 puff, Inhalation, Q4H PRN  •  busPIRone HCl (BUSPAR) tab 10 mg, 10 mg, Oral, TID Patient is alert and oriented x 3, not in acute distress but drifts off during conversation.   Vital Signs: /89 (BP Location: Left arm)   Pulse 115   Temp 98 °F (36.7 °C) (Oral)   Resp 16   Wt 115.1 kg (253 lb 12.8 oz)   SpO2 96%   BMI 29.33 kg/m²   H Creatinine 0.69 (L) 0.70 - 1.30 mg/dL     BUN/CREA Ratio 26.1 (H) 10.0 - 20.0     Calcium, Total 8.6 8.5 - 10.1 mg/dL     Calculated Osmolality 283 275 - 295 mOsm/kg     GFR, Non- 126 >=60     GFR, -American 146 >=60   CARBOHYDRATE A 67.7 (H) 35.1 - 46.3 fL   BASIC METABOLIC PANEL (8)     Collection Time: 04/14/21 12:01 AM   Result Value Ref Range     Glucose 92 70 - 99 mg/dL     Sodium 137 136 - 145 mmol/L     Potassium 3.8 3.5 - 5.1 mmol/L     Chloride 101 98 - 112 mmol/L     CO2 32. B-mode two-dimensional images of the vascular structures, Doppler spectral analysis, and color flow.  Doppler imaging were performed.  The   following veins were imaged bilaterally:  Common, deep, and superficial femoral, popliteal, sapheno-femoral junctio anticoagulation for this.    3)  Dyspnea - multifactorial - Seen by Dr Siddharth Bianchi - has effusion and partial obstruction of left lung due to hilar mets.  ?bronch and stenting. Obviously will hold anticoagulation for this     4)  Increased LE edema/DVT.   Ref

## 2021-04-14 NOTE — PROGRESS NOTES
[address] [facility] [address] Follow Up     Jayeshmoniquevinod Radha 11  [id number]  Hospital Day #2  Date of Consult: 04/13/21  Patient seen at: [facility]     Subjective:      Patient was seen  at the bedside.  Kristie is aware palliative care i patch, 1 patch, Transdermal, Q72H  •  acetaminophen (TYLENOL) tab 650 mg, 650 mg, Oral, Q6H PRN  •  HYDROmorphone HCl (DILAUDID) 1 MG/ML injection 0.2 mg, 0.2 mg, Intravenous, Q2H PRN **OR** HYDROmorphone HCl (DILAUDID) 1 MG/ML injection 0.4 mg, 0.4 mg, In Component Value Date    COVID19 Not Detected 04/06/2021    COVID19 Not Detected 03/19/2021    COVID19 Detected (A) 12/05/2020        Pro-Calcitonin  Recent Labs   Lab 04/14/21  0559   PCT 0.83*        Cardiac  Recent Labs   Lab 04/14/21  0559   PBNP 228* 4/12/2021  CONCLUSION:  Moderate to large left pleural effusion has increased in size. Increased atelectasis of the left lung.     Dictated by (CST): Naina Munoz MD on 4/12/2021 at 9:44 PM     Finalized by (CST): Naina Munoz MD on 4/12/2021 a Kristie continues to have back and lower extremity pain, Dilaudid as needed available as needed if pain pump ineffective. Kristie recognizes he will need help once he returns home. He is aware SW can assist with planning.      His wife is bringing his medicat

## 2021-04-14 NOTE — SLP NOTE
SPEECH DAILY NOTE - INPATIENT    ASSESSMENT & PLAN   ASSESSMENT  Pt seen for dysphagia tx to assess tolerance with recommended diet, ensure proper utilization of aspiration precautions and provide pt/family education.   VFSS completed yesterday revealing as (Documentation Required): Yes  SLP Follow-up Date: 04/16/21  Number of Visits to Meet Established Goals:  (2-3)    Session: 1    Prior to entering room, SLP donned appropriate PPE for Patient level of isolation including gloves, eyewear, and mask.  Patient

## 2021-04-14 NOTE — PROGRESS NOTES
[facility]  Progress Note    Seferino Began Patient Status:  Inpatient    10/3/1990 MRN [id number]   [facility] 7NE-A Attending Michael Covarrubias MD   [facility] Day # 2 PCP Gilmer Ortega DO     Subjective:  Seferino Began is a(n) 31 yea 3.8 3.8 3.6      < > 100  100 101 101   CO2 32.0   < > 30.0  30.0 32.0 33.0*   ALKPHO 124*  --  117  --   --    AST 19  --  19  --   --    ALT 14*  --  16  --   --    BILT 0.3  --  0.3  --   --    TP 6.5  --  6.2*  --   --     < > = values in this in PRN  busPIRone HCl (BUSPAR) tab 10 mg, 10 mg, Oral, TID  escitalopram (LEXAPRO) tab 20 mg, 20 mg, Oral, Daily  cyclobenzaprine (FLEXERIL) tab 5 mg, 5 mg, Oral, TID PRN  Dicyclomine HCl (BENTYL) tab 20 mg, 20 mg, Oral, QID PRN  gabapentin (NEURONTIN) cap 30 on chest imaging with 3/19/21 CTA with LMS narrowing concern for MAO with partial collapse of left lung  · possible invasion of tumor into lingula/LLL and possible tumor thrombus   · metastatic GEJ adenocarcinoma on immunotherapy with Dr. Magda De Oliveira  · PE/DVT

## 2021-04-14 NOTE — PROGRESS NOTES
[facility]  Progress Note     Aleksandr Jimenes Patient Status:  Inpatient    10/3/1990 MRN [id number]   [facility] 7NE-A Attending Naa Sher MD   Hosp Day # 2 PCP Arian Hu DO     Chief Complaint: les edema    S: Patient fe ALT 14*  --  16  --   --    BILT 0.3  --  0.3  --   --    TP 6.5  --  6.2*  --   --     < > = values in this interval not displayed. Estimated Creatinine Clearance: 187 mL/min (based on SCr of 0.74 mg/dL).     No results for input(s): PTP, INR in th progress  At this point Mr. Nikolay Davidson is expected to be discharge to: home     Plan of care discussed with patient and RN    Cecilio Gauthier MD

## 2021-04-14 NOTE — PHYSICAL THERAPY NOTE
Attempted to see pt for PT tx session. Per chart review pt found to have extensive BLE DVTs, PT attempted to clarify activity orders with the hospitalist yet unsuccessful.  Will hold until pt on heparin drip for >24 hours or until contacted back from hospit

## 2021-04-14 NOTE — PROGRESS NOTES
[facility] Gastroenterology     Callchristi Alvares Patient Status:  Inpatient    10/3/1990 MRN [id number]   [facility] 7NE-A Attending Judd Bland MD   [facility] Day # 2 PCP Eric Dickinson DO counseling/discussion     Palliative care encounter     Acute deep vein thrombosis (DVT) of proximal vein of both lower extremities (HCC)      PMH, PSH, Fhx, Shx as per initial consult note    Review of Systems  12 point Review of Systems negative unless o busPIRone HCl  10 mg Oral TID   • escitalopram  20 mg Oral Daily   • gabapentin  300 mg Oral Nightly   • mirtazapine  15 mg Oral Nightly   • OLANZapine  10 mg Oral Nightly   • Olaparib  300 mg Oral BID   • Pantoprazole Sodium  40 mg Oral QAM AC   • predniS Gastroenterology

## 2021-04-14 NOTE — PROGRESS NOTES
Heme/Onc Progress Note    Patient Name: Seferino Began   YOB: 1990   Medical Record Number: [id number]   CSN: [id number]   Attending Physician: Katharine Sigala M.D. Subjective:  Seen by multiple services.   Pain meds increased - seem 40 mg, Oral, QAM AC  •  predniSONE (DELTASONE) tab 10 mg, 10 mg, Oral, Daily  •  scopolamine (TRANSDERM-SCOP) patch, 1 patch, Transdermal, Q72H  •  acetaminophen (TYLENOL) tab 650 mg, 650 mg, Oral, Q6H PRN  •  HYDROmorphone HCl (DILAUDID) 1 MG/ML injection regions.   Psych/Depression: withdrawn    Labs:  Recent Results (from the past 24 hour(s))   COMP METABOLIC PANEL (14)    Collection Time: 04/13/21  7:30 AM   Result Value Ref Range    Glucose 88 70 - 99 mg/dL    Sodium 136 136 - 145 mmol/L    Potassium 3.8 35.1 - 46.3 fL    Neutrophil Absolute Prelim 3.47 1.50 - 7.70 x10 (3) uL    Neutrophil Absolute 3.47 1.50 - 7.70 x10(3) uL    Lymphocyte Absolute 0.41 (L) 1.00 - 4.00 x10(3) uL    Monocyte Absolute 0.51 0.10 - 1.00 x10(3) uL    Eosinophil Absolute 0.04 0.0 mmol/L    Anion Gap 2 0 - 18 mmol/L    BUN 19 (H) 7 - 18 mg/dL    Creatinine 0.74 0.70 - 1.30 mg/dL    BUN/CREA Ratio 25.7 (H) 10.0 - 20.0    Calcium, Total 8.5 8.5 - 10.1 mg/dL    Calculated Osmolality 284 275 - 295 mOsm/kg    GFR, Non-African American 12 tibial veins in the proximal and mid left calf.                        Impression   CONCLUSION:  Bilateral deep venous thrombosis, extending up into the common femoral veins bilaterally.  The upper extent of the thrombus is not identified on the left side.

## 2021-04-15 NOTE — PROGRESS NOTES
[facility]  Progress Note    Sabrina Joyce Patient Status:  Inpatient    10/3/1990 MRN [id number]   [facility] 7NE-A Attending Torie Robbins MD   [address] Day # 3 PCP Simon Palacios DO     Subjective:  Sabrina Joyce is a(n) 31 yea Calculated Osmolality 285 mOsm/kg    GFR, Non- 130    GFR, -American 151   PTT, Activated [[id number]] (Abnormal)    Collected: 04/15/21 0316    Updated: 04/15/21 0352    Specimen Type: Blood     PTT 59.8High  seconds   CBC With Diffe retroperitoneal lymph nodes (HCC)     Secondary malignant neoplasm of supraclavicular lymph node ([address]     Bilateral arm pain     Allergic rhinitis     Monoallelic mutation of NATIVIDAD gene     Malignant ascites     Palliative care by specialist     CINV (chemo radicular in nature. We had discussed possibility of adding patient controlled bolus to his intrathecal pump. However, given the dynamic nature of his pain presentation, using oral MO medications may be more effective.   Any patient controlled the thecal

## 2021-04-15 NOTE — PLAN OF CARE
Assumed care at 0700  Patient alert, oriented x4  CT chest/abd/pelvis completed.  Dr. Elisabet Oleary notified of critical results  IV Dilaudid given for pain  Patient declining PO pain meds  Pain pump adjusted by pain service  zofran and reglan given for nausea  He

## 2021-04-15 NOTE — PAYOR COMM NOTE
--------------  4/15 CONTINUED STAY REVIEW    Payor: GABBIE RODAS  Subscriber #:  [id number]  Authorization Number: [id number]      HEME/ONC:    Subjective:  Weak and moderate pain but ok. Feels breathing has gotten a bit better. No fevers.   Legs still swo 7.70 x10(3) uL     Lymphocyte Absolute 0.46 (L) 1.00 - 4.00 x10(3) uL     Monocyte Absolute 0.56 0.10 - 1.00 x10(3) uL     Eosinophil Absolute 0.01 0.00 - 0.70 x10(3) uL     Basophil Absolute 0.01 0.00 - 0.20 x10(3) uL     Immature Granulocyte Absolute 0.0 DOAC.  Now on UFH and will hold for procedures.  Will have to discharge on enoxaparin.     5)  Neoplasm related pain.  Pain service following up and adjusting.given his pain pump.   Seems a bit sedated.     6)  Palliative care.  All efforts are palliative a Date Action Dose Route User    4/15/2021 0927 Given 0.8 mg Intravenous Lauri Brittle, RN    4/15/2021 8203 Given 0.8 mg Intravenous Cynthia Mario, KLEBER    4/15/2021 5726 Given 0.8 mg Intravenous Cynthia Mario, RN    4/14/2021 2233 Given 0.8 mg In

## 2021-04-15 NOTE — PLAN OF CARE
Assumed care of pt at 1900. Pt AOx4. Mother at bedside. ST.  RA.  . Pain controlled by PRN meds and intrathecal pump. Heparing gtt per protocol. PAC dressing CDI. Will continue to monitor.

## 2021-04-15 NOTE — CM/SW NOTE
Pt is a 30 yo male admitted for edema. Pt has a history of esophageal cancer w/mets. Pt lives with his wife. PT is now recommending HH. Pt is agreeable to [facility]. Referrals sent for [facility] in [place] - waiting for responses. [facility] written.   SW to f/u with accept

## 2021-04-15 NOTE — PHYSICAL THERAPY NOTE
PHYSICAL THERAPY TREATMENT NOTE - INPATIENT    Room Number: 4475/5547-C     Session: 1  Number of Visits to Meet Established Goals: 7    Presenting Problem: weakness, edema, shortness of breath       History related to current admission: Patient is a 31 y lower extremities [facility])      Past Medical History  Past Medical History:   Diagnosis Date   • Abdominal distention    • Abdominal pain    • Anxiety state    • Back problem     Lower back disc    • Bloating    • Blood disorder     thrombocytopenia   • Body he does not lay flat with his esophagus issues. Patient’s self-stated goal is to go home.      OBJECTIVE  Precautions: Bed/chair alarm    WEIGHT BEARING RESTRICTION  Weight Bearing Restriction: None                PAIN ASSESSMENT   Ratin  Location: exercises (handout provided), and discharge planning.  Pt performed supine to sit transfer with supervision assist. Pt performed sit to stand transfers throughout session with supervision assist. Gait trained with use of RW, supervision assist, and emphasis training;Stair training;Balance training; Body mechanics  Rehab Potential : Fair  Frequency (Obs): 3-5x/week    CURRENT GOALS   Goal #1 Patient is able to demonstrate supine - sit EOB @ level: modified independent  Updated to modified independent as pt has

## 2021-04-15 NOTE — PROGRESS NOTES
Heme/Onc Progress Note    Patient Name: Karlee Vann   YOB: 1990   Medical Record Number: [id number]   CSN: [id number]   Attending Physician: Cisco Reeves M.D. Subjective:  Weak and moderate pain but ok.   Feels breathing has go Transdermal, Q72H  •  acetaminophen (TYLENOL) tab 650 mg, 650 mg, Oral, Q6H PRN  •  HYDROmorphone HCl (DILAUDID) 1 MG/ML injection 0.2 mg, 0.2 mg, Intravenous, Q2H PRN **OR** HYDROmorphone HCl (DILAUDID) 1 MG/ML injection 0.4 mg, 0.4 mg, Intravenous, Q2H P Collection Time: 04/14/21  1:49 PM   Result Value Ref Range    .1 (H) 25.4 - 36.1 seconds   PTT, ACTIVATED    Collection Time: 04/14/21  8:28 PM   Result Value Ref Range    .1 (H) 25.4 - 36.1 seconds   PTT, ACTIVATED    Collection Time: 04/15 immunotherapy. Maryjo Espinoza is currently on a last-ditch option with olaparib - a PARP inhibitor being tried since he has a germline NATIVIDAD mutation (DNA repair defect).  His marker continuies to decline but since starting this his functional state continues his grad

## 2021-04-15 NOTE — ANESTHESIA PREPROCEDURE EVALUATION
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]     Microsuspension Direct L BID  Pantoprazole Sodium (PROTONIX) EC tab 40 mg, 40 mg, Oral, QAM AC  predniSONE (DELTASONE) tab 10 mg, 10 mg, Oral, Daily  scopolamine (TRANSDERM-SCOP) patch, 1 patch, Transdermal, Q72H  acetaminophen (TYLENOL) tab 650 mg, 650 mg, Oral, Q6H PRN  HYDROmor TABLET(2 MG) BY MOUTH EVERY 6 HOURS AS NEEDED FOR NAUSEA, Disp: 90 tablet, Rfl: 1, 4/12/2021 at 1400  ONDANSETRON 8 MG Oral Tablet Dispersible, DISSOLVE 1 TABLET(8 MG) ON THE TONGUE EVERY 8 HOURS AS NEEDED FOR NAUSEA, Disp: 30 tablet, Rfl: 3, Past Week at time  Scopolamine 1.5mg TD patch 1mg/3days, Place 1 patch onto the skin every third day., Disp: 10 patch, Rfl: 3, 4/9/2021  DIPHENOXYLATE-ATROPINE 2.5-0.025 MG Oral Tab, TAKE 1 TO 2 TABLETS BY MOUTH FOUR TIMES DAILY AS NEEDED FOR DIARRHEA, Disp: 60 tablet, • WISDOM TEETH REMOVED       Social History    Tobacco Use      Smoking status: Never Smoker      Smokeless tobacco: Never Used    Alcohol use: Not Currently      Alcohol/week: 0.0 standard drinks      Comment: very occ      Drug use: Unknown   Comment

## 2021-04-15 NOTE — DIETARY NOTE
[facility]    NUTRITION ASSESSMENT    Pt does not meet malnutrition criteria.     NUTRITION INTERVENTION:    1. RD nutrition Care Plan- Encouraged small frequent meals with emphasis on high calorie/high protein and Initiated ONS (oral nutritional suppl lb)  02/08/21 : 105.7 kg (233 lb)    NUTRITION:  Diet: Orders Placed This Encounter      Regular/General diet Fluid Consistency: Honey Thick / Moderately Thick Liquids; Is Patient on Accuchecks?  No      NPO  Oral Supplements: Magic Cup BID  Percent Meals E

## 2021-04-15 NOTE — PROGRESS NOTES
Pain Service    Patient is resting in bed, A&O x 4. He states his pain is in his right side low back area and also his BLE r/t DVTs - legs still edematous.  He states his pain is around a 5-6/10 prior to getting the prn Dilaudid 0.8 mg prn and then it goes

## 2021-04-15 NOTE — OCCUPATIONAL THERAPY NOTE
OCCUPATIONAL THERAPY TREATMENT NOTE - INPATIENT     Room Number: 0941/2145-S  Session: 1   Number of Visits to Meet Established Goals: 5    Presenting Problem: dyspnea, L pleural effusion    History related to current admission: Admitted from home with dys Past Surgical History  Past Surgical History:   Procedure Laterality Date   • INSERTION OF ACCESS PORT  04/10/2019   • INTRATHECAL PUMP TRIAL N/A 11/19/2020    Performed by Bal Johnston MD at [facility] ENDOSCOPY   • LAPAROSCOPIC ESOPHAGOGASTRECTOMY N/A 4/27 supine, agreeable to therapy; sitting EOB to L side SBA with HOB elevated; standing via RW and SBA; functional mobility to garnett and back into bathroom via RW, SBA, increased time; education on toilet transfer techniques, performed SBA to standard height to dressing:  with setup and with supervision --> in progress  Patient will perform lower body dressing:  with min assist and with adaptive equipment PRN --> MET 4/15 (min is patient's baseline)  Patient will perform toileting: with supervision --> MET 4/15

## 2021-04-15 NOTE — PROGRESS NOTES
[facility]  Progress Note    Janimiguel Powersmaricel Patient Status:  Inpatient    10/3/1990 MRN [id number]   [facility] [place] 7NE-A Attending Radha Álvarez MD   [facility] Day # 3 PCP Jacinto Mulligan DO     Subjective:  Jani Jose is a(n) 31 yea 8.6   < > 8.5 8.5 8.1*   ALB 2.0*  --  1.8*  --   --   --   --       < > 136  136   < > 137 136 137   K 4.0   < > 3.8  3.8   < > 3.8 3.6 3.8      < > 100  100   < > 101 101 101   CO2 32.0   < > 30.0  30.0   < > 32.0 33.0* 34.0*   ALKPHO 124* [id number]/250mL infusion CONTINUOUS, 200-3,000 Units/hr, Intravenous, Continuous  torsemide (DEMADEX) tab 20 mg, 20 mg, Oral, Daily  Albuterol Sulfate  (90 Base) MCG/ACT inhaler 1 puff, 1 puff, Inhalation, Q4H PRN  busPIRone HCl (BUSPAR) tab 10 mg, LMS    Assessment:  · Worsening dyspnea and edema, multifactorial with third spacing, hypoalbuminemia, suspected malignant airway obstruction  · Pleural effusion - s/p left thoracentesis 4/8 with 900ml removed, analysis consistent with transudate which may

## 2021-04-16 NOTE — PROGRESS NOTES
[facility]  Progress Note     Jess Morton Patient Status:  Inpatient    10/3/1990 MRN [id number]   [facility] 7NE-A Attending Mode Marlow MD   [facility] Day # 4 PCP Fam Estrella,      Chief Complaint: les edema sob    S: Patien CA 8.4*   < > 8.6  8.6   < > 8.5 8.5 8.1*   ALB 2.0*  --  1.8*  --   --   --   --       < > 136  136   < > 137 136 137   K 4.0   < > 3.8  3.8   < > 3.8 3.6 3.8      < > 100  100   < > 101 101 101   CO2 32.0   < > 30.0  30.0   < > 32.0 33.0* 3 thoracentesis recenlty  2. On RA  5. Esophageal Cancer with mets  1. Consider bronch per pulm. Could have Monday or dc and have it done at [facility]. Pt considering options  6. Vocal cord paralysis  1. To have vocal cord injection today  2. AC on hold     5.  Ane

## 2021-04-16 NOTE — PROGRESS NOTES
Pain Service     Patient reports his pain is better today. Rates pain 4/10 in right low back and states pain 4/10 in BLE \"burning\" pain that increases during ambulation and takes time to subside after lying in bed.  Pain in BLE related to edema from retro

## 2021-04-16 NOTE — PLAN OF CARE
Assumed care at 1930  Heparin drip infusing, to be held at 7am  Pain managed with prn dilaudid, and intrathecal pump  NPO at midnight  Zofran x1 for nausea  Needs met will continue to monitor        Problem: RESPIRATORY - ADULT  Goal: Achieves optimal vent

## 2021-04-16 NOTE — PROGRESS NOTES
[facility] received palliative referral for community PC services. Waiting to obtain insurance (verification/authorization) prior to pursuing.          Jadon Rawls  Residential Pall[facility] Liaison   [phone]

## 2021-04-16 NOTE — OPERATIVE REPORT
[address] [facility] Patient Status:  Inpatient    10/3/1990 MRN [id number]   [facility] SURGERY Attending John Dubois MD   [facility] Day # 4 PCP Bridgette Hammond DO     MSDL Injection Op Note  Pre-Op Diagnosis:  Left Vocal Cord

## 2021-04-16 NOTE — PROGRESS NOTES
[facility]  Progress Note    Aleksandr Jimenes Patient Status:  Inpatient    10/3/1990 MRN [id number]   [facility] 7NE-A Attending Naa Sher MD   [facility] Day # 4 PCP Arian Hu DO     Subjective:  Aleksandr Jimenes is a(n) 31 yea 126   < > 118 123 130   CA 8.4*   < > 8.6  8.6   < > 8.5 8.5 8.1*   ALB 2.0*  --  1.8*  --   --   --   --       < > 136  136   < > 137 136 137   K 4.0   < > 3.8  3.8   < > 3.8 3.6 3.8      < > 100  100   < > 101 101 101   CO2 32.0   < > 30.0 (PF) 400 mg in sodium chloride 0.9% 40 mL intrathecal pain pump, , Intrathecal, Continuous  heparin (PORCINE) drip 60722waycr/250mL infusion CONTINUOUS, 200-3,000 Units/hr, Intravenous, Continuous  torsemide (DEMADEX) tab 20 mg, 20 mg, Oral, Daily  Albuter chest reviewed personally which shows worsening collapse of the left lung with endobronchial lesion in LMS    Assessment:  · Worsening dyspnea and edema, multifactorial with third spacing, hypoalbuminemia, suspected malignant airway obstruction  · Pleural

## 2021-04-16 NOTE — PROGRESS NOTES
Heme/Onc Progress Note    Patient Name: Aleksandr Jimenes   YOB: 1990   Medical Record Number: [id number]   CSN: [id number]   Attending Physician: Malka Multani M.D. Subjective:  Still weak - pain comes and goes. Oxygenating off O2. tab 40 mg, 40 mg, Oral, QAM AC  •  predniSONE (DELTASONE) tab 10 mg, 10 mg, Oral, Daily  •  scopolamine (TRANSDERM-SCOP) patch, 1 patch, Transdermal, Q72H  •  acetaminophen (TYLENOL) tab 650 mg, 650 mg, Oral, Q6H PRN  •  HYDROmorphone HCl (DILAUDID) 1 MG/M inguinal regions.   Psych/Depression: Withdrawn    Labs:  Recent Results (from the past 24 hour(s))   PTT, ACTIVATED    Collection Time: 04/15/21  9:39 AM   Result Value Ref Range    PTT 67.2 (H) 25.4 - 36.1 seconds   PTT, ACTIVATED    Collection Time: 04/1  There is near-complete resolution of the ground-glass   opacity in the right middle lobe laterally.  There is resolution of the ground-glass opacities in the lateral and anterior basal segments of the right lower lobe.  There is minimal atelectasis along atrophy.     SPLEEN:  No enlargement or focal lesion.     KIDNEYS:  No mass, obstruction, or calcification.     ADRENALS:  Slight decrease in the enlargement of the left adrenal gland, stable right adrenal gland.    AORTA:  An IVC filter is noted unchanged enlarging left effusion.  Slight worsening in the patchy areas of pneumonia in the left lower lobe.    2. Status post esophagectomy and gastric pull-through.  The fluid and atelectasis adjacent to the pull-through in the medial right lung base appear not si effusion and increased obstruction of left lung due to hilar mets.  ?bronch and stenting.  Anticoagulation on hold for this. Discussed with Dr Kings Bruno. Left message for patient's wife that I would encourage them to agree to this attempt.      4)  Increased

## 2021-04-16 NOTE — PLAN OF CARE
Assumed care at 0700. Pt A/O x4. RA. ST on tele. VSS. PRN dilaudid for pain control. Heparin gtt stopped at 0700 and NPO this morning for Vocal cord injection done today. After injection completed pt eating and drinking without any issues.  Voice has improv

## 2021-04-16 NOTE — BRIEF OP NOTE
Pre-Operative Diagnosis: Paralysis of larynx [J38.00]     Post-Operative Diagnosis: Paralysis of larynx [J38.00]      Procedure Performed:    Microsuspension Direct Laryngoscopy with left vocal cord injection Prolaryn Plus           Surgeon(s) and Role:

## 2021-04-16 NOTE — PROGRESS NOTES
[facility]  Progress Note     Dagmar Courtney Patient Status:  Inpatient    10/3/1990 MRN [id number]   [facility] 7NE-A Attending Eliana Zaidi MD   [facility] Day # 3 PCP Leobardo Montgomery DO     Chief Complaint: les edema sob    S: Patien 136   < > 137 136 137   K 4.0   < > 3.8  3.8   < > 3.8 3.6 3.8      < > 100  100   < > 101 101 101   CO2 32.0   < > 30.0  30.0   < > 32.0 33.0* 34.0*   ALKPHO 124*  --  117  --   --   --   --    AST 19  --  19  --   --   --   --    ALT 14*  --  16  - started     Plan of care: ent eval     Quality:  · DVT Prophylaxis: eliquis  · CODE status: full  · Howard: no  · Central line: yes  · If COVID testing is negative, may discontinue isolation: yes      Will the patient be referred to TCC on discharge?: no  E

## 2021-04-16 NOTE — CONSULTS
[facility]  Report of Consultation    Seferino Began Patient Status:  Inpatient    10/3/1990 MRN [id number]   [facility] 7NE-A Attending Lorenzo Hernandez MD   [facility] Day # 4 PCP Gilmer Ortega DO     Reason for Consultation:  Left Vocal REMOVED       Family History   Problem Relation Age of Onset   • Cancer Mother 50        breast cancer   • Other (cancer- kidney) Maternal Grandfather    • Cancer Paternal Grandmother         cervicasl cancer      reports that he has never smoked.  He has n HYDROmorphone HCl (DILAUDID) 1 MG/ML injection 0.4 mg, 0.4 mg, Intravenous, Q2H PRN **OR** HYDROmorphone HCl (DILAUDID) 1 MG/ML injection 0.8 mg, 0.8 mg, Intravenous, Q2H PRN  •  ondansetron HCl (ZOFRAN) injection 4 mg, 4 mg, Intravenous, Q6H PRN  •  Berna Maharaj turgor. Lymphatic:  No palpable cervical lymphadenopathy. Neurologic: Cranial nerves are grossly intact. Motor strength and sensory examination is grossly normal.  No focal neurologic deficit.     Laboratory Data:  Lab Results   Component Value Date    P extremities ([address]      Plan MSDL with left Proalryn Injection    Time spent on counseling/coordination of care:  15 Minutes    Total time spent with patient:  [id number] Nel Galvan 1785  4/16/2021  7:41 AM

## 2021-04-16 NOTE — ANESTHESIA PROCEDURE NOTES
Airway  Urgency: elective      General Information and Staff    Patient location during procedure: OR  Anesthesiologist: Benjie Cope MD  Performed: anesthesiologist     Indications and Patient Condition  Indications for airway management: anesthe

## 2021-04-16 NOTE — ANESTHESIA POSTPROCEDURE EVALUATION
119 [facility] Patient Status:  Inpatient   Age/Gender 31year old male MRN [id number]   Location [facility] Attending Hillary Milton MD   [facility] Day # 4 PCP Xavier Obrien DO       Anesthesia Post-op Note
- - -

## 2021-04-16 NOTE — PHYSICAL THERAPY NOTE
Attempted to see Pt this AM - RN aware of attempt. Pt awaiting vocal cord procedure - wanting to rest at this time.

## 2021-04-17 NOTE — SLP NOTE
SPEECH DAILY NOTE - INPATIENT    ASSESSMENT & PLAN   ASSESSMENT  Pt seen for dysphagia tx to ensure diet tolerance and safety. Pt with vocal chord injection yesterday which much improved his vocal quality.   Pt reports that he had bouts of nausea yesterday

## 2021-04-17 NOTE — PHYSICAL THERAPY NOTE
PHYSICAL THERAPY TREATMENT NOTE - INPATIENT    Room Number: 8487/1463-R     Session: 2  Number of Visits to Meet Established Goals: 7    Presenting Problem: weakness, edema, shortness of breath       History related to current admission: Patient is a 31 y paralysis      Past Medical History  Past Medical History:   Diagnosis Date   • Abdominal distention    • Abdominal pain    • Anxiety state    • Back problem     Lower back disc    • Bloating    • Blood disorder     thrombocytopenia   • Body piercing    • Static Sitting: Good  Dynamic Sitting: Good           Static Standing: Fair  Dynamic Standing: Fair    ACTIVITY TOLERANCE  Pulse: (!) 155 (activity/ambulation) and 133 with rest           AM-PAC '6-Clicks' INPATIENT SHORT reach;RN aware of session/findings; All patient questions and concerns addressed; Alarm set    ASSESSMENT   Pt is making progress towards all PT goals.  Pt was able to increase ambulation distance this session and tolerate increased activity in general but co

## 2021-04-17 NOTE — DISCHARGE SUMMARY
[facility]  DISCHARGE SUMMARY     Jess Hdez Patient Status:  Inpatient    10/3/1990 MRN [id number]   [facility] 7NE-A Attending Juan Pablo Rojo MD   [facility] Day # 5 JINA Estrella,      Date of Admission: 2021  Date of to eat. He does not recall when he was last scoped. Pt states he recently did have a thoracentesis to get fluid off his lungs.        Brief Synopsis: pt diuresed w/ IV lasix, then switched back to torsemide as he improved. Back on room air.  CT noted a obst every 6 (six) hours as needed for Wheezing or Shortness of Breath.    Quantity: 90 vial  Refills: 3        CONTINUE taking these medications      Instructions Prescription details   busPIRone HCl 10 MG Tabs  Commonly known as: BUSPAR      Take 1 tablet (10 Tabs      Take 300 mg by mouth 2 (two) times a day. Refills: 0     Omeprazole 40 MG Cpdr      Take 1 capsule (40 mg total) by mouth 2 (two) times daily.    Quantity: 60 capsule  Refills: 0     ondansetron 8 MG Tbdp  Commonly known as: ZOFRAN-ODT      DISS MD  [address]  [phone]      As needed for intrathecal pump / pain management    Hi Renteria DO  [address]  [phone]    In 1 week      Prudence MD Joselito  [address]

## 2021-04-17 NOTE — PROGRESS NOTES
Heme/Onc Progress Note    Patient Name: Codi Zarate   YOB: 1990   Medical Record Number: [id number]   CSN: [id number]       Subjective:    Feeling better. Voice strength has improved. Pain is stable. Oxygenating okay.   Appetite is p HYDROmorphone HCl (DILAUDID) 1 MG/ML injection 0.2 mg, 0.2 mg, Intravenous, Q2H PRN **OR** HYDROmorphone HCl (DILAUDID) 1 MG/ML injection 0.4 mg, 0.4 mg, Intravenous, Q2H PRN **OR** HYDROmorphone HCl (DILAUDID) 1 MG/ML injection 0.8 mg, 0.8 mg, Intravenous 21.0 - 32.0 mmol/L    Anion Gap 4 0 - 18 mmol/L    BUN 20 (H) 7 - 18 mg/dL    Creatinine 0.81 0.70 - 1.30 mg/dL    BUN/CREA Ratio 24.7 (H) 10.0 - 20.0    Calcium, Total 8.8 8.5 - 10.1 mg/dL    Calculated Osmolality 289 275 - 295 mOsm/kg    GFR, Non-African confirm with him on Monday.     # Increased LE edema/DVT:  Refractory to a DOAC.  On UFH, will  discharge on enoxaparin.     # Neoplasm related pain:  Pain service following up and adjusting.given his pain pump.       #  Palliative care:  All efforts are p

## 2021-04-17 NOTE — PROGRESS NOTES
[facility]  Progress Note    Nora Pérez Patient Status:  Inpatient    10/3/1990 MRN [id number]   [facility] 7NE-A Attending Jazmin Kearns MD   [facility] Day # 5 PCP Carol Barrientos DO     Subjective:  Nora Pérez is a(n) 31 yea K 4.0   < > 3.8  3.8   < > 3.6 3.8 4.5      < > 100  100   < > 101 101 103   CO2 32.0   < > 30.0  30.0   < > 33.0* 34.0* 31.0   ALKPHO 124*  --  117  --   --   --   --    AST 19  --  19  --   --   --   --    ALT 14*  --  16  --   --   --   --    BI Intrathecal, Continuous  torsemide (DEMADEX) tab 20 mg, 20 mg, Oral, Daily  Albuterol Sulfate  (90 Base) MCG/ACT inhaler 1 puff, 1 puff, Inhalation, Q4H PRN  busPIRone HCl (BUSPAR) tab 10 mg, 10 mg, Oral, TID  escitalopram (LEXAPRO) tab 20 mg, 20 mg with third spacing, hypoalbuminemia, suspected malignant airway obstruction  · Pleural effusion - s/p left thoracentesis 4/8 with 900ml removed, analysis consistent with transudate which may be related to total body fluid overload or even the collapsed og

## 2021-04-17 NOTE — CM/SW NOTE
[facility] has accepted the patient for home nebulizer. They stated he does not have a qualifying diagnosis and will therefore be charged $40.  Patient agreeable to cost.  MSW requested [facility] deliver to the patient's home.       Clementina Hanna LCSW

## 2021-04-17 NOTE — PLAN OF CARE
Assumed care at Doctor Rickey [address] and oriented x4, drowsy  C/o nausea, emesis x1, relief with IV ativan  Heparin gtt infusing, next PTT at 0400  C/o LLE pain, relief with PRN diluadid  Plan of care discussed, call light in reach          Problem: CARDIOVASCULAR Monitor for signs/symptoms of CO2 retention  Outcome: Progressing     Problem: HEMATOLOGIC - ADULT  Goal: Maintains hematologic stability  Description: INTERVENTIONS  - Assess for signs and symptoms of bleeding or hemorrhage  - Monitor labs and vital signs

## 2021-04-17 NOTE — PLAN OF CARE
Assumed care at 0700. Pt A/O x4. RA. ST on tele. VSS. PRN dilaudid q2 for pain control. Up to the chair and walked in the halls with physical therapy. All consults cleared for discharge. Lovenox injections ordered and started. Lovenox teaching completed.  P

## 2021-04-17 NOTE — CM/SW NOTE
MSW acknowledged order for nebulizer and sent it via [place] to [address]. Awaiting responses.     Nguyen Chew LCSW

## 2021-04-19 NOTE — TELEPHONE ENCOUNTER
Home Health directed to call Dr Clari Peterson for signing orders, since he is the one managing his care. Expresses understanding.

## 2021-04-19 NOTE — TELEPHONE ENCOUNTER
[facility] with [facility] home health is calling to confirm that Dr Estela Hannah will sign home health orders for Salvatore Harada just admitted him into home health. Please call.

## 2021-04-19 NOTE — TELEPHONE ENCOUNTER
MD Clarisa Mao, APRN  Cc: P Mahendra Matos Rns  His last dose should be 24 hours prior to the bronch             Reviewed the above with patient.    Verbalized understanding

## 2021-04-20 NOTE — TELEPHONE ENCOUNTER
Patient with c/o chronic nausea and vomiting every day despite taking antiemetics, Olanzapine and Lorazepam. Per Dr Elisabet Oleary, patient to get an MRI brain. Patient scheduled for tomorrow 5/20 @ 1230 pm. Patient given instructions.
Speaking Coherently

## 2021-04-22 NOTE — TELEPHONE ENCOUNTER
TESTED POSITIVE FOR COVID YESTERDAY AND HE WANTS A 10 DAY [address]. CAN SHE GET THE ORDER FOR THAT? The patient and family have been mailed the information provided by [facility] in regards to resuming surgery during COVID-19. The patient and family have been informed of [facility] procedures to minimize COVID-19 related risk, but that elimination of risk is not possible. The patient and family have been informed of the visitation policy during their surgery - that one visitor is allowed to be with the patient. The patient has been advised to maintain the stay at home order for two weeks prior and two weeks after surgery. The patient and family have been educated to monitor symptoms such as fever, cough (dry or productive), shortness of breath, difficulty breathing, sore throat, laryngitis, headache, fatigue, unexplained soreness, diarrhea, nausea and sudden loss of taste or smell. They have been advised if the patient develops any of these symptoms, they are to contact our office to review and possibly reschedule their surgery. This was discussed in the office with the patient on 11/11/20.

## 2021-04-22 NOTE — TELEPHONE ENCOUNTER
Umesh Katz from home health stated that the patient tested positive for Covid 19 and home health for PT with be hold for 10 days. She requested Dr Vianney Hi to manage the home health orders. Dr Vianney Hi informed.    She was informed ok for Dr Vianney Hi to manage the frandy

## 2021-04-27 NOTE — TELEPHONE ENCOUNTER
Patient c/o pain and swelling in both legs. Patient is also have 4-5 episodes of diarrhea per day. Patient is taking Lovenox twice daily as prescribed. Per Dr Yaa Walters, increase Torsemide to 40 mg daily. Patient to start taking Imodium.  Advised patient to st

## 2021-04-30 NOTE — TELEPHONE ENCOUNTER
Patient is asking when to discontinue Lovenox for upcoming procedure with Dr Yisel Alfredo. Per Dr Izzy Jorgensen patient to stop Lovenox 24 hours prior to the procedure. Ok to resume 24 hours after the procedure or as directed by surgeon.  Patient verbalized understandi

## 2021-05-05 NOTE — PROGRESS NOTES
From: Kelly Masterson MD   Sent: 5/4/2021   1:03 PM CDT   To: MARILIN Carpenter, Dina Giron MD     HI all   Just Thierry TAPIA's case was delayed the first time since he got admitted, 2nd time due to +COVID and policy was to wait 10 days.    Now [facility] jus

## 2021-05-07 NOTE — TELEPHONE ENCOUNTER
[facility] palliative nurse practitioner visited with patient today. Patient appeared jaundiced and pale. Oxygen saturation is around 89-92%, heart rate up to 120's. No breath sounds on left side. NP discussed hospice. Patient is not ready for hospice.  Do

## 2021-05-10 PROBLEM — C15.9 ESOPHAGEAL CARCINOMA (HCC): Status: ACTIVE | Noted: 2021-01-01

## 2021-05-10 PROBLEM — J90 LARGE PLEURAL EFFUSION: Status: ACTIVE | Noted: 2021-01-01

## 2021-05-10 PROBLEM — R17 JAUNDICE: Status: ACTIVE | Noted: 2021-01-01

## 2021-05-10 NOTE — ED PROVIDER NOTES
Patient Seen in: [facility] Emergency Department      History   Patient presents with:  Swelling Edema    Stated Complaint: edema/jaundice    HPI/Subjective:   HPI    50-year-old male who has a history esophageal cancer on chemotherapy comes to the Laparoscopic-assisted Robert Bruno esophagogastrectomy, abdominal and mediastinal lymphadenectomy, feeding jejunostomy tube placement   • PORT, INDWELLING, IMP     • WISDOM TEETH REMOVED                  Social History    Tobacco Use      Smoking status: [employer] (*)     Alkaline Phosphatase 382 (*)     Bilirubin, Total 7.4 (*)     Albumin 1.9 (*)     Globulin  4.6 (*)     A/G Ratio 0.4 (*)     All other components within normal limits   PRO BETA NATRIURETIC PEPTIDE - Abnormal; Notable for the following components: [facility] , , US VENOUS DOPPLER LEG LEFT - DIAG IMG (CPT=93971), 3/19/2021, 9:53 PM.  INDICATIONS:  swelling, hx of DVT  TECHNIQUE:  Real time, grey scale, and duplex ultrasound was used to evaluate the lower extremity venous system.  B-mode two-dimensional esophagus; s/s of aspiration w/ PO  PATIENT STATED HISTORY: (As transcribed by Technologist)  Patient has a history of esophageal cancer in 2016 with surgery and radiation. He states having difficulty swallowing.    CINE CAPTURES:  6 FLUORSCOPY TIME:  53 se the medial aspect of the left upper lobe. There is volume loss of the left hemithorax  with elevation of the left hemidiaphragm. There is atelectasis of the left upper lobe.   There are irregular opacities in the left lower lobe which are slightly worse i segment of the left upper lobe. ABDOMEN/PELVIS: LIVER:  No enlargement, atrophy, abnormal density, or significant focal lesion. BILIARY:  No visible dilatation or calcification. PANCREAS:  No lesion, fluid collection, ductal dilatation, or atrophy.   SPL arteries to the upper lobe and lower lobe with marked narrowing of the superior left pulmonary vein in the interval.  There is now atelectasis and pneumonia in the left upper lobe with enlarging left effusion.   Slight worsening in the patchy areas of pneum (78in)  BP: 124 / 83 MRN:  [id number]    Age:  30years    Wt:  (254lb) HR: 109bpm Loc:  EDW        Gndr: M          BSA: 2.5m^2 Sonographer: Tamika ANGELA Ordering:    Mello Freitas Consulting:  Alton Barrett Referring: ------------------------------- regurgitation. Aortic valve:   Probably trileaflet. Doppler:  Transvalvular velocity was within the normal range. There was no stenosis. No significant regurgitation. Tricuspid valve:   Structurally normal valve.    Leaflet separation was normal.  Doppler PORTABLE  (CPT=71045)    Result Date: 4/12/2021  PROCEDURE:  XR CHEST AP PORTABLE  (CPT=71045)  TECHNIQUE:  AP chest radiograph was obtained.   COMPARISON:  [facility] , XR, XR CHEST AP PORTABLE  (CPT=71045), 3/19/2021, 8:07 PM.  INDICATIONS:  SOB  PATIENT STAT

## 2021-05-10 NOTE — PLAN OF CARE
NURSING ADMISSION NOTE      Patient admitted via stretcher from ER. Accompanied by pt's wife Silvestre Huynh to room. Safety precautions initiated. Bed in low position. Call light in reach. Admission Navigator completed.  Dr. Tellez Roads in to see pt

## 2021-05-10 NOTE — TELEPHONE ENCOUNTER
Patient's wife called. He is jaundiced and weaker and more SOB. Just recovered from a COVID infection (#2) and was seen by palliative care at home last week and wasn't interested in further visits from them. Wife doesn't know what to do.   I told her is s

## 2021-05-10 NOTE — ED QUICK NOTES
Back from ct department and states he's in a lot of lower back pain. Dr. Rebecca Pina aware.  Wit orders

## 2021-05-10 NOTE — CONSULTS
[facility] [address] Note [facility]  Report of Consultation    Seferino Began Patient Status:  Emergency    10/3/1990 MRN [id number]   Location [address] [place] [address] chemotherapy     12/3/2020 last tx- port a cath right side   • Pulmonary embolism (HCC)    • S/P insertion of IVC (inferior vena caval) filter     Per MD Notes- and confirmed by patient, IVC Filter will be placed- on 12/22/20   • Shortness of breath     a every 6 (six) hours as needed for Wheezing or Shortness of Breath., Disp: 90 vial, Rfl: 3  •  torsemide 20 MG Oral Tab, Take 1 tablet (20 mg total) by mouth daily. , Disp: 30 tablet, Rfl: 3  •  potassium chloride (KLOR-CON) 20 MEQ Oral Powd Pack, Take 20 mE mouth 3 (three) times daily as needed for Muscle spasms. , Disp: 30 tablet, Rfl: 0  •  Omeprazole 40 MG Oral Capsule Delayed Release, Take 1 capsule (40 mg total) by mouth 2 (two) times daily. , Disp: 60 capsule, Rfl: 0  •  Loperamide HCl 2 MG Oral Cap, Take (!) 124/94 98.8 °F (37.1 °C) (!) 122 (!) 32 100 % 6' 6\" (1.981 m) 230 lb (104.3 kg)   05/10/21 1330 (!) 124/94 — (!) 123 (!) 31 — — —       Intake/Output:  No intake or output data in the 24 hours ending 05/10/21 1714    PHYSICAL EXAM  GEN: Appears orient 168 hours. Recent Labs   Lab 05/10/21  1341   TROP 0.045*   PBNP 392*         ABG:     No results for input(s): ABGPHT, MWPELI7M, DAVYB2F, ABGHCO3, ABGBE, TEMP, FEDERICO, SITE, DEV, THGB in the last 168 hours.     Invalid input(s): BSS34UIO, CHOB    Culture infection in April 21st 2021     PLAN  · Start diuresis and airway clearance/ chest PT  · Reviewed with patient and wife regarding enlarging pleural effusion and lung collapse.  We reviewed options presently and would first pursue thoracentesis (or chest tu

## 2021-05-10 NOTE — ED INITIAL ASSESSMENT (HPI)
Pt has h/o esophageal cancer with mets to lymph node and is having edema to bilateral lower extremities and abdomen with hypoglycemia and new onset jaundice.

## 2021-05-11 NOTE — CM/SW NOTE
Patient current with [facility] for home health services for RN/PT. CURTIS sent and [facility] reserved on Aidin. SW to continue following patient's discharge planning.      GILMER Mariee

## 2021-05-11 NOTE — OPERATIVE REPORT
OPERATIVE REPORT     DATE OF OPERATION: 05/11/21    PREOPERATIVE DIAGNOSIS(ES): left pleural effusion    POSTOPERATIVE DIAGNOSIS(ES): same    OPERATION(S) PERFORMED: left sided ultrasound guided 14 Zimbabwean pigtail chest tube placement    SURGEON: Davide Bolton pleural space over the guidewire. The obturator and guidewire were then removed with ease keeping the chest tube in place. The chest tube was then secured at the proximal skin gerald using 2-0 silk suture.  The catheter was then connected to collection system

## 2021-05-11 NOTE — H&P
[facility]  History and Physical     Abner Rod Patient Status:  Inpatient    10/3/1990 MRN [id number]   [facility] 4NW-A Attending Marla Buchanan MD   Hosp Day # 0 PCP Ainsley Chew DO     Chief Complaint: Lower extremity Uncomfortable fullness after meals    • Vomiting    • Vomiting blood         Past Surgical History:   Past Surgical History:   Procedure Laterality Date   • INSERTION OF ACCESS PORT  04/10/2019   • OTHER      2/2021- intrathecal pump placement   • OTHER COBOS Rfl: 3  torsemide 20 MG Oral Tab, Take 1 tablet (20 mg total) by mouth daily. , Disp: 30 tablet, Rfl: 3  potassium chloride (KLOR-CON) 20 MEQ Oral Powd Pack, Take 20 mEq by mouth daily. , Disp: 30 packet, Rfl: 0  oxyCODONE-acetaminophen 5-325 MG Oral Tab, Ta 1 capsule (40 mg total) by mouth 2 (two) times daily. , Disp: 60 capsule, Rfl: 0  Loperamide HCl 2 MG Oral Cap, Take 2 mg by mouth 4 (four) times daily as needed for Diarrhea., Disp: , Rfl:         Review of Systems:   A comprehensive 14 point review of sys mass  4. Severely dilated common bile duct  1. Consult GI  2. Will likely need ERCP  3. Keep n.p.o. after midnight  4. We will need clearance from pulmonology first  5. If spikes fever, start empiric IV antibiotics  5. Bilateral pleural effusions  6.  Anasa

## 2021-05-11 NOTE — CONSULTS
Sindy 13  [id number]  Hospital Day #1  Date of Consult: 05/11/21  Patient seen at: [facility]    Reason for Consultation:      Consult requested by Dr. Leticia Hartman for evaluation of p placed- on 12/22/20   • Shortness of breath     a little bit   • Sleep disturbance    • Uncomfortable fullness after meals    • Vomiting    • Vomiting blood      Past Surgical History:   Procedure Laterality Date   • INSERTION OF ACCESS PORT  04/10/2019 Count Calculated for I/O:  1          Review of pertinent medication requirements in past 24 hours:     HYDROmorphone HCl, 8 mg, Q4H PRN   Or  HYDROmorphone HCl, 4 mg, Q4H PRN  Albuterol Sulfate HFA, 1 puff, Q4H PRN  Dicyclomine HCl, 20 mg, QID PRN  Cory cap 300 mg, 300 mg, Oral, Nightly  Loperamide HCl (IMODIUM) cap 2 mg, 2 mg, Oral, QID PRN  LORazepam (ATIVAN) tab 2 mg, 2 mg, Oral, Q6H PRN  mirtazapine (REMERON) tab 15 mg, 15 mg, Oral, Nightly PRN  OLANZapine (ZYPREXA) tab 10 mg, 10 mg, Oral, Nightly  Ol 05/11/2021    ALKPHO 393 (H) 05/11/2021    BILT 8.8 (H) 05/11/2021    TP 6.4 05/11/2021    AST 83 (H) 05/11/2021    ALT 53 05/11/2021    DDIMER 1.66 (H) 03/19/2021    MG 2.1 05/11/2021    PHOS 2.6 04/29/2018    TROP 0.045 (HH) 05/10/2021       Imaging:  XR Bettye Tim MD on 5/10/2021 at 5:40 PM           Objective/Physical Exam:     Vital Signs: BP 97/45 (BP Location: Right arm)   Pulse 115   Temp 99.1 °F (37.3 °C) (Oral)   Resp 20   Ht 6' 6\" (1.981 m)   Wt 230 lb (104.3 kg)   SpO2 100%   BMI 26.58 kg/m²     Gen when wife, Esme Shook present.  I re-introduced palliative care and reason for consultation as patient was seen by our OP PC NP in September/October, 2020, our inpatient PC service on 4/12/21 and most recently by [facility] NP on 5/7/21 at home for continued G Agent's Phone Number: [phone]    Spiritual needs addressed: Offered  support but declined.        Disposition: Patient and spouse requesting home hospice with [facility]. SW order placed and d/w Elizabeth BALLARD with [facility]. Note plans to meet t Large pleural effusion     Jaundice     Esophageal carcinoma (HCC)      Palliative Care Recommendations/Plan:     1. Goals of Care:  Patient and spouse agreeing to transition to comfort focused treatment with hospice care. They would like to be at home.

## 2021-05-11 NOTE — PLAN OF CARE
Pt A/Ox4 but drowsy. Oxygen saturation high 90's-100 on 2L O2.  HR tachy. MD notified. No new orders. Pt c/o pain. PRN morphine given per MAR. MD spoke with GI. Made NPO at midnight for ERCP probably today (5/11).  Thoracentesis also likely for today (5/11) activity  - Obtain nutritional consult as needed  - Establish a toileting routine/schedule  - Consider collaborating with pharmacy to review patient's medication profile  Outcome: Progressing     Problem: METABOLIC/FLUID AND ELECTROLYTES - ADULT  Goal: Glu redness and/or skin breakdown  - Initiate interventions, skin care algorithm/standards of care as needed  Outcome: Progressing  Goal: Incision(s), wounds(s) or drain site(s) healing without S/S of infection  Description: INTERVENTIONS:  - Assess and docume

## 2021-05-11 NOTE — PROGRESS NOTES
[facility] Pulmonary/Critical Care Progress Note     SUBJECTIVE/Interval history: All events, procedures, notes reviewed. No acute events overnight, feels somewhat better today but still with significant dyspnea.  Mariah Gonzales GLU 79   BUN 19*   CREATSERUM 0.75   GFRAA 141   GFRNAA 122   CA 8.5   *   K 4.1      CO2 28.0     Recent Labs   Lab 05/10/21  1341   RBC 2.74*   HGB 7.8*   HCT 24.9*   MCV 90.9   MCH 28.5   MCHC 31.3   RDW 19.1*   NEPRELIM 6.17   WBC 7.4   P severe body wall edema. There is associated diffuse ileal caval DVT which has been previously described. Above critical findings were reported to the emergency room staff on May 10, 2021 at 5:40 p.m. 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 Saad Law  · PE/DVT s/p IVC filter and on therapeutic LMWH  · H

## 2021-05-11 NOTE — CM/SW NOTE
SW went in room and met with patient and patient's mother. SW introduced self and role. SW explained ABDIAS recommendation and patient and mother expressed understanding.  Patient's mother stated that patient should talk to his wife about discharge plans and S

## 2021-05-11 NOTE — OCCUPATIONAL THERAPY NOTE
OCCUPATIONAL THERAPY EVALUATION - INPATIENT     Room Number: 421/421-A  Evaluation Date: 5/11/2021  Type of Evaluation: Initial  Presenting Problem: SOB, jaundice, anemia, biliary obstruction     Physician Order: IP Consult to Occupational Therapy  Reason previously described. Chest XR for 5/10:   CONCLUSION:    1. New complete opacification of the left hemithorax likely secondary to pleural fluid and atelectasis.    2. There is patchy right basilar and right infrahilar airspace disease, unchanged since History  Past Surgical History:   Procedure Laterality Date   • INSERTION OF ACCESS PORT  04/10/2019   • OTHER      2/2021- intrathecal pump placement   • OTHER SURGICAL HISTORY  04/27/2018    Laparoscopic-assisted Robert Bruno esophagogastrectomy, abdominal Findings: None                ACTIVITY TOLERANCE                         O2 SATURATIONS  2L O2   CITLALI 7/10 with activity       ACTIVITIES OF DAILY LIVING ASSESSMENT  AM-PAC ‘6-Clicks’ Inpatient Daily Activity Short Form  How much help from another person d above. Functional outcome measures completed include AM-PAC. Pt's raw score for the AM-PAC ADL assessment is 12 indicating that pt is a good ABDIAS candidate based on 66.7 percentage of impairment.   In this OT evaluation patient presents with the following pe Fair  Frequency (Obs): 5x/week  Number of Visits to Meet Established Goals: 5    ADL Goals   Patient will perform lower body dressing:  with max assist  Patient will perform toileting: with max assist    Functional Transfer Goals  Patient will transfer fro

## 2021-05-11 NOTE — PROGRESS NOTES
[facility] Pharmacy Note: Antimicrobial Weight Based Dose Adjustment for: piperacillin/tazobactam (Cosmo Dukes)    Nora Pérez is a 31year old patient who has been prescribed piperacillin/tazobactam (ZOSYN) 3.375 gm every 8 hours.     Estimated Creatinine Clear

## 2021-05-11 NOTE — CM/SW NOTE
Sw received referral , aidin being placed. . Will call wife to schedule meeting for hospice . Update:  Spoke with wife who requested 10am meeting tomorrow.

## 2021-05-11 NOTE — PLAN OF CARE
Patient had a brief moment of sharp pain on the chest tube insertion site. Morphine 2mg IV was givn & patient told RN the pain was gone. Monitoring patient.  Patient just now complained of severe pain on the chest tube site,tachycardic HR of 118, rating pain

## 2021-05-11 NOTE — CONSULTS
[facility]                       Gastroenterology [address] [facility]    Annie Lopes Patient Status:  Inpatient    10/3/1990 MRN [id number]   [facility] 4NW-A Attending Lucrecia Sosa MD   Hosp Day # 1 PCP (COVID-19) 12/05/2020   • Indigestion    • Lab test positive for detection of COVID-19 virus     12/5/2020   • Loss of appetite    • Personal history of antineoplastic chemotherapy     12/3/2020 last tx- port a cath right side   • Pulmonary embolism ([facility] [address] 10 mg, 10 mg, Oral, Nightly  Olaparib TABS 300 mg, 300 mg, Oral, BID  Pantoprazole Sodium (PROTONIX) EC tab 40 mg, 40 mg, Oral, BID AC  predniSONE (DELTASONE) tab 5 mg, 5 mg, Oral, Nightly  predniSONE (DELTASONE) tab 10 mg, 10 mg, Oral, Daily with breakfas Disease:  No chronic infections or recent fevers prior to the acute illness            Cardiovascular: No history of CAD, prior MI, chest pain, or palpitations            Respiratory: + recurrent pleural effusions             Hematologic: + DVT, PE;  + chr 05/10/2021    GLU 79 05/10/2021    CA 8.5 05/10/2021    ALB 1.9 05/10/2021    ALKPHO 382 05/10/2021    BILT 7.4 05/10/2021    AST 78 05/10/2021    ALT 56 05/10/2021    LIP 30 05/10/2021     Recent Labs   Lab 05/10/21  1341   GLU 79   BUN 19*   CREATSERUM 0 head mass or peripancreatic mass.    SPLEEN:  No enlargement or focal lesion.     KIDNEYS:  No mass, obstruction, or calcification.     ADRENALS:  No mass or enlargement.     AORTA/VASCULAR: Stephanie Guido is a vena cava filter noted. Stephanie Guido is again noted to be [facility] reported to the emergency room staff on May 10, 2021 at 5:40 p.m. .         Dictated by (CST): Brennan Alegria MD on 5/10/2021 at 5:16 PM       Finalized by (CST): Brennan Alegria MD on 5/10/2021 at 5:40 PM     Impression: 31 yr-old male with DVT/PE s/p IVC nicanor NT.    Patient will be higher risk for ERCP with stenting in setting of pleural effusion. Will repeat CXR and d/w Pulmonology in am. Family also discussing goals of care with Palliative service.  Empiric antibiotics in the interim -no clinical signs of chol

## 2021-05-11 NOTE — PROGRESS NOTES
[facility]  Progress Note     Geofm Presume Patient Status:  Inpatient    10/3/1990 MRN [id number]   [facility] 4NW-A Attending Rosana Quiñones MD   Hosp Day # 1 PCP Ainsley Landis DO     Chief Complaint: worsening cancer sx bilirubin  2. Transaminitis  3. Possible pancreatic mass  4. Severely dilated common bile duct  1. Consult GI  2. Will likely need ERCP - now DNAR not sure if he would tolerate   5. Bilateral pleural effusions- s/p left ct placement    6. Anasarca  1.  IV L

## 2021-05-11 NOTE — CONSULTS
[facility]  Report of Consultation    Margarita Inch Patient Status:  Inpatient    10/3/1990 MRN [id number]   [facility] 4NW-A Attending Gloria Hylton MD   Hosp Day # 1 PCP Geraldo Bro DO     Reason for Consultation:    Josh Moreno thrombosis ([facility]    • Depression    • Exposure to medical diagnostic radiation     12/2019   • Flatulence/gas pain/belching    • Frequent urination    • GE junction carcinoma ([facility] [address]    • History of 2019 novel coronavirus disease (COVID-19) 12/05/2020   • In Albuterol Sulfate  (90 Base) MCG/ACT inhaler 1 puff, 1 puff, Inhalation, Q4H PRN  •  busPIRone HCl (BUSPAR) tab 10 mg, 10 mg, Oral, TID  •  escitalopram (LEXAPRO) tab 20 mg, 20 mg, Oral, Daily  •  Dicyclomine HCl (BENTYL) tab 20 mg, 20 mg, Oral, QI Ordered in Other Encounters:   •  Normal Saline Flush 0.9 % injection 10 mL, 10 mL, Intravenous, Once  •  Heparin Lock Flush 100 UNIT/ML lock flush 500 Units, 5 mL, Intracatheter, Once    Review of Systems:  A 12-point review of systems was done with anette 0. 75 0.70 - 1.30 mg/dL    BUN/CREA Ratio 25.3 (H) 10.0 - 20.0    Calcium, Total 8.5 8.5 - 10.1 mg/dL    Calculated Osmolality 279 275 - 295 mOsm/kg    GFR, Non- 122 >=60    GFR, -American 141 >=60    AST 78 (H) 15 - 37 U/L    ALT 56 Collection Time: 05/10/21  1:44 PM   Result Value Ref Range    Ventricular rate 118 BPM    Atrial rate 118 BPM    P-R Interval  ms    QRS Duration 84 ms    Q-T Interval 342 ms    QTC Calculation (Bezet) 479 ms    P Axis  degrees    R Axis 39 degrees    T A techniques were used.  Dose information is   transmitted to the ACR ([facility]) [address] ([address]) which includes the Dose Index Registry.       PATIENT STATED HISTORY:(As transcribed by Technologist)  Patient with e right pleural effusion. Karlene Gold   is patchy ground-glass density in the remainder of the lungs which could indicate multifocal pneumonia. OTHER:  Negative.                          Impression   CONCLUSION:     1.  There is interval development of marked di Malignant neoplasm of esophagus (HCC)     Abdominal pain of unknown etiology     Metastatic malignant neoplasm, unspecified site [facility])     General weakness     Diarrhea     Depressive disorder     Pancytopenia (HCC)     Back pain     Pneumonia due to COVID and I would recommend against this. 5)  DVT's - on lovenox - on hold for possible procedure. Has filter in place.     6)  Anemia - recheck Hgb this am - transfuse for less than 7.    7)  Vocal cord paralysis - was injected - still with some aspiration b

## 2021-05-12 NOTE — PROGRESS NOTES
[facility] Pulmonary/Critical Care Progress Note     SUBJECTIVE/Interval history: All events, procedures, notes reviewed. No acute events overnight, he feels better today, slept earlier. Pain and dyspnea improved.  No fe 136 135   GFRNAA 122 117 117   CA 8.5 8.7 8.0*   * 134* 136   K 4.1 4.2 3.7    102 101   CO2 28.0 24.0 27.0     Recent Labs   Lab 05/10/21  1341 05/11/21  0928 05/12/21  0626   RBC 2.74* 2.78* 2.19*   HGB 7.8* 7.8* 6.4*   HCT 24.9* 26.4* 20.0* 5/11/2021 at 2:08 PM     Finalized by (CST): Joelle Cobb MD on 5/11/2021 at 2:10 PM       XR CHEST AP PORTABLE  (CPT=71045)    Result Date: 5/10/2021  CONCLUSION:  1. New complete opacification of the left hemithorax likely secondary to pleural fluid and at mg Oral Nightly   • predniSONE  10 mg Oral Daily with breakfast   • scopolamine  1 patch Transdermal Q72H     HYDROmorphone HCl **OR** HYDROmorphone HCl, Albuterol Sulfate HFA, Dicyclomine HCl, Loperamide HCl, LORazepam, mirtazapine, Prochlorperazine Ofe

## 2021-05-12 NOTE — PHYSICAL THERAPY NOTE
Pt being follow by physical therapy. Pt and family pursing hospice care at this time. Will sign off.

## 2021-05-12 NOTE — PROGRESS NOTES
[facility]                       Gastroenterology Follow up [address] [facility] Gastroenterology    Gaurav Fernando Patient Status:  Inpatient    10/3/1990 MRN [id number]   [facility] 4NW-A Attending Arash Vergara MD   [facility] 2. There is patchy right basilar and right infrahilar airspace disease, unchanged since 4/12/2021.     Dictated by (CST): Judy Blanchard MD on 5/10/2021 at 2:37 PM     Finalized by (CST): Judy Blanchard MD on 5/10/2021 at 2:38 PM       [facility] Gastroenterology  [phone]

## 2021-05-12 NOTE — PLAN OF CARE
Problem: shortness of breath  Data: Patient alert and oriented overnight, drowsy at times, jaundiced. Patient reports pain to chest tube site, rating it at a 7 on 1-10 pain scale. Patient's chest tube to water seal, serosanguinous output noted.  Patient inc Verbalizes/displays adequate comfort level or patient's stated pain goal  Description: INTERVENTIONS:  - Encourage pt to monitor pain and request assistance  - Assess pain using appropriate pain scale  - Administer analgesics based on type and severity of

## 2021-05-12 NOTE — PROGRESS NOTES
Pain Service    Request to interrogate intrathecal pain pump.       32 yo with advanced metastatic adenocarcinoma of the esophagus He has left lung obstruction and massive effusion s/p thorocentesis and CT placement and new malignant extrahepatic biliary ob

## 2021-05-12 NOTE — HOSPICE RN NOTE
Pt, wife and mother participated in meeting to provide info on hospice philosophy and benefit. Questions answered. Their goal is to go home. They were advised of [facility] relationship with [facility].   Claudia Lopez 83 expressed that he notes that

## 2021-05-12 NOTE — PROGRESS NOTES
Heme/Onc Progress Note    Patient Name: Delio Sotelo   YOB: 1990   Medical Record Number: [id number]   CSN: [id number]   Attending Physician: Dari Walters M.D. Subjective:  Sleeping.   Had a reasonable night per nursing - requir BID  •  Pantoprazole Sodium (PROTONIX) EC tab 40 mg, 40 mg, Oral, BID AC  •  predniSONE (DELTASONE) tab 5 mg, 5 mg, Oral, Nightly  •  predniSONE (DELTASONE) tab 10 mg, 10 mg, Oral, Daily with breakfast  •  Prochlorperazine Maleate (COMPAZINE) tab 10 mg, 10 regions. Psych/Depression: depressed and somewhat anxious.     Labs:  Recent Results (from the past 24 hour(s))   COMP METABOLIC PANEL (14)    Collection Time: 05/11/21  9:28 AM   Result Value Ref Range    Glucose 67 (L) 70 - 99 mg/dL    Sodium 134 (L) 136 %   RBC MORPHOLOGY SCAN    Collection Time: 05/11/21  9:28 AM   Result Value Ref Range    RBC Morphology See morphology below (A) Normal, Slide reviewed, see previous RBC morphology.     Platelet Morphology Normal Normal    Hypochromia 1+      Microcytosis 7.50   POCT GLUCOSE    Collection Time: 05/11/21  3:25 PM   Result Value Ref Range    POC Glucose 92 70 - 99 mg/dL       Radiology:  CXR - Left hemithorax still opacified. Impression and Plan:  1)  Advanced metastatic adenocarcinoma of the esophagus.   Yue Cage

## 2021-05-12 NOTE — OCCUPATIONAL THERAPY NOTE
Pt/family have decided to pursue hospice care at home. Will DC OT at this time as therapy is not consistent with end of life care.      Thank you for allowing me to care for this patient,   Stefan MENDEZ, OTR/L   Occupational Therapist   [facility]

## 2021-05-12 NOTE — PROGRESS NOTES
[facility]  Progress Note     Dagmar Courtney Patient Status:  Inpatient    10/3/1990 MRN [id number]   [facility] 4NW-A Attending Karlee Medina MD   Hosp Day # 2 PCP Leobardo Montgomery DO     Chief Complaint: worsening cancer sx data reviewed in Epic. ASSESSMENT / PLAN:   1. Comfort care Now -   1. resid following, didn't sign POSLT form  2. Plan home Thrs, DME being ordered  3. Cont w/ pain pump and po meds  4. Ct per pulm- ? Pull vs leaving in     2.  Anemia due to cancer- s/p 1

## 2021-05-12 NOTE — TELEPHONE ENCOUNTER
Spoke with Friday quirino and informed her that per Dr. Froylan White he will follow patient if Dr. Aracelis Mariee does not. She stated she will have Dr. Camila Webb follow.

## 2021-05-13 NOTE — CM/SW NOTE
05/13/21 1700   Discharge disposition   Expected discharge disposition Home-Health   Name of 340Ofelia Velasquez   Discharge transportation [facility]

## 2021-05-13 NOTE — PROGRESS NOTES
[facility] Pulmonary/Critical Care Progress Note     SUBJECTIVE/Interval history: All events, procedures, notes reviewed. No acute events overnight, he feels 'okay' today, breathing remains improved. Denies cough.  No ch 67* 117*   BUN 19* 21* 22*   CREATSERUM 0.75 0.83 0.84   GFRAA 141 136 135   GFRNAA 122 117 117   CA 8.5 8.7 8.0*   * 134* 136   K 4.1 4.2 3.7    102 101   CO2 28.0 24.0 27.0     Recent Labs   Lab 05/10/21  1341 05/10/21  1341 05/11/21  0928 05 Darrel Falcon MD on 5/13/2021 at 7:39 AM       XR CHEST AP PORTABLE  (CPT=71045)    Result Date: 5/12/2021  CONCLUSION:    L subtotal opacification of the left chest with mild aeration left apex showing slight improvement since yesterday.   Dictated by (CST): Celeste 10, 2021 at 5:40 p.m. Shania Talley    Dictated by (CST): Heydi Joyce MD on 5/10/2021 at 5:16 PM     Finalized by (CST): Heydi Joyce MD on 5/10/2021 at 5:40 PM         • sodium chloride  30 mL Irrigation Q8H   • piperacillin-tazobactam  4.5 g Intravenous Q8H   • fur infection in April 21st 2021     PLAN  · Continue diuresis and airway clearance/ chest PT  · Continue close monitoring of respiratory status, wean o2 for sats >89%  · Follow fluid balance, lytes, urine output  · Continue chest tube to suction; had intended

## 2021-05-13 NOTE — PAYOR COMM NOTE
--------------  ADMISSION REVIEW     Payor: GABBIE RODAS  Subscriber #:  [id number]  Authorization Number: [id number]     Admit date: 5/10/21  Admit time:  6:21 PM       Admitting Physician: Lorenzo Hernandez MD  Attending Physician:  Gilmer Byrne MD  Prim Personal history of antineoplastic chemotherapy     12/3/2020 last tx- port a cath right side   • Pulmonary embolism (HCC)    • S/P insertion of IVC (inferior vena caval) filter     Per MD Notes- and confirmed by patient, IVC Filter will be placed- on 12/2 Abdomen: Soft nontender nondistended normal active bowel sounds without rebound, guarding or masses noted, peripheral edema of the abdomen noted  Back nontender without CVA tenderness  Extremity lower extremity peripheral edema noted  Neuro: No focal defic results for these tests on the individual orders. RAINBOW DRAW BLUE   RAINBOW DRAW LAVENDER   RAINBOW DRAW LIGHT GREEN   RAINBOW DRAW GOLD     EKG    Rate, intervals and axes as noted on EKG Report.   Rate: 118  Rhythm: Sinus Rhythm  Reading: Agreed 3:45 PM     Finalized by (CST): Opal Elkins MD on 4/13/2021 at 3:49 PM       XR VIDEO SWALLOW (LOC=88499)    Result Date: 4/13/2021            PROCEDURE:  XR VIDEO SWALLOW (CPT=74230)  TECHNIQUE:  Video fluoroscopic swallowing study was performed in coop response to antineoplastic. CONTRAST USED:  100cc of Omnipaque 350  FINDINGS:   CHEST:  LUNGS:  There is a large soft tissue density mass involving the left hilum extending into the mediastinum and left AP window.   This is difficult to measure but appear on the right. The tumor mass in the left hilum and mediastinum causes significant narrowing of the left superior pulmonary vein. The inferior pulmonary vein is not well appreciated.   There is narrowing of the pulmonary arteries to the left lung due to th likely degenerative. Osteoblastic disease cannot be excluded. CONCLUSION:  1. The large mass in the left hilar region and mediastinum is difficult to measure but appears unchanged. Approximate measurement 7.3 x 3.2 cm.   There is however, devel *Johnson 3  [address] [facility], [address] [place] [address] function was at the lower limits of normal to mildly reduced. The estimated ejection fraction was 45-50%. Left atrium:  The left atrium was normal in size. Right ventricle:  Poorly visualized.  The cavity size was normal. Systolic function was normal. Right disease again noted. No pneumothorax. Right costophrenic angle is clear. The cardiac silhouette is obscured. CONCLUSION:  1. New complete opacification of the left hemithorax likely secondary to pleural fluid and atelectasis.  2. There is patc specified.         Medications Prescribed:  Current Discharge Medication List                                       Signed by Sai Cavazos MD on 5/10/2021  4:27 PM            H&P - H&P Note      H&P signed by Lisa Valencia MD at 5/10/2021  9:16 PM test positive for detection of COVID-19 virus     12/5/2020   • Loss of appetite    • Personal history of antineoplastic chemotherapy     12/3/2020 last tx- port a cath right side   • Pulmonary embolism (HCC)    • S/P insertion of IVC (inferior vena caval) MG/ML Subcutaneous Solution, Inject 1 mL (100 mg total) into the skin 2 (two) times daily. , Disp: 60 Syringe, Rfl: 0  albuterol sulfate (2.5 MG/3ML) 0.083% Inhalation Nebu Soln, Take 1.5 mL (1.25 mg total) by nebulization every 6 (six) hours as needed for Rfl: 5  gabapentin 300 MG Oral Cap, Take 1 capsule (300 mg total) by mouth nightly., Disp: 90 capsule, Rfl: 0  cyclobenzaprine 5 MG Oral Tab, Take 1 tablet (5 mg total) by mouth 3 (three) times daily as needed for Muscle spasms. , Disp: 30 tablet, Rfl: 0  O Clearance: 184.5 mL/min (based on SCr of 0.75 mg/dL). No results for input(s): PTP, INR in the last 168 hours. Recent Labs   Lab 05/10/21  1341   TROP 0.045*       Imaging: Imaging data reviewed in Epic.       ASSESSMENT / PLAN:     1. Jaundice, eleva (Port-a-Cath) Andrew Zee, RN      furosemide (LASIX) injection 20 mg     Date Action Dose Route User    5/12/2021 1806 Given 20 mg Intravenous Andrew Zee, RN      gabapentin (NEURONTIN) cap 300 mg     Date Action Dose Route User    5/12/2 sodium chloride 0.9 % irrigation 30 mL     Date Action Dose Route User    5/13/2021 0500 New Bag 30 mL Irrigation [facility]    5/12/2021 2100 New Bag 30 mL Irrigation [facility]    5/12/2021 1400 Restarted (none) Irrigation Siapn repeated difficulties with scheduling at [facility]. · GI to evaluate biliary obstruction  · Given his worsening clinical status, would consider palliative care evaluation and review with patient/family goals of care.  Hospice would be appropriate given his cont Sukh Webster MD  5/11/2021  7:09 AM               Electronically signed by Alex Vegas MD at 5/11/2021  7:27 AM  Froilan Woods MD   Physician   Pulmonology   Operative Report      Signed   Date of Service:  5/11/2021 12:08 PM               S then advanced via the introducer needle into the pleural space and the needle was removed. The blunt tip dilator was then inserted over the guidewire to dilate the tract and removed keeping the guidewire in place.  Next, the [facility] 14 Lao pigtail catheter however currently is not stable from a cardio-pulmonary standpoint ('s, no breath sounds on left with plans for thoracentesis today). Currently no fevers or abd pain--does not appear to have cholangitis.  Can consider ERCP once stable and pending pall wife today and we will allow someone to come up with her for the discussion. Will likely take a day to arrange for care at home. DNAR.     2)  Pleural effusions L>>>R.  Per Dr Miguel Crowe guided chest tube placed yesterday.  Trapped lung due to hilar dise likely malignant pancreatic/peripancreatic mass  · Pleural effusion - s/p left thoracentesis 4/8 with 900ml removed, analysis consistent with transudate which may be related to total body fluid overload or even the collapsed lung  · Suspected malignant air against this.  Agreed to DNAR.     5)  DVT's - on lovenox - to try to reduce lower extremity edema from clots.  I consider this palliative.  Has filter in place. If he is tired of shots once he's established at home, may be able to quit.      6)  Anemia -

## 2021-05-13 NOTE — PROGRESS NOTES
[facility]  Progress Note     Gregory Alvares Patient Status:  Inpatient    10/3/1990 MRN [id number]   [facility] 4NW-A Attending Nini Hdez MD   Hosp Day # 3 PCP Eric Dickinson DO     Chief Complaint: worsening cancer sx care Now -   1. resid following, didn't sign POSLT form  2. Plan home Thrs, DME being delievered this am   3. Cont w/ pain pump and po meds  4. Ct per pulm- ? Pull vs leaving in     2.  Anemia due to cancer- s/p 1 unit PRBC's    3. Jaundice, elevated biliru

## 2021-05-13 NOTE — PLAN OF CARE
Appears more comfortable today. Morphine given every 2h as requested for L posterios chest pain & lower back pain, dilaudid p.o given once this am.Turned & repositioned patient. Chest tube care rendered. Chest tube irrigation done as ordered by Dr Marivel Guzman. C

## 2021-05-13 NOTE — TELEPHONE ENCOUNTER
Kristie will be admitted to routine hospice care at home following discharge from [facility]. Thank you.

## 2021-05-13 NOTE — PROGRESS NOTES
Patient was provided with discharge instructions, education, and follow up information. Printed prescriptions were given to patient for Sodium Chloride 0.9% Irrigation Solution.   Patient verbalizes understanding of follow up information, specifically medic

## 2021-05-13 NOTE — HOSPICE RN NOTE
Plan is for d/c to home today. Spoke with Kritsie to go over plan. Call placed to his wife Bin Montejo who was updated on plan and she said DME and meds have arrived. POC reviewed with KLEBER Daley.

## 2021-05-13 NOTE — PROGRESS NOTES
Pt is alert and oriented. Pt has received morphine for pain with good relief. Pt has generalized swelling to all extremities and scrotum. Pt is incontinent. Pt has chest tube to gravity water seal at 20. Pt has been receiving zosyn as ordered.

## 2021-05-13 NOTE — PROGRESS NOTES
Heme/Onc Progress Note    Patient Name: Karlee Vann   YOB: 1990   Medical Record Number: [id number]   CSN: [id number]   Attending Physician: Cisco Reeves M.D.      Subjective:  Required regular doses of PRN narcotics last night but 300 mg, 300 mg, Oral, Nightly  •  Loperamide HCl (IMODIUM) cap 2 mg, 2 mg, Oral, QID PRN  •  LORazepam (ATIVAN) tab 2 mg, 2 mg, Oral, Q6H PRN  •  mirtazapine (REMERON) tab 15 mg, 15 mg, Oral, Nightly PRN  •  OLANZapine (ZYPREXA) tab 10 mg, 10 mg, Oral, Nig Regular rate and rhythm. Abdomen: Soft, non tender with good bowel sounds. Extremities: Pedal pulses are present. Left upper extremity and both lower extremities with 4+ edema. Neurological: Grossly intact. Lymphatics:  There is no palpable lymphadeno and I would recommend against this. Agreed to DNAR.     5)  DVT's - on lovenox - to try to reduce lower extremity edema from clots. I consider this palliative.  Has filter in place.  If he is tired of shots once he's established at home, may be able to [facility]

## 2021-05-14 NOTE — PAYOR COMM NOTE
--------------  DISCHARGE REVIEW    Payor: GABBIE RODAS  Subscriber #:  [id number]  Authorization Number: [id number]     Admit date: 5/10/21  Admit time:   6:21 PM  Discharge Date: 5/13/2021  3:45 PM     Admitting Physician: Heather Barnes MD  Attending Phys

## 2021-05-14 NOTE — PLAN OF CARE
Patient discharged via ambulance at approx [phone] to home with [facility]. Per [address] [place] spouse aware and DME in place. IVs and port de accessed by DC KLEBER Live. Per Jairo lee [facility] patient can DC with Chest tube in place.  This was dicussed

## 2021-05-14 NOTE — DISCHARGE SUMMARY
[facility] [place] [facility]  DISCHARGE SUMMARY     Dagmar Courtney Patient Status:  Inpatient    10/3/1990 MRN [id number]   [facility] 4NW-A Attending Zay Del Rio MD   [facility] Day # 3 PCP Leobardo Montgomery DO     Date of Admission: 5/10/2021  Date of admitted with increasing pain shortness of breath. CT shows collapse of left lung large pleural effusion. Worsening jaundice. Patient underwent thoracentesis and placement of chest tube.   Chest tube on waterseal with irrigation did not tolerated to suct oncology  • Received 1 unit packed RBCs    Lab/Test results pending at Discharge:   · None    Consultants:  • Pulmonology, hematology, residential [facility], GI, social work, palliative care, pain service    Discharge Medication List:     Discharge Medicatio Syringe  Refills: 0     gabapentin 300 MG Caps  Commonly known as: NEURONTIN      Take 1 capsule (300 mg total) by mouth nightly.    Quantity: 90 capsule  Refills: 0     HYDROmorphone HCl 4 MG Tabs  Commonly known as: DILAUDID  Notes to patient: Next dose d Quantity: 30 packet  Refills: 0     predniSONE 5 MG Tabs  Commonly known as: DELTASONE      2 tabs in am and 1 tab in pm   Quantity: 90 tablet  Refills: 5     Prochlorperazine Maleate 10 mg tablet  Commonly known as: COMPAZINE      Take 1 tablet (10 mg tot minutes

## 2021-05-17 NOTE — TELEPHONE ENCOUNTER
Stef Velazquez from residential [facility] says Kristie signed a DNR today. He does have a chest tube in and is running a temperature on and off. His chest tube drainage devise holds 2000 and he has 1550 in it. She is asking if she should keep draining it.  She says he ca

## 2021-05-20 NOTE — TELEPHONE ENCOUNTER
PT'S CHEST TUBE STOPPED WORKING TODAY, PT CHOOSING TO STAY AT HOME & HE IS NOT IN ANY RESPIRATORY DISTRESS , HE IS AWARE HE CAN CALL 911 IF HE CHANGES HIS MIND IF HE BECOMES IN DISTRESS

## 2021-05-21 NOTE — TELEPHONE ENCOUNTER
ARUN'S CHEST TUBE STOPPED WORKING YESTERDAY, AND THEY NEED TO SPEAK WITH DR Lily Amanda  ABOUT POSSIBLY REMOVING THE CHEST TUBE IN OFFICE.

## 2021-05-21 NOTE — TELEPHONE ENCOUNTER
Bina Aviles from [facility] called asking to speak with Wolf Kelly for a general condition report.

## 2021-05-21 NOTE — TELEPHONE ENCOUNTER
New cannister changed 5/18/2021- working ok; ball in [address] moves up and down like it is suppose to. Chest tube stopped working yesterday. When nurse flushed it he had pain-  This is new. No more flushing per [facility].   She is going t

## 2021-05-21 NOTE — TELEPHONE ENCOUNTER
JOSE ANTONIO for [facility] Anast[facility] requesting call back. Contact information provided.    [phone]

## 2021-06-09 ENCOUNTER — TELEPHONE (OUTPATIENT)
Dept: HEMATOLOGY/ONCOLOGY | Facility: HOSPITAL | Age: 31
End: 2021-06-09

## 2021-06-09 ENCOUNTER — TELEPHONE (OUTPATIENT)
Dept: HEMATOLOGY/ONCOLOGY | Age: 31
End: 2021-06-09

## 2021-06-09 NOTE — TELEPHONE ENCOUNTER
11:30pm on 6/8/2021  Offering condolences. It was a peaceful passing and they are supporting the family.

## 2021-06-09 NOTE — TELEPHONE ENCOUNTER
[facility] at [phone] is calling with a correction on time of death.  Patient passed away on 6/8/21, Time of death is 11:30 AM, Allan Butler would like to apologize for the confusion of time and Allan Butler would like thank Dr. Yaa Walters for the

## 2022-05-18 NOTE — TELEPHONE ENCOUNTER
----- Message from Jennie Martinez RN sent at 12/10/2020  4:09 PM CST -----  Regarding: Kristie admitted with + COVID screen prior to ITP implant and bilateral PEs  Hi all,  Kristie was admitted to [facility] after positive COVID screening prior to ITP implant surgery. [Well Nourished] : well nourished [Well Developed] : well developed [Well-Appearing] : well-appearing [Normal Sclera/Conjunctiva] : normal sclera/conjunctiva [PERRL] : pupils equal round and reactive to light [EOMI] : extraocular movements intact [Normal Outer Ear/Nose] : the outer ears and nose were normal in appearance [Normal Oropharynx] : the oropharynx was normal [No JVD] : no jugular venous distention [No Lymphadenopathy] : no lymphadenopathy [Supple] : supple [Thyroid Normal, No Nodules] : the thyroid was normal and there were no nodules present [No Respiratory Distress] : no respiratory distress  [No Accessory Muscle Use] : no accessory muscle use [Clear to Auscultation] : lungs were clear to auscultation bilaterally [Normal Rate] : normal rate  [Regular Rhythm] : with a regular rhythm [Normal S1, S2] : normal S1 and S2 [No Murmur] : no murmur heard [No Carotid Bruits] : no carotid bruits [No Abdominal Bruit] : a ~M bruit was not heard ~T in the abdomen [No Varicosities] : no varicosities [Pedal Pulses Present] : the pedal pulses are present [No Edema] : there was no peripheral edema [No Palpable Aorta] : no palpable aorta [No Extremity Clubbing/Cyanosis] : no extremity clubbing/cyanosis [Soft] : abdomen soft [Non Tender] : non-tender [Non-distended] : non-distended [No Masses] : no abdominal mass palpated [No HSM] : no HSM [Normal Bowel Sounds] : normal bowel sounds [Normal Posterior Cervical Nodes] : no posterior cervical lymphadenopathy [Normal Anterior Cervical Nodes] : no anterior cervical lymphadenopathy [No CVA Tenderness] : no CVA  tenderness [No Spinal Tenderness] : no spinal tenderness [No Joint Swelling] : no joint swelling [Grossly Normal Strength/Tone] : grossly normal strength/tone [No Rash] : no rash [Coordination Grossly Intact] : coordination grossly intact [No Focal Deficits] : no focal deficits [Normal Gait] : normal gait [Normal Affect] : the affect was normal [Normal Insight/Judgement] : insight and judgment were intact

## 2023-02-06 NOTE — PLAN OF CARE
From: Yen Curry  To: Isa COLE Garza  Sent: 2/4/2023 6:43 PM CST  Subject: Restart minocycline?    This message is being sent by Jluy Curry on behalf of Yen Curry.    Deborah Aviles  Yen is wondering if she can restart the oral minocycline for her acne? She has been off of it about 4-6 wks and is noticing her acne returning. She is nervous it is going to continue to get worse. She has been using the topical clindamycin. She would like her face as clear as possible for prom this spring. Thank you in advance for your feedback on this.   -July Curry   S/p chest tube placement at bedside by Dr Kee Garcia. Brown pleural fluid drained & completely filled up the chamber. Chamber changed. Catheter is noted secure in place & with no complications. Patient is tolerating it. SBP on the high 90s. O2 sats 100%. No comp

## 2023-05-10 NOTE — PROGRESS NOTES
[facility]  Report of Consultation    Jayeshmoniqueu Radha 11 Patient Status:  Outpatient in a Bed    10/3/1990 MRN [id number]   [facility] 3SW-A Attending Flex Lee MD   Hosp Day # 0 PCP Bridgette Hammond DO     Date of Admission:   [facility] list updated/reviewed. LAPAROSCOPIC ESOPHAGOGASTRECTOMY Right 4/27/2018    Performed by Gloria Worley., Lynne Liu MD at [facility] MAIN OR   • OTHER SURGICAL HISTORY  04/27/2018    Laparoscopic-assisted Robert Bruno esophagogastrectomy, abdominal and mediastinal lymphadenectomy, feeding jeju hydroxide (MILK OF MAGNESIA) 400 MG/5ML suspension 30 mL, 30 mL, Oral, Daily PRN  •  bisacodyl (DULCOLAX) rectal suppository 10 mg, 10 mg, Rectal, Daily PRN  •  Fleet Enema (FLEET) 7-19 GM/118ML enema 133 mL, 1 enema, Rectal, Once PRN  •  ondansetron HCl ( to chronic blood loss     Infusion reaction     Thrombocytopenia (HCC)     Anxiety     Anemia associated with chemotherapy     Folliculitis     Chemotherapy-induced nausea and vomiting     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     Marc Pena, Pain RN -  [facility]  12/31/2020  1:07 PM

## 2023-08-31 NOTE — PROGRESS NOTES
Virtual Telephone Check-In    Ana Paula Bello verbally consents to a Virtual/Telephone Check-In visit on 07/13/20. Patient has been referred to the [facility] website at [email or web address] to review the yearly Consent to Treat document.     Patient und tablet 0   • oxyCODONE-acetaminophen 5-325 MG Oral Tab Take 1-2 tablets by mouth every 4 (four) hours as needed for Pain.  150 tablet 0   • LORAZEPAM 1 MG Oral Tab TAKE 1 TABLET BY MOUTH EVERY 4 HOURS AS NEEDED FOR ANXIETY 60 tablet 0   • morphINE Sulfate E sampling is clinically desired. 2. Slight increase in the stranding of the left periaortic fat with slight increase in the left common iliac lymph node could represent metastatic disease.   3. Findings of a gastric pull-through within the chest without lauren yes

## 2024-04-15 NOTE — LETTER
[facility] [place]ONENTS      In the course of your treatment, it may become necessary to administer a transfusion of blood or blood components.  This form provides basic information concerning this proc If loss of blood poses serious threats in the course of your treatment, THERE IS NO EFFECTIVE ALTERNATIVE TO BLOOD TRANSFUSION.  However, if you have any further questions on this matter, your physician will fully explain the alternatives to you if it has n Ptosis of breast

## 2025-01-18 NOTE — PROGRESS NOTES
[facility]    PATIENT'S NAME: Carlos Jordan NELDA   ATTENDING PHYSICIAN: Amy Cagle M.D.    PATIENT ACCOUNT #: [de-identified] LOCATION: [address] [place] [address] RECORD #: [id number] YOB: 1990   DATE OF SERVICE: 11/27/2018       CANCER has punctate uptake in his subcrural node with an SUV of 6.1. He is relatively asymptomatic. He is here with his wife and mother to discuss further management.   His current medications include Tylenol, citalopram 40 mg daily, fluticasone propionate nasal not likely a curable situation.   If he had strictly 1 area of nodes I might consider the possibility of additional radiation, but these being evident, both above and below his previous field, there is not likely to be much benefit for any local interventio No

## (undated) DEVICE — MARKER SKIN 2 TIP

## (undated) DEVICE — DRAPE EQUIPMENT INTRATEMP THER

## (undated) DEVICE — GENERAL LAPAROS CDS-LF: Brand: MEDLINE INDUSTRIES, INC.

## (undated) DEVICE — WOUND RETRACTOR AND PROTECTOR: Brand: ALEXIS WOUND PROTECTOR-RETRACTOR

## (undated) DEVICE — STERILE POLYISOPRENE POWDER-FREE SURGICAL GLOVES: Brand: PROTEXIS

## (undated) DEVICE — PASSER CATHETER 60CM

## (undated) DEVICE — SUTURE SILK 2-0 SH

## (undated) DEVICE — SUTURE SILK 3-0 SH

## (undated) DEVICE — SUTURE SILK 0 FSL

## (undated) DEVICE — C-ARM: Brand: UNBRANDED

## (undated) DEVICE — [HIGH FLOW INSUFFLATOR,  DO NOT USE IF PACKAGE IS DAMAGED,  KEEP DRY,  KEEP AWAY FROM SUNLIGHT,  PROTECT FROM HEAT AND RADIOACTIVE SOURCES.]: Brand: PNEUMOSURE

## (undated) DEVICE — HIPEC PACK: Brand: MEDLINE INDUSTRIES, INC.

## (undated) DEVICE — BLADE ELECTRODE: Brand: EDGE

## (undated) DEVICE — SUTURE ETHILON 3-0 FSL

## (undated) DEVICE — NEEDLE SPINAL 22X3-1/2 BLK

## (undated) DEVICE — GOWN,SIRUS,FABRIC-REINFORCED,X-LARGE: Brand: MEDLINE

## (undated) DEVICE — SCD SLEEVE KNEE HI BLEND

## (undated) DEVICE — CORD MONOPOLAR DISP

## (undated) DEVICE — Device

## (undated) DEVICE — PDS II VLT 0 107CM AG ST3: Brand: ENDOLOOP

## (undated) DEVICE — SUTURE PDS II 1 TP-1

## (undated) DEVICE — MARKER SKIN PREP RESIST STRL

## (undated) DEVICE — PLASTC TOOMEY SYRNG DISP

## (undated) DEVICE — AVANOS* TUOHY EPIDURAL NEEDLE: Brand: AVANOS

## (undated) DEVICE — LARYNGOSCOPY: Brand: MEDLINE INDUSTRIES, INC.

## (undated) DEVICE — DRAPE,T,LAPARO,TRANS,STERILE: Brand: MEDLINE

## (undated) DEVICE — SOL  .9 1000ML BTL

## (undated) DEVICE — SUTURE MONOCRYL 4-0 P-3

## (undated) DEVICE — SKIN AFFIX .4ML

## (undated) DEVICE — SUTURE ETHILON 3-0 PS-1

## (undated) DEVICE — SUTURE VICRYL 3-0 RB-1

## (undated) DEVICE — CATHETER,URETHRAL,REDRUBBER,STRL,12FR: Brand: MEDLINE INDUSTRIES, INC.

## (undated) DEVICE — TISSUE RETRIEVAL SYSTEM: Brand: INZII RETRIEVAL SYSTEM

## (undated) DEVICE — ECHELON FLEX POWERED PLUS ARTICULATING ENDOSCOPIC LINEAR CUTTER , 60MM: Brand: ECHELON FLEX

## (undated) DEVICE — SUTURE MONOCRYL 4-0 PS-2

## (undated) DEVICE — SUTURE VICRYL 2-0 CT-2

## (undated) DEVICE — SUTURE VICRYL 3-0 SH

## (undated) DEVICE — TROCAR: Brand: KII FIOS FIRST ENTRY

## (undated) DEVICE — TROCARS: Brand: KII® BLUNT TIP ACCESS SYSTEM

## (undated) DEVICE — GLOVE SURG SENSICARE SZ 7

## (undated) DEVICE — SOLUTION ENDOSCOPIC ANTI-FOG NON-TOXIC NON-ABRASIVE 6 CUBIC CENTIMETER WITH RADIOPAQUE ADHESIVE-BACKED SPONGE STERILE NOT MADE WITH NATURAL RUBBER LATEX MEDICHOICE: Brand: MEDICHOICE

## (undated) DEVICE — 28 FR STRAIGHT – FIRM PVC CATHETER: Brand: PVC THORACIC CATHETERS

## (undated) DEVICE — SEAM GUARD 60 ECHELON

## (undated) DEVICE — DERMABOND LIQUID ADHESIVE

## (undated) DEVICE — NEEDLE SPINAL 25X3-1/2 BLUE

## (undated) DEVICE — KIT REFILL MEDTRONIC 8551

## (undated) DEVICE — FLOSEAL HEMOSTATIC MATRIX, 5ML: Brand: FLOSEAL HEMOSTATIC MATRIX

## (undated) DEVICE — SUTURE VICRYL 2-0 CT-1

## (undated) DEVICE — LAMINECTOMY CDS: Brand: MEDLINE INDUSTRIES, INC.

## (undated) DEVICE — GAUZE SPONGES,USP TYPE VII GAUZE, 12 PLY: Brand: CURITY

## (undated) DEVICE — 6617 IOBAN II PATIENT ISOLATION DRAPE 5/BX,4BX/CS: Brand: STERI-DRAPE™ IOBAN™ 2

## (undated) DEVICE — STRYKER HARMONIC 36CM REPRCSED

## (undated) DEVICE — KENDALL SCD EXPRESS SLEEVES, KNEE LENGTH, MEDIUM: Brand: KENDALL SCD

## (undated) DEVICE — REMOVER DURAPREP 3M

## (undated) DEVICE — PLUMEPORT ACTIV LAPAROSCOPIC SMOKE FILTRATION DEVICE: Brand: PLUMEPORT ACTIVE

## (undated) DEVICE — 2, DISPOSABLE SUCTION/IRRIGATOR WITHOUT DISPOSABLE TIP: Brand: STRYKEFLOW

## (undated) DEVICE — SUTURE VICRYL 0 UR-6

## (undated) DEVICE — SYRINGE 20CC LL TIP

## (undated) DEVICE — TRANSPOSAL ULTRAFLEX DUO/QUAD ULTRA CART MANIFOLD

## (undated) DEVICE — STERILE SYNTHETIC POLYISOPRENE POWDER-FREE SURGICAL GLOVES WITH HYDROGEL COATING: Brand: PROTEXIS

## (undated) DEVICE — BANDAID COVERLET 1X3

## (undated) DEVICE — ENDOPATH ECHELON ENDOSCOPIC LINEAR CUTTER RELOADS, GOLD, 60MM: Brand: ECHELON ENDOPATH

## (undated) DEVICE — ACCESS PLATFORM FOR MINIMALLY INVASIVE SURGERY.: Brand: GELPORT® LAPAROSCOPIC  SYSTEM

## (undated) DEVICE — ENDOPATH ECHELON ENDOSCOPIC LINEAR CUTTER RELOADS, GREEN, 60MM: Brand: ECHELON ENDOPATH

## (undated) DEVICE — GLOVE SURG SENSICARE SZ 7-1/2

## (undated) DEVICE — SUTURE VICRYL 0 CT-1

## (undated) DEVICE — TROCAR: Brand: KII® SLEEVE

## (undated) DEVICE — CIRCULAR STAPLER WITH DST SERIES TECHNOLOGY: Brand: EEA

## (undated) DEVICE — TOWEL: OR BLU 80/CS: Brand: MEDICAL ACTION INDUSTRIES

## (undated) DEVICE — SUTURE SILK 0 CT-2

## (undated) DEVICE — GLOVE ORTHO ALOETOUCH SZ 71/2

## (undated) DEVICE — DRAIN CHEST DRY ADULT/PED

## (undated) DEVICE — ENDOPATH ECHELON ENDOSCOPIC LINEAR CUTTER RELOADS, WHITE, 60MM: Brand: ECHELON ENDOPATH

## (undated) DEVICE — 3M(TM) TEGADERM(TM) TRANSPARENT FILM DRESSING FRAME STYLE 9505W: Brand: 3M™ TEGADERM™

## (undated) DEVICE — DISPOSABLE GRASPER: Brand: EPIX LAPAROSCOPIC GRASPER

## (undated) DEVICE — LIGHT HANDLE

## (undated) DEVICE — WOUND RETRACTOR AND PROTECTOR: Brand: ALEXIS O WOUND PROTECTOR-RETRACTOR

## (undated) DEVICE — PAD SACRAL SPAN AID

## (undated) DEVICE — INSUFFLATION NEEDLE TO ESTABLISH PNEUMOPERITONEUM.: Brand: INSUFFLATION NEEDLE

## (undated) DEVICE — ALCOHOL 70% 4 OZ

## (undated) DEVICE — GOWN,SIRUS,FABRIC-REINFORCED,LARGE: Brand: MEDLINE

## (undated) DEVICE — 3M(TM) MEDIPORE(TM) +PAD SOFT CLOTH ADHESIVE WOUND DRESSING 3566: Brand: 3M™ MEDIPORE™

## (undated) DEVICE — 3M™ IOBAN™ 2 ANTIMICROBIAL INCISE DRAPE 6650EZ: Brand: IOBAN™ 2

## (undated) DEVICE — ABSORBABLE HEMOSTAT (OXIDIZED REGENERATED CELLULOSE, U.S.P.): Brand: SURGICEL

## (undated) NOTE — ED AVS SNAPSHOT
Fiona Gorman   MRN: [id number]    Department:  [facility] Emergency Department in [place]   Date of Visit:  1/13/2020           Disclosure     Insurance plans vary and the physician(s) referred by the ER may not be covered by your plan.  Please co tell this physician (or your personal doctor if your instructions are to return to your personal doctor) about any new or lasting problems. The primary care or specialist physician will see patients referred from the [facility] Emergency Department.  Albert Pacheco

## (undated) NOTE — LETTER
Printed: 2021    Patient Name: Cayetano Mackenzie  : 10/3/1990   Medical Record #: [id number]    Consent to [address], understand that I have been diagnosed with NATIVIDAD  mutates esophageal cancer.     I underst I have had the chance to ask questions about this treatment, and my questions have been answered to my satisfaction. I understand that I can contact my oncologist, Dr Janel Ward or my Cancer Care Team at any time if I have questions, by calling [phone].

## (undated) NOTE — LETTER
3949 [facility] [place] FOR BLOOD OR BLOOD COMPONENTS      In the course of your treatment, it may become necessary to administer a transfusion of blood or blood components.  This form provides basic information concerning this proc alternatives to you if it has not already been done. Ranjeet Best, have read/had read to me the above. I understand the matters bearing on the decision whether or not to authorize a transfusion of blood or blood components.  I have no questions wh

## (undated) NOTE — LETTER
Carolyn Griffin [address]  [address] [facility] [address], [address] [facility]  Authorization for Surgical Operation and Procedure     Date:___________                                                                                                         Time:__________ following are some, but not all, of the potential risks that can occur: fever and allergic reactions, hemolytic reactions, transmission of diseases such as Hepatitis, AIDS and Cytomegalovirus (CMV) and fluid overload.   In the event that I wish to have an a The surgeon or my attending physician will determine when the applicable recovery period ends for purposes of reinstating the DNAR order.   10. Patients having a sterilization procedure: I understand that if the procedure is successful the results will be p agree to:  a. Allow the anesthesiologist (anesthesia doctor) to give me medicine and do additional procedures as necessary.  Some examples are: Starting or using an “IV” to give me medicine, fluids or blood during my procedure, and having a breathing tube p Anesthesia (“spinal”, “epidural”, & “nerve blocks”): I understand that rare but potential complications include headache, bleeding, infection, seizure, irregular heart rhythms, and nerve injury.     I can change my mind about having anesthesia services at

## (undated) NOTE — Clinical Note
Jessy Garcia saw Kristie in the office today. As you know he has a newly diagnosed pilonidal cyst with abscess. It is currently drained. I have started him on Keflex recommended warm soaks and I will follow-up with him in 2 weeks time to ensure resolution.   Than

## (undated) NOTE — LETTER
Printed: 2020    Patient Name: Shaan Parekh  : 10/3/1990   Medical Record #: [id number]    Consent to [address] Carol[address] Cohen, understand that I have been diagnosed with esophageal cancer.     I understand that the william I understand that I could have side effects from my treatment that are not listed on this form. Each patient can respond differently to medications, and could have side effects that have not been reported by others.  I understand that complications from th and have answered any such questions.       Date:__________  Time:________ Signed:_________________________________      Patient Name: Karlee Vann  : 10/3/1990   Medical Record #: [id number]

## (undated) NOTE — LETTER
Printed: 2018    Patient Name: Kinsey Presume  : 10/3/1990   Medical Record #: [id number]    Consent to [address] Carolyn Cohen, understand that I have been diagnosed with esophageal cancer (stage IV).     I understand t I have had the chance to ask questions about this treatment, and my questions have been answered to my satisfaction.   I understand that I can contact my oncologist, Dr Devorah Tavarez or my Cancer Care Team at any time if I have questions, by calling Morgan Mota

## (undated) NOTE — LETTER
May 7, 2018        [address] [place] [id number]      Dear Erika Zamora:    I am the Nurse Care Manager with Robert Larry, DO office. Just reaching out to see how you are doing since your discharge home.    When you have a minut

## (undated) NOTE — MR AVS SNAPSHOT
[facility] HEART AND SURGICAL [facility]  [phone]                    After Visit Summary   12/3/2020    Estefanía Ken    MRN: [id number]           Visit Information     Date & Time  12/3/2020 11:00 AM Prov LORazepam 2 MG Oral Tab Take 1 tablet (2 mg total) by mouth every 6 (six) hours as needed (nausea). morphINE Sulfate ER (MS CONTIN) 100 MG Oral Tab CR () Take 1 tablet (100 mg total) by mouth every 12 (twelve) hours.     Trifluridine-Tipiracil 20 To-Do List     To-Do List     Future Appointments Provider Department Dept Phone    12/4/2020 9:00 AM OS [address] [phone]        12/5/2020 8:45  E [facility] [phone]    Please a HCT 32.1      39.0 - 53.0 % Final    .0      150.0 - 450.0 10(3)uL Final    MCV 88.9      80.0 - 100.0 fL Final    MCH 29.4      26.0 - 34.0 pg Final    MCHC 33.0      31.0 - 37.0 g/dL Final    RDW 15.6      11.0 - 15.0 % Final    RDW-SD 50.7

## (undated) NOTE — LETTER
Carolyn Griffin 182  [address] [facility] [address], [address] [facility]  Authorization for Surgical Operation and Procedure     Date:____5/11/2021_______                                                                                                         Time:__1 not all, of the potential risks that can occur: fever and allergic reactions, hemolytic reactions, transmission of diseases such as Hepatitis, AIDS and Cytomegalovirus (CMV) and fluid overload.   In the event that I wish to have an autologous transfusion of attending physician will determine when the applicable recovery period ends for purposes of reinstating the DNAR order.   10. Patients having a sterilization procedure: I understand that if the procedure is successful the results will be permanent and it wi the anesthesiologist (anesthesia doctor) to give me medicine and do additional procedures as necessary.  Some examples are: Starting or using an “IV” to give me medicine, fluids or blood during my procedure, and having a breathing tube placed to help me neno “epidural”, & “nerve blocks”): I understand that rare but potential complications include headache, bleeding, infection, seizure, irregular heart rhythms, and nerve injury.     I can change my mind about having anesthesia services at any time before I get

## (undated) NOTE — LETTER
[facility] Testing Department  Phone: [phone]  Right Fax: [phone]  Pikk 20 Marlo Boo RN Date: 12/18/20    Patient Name: Jessica Orlando  Surgery Date: 12/31/2020    CSN: [id number]  Medical Record: [id number]

## (undated) NOTE — LETTER
Printed: 2020    Patient Name: Kinsey Presume  : 10/3/1990   Medical Record #: [id number]    Consent to 29 Carolyn Cohen, understand that I have been diagnosed with esophageal cancer (stage IV).    I understand that have questions, by calling [phone]. Additional written information will be given to me prior to start of therapy. Additionally, I will receive a copy of this consent form. I have read and fully understand this consent to cancer treatment.  I ac

## (undated) NOTE — IP AVS SNAPSHOT
[address]            (For Outpatient Use Only) Initial Admit Date: 5/10/2021   Inpt/Obs Admit Date: Inpt: 5/10/21 / Obs: N/A   Discharge Date:    Christa Osorio:  [de-identified]   MRN: [de-identified]   CSN: [id number]   CEID: [id number] Subscriber :    Subscriber ID:  Pt Rel to Subscriber:    Hospital Account Financial Class: [facility]    May 13, 2021

## (undated) NOTE — MR AVS SNAPSHOT
[facility]  [address] [facility] [address]  [phone]               Thank you for choosing us for your health care visit with Simon Palacios DO.   We are glad to serve you and happy to provide you with this summary of Call [phone] for help. [employer] is NOT to be used for urgent needs. For medical emergencies, dial 911.            Visit [facility] online at  [email or web address]

## (undated) NOTE — LETTER
[facility] [address], [address]  Consent for Procedure/Sedation    Date:     Time:       1.  I authorize the performance upon [employer] the following:  VENOUS ACCESS PORT IMPLANT AND REMOVAL OF PERIPHERALLY INSERTED KAREN Signature of person authorized to consent for patient:        Relationship to patient:    ________________________________    ___________________    Witness: _________________________      Date: ___________________    Printed: 3/29/2019   2:15 PM  Patient

## (undated) NOTE — LETTER
20        RE: Anil Titus     : 10/3/1990    Dear Dr. Lisa Aase,    This letter is to inform you that your patient is being scheduled for surgery with Dr. Julianne Dyer on 20 at [facility]. We have asked the patient to contact your o

## (undated) NOTE — LETTER
Printed: 2018    Patient Name: Tommy Duggan  : 10/3/1990   Medical Record #: [id number]    Consent to Chemotherapy    I, Tommy Duggan, understand that I have been diagnosed with esophageal cancer.     I understand that the treatment suggested

## (undated) NOTE — MR AVS SNAPSHOT
[facility] HEART AND SURGICAL [facility]  [phone]                    After Visit Summary   10/28/2020    Keri Winston    MRN: [id number]           Visit Information     Date & Time  10/28/2020  2:30 PM Pr oxyCODONE-acetaminophen 5-325 MG Oral Tab Take 1-2 tablets by mouth every 4 (four) hours as needed for Pain.    gabapentin 300 MG Oral Cap Take 1 capsule (300 mg total) by mouth nightly.     HYDROmorphone HCl 4 MG Oral Tab Take 1-2 tablets (4-8 mg total) b COMP METABOLIC PANEL (14)  27/15/9975 (Approximate) 10/28/2021       Component Value Flag Ref Range Units Status    Glucose 87      70 - 99 mg/dL Final    Sodium 142      136 - 145 mmol/L Final    Potassium 3.8      3.5 - 5.1 mmol/L Final    Chloride 111 This test may exhibit interference when a sample is collected from a person who is consuming high dose of biotin (a.k.a., vitamin B7, vitamin H, coenzyme R) supplements resulting in serum concentrations >100 ng/mL.   Intake of the recommended daily allowan If you've recently had a stay at the Hospital you can access your discharge instructions in [employer] by going to Visits < Admission Summaries.  If you've been to the Emergency Department or your doctor's office, you can view your past visit information in My

## (undated) NOTE — LETTER
To Whom It May Concern:    Estefanía Rogers has been under our care regarding ongoing medical issues. Because of this, he has been required to restrict her physical activities.     He may resume his usual activities, including work, on 6/3/19 with the

## (undated) NOTE — LETTER
Printed: 2018    Patient Name: Dagmar Courtney  : 10/3/1990   Medical Record #: [id number]    Consent to [address] Carol[address] Cohen, understand that I have been diagnosed with Stage 3 esophageal cancer     I understand that I understand that I could have side effects from my treatment that are not listed on this form. Each patient can respond differently to medications, and could have side effects that have not been reported by others.  I understand that complications from th I have discussed the nature, purpose and risks of cancer treatment, alternative methods of treatment and the possibility of complications, including, but not limited to death, with the patient and/or the patient’s authorized representative.  I have given th

## (undated) NOTE — LETTER
Carolyn Griffin 182  [address] [facility] [address], [address] [facility]  Authorization for Surgical Operation and Procedure     Date:___________                                                                                                         Time:__________ occur: fever and allergic reactions, hemolytic reactions, transmission of diseases such as Hepatitis, AIDS and Cytomegalovirus (CMV) and fluid overload.   In the event that I wish to have an autologous transfusion of my own blood, or a directed donor transf applicable recovery period ends for purposes of reinstating the DNAR order.   10. Patients having a sterilization procedure: I understand that if the procedure is successful the results will be permanent and it will therefore be impossible for me to insemin give me medicine and do additional procedures as necessary. Some examples are: Starting or using an “IV” to give me medicine, fluids or blood during my procedure, and having a breathing tube placed to help me breathe when I’m asleep (intubation).  In the ev rare but potential complications include headache, bleeding, infection, seizure, irregular heart rhythms, and nerve injury.     I can change my mind about having anesthesia services at any time before I get the medicine.    _________________________________

## (undated) NOTE — LETTER
[facility] [address], [address] [facility]    Consent for Anesthesia   1.    Ane Aus agree to be cared for by a physician anesthesiologist alone and/or with a nurse anesthetist, who is specially trained to monitor me and give procedure, allergic reactions to medications, injury to my airway, heart, lungs, vision, nerves, or muscles and in extremely rare instances death. 5. My doctor has explained to me other choices available to me for my care (alternatives).   6. Pregnant Orly Printed: 4/15/2021 at 12:04 PM    Medical Record #: [id number]                                            Page 1 of 1